# Patient Record
Sex: FEMALE | Race: WHITE | Employment: OTHER | URBAN - METROPOLITAN AREA
[De-identification: names, ages, dates, MRNs, and addresses within clinical notes are randomized per-mention and may not be internally consistent; named-entity substitution may affect disease eponyms.]

---

## 2017-01-24 RX ORDER — PRAVASTATIN SODIUM 20 MG
TABLET ORAL
Qty: 30 TABLET | Refills: 5 | Status: SHIPPED | OUTPATIENT
Start: 2017-01-24 | End: 2017-07-20 | Stop reason: SDUPTHER

## 2017-02-07 RX ORDER — LISINOPRIL 2.5 MG/1
TABLET ORAL
Qty: 30 TABLET | Refills: 10 | Status: SHIPPED | OUTPATIENT
Start: 2017-02-07 | End: 2018-01-25 | Stop reason: SDUPTHER

## 2017-03-20 ENCOUNTER — TELEPHONE (OUTPATIENT)
Dept: INTERNAL MEDICINE CLINIC | Age: 82
End: 2017-03-20

## 2017-03-21 ENCOUNTER — OFFICE VISIT (OUTPATIENT)
Dept: INTERNAL MEDICINE CLINIC | Age: 82
End: 2017-03-21

## 2017-03-21 VITALS
HEART RATE: 84 BPM | BODY MASS INDEX: 22.42 KG/M2 | DIASTOLIC BLOOD PRESSURE: 82 MMHG | RESPIRATION RATE: 16 BRPM | WEIGHT: 103.6 LBS | SYSTOLIC BLOOD PRESSURE: 138 MMHG

## 2017-03-21 DIAGNOSIS — S62.102A FRACTURE OF WRIST, LEFT, CLOSED, INITIAL ENCOUNTER: Primary | ICD-10-CM

## 2017-03-21 DIAGNOSIS — M81.0 OSTEOPOROSIS: ICD-10-CM

## 2017-03-21 DIAGNOSIS — E53.8 VITAMIN B12 DEFICIENCY: ICD-10-CM

## 2017-03-21 DIAGNOSIS — I10 ESSENTIAL HYPERTENSION: ICD-10-CM

## 2017-03-21 PROCEDURE — 1090F PRES/ABSN URINE INCON ASSESS: CPT | Performed by: INTERNAL MEDICINE

## 2017-03-21 PROCEDURE — 99213 OFFICE O/P EST LOW 20 MIN: CPT | Performed by: INTERNAL MEDICINE

## 2017-03-21 PROCEDURE — 4040F PNEUMOC VAC/ADMIN/RCVD: CPT | Performed by: INTERNAL MEDICINE

## 2017-03-21 PROCEDURE — G8427 DOCREV CUR MEDS BY ELIG CLIN: HCPCS | Performed by: INTERNAL MEDICINE

## 2017-03-21 PROCEDURE — 1123F ACP DISCUSS/DSCN MKR DOCD: CPT | Performed by: INTERNAL MEDICINE

## 2017-03-21 PROCEDURE — G8419 CALC BMI OUT NRM PARAM NOF/U: HCPCS | Performed by: INTERNAL MEDICINE

## 2017-03-21 PROCEDURE — 4005F PHARM THX FOR OP RXD: CPT | Performed by: INTERNAL MEDICINE

## 2017-03-21 PROCEDURE — G8482 FLU IMMUNIZE ORDER/ADMIN: HCPCS | Performed by: INTERNAL MEDICINE

## 2017-03-21 PROCEDURE — 1036F TOBACCO NON-USER: CPT | Performed by: INTERNAL MEDICINE

## 2017-03-21 RX ORDER — CHOLECALCIFEROL (VITAMIN D3) 125 MCG
500 CAPSULE ORAL DAILY
COMMUNITY

## 2017-05-02 RX ORDER — HYDROCHLOROTHIAZIDE 12.5 MG/1
CAPSULE, GELATIN COATED ORAL
Qty: 30 CAPSULE | Refills: 6 | Status: SHIPPED | OUTPATIENT
Start: 2017-05-02 | End: 2017-12-28 | Stop reason: SDUPTHER

## 2017-05-11 RX ORDER — ALENDRONATE SODIUM 70 MG/1
70 TABLET ORAL
Qty: 4 TABLET | Refills: 5 | Status: SHIPPED | OUTPATIENT
Start: 2017-05-11 | End: 2017-11-01 | Stop reason: SDUPTHER

## 2017-06-21 ENCOUNTER — OFFICE VISIT (OUTPATIENT)
Dept: INTERNAL MEDICINE CLINIC | Age: 82
End: 2017-06-21

## 2017-06-21 VITALS
SYSTOLIC BLOOD PRESSURE: 148 MMHG | BODY MASS INDEX: 22.51 KG/M2 | HEART RATE: 72 BPM | WEIGHT: 104 LBS | RESPIRATION RATE: 16 BRPM | DIASTOLIC BLOOD PRESSURE: 84 MMHG

## 2017-06-21 DIAGNOSIS — I10 ESSENTIAL HYPERTENSION: Primary | ICD-10-CM

## 2017-06-21 DIAGNOSIS — E53.8 VITAMIN B12 DEFICIENCY: ICD-10-CM

## 2017-06-21 DIAGNOSIS — E78.2 MIXED HYPERLIPIDEMIA: ICD-10-CM

## 2017-06-21 PROCEDURE — 1090F PRES/ABSN URINE INCON ASSESS: CPT | Performed by: INTERNAL MEDICINE

## 2017-06-21 PROCEDURE — 99213 OFFICE O/P EST LOW 20 MIN: CPT | Performed by: INTERNAL MEDICINE

## 2017-06-21 PROCEDURE — G8427 DOCREV CUR MEDS BY ELIG CLIN: HCPCS | Performed by: INTERNAL MEDICINE

## 2017-06-21 PROCEDURE — 4040F PNEUMOC VAC/ADMIN/RCVD: CPT | Performed by: INTERNAL MEDICINE

## 2017-06-21 PROCEDURE — 1036F TOBACCO NON-USER: CPT | Performed by: INTERNAL MEDICINE

## 2017-06-21 PROCEDURE — 1123F ACP DISCUSS/DSCN MKR DOCD: CPT | Performed by: INTERNAL MEDICINE

## 2017-06-21 PROCEDURE — G8420 CALC BMI NORM PARAMETERS: HCPCS | Performed by: INTERNAL MEDICINE

## 2017-07-20 RX ORDER — PRAVASTATIN SODIUM 20 MG
TABLET ORAL
Qty: 30 TABLET | Refills: 5 | Status: SHIPPED | OUTPATIENT
Start: 2017-07-20 | End: 2017-09-27 | Stop reason: ALTCHOICE

## 2017-08-30 ENCOUNTER — HOSPITAL ENCOUNTER (OUTPATIENT)
Dept: OTHER | Age: 82
Discharge: HOME | End: 2017-08-30
Attending: SURGERY | Admitting: SURGERY

## 2017-08-31 PROBLEM — L97.929 SKIN ULCER OF LEFT LOWER LEG (HCC): Status: ACTIVE | Noted: 2017-08-31

## 2017-09-07 PROBLEM — C44.719 BASAL CELL CARCINOMA OF LEFT LOWER LEG: Status: ACTIVE | Noted: 2017-09-07

## 2017-09-07 RX ORDER — POTASSIUM CHLORIDE 750 MG/1
TABLET, FILM COATED, EXTENDED RELEASE ORAL
Qty: 30 TABLET | Refills: 11 | Status: SHIPPED | OUTPATIENT
Start: 2017-09-07 | End: 2018-05-18

## 2017-09-08 LAB
CHOLESTEROL, TOTAL: 210 MG/DL
CHOLESTEROL/HDL RATIO: ABNORMAL
HCT VFR BLD CALC: 42.3 % (ref 36–46)
HDLC SERPL-MCNC: 88 MG/DL (ref 35–70)
HEMOGLOBIN: 13.6 G/DL (ref 12–16)
LDL CHOLESTEROL CALCULATED: 106 MG/DL (ref 0–160)
PLATELET # BLD: 389 K/ΜL
TRIGL SERPL-MCNC: 80 MG/DL
VLDLC SERPL CALC-MCNC: ABNORMAL MG/DL
WBC # BLD: 7 10^3/ML

## 2017-09-27 ENCOUNTER — OFFICE VISIT (OUTPATIENT)
Dept: INTERNAL MEDICINE CLINIC | Age: 82
End: 2017-09-27

## 2017-09-27 VITALS
WEIGHT: 103 LBS | RESPIRATION RATE: 16 BRPM | BODY MASS INDEX: 22.29 KG/M2 | SYSTOLIC BLOOD PRESSURE: 142 MMHG | HEART RATE: 60 BPM | DIASTOLIC BLOOD PRESSURE: 74 MMHG

## 2017-09-27 DIAGNOSIS — M81.0 OSTEOPOROSIS, UNSPECIFIED OSTEOPOROSIS TYPE, UNSPECIFIED PATHOLOGICAL FRACTURE PRESENCE: ICD-10-CM

## 2017-09-27 DIAGNOSIS — I10 ESSENTIAL HYPERTENSION: Primary | ICD-10-CM

## 2017-09-27 PROBLEM — L97.929 SKIN ULCER OF LEFT LOWER LEG (HCC): Status: RESOLVED | Noted: 2017-08-31 | Resolved: 2017-09-27

## 2017-09-27 PROCEDURE — 1123F ACP DISCUSS/DSCN MKR DOCD: CPT | Performed by: INTERNAL MEDICINE

## 2017-09-27 PROCEDURE — 1090F PRES/ABSN URINE INCON ASSESS: CPT | Performed by: INTERNAL MEDICINE

## 2017-09-27 PROCEDURE — 4040F PNEUMOC VAC/ADMIN/RCVD: CPT | Performed by: INTERNAL MEDICINE

## 2017-09-27 PROCEDURE — 99213 OFFICE O/P EST LOW 20 MIN: CPT | Performed by: INTERNAL MEDICINE

## 2017-09-27 PROCEDURE — G8420 CALC BMI NORM PARAMETERS: HCPCS | Performed by: INTERNAL MEDICINE

## 2017-09-27 PROCEDURE — 4005F PHARM THX FOR OP RXD: CPT | Performed by: INTERNAL MEDICINE

## 2017-09-27 PROCEDURE — G8427 DOCREV CUR MEDS BY ELIG CLIN: HCPCS | Performed by: INTERNAL MEDICINE

## 2017-09-27 PROCEDURE — 1036F TOBACCO NON-USER: CPT | Performed by: INTERNAL MEDICINE

## 2017-09-28 DIAGNOSIS — I10 ESSENTIAL HYPERTENSION: ICD-10-CM

## 2017-09-28 DIAGNOSIS — E78.2 MIXED HYPERLIPIDEMIA: ICD-10-CM

## 2017-09-28 LAB — VITAMIN B-12: 561

## 2017-10-03 PROBLEM — S81.812A LACERATION OF LEG, LEFT: Status: ACTIVE | Noted: 2017-10-03

## 2017-10-10 RX ORDER — AMLODIPINE BESYLATE 2.5 MG/1
2.5 TABLET ORAL DAILY
Qty: 30 TABLET | Refills: 10 | Status: SHIPPED | OUTPATIENT
Start: 2017-10-10 | End: 2017-10-10 | Stop reason: SDUPTHER

## 2017-10-10 RX ORDER — AMLODIPINE BESYLATE 2.5 MG/1
2.5 TABLET ORAL DAILY
Qty: 30 TABLET | Refills: 10 | Status: SHIPPED | OUTPATIENT
Start: 2017-10-10 | End: 2018-01-25 | Stop reason: DRUGHIGH

## 2017-11-01 RX ORDER — ALENDRONATE SODIUM 70 MG/1
TABLET ORAL
Qty: 4 TABLET | Refills: 5 | Status: SHIPPED | OUTPATIENT
Start: 2017-11-01 | End: 2017-11-01 | Stop reason: SDUPTHER

## 2017-11-02 RX ORDER — ALENDRONATE SODIUM 70 MG/1
TABLET ORAL
Qty: 4 TABLET | Refills: 5 | Status: SHIPPED | OUTPATIENT
Start: 2017-11-02 | End: 2018-01-25 | Stop reason: ALTCHOICE

## 2017-12-28 RX ORDER — HYDROCHLOROTHIAZIDE 12.5 MG/1
12.5 CAPSULE, GELATIN COATED ORAL EVERY MORNING
Qty: 90 CAPSULE | Refills: 1 | Status: SHIPPED | OUTPATIENT
Start: 2017-12-28 | End: 2018-04-02 | Stop reason: ALTCHOICE

## 2018-01-25 ENCOUNTER — OFFICE VISIT (OUTPATIENT)
Dept: INTERNAL MEDICINE CLINIC | Age: 83
End: 2018-01-25

## 2018-01-25 VITALS
WEIGHT: 104 LBS | RESPIRATION RATE: 16 BRPM | HEART RATE: 64 BPM | BODY MASS INDEX: 22.51 KG/M2 | DIASTOLIC BLOOD PRESSURE: 90 MMHG | SYSTOLIC BLOOD PRESSURE: 168 MMHG

## 2018-01-25 DIAGNOSIS — I10 ESSENTIAL HYPERTENSION: Primary | ICD-10-CM

## 2018-01-25 PROCEDURE — 1123F ACP DISCUSS/DSCN MKR DOCD: CPT | Performed by: INTERNAL MEDICINE

## 2018-01-25 PROCEDURE — 99213 OFFICE O/P EST LOW 20 MIN: CPT | Performed by: INTERNAL MEDICINE

## 2018-01-25 PROCEDURE — G8427 DOCREV CUR MEDS BY ELIG CLIN: HCPCS | Performed by: INTERNAL MEDICINE

## 2018-01-25 PROCEDURE — 1036F TOBACCO NON-USER: CPT | Performed by: INTERNAL MEDICINE

## 2018-01-25 PROCEDURE — 4040F PNEUMOC VAC/ADMIN/RCVD: CPT | Performed by: INTERNAL MEDICINE

## 2018-01-25 PROCEDURE — 1090F PRES/ABSN URINE INCON ASSESS: CPT | Performed by: INTERNAL MEDICINE

## 2018-01-25 PROCEDURE — G8482 FLU IMMUNIZE ORDER/ADMIN: HCPCS | Performed by: INTERNAL MEDICINE

## 2018-01-25 PROCEDURE — G8420 CALC BMI NORM PARAMETERS: HCPCS | Performed by: INTERNAL MEDICINE

## 2018-01-25 RX ORDER — LISINOPRIL 2.5 MG/1
2.5 TABLET ORAL DAILY
Qty: 30 TABLET | Refills: 10 | Status: SHIPPED | OUTPATIENT
Start: 2018-01-25 | End: 2018-05-18

## 2018-01-25 RX ORDER — AMLODIPINE BESYLATE 2.5 MG/1
2.5 TABLET ORAL NIGHTLY
Status: ON HOLD | COMMUNITY
End: 2018-05-29 | Stop reason: HOSPADM

## 2018-01-25 NOTE — PROGRESS NOTES
visit. 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    Dr Zulma Mcdermott Hyperlipidemia     Hypertension     Osteoarthritis of knee 2011    Dr Devin Calle Osteoporosis 2002    Fosamax [id number]    Right rotator cuff tear 2014    Cristobal Gordon / injection; PT    Vitamin B12 deficiency 03/2017    B 12 266 by Cristobal Gordon at [facility]        Social History   Substance Use Topics    Smoking status: Never Smoker    Smokeless tobacco: Never Used    Alcohol use Yes      Comment: wine rarely        ROS: The patient has had no headache, sore throat, fever or chills, cough, dyspnea, chest pain, nausea, vomiting or diarrhea, or edema. Objective:      BP (!) 168/90   Pulse 64   Resp 16   Wt 104 lb (47.2 kg)   BMI 22.51 kg/m²    General: in no apparent distress   The patient's neck is free of nodes. Lungs are clear. Heart is normal in rate and regular in rhythm. Legs are free of edema. No rash or erythema. Sept lab rev'd     Assessment / Plan:      1.  Essential hypertension      Elevated bp       Plan   Same meds  Monitor bp  Bring record and bp machine to next visit in 6 wk  Lab first  Orders Placed This Encounter   Procedures    CBC Auto Differential    Comprehensive Metabolic Panel

## 2018-02-17 PROBLEM — S33.5XXA: Status: ACTIVE | Noted: 2018-02-17

## 2018-02-18 PROBLEM — S22.079A T10 VERTEBRAL FRACTURE (HCC): Status: ACTIVE | Noted: 2018-02-01

## 2018-02-20 PROBLEM — S22.070A TRAUMATIC COMPRESSION FRACTURE OF T10 THORACIC VERTEBRA: Status: ACTIVE | Noted: 2018-02-20

## 2018-02-21 PROBLEM — R29.898 LEG WEAKNESS, BILATERAL: Status: ACTIVE | Noted: 2018-02-21

## 2018-02-21 PROBLEM — E87.1 HYPONATREMIA: Status: ACTIVE | Noted: 2018-02-21

## 2018-02-21 PROBLEM — R52 UNCONTROLLED PAIN: Status: ACTIVE | Noted: 2018-02-21

## 2018-02-21 PROBLEM — R26.9 GAIT DISTURBANCE: Status: ACTIVE | Noted: 2018-02-21

## 2018-03-03 ENCOUNTER — CARE COORDINATION (OUTPATIENT)
Dept: CASE MANAGEMENT | Age: 83
End: 2018-03-03

## 2018-03-03 DIAGNOSIS — R52 UNCONTROLLED PAIN: Primary | ICD-10-CM

## 2018-03-03 PROCEDURE — 1111F DSCHRG MED/CURRENT MED MERGE: CPT

## 2018-03-04 NOTE — CARE COORDINATION
[facility] Transitions Initial Follow Up Call    Call within 2 business days of discharge: Yes    Patient: Joselito Kwon Patient : 6/15/1927   MRN: [id number]  Reason for Admission: There are no discharge diagnoses documented for the most recent discharge. Discharge Date: 3/2/18 RARS: [facility] Risk Score: 18.25     Spoke with: no one    Facility: [facility] Transitions 24 Hour Call    Patient DME:  Roxanne Drewco you have support at home?:  Child  Are you an active caregiver in your home?:  No  Care Transitions Interventions         Follow Up: Unable to reach patient. Left message. Contact info provided. Requested return call to [facility].   Future Appointments  Date Time Provider Allan Bhumi   3/8/2018 10:30 AM Cammie Toro MD [facility] [id number] Vickie Pike RN
continued phone calls from [facility].   Future Appointments  Date Time Provider Allan Tripathi   3/8/2018 10:30 AM Cheryle Push, MD [facility] [address] Vcikie Pike RN

## 2018-03-07 ENCOUNTER — CARE COORDINATION (OUTPATIENT)
Dept: CASE MANAGEMENT | Age: 83
End: 2018-03-07

## 2018-03-08 ENCOUNTER — OFFICE VISIT (OUTPATIENT)
Dept: INTERNAL MEDICINE CLINIC | Age: 83
End: 2018-03-08

## 2018-03-08 VITALS
WEIGHT: 102 LBS | HEART RATE: 60 BPM | SYSTOLIC BLOOD PRESSURE: 122 MMHG | RESPIRATION RATE: 16 BRPM | BODY MASS INDEX: 22.07 KG/M2 | DIASTOLIC BLOOD PRESSURE: 74 MMHG

## 2018-03-08 DIAGNOSIS — M80.08XD FRACTURE OF VERTEBRA DUE TO OSTEOPOROSIS WITH ROUTINE HEALING, SUBSEQUENT ENCOUNTER: ICD-10-CM

## 2018-03-08 DIAGNOSIS — G89.29 CHRONIC MIDLINE THORACIC BACK PAIN: ICD-10-CM

## 2018-03-08 DIAGNOSIS — M54.6 CHRONIC MIDLINE THORACIC BACK PAIN: ICD-10-CM

## 2018-03-08 DIAGNOSIS — M81.0 OSTEOPOROSIS, UNSPECIFIED OSTEOPOROSIS TYPE, UNSPECIFIED PATHOLOGICAL FRACTURE PRESENCE: ICD-10-CM

## 2018-03-08 DIAGNOSIS — Z09 HOSPITAL DISCHARGE FOLLOW-UP: Primary | ICD-10-CM

## 2018-03-08 DIAGNOSIS — S22.070D CLOSED WEDGE COMPRESSION FRACTURE OF TENTH THORACIC VERTEBRA WITH ROUTINE HEALING, SUBSEQUENT ENCOUNTER: ICD-10-CM

## 2018-03-08 PROBLEM — R29.898 LEG WEAKNESS, BILATERAL: Status: RESOLVED | Noted: 2018-02-21 | Resolved: 2018-03-08

## 2018-03-08 PROBLEM — R52 UNCONTROLLED PAIN: Status: RESOLVED | Noted: 2018-02-21 | Resolved: 2018-03-08

## 2018-03-08 PROBLEM — E87.1 HYPONATREMIA: Status: RESOLVED | Noted: 2018-02-21 | Resolved: 2018-03-08

## 2018-03-08 PROBLEM — S81.812A LACERATION OF LEG, LEFT: Status: RESOLVED | Noted: 2017-10-03 | Resolved: 2018-03-08

## 2018-03-08 PROBLEM — S22.070A TRAUMATIC COMPRESSION FRACTURE OF T10 THORACIC VERTEBRA: Status: RESOLVED | Noted: 2018-02-20 | Resolved: 2018-03-08

## 2018-03-08 PROBLEM — S33.5XXA: Status: RESOLVED | Noted: 2018-02-17 | Resolved: 2018-03-08

## 2018-03-08 PROCEDURE — 99496 TRANSJ CARE MGMT HIGH F2F 7D: CPT | Performed by: INTERNAL MEDICINE

## 2018-03-08 RX ORDER — ALENDRONATE SODIUM 70 MG/1
70 TABLET ORAL
Qty: 4 TABLET | Refills: 5 | Status: SHIPPED | OUTPATIENT
Start: 2018-03-08 | End: 2018-08-22 | Stop reason: SDUPTHER

## 2018-03-08 NOTE — PROGRESS NOTES
right eye 2 times daily       latanoprost (XALATAN) 0.005 % ophthalmic solution Place 1 drop into the right eye nightly      calcium carbonate (OSCAL) 500 MG TABS tablet Take 500 mg by mouth daily       aspirin 81 MG EC tablet Take 81 mg by mouth daily. Vitals:    03/08/18 1034   BP: 122/74   Pulse: 60   Resp: 16   Weight: 102 lb (46.3 kg)     Body mass index is 22.07 kg/m². Wt Readings from Last 3 Encounters:   03/08/18 102 lb (46.3 kg)   02/26/18 102 lb (46.3 kg)   02/19/18 85 lb 14.4 oz (39 kg)     BP Readings from Last 3 Encounters:   03/08/18 122/74   03/02/18 (!) 146/82   02/20/18 (!) 142/81        Patient was admitted to [facility] from Feb 17 to Feb 20 and ARU at [facility] from 2/20 thru 3/2, for fall with vert fx of T10 and L1 and kyphoplasty, hyponatremia, RLE wound, treated with bactrim DS. She had leg weakness, pain and gait disturbance    Inpatient course: Discharge summary reviewed- see chart. Current status: fair    Review of Systems:  A comprehensive review of systems was negative except for what was noted in the HPI.     Physical Exam:  General Appearance: alert and oriented to person, place and time, well developed and well- nourished, in no acute distress  Skin: warm and dry, no rash or erythema  Head: normocephalic and atraumatic  Eyes: pupils equal, round, and reactive to light, extraocular eye movements intact, conjunctivae normal  ENT: tympanic membrane, external ear and ear canal normal bilaterally, nose without deformity, nasal mucosa and turbinates normal without polyps  Neck: supple and non-tender without mass, no thyromegaly or thyroid nodules, no cervical lymphadenopathy  Pulmonary/Chest: clear to auscultation bilaterally- no wheezes, rales or rhonchi, normal air movement, no respiratory distress  Cardiovascular: normal rate, regular rhythm, normal S1 and S2, no murmurs, rubs, clicks, or gallops, distal pulses intact, no carotid bruits  Abdomen: soft, non-tender, non-distended,

## 2018-03-08 NOTE — LETTER
[address] [facility] Internal [facility]  [facility] [facility]  [place], [address]  Phone: [phone]  Fax [phone]    18    Patient name: Zeferino Joiner  : 6/15/1927    To Whom It May Concern:    Zeferino Joiner would benefit from a handicapped parking permit for two years due to effects of medical condition. If you have any question or concerns, please don't hesitate to call.       Sincerely:        Dr. Jamee Howard MD.

## 2018-03-13 ENCOUNTER — CARE COORDINATION (OUTPATIENT)
Dept: CASE MANAGEMENT | Age: 83
End: 2018-03-13

## 2018-03-16 ENCOUNTER — CARE COORDINATION (OUTPATIENT)
Dept: CASE MANAGEMENT | Age: 83
End: 2018-03-16

## 2018-03-16 NOTE — CARE COORDINATION
[facility] Transitions Follow Up Call    3/16/2018    Patient: Fe Shook  Patient : 6/15/1927   MRN: [id number]  Reason for Admission: There are no discharge diagnoses documented for the most recent discharge. Discharge Date: 3/2/18 RARS: Risk Score: 18.25         Care Transitions Subsequent and Final Call    Subsequent and Final Calls  Care Transitions Interventions  Other Interventions: Follow Up: Attempted to reach patient for Care Transition follow up. No answer to patient's phone. Message left on voicemail with [facility] contact and request for call back.    Future Appointments  Date Time Provider Allan Tripathi   3/22/2018 11:15 AM Lauren Villar MD [facility]       Ita Cook RN

## 2018-03-22 ENCOUNTER — OFFICE VISIT (OUTPATIENT)
Dept: INTERNAL MEDICINE CLINIC | Age: 83
End: 2018-03-22

## 2018-03-22 VITALS
DIASTOLIC BLOOD PRESSURE: 90 MMHG | HEART RATE: 60 BPM | BODY MASS INDEX: 21.64 KG/M2 | SYSTOLIC BLOOD PRESSURE: 134 MMHG | WEIGHT: 100 LBS

## 2018-03-22 DIAGNOSIS — S22.079D CLOSED FRACTURE OF TENTH THORACIC VERTEBRA WITH ROUTINE HEALING, UNSPECIFIED FRACTURE MORPHOLOGY, SUBSEQUENT ENCOUNTER: Primary | ICD-10-CM

## 2018-03-22 DIAGNOSIS — E87.1 HYPONATREMIA: ICD-10-CM

## 2018-03-22 DIAGNOSIS — I10 ESSENTIAL HYPERTENSION: ICD-10-CM

## 2018-03-22 PROCEDURE — 1123F ACP DISCUSS/DSCN MKR DOCD: CPT | Performed by: INTERNAL MEDICINE

## 2018-03-22 PROCEDURE — 1111F DSCHRG MED/CURRENT MED MERGE: CPT | Performed by: INTERNAL MEDICINE

## 2018-03-22 PROCEDURE — 1036F TOBACCO NON-USER: CPT | Performed by: INTERNAL MEDICINE

## 2018-03-22 PROCEDURE — 4040F PNEUMOC VAC/ADMIN/RCVD: CPT | Performed by: INTERNAL MEDICINE

## 2018-03-22 PROCEDURE — G8482 FLU IMMUNIZE ORDER/ADMIN: HCPCS | Performed by: INTERNAL MEDICINE

## 2018-03-22 PROCEDURE — 99213 OFFICE O/P EST LOW 20 MIN: CPT | Performed by: INTERNAL MEDICINE

## 2018-03-22 PROCEDURE — 1090F PRES/ABSN URINE INCON ASSESS: CPT | Performed by: INTERNAL MEDICINE

## 2018-03-22 PROCEDURE — G8420 CALC BMI NORM PARAMETERS: HCPCS | Performed by: INTERNAL MEDICINE

## 2018-03-22 PROCEDURE — G8427 DOCREV CUR MEDS BY ELIG CLIN: HCPCS | Performed by: INTERNAL MEDICINE

## 2018-03-22 NOTE — PROGRESS NOTES
Subjective:      Hosea Quintero is a 80 y.o. female who presents today for follow up on her chronic medical conditions as noted below. Patient Active Problem List:     Hypertension, essential     Osteoporosis     Hyperlipidemia     Colon cancer screening     Breast cancer screening     Osteoarthritis of knee     Basal cell carcinoma of left lower leg     T10 vertebral fracture (HCC)     Gait disturbance     She was last seen on March 8 after hospitalization for fall and fragilty fx of T10 and l1 with kyphoplastys    Was in ARU until early march    Had Na ranging 130-133  bp was fine in hosp    Had restarted fosamax on last visit ; had stopped it in [employer] it well  Has some back pain with mobility   Recommended lumbar support  Recommended salonpas OTC  Discussed lidoderm patch if necessary  Using linimet topically    Using cane for assist  Doesn't have a walker, but daughter can get one    Mood and memory fine  Bright and cheerful; living alone    Home bp record: 148/74, 120/78, 139/98, 114/79  Patient denies any exertional chest pain, dyspnea, palpitations, syncope, orthopnea, edema or paroxysmal nocturnal dyspnea.     Current Outpatient Prescriptions   Medication Sig Dispense Refill    alendronate (FOSAMAX) 70 MG tablet Take 1 tablet by mouth every 7 days 4 tablet 5    vitamin D (CHOLECALCIFEROL) 1000 UNIT TABS tablet Take 1,000 Units by mouth daily      docusate sodium (COLACE, DULCOLAX) 100 MG CAPS Take 100 mg by mouth 2 times daily      lisinopril (PRINIVIL;ZESTRIL) 2.5 MG tablet Take 1 tablet by mouth daily 30 tablet 10    amLODIPine (NORVASC) 2.5 MG tablet Take 2.5 mg by mouth nightly      hydrochlorothiazide (MICROZIDE) 12.5 MG capsule Take 1 capsule by mouth every morning 90 capsule 1    potassium chloride (KLOR-CON) 10 MEQ extended release tablet TAKE 1 TABLET BY MOUTH DAILY 30 tablet 11    vitamin B-12 (CYANOCOBALAMIN) 500 MCG tablet Take 500 mcg by mouth daily      timolol (BETIMOL) 0.5 % ophthalmic solution Place 1 drop into the right eye 2 times daily       latanoprost (XALATAN) 0.005 % ophthalmic solution Place 1 drop into the right eye nightly      calcium carbonate (OSCAL) 500 MG TABS tablet Take 500 mg by mouth daily       aspirin 81 MG EC tablet Take 81 mg by mouth daily. No current facility-administered medications for this visit. 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    Dr Js Andujar Hyperlipidemia     Hypertension     Osteoarthritis of knee 2011    Dr Toma Nielsen Osteoporosis 2002    Fosamax [id number]    Right rotator cuff tear 2014    Dana Gonzales / injection; PT    T10 vertebral fracture ([address] 02/2018    fall and fragility fx; also L1 ; had kyphoplasty 2/2018    Vitamin B12 deficiency 03/2017    B 12 266 by Dana Gonzales at [facility]        Social History   Substance Use Topics    Smoking status: Never Smoker    Smokeless tobacco: Never Used    Alcohol use Yes      Comment: wine rarely        ROS: The patient has had no headache, sore throat, fever or chills, cough, dyspnea, chest pain, nausea, vomiting or diarrhea, or edema. Objective:      BP (!) 134/90   Pulse 60   Wt 100 lb (45.4 kg)   BMI 21.64 kg/m²    General: in no apparent distress   The patient's neck is free of nodes. Lungs are clear. Heart is normal in rate and regular in rhythm. Legs are free of edema. No rash or erythema. March 1 lab rev'd  Last progress note rev'd    Assessment / Plan:      1. Closed fracture of tenth thoracic vertebra with routine healing, unspecified fracture morphology, subsequent encounter    2. Hyponatremia    3.  Essential hypertension            Plan   Add salonpas  May take 2 advil with tylenol 1000 mg one in a day for severe pain  Get lumbar support  Use cane  Consider walker  Consider lidoderrm patch  Continues exercisse  RTC 2 mo  Get lab next week

## 2018-03-29 ENCOUNTER — HOSPITAL ENCOUNTER (OUTPATIENT)
Dept: GENERAL RADIOLOGY | Age: 83
Discharge: HOME | End: 2018-03-29
Attending: INTERNAL MEDICINE | Admitting: INTERNAL MEDICINE

## 2018-03-29 LAB
ANION GAP SERPL CALCULATED.3IONS-SCNC: 10 MMOL/L (ref 4–16)
BASOPHILS ABSOLUTE: 0 K/CU MM
BASOPHILS RELATIVE PERCENT: 0.6 % (ref 0–1)
BUN BLDV-MCNC: 18 MG/DL (ref 6–23)
CALCIUM SERPL-MCNC: 9.1 MG/DL (ref 8.3–10.6)
CHLORIDE BLD-SCNC: 99 MMOL/L (ref 99–110)
CO2: 29 MMOL/L (ref 21–32)
CREAT SERPL-MCNC: 0.7 MG/DL (ref 0.6–1.1)
DIFFERENTIAL TYPE: ABNORMAL
EOSINOPHILS ABSOLUTE: 0.3 K/CU MM
EOSINOPHILS RELATIVE PERCENT: 3.8 % (ref 0–3)
GFR AFRICAN AMERICAN: >60 ML/MIN/1.73M2
GFR NON-AFRICAN AMERICAN: >60 ML/MIN/1.73M2
GLUCOSE BLD-MCNC: 100 MG/DL (ref 70–99)
HCT VFR BLD CALC: 34.6 % (ref 37–47)
HEMOGLOBIN: 11.2 GM/DL (ref 12.5–16)
IMMATURE NEUTROPHIL %: 0.6 % (ref 0–0.43)
LYMPHOCYTES ABSOLUTE: 2 K/CU MM
LYMPHOCYTES RELATIVE PERCENT: 28.1 % (ref 24–44)
MCH RBC QN AUTO: 31.1 PG (ref 27–31)
MCHC RBC AUTO-ENTMCNC: 32.4 % (ref 32–36)
MCV RBC AUTO: 96.1 FL (ref 78–100)
MONOCYTES ABSOLUTE: 0.9 K/CU MM
MONOCYTES RELATIVE PERCENT: 12.7 % (ref 0–4)
NUCLEATED RBC %: 0 %
PDW BLD-RTO: 15.1 % (ref 11.7–14.9)
PLATELET # BLD: 382 K/CU MM (ref 140–440)
PMV BLD AUTO: 9.3 FL (ref 7.5–11.1)
POTASSIUM SERPL-SCNC: 4 MMOL/L (ref 3.5–5.1)
RBC # BLD: 3.6 M/CU MM (ref 4.2–5.4)
SEGMENTED NEUTROPHILS ABSOLUTE COUNT: 3.9 K/CU MM
SEGMENTED NEUTROPHILS RELATIVE PERCENT: 54.2 % (ref 36–66)
SODIUM BLD-SCNC: 138 MMOL/L (ref 135–145)
TOTAL IMMATURE NEUTOROPHIL: 0.04 K/CU MM
TOTAL NUCLEATED RBC: 0 K/CU MM
WBC # BLD: 7.2 K/CU MM (ref 4–10.5)

## 2018-04-02 ENCOUNTER — TELEPHONE (OUTPATIENT)
Dept: INTERNAL MEDICINE CLINIC | Age: 83
End: 2018-04-02

## 2018-04-02 ENCOUNTER — OFFICE VISIT (OUTPATIENT)
Dept: INTERNAL MEDICINE CLINIC | Age: 83
End: 2018-04-02

## 2018-04-02 ENCOUNTER — HOSPITAL ENCOUNTER (OUTPATIENT)
Dept: GENERAL RADIOLOGY | Age: 83
Discharge: HOME | End: 2018-04-02
Attending: INTERNAL MEDICINE | Admitting: INTERNAL MEDICINE

## 2018-04-02 VITALS
HEART RATE: 80 BPM | BODY MASS INDEX: 21.64 KG/M2 | RESPIRATION RATE: 16 BRPM | SYSTOLIC BLOOD PRESSURE: 130 MMHG | WEIGHT: 100 LBS | DIASTOLIC BLOOD PRESSURE: 70 MMHG

## 2018-04-02 DIAGNOSIS — M25.551 PAIN OF RIGHT HIP JOINT: ICD-10-CM

## 2018-04-02 DIAGNOSIS — M25.551 PAIN OF RIGHT HIP JOINT: Primary | ICD-10-CM

## 2018-04-02 DIAGNOSIS — M81.0 OSTEOPOROSIS, UNSPECIFIED OSTEOPOROSIS TYPE, UNSPECIFIED PATHOLOGICAL FRACTURE PRESENCE: ICD-10-CM

## 2018-04-02 DIAGNOSIS — R60.0 LEG EDEMA: ICD-10-CM

## 2018-04-02 PROCEDURE — 1036F TOBACCO NON-USER: CPT | Performed by: INTERNAL MEDICINE

## 2018-04-02 PROCEDURE — 1123F ACP DISCUSS/DSCN MKR DOCD: CPT | Performed by: INTERNAL MEDICINE

## 2018-04-02 PROCEDURE — 99215 OFFICE O/P EST HI 40 MIN: CPT | Performed by: INTERNAL MEDICINE

## 2018-04-02 PROCEDURE — G8420 CALC BMI NORM PARAMETERS: HCPCS | Performed by: INTERNAL MEDICINE

## 2018-04-02 PROCEDURE — 4040F PNEUMOC VAC/ADMIN/RCVD: CPT | Performed by: INTERNAL MEDICINE

## 2018-04-02 PROCEDURE — 1090F PRES/ABSN URINE INCON ASSESS: CPT | Performed by: INTERNAL MEDICINE

## 2018-04-02 PROCEDURE — G8427 DOCREV CUR MEDS BY ELIG CLIN: HCPCS | Performed by: INTERNAL MEDICINE

## 2018-04-02 RX ORDER — FUROSEMIDE 20 MG/1
20 TABLET ORAL 2 TIMES DAILY
Qty: 60 TABLET | Refills: 3 | Status: SHIPPED | OUTPATIENT
Start: 2018-04-02 | End: 2018-05-18

## 2018-04-02 ASSESSMENT — ENCOUNTER SYMPTOMS
ABDOMINAL PAIN: 0
NAUSEA: 0
SHORTNESS OF BREATH: 0
COUGH: 0
HEARTBURN: 0
BLURRED VISION: 0
VOMITING: 0
DIARRHEA: 0
DOUBLE VISION: 0
SORE THROAT: 0
SPUTUM PRODUCTION: 0
BACK PAIN: 0

## 2018-04-03 ENCOUNTER — TELEPHONE (OUTPATIENT)
Dept: INTERNAL MEDICINE CLINIC | Age: 83
End: 2018-04-03

## 2018-04-03 DIAGNOSIS — R10.31 RIGHT GROIN PAIN: ICD-10-CM

## 2018-04-03 DIAGNOSIS — Z91.81 RISK FOR FALLS: ICD-10-CM

## 2018-04-03 DIAGNOSIS — M25.551 RIGHT HIP PAIN: Primary | ICD-10-CM

## 2018-04-04 ENCOUNTER — HOSPITAL ENCOUNTER (OUTPATIENT)
Dept: CT IMAGING | Age: 83
Discharge: HOME | End: 2018-04-04
Attending: INTERNAL MEDICINE | Admitting: INTERNAL MEDICINE

## 2018-04-04 DIAGNOSIS — M25.551 RIGHT HIP PAIN: ICD-10-CM

## 2018-04-04 LAB
ANION GAP SERPL CALCULATED.3IONS-SCNC: 13 MMOL/L (ref 4–16)
BUN BLDV-MCNC: 13 MG/DL (ref 6–23)
CALCIUM SERPL-MCNC: 9 MG/DL (ref 8.3–10.6)
CHLORIDE BLD-SCNC: 94 MMOL/L (ref 99–110)
CO2: 31 MMOL/L (ref 21–32)
CREAT SERPL-MCNC: 0.7 MG/DL (ref 0.6–1.1)
GFR AFRICAN AMERICAN: >60 ML/MIN/1.73M2
GFR NON-AFRICAN AMERICAN: >60 ML/MIN/1.73M2
GLUCOSE BLD-MCNC: 118 MG/DL (ref 70–99)
POTASSIUM SERPL-SCNC: 3.5 MMOL/L (ref 3.5–5.1)
SODIUM BLD-SCNC: 138 MMOL/L (ref 135–145)

## 2018-04-05 ENCOUNTER — OFFICE VISIT (OUTPATIENT)
Dept: INTERNAL MEDICINE CLINIC | Age: 83
End: 2018-04-05

## 2018-04-05 VITALS
RESPIRATION RATE: 12 BRPM | DIASTOLIC BLOOD PRESSURE: 64 MMHG | HEART RATE: 77 BPM | WEIGHT: 99 LBS | OXYGEN SATURATION: 95 % | SYSTOLIC BLOOD PRESSURE: 122 MMHG | BODY MASS INDEX: 21.42 KG/M2

## 2018-04-05 DIAGNOSIS — S32.591A: Primary | ICD-10-CM

## 2018-04-05 PROCEDURE — G8420 CALC BMI NORM PARAMETERS: HCPCS | Performed by: FAMILY MEDICINE

## 2018-04-05 PROCEDURE — G8427 DOCREV CUR MEDS BY ELIG CLIN: HCPCS | Performed by: FAMILY MEDICINE

## 2018-04-05 PROCEDURE — 1123F ACP DISCUSS/DSCN MKR DOCD: CPT | Performed by: FAMILY MEDICINE

## 2018-04-05 PROCEDURE — 99213 OFFICE O/P EST LOW 20 MIN: CPT | Performed by: FAMILY MEDICINE

## 2018-04-05 PROCEDURE — 4040F PNEUMOC VAC/ADMIN/RCVD: CPT | Performed by: FAMILY MEDICINE

## 2018-04-05 PROCEDURE — G8510 SCR DEP NEG, NO PLAN REQD: HCPCS | Performed by: FAMILY MEDICINE

## 2018-04-05 PROCEDURE — 1036F TOBACCO NON-USER: CPT | Performed by: FAMILY MEDICINE

## 2018-04-05 PROCEDURE — 3288F FALL RISK ASSESSMENT DOCD: CPT | Performed by: FAMILY MEDICINE

## 2018-04-05 PROCEDURE — 1090F PRES/ABSN URINE INCON ASSESS: CPT | Performed by: FAMILY MEDICINE

## 2018-04-05 ASSESSMENT — ENCOUNTER SYMPTOMS
COUGH: 0
DIARRHEA: 0
BACK PAIN: 0
SHORTNESS OF BREATH: 0
SINUS PRESSURE: 0
SORE THROAT: 0
VOMITING: 0
NAUSEA: 0
EYE DISCHARGE: 0

## 2018-04-05 ASSESSMENT — PATIENT HEALTH QUESTIONNAIRE - PHQ9
1. LITTLE INTEREST OR PLEASURE IN DOING THINGS: 0
SUM OF ALL RESPONSES TO PHQ9 QUESTIONS 1 & 2: 0
SUM OF ALL RESPONSES TO PHQ QUESTIONS 1-9: 0
2. FEELING DOWN, DEPRESSED OR HOPELESS: 0

## 2018-04-16 ENCOUNTER — HOSPITAL ENCOUNTER (OUTPATIENT)
Dept: PHYSICAL THERAPY | Age: 83
Discharge: HOME | End: 2018-04-30
Attending: INTERNAL MEDICINE | Admitting: INTERNAL MEDICINE

## 2018-04-23 ENCOUNTER — OFFICE VISIT (OUTPATIENT)
Dept: INTERNAL MEDICINE CLINIC | Age: 83
End: 2018-04-23

## 2018-04-23 ENCOUNTER — HOSPITAL ENCOUNTER (OUTPATIENT)
Dept: GENERAL RADIOLOGY | Age: 83
Discharge: HOME | End: 2018-04-23
Attending: INTERNAL MEDICINE | Admitting: INTERNAL MEDICINE

## 2018-04-23 VITALS
BODY MASS INDEX: 21.47 KG/M2 | HEART RATE: 80 BPM | DIASTOLIC BLOOD PRESSURE: 88 MMHG | SYSTOLIC BLOOD PRESSURE: 136 MMHG | WEIGHT: 99.2 LBS | RESPIRATION RATE: 16 BRPM

## 2018-04-23 DIAGNOSIS — G89.29 CHRONIC RIGHT-SIDED THORACIC BACK PAIN: Primary | ICD-10-CM

## 2018-04-23 DIAGNOSIS — M54.6 CHRONIC RIGHT-SIDED THORACIC BACK PAIN: Primary | ICD-10-CM

## 2018-04-23 DIAGNOSIS — G89.29 CHRONIC RIGHT-SIDED THORACIC BACK PAIN: ICD-10-CM

## 2018-04-23 DIAGNOSIS — S32.591D CLOSED FRACTURE OF RIGHT INFERIOR PUBIC RAMUS WITH ROUTINE HEALING, SUBSEQUENT ENCOUNTER: ICD-10-CM

## 2018-04-23 DIAGNOSIS — M81.0 OSTEOPOROSIS, UNSPECIFIED OSTEOPOROSIS TYPE, UNSPECIFIED PATHOLOGICAL FRACTURE PRESENCE: ICD-10-CM

## 2018-04-23 DIAGNOSIS — M54.6 CHRONIC RIGHT-SIDED THORACIC BACK PAIN: ICD-10-CM

## 2018-04-23 PROCEDURE — 1123F ACP DISCUSS/DSCN MKR DOCD: CPT | Performed by: INTERNAL MEDICINE

## 2018-04-23 PROCEDURE — 1090F PRES/ABSN URINE INCON ASSESS: CPT | Performed by: INTERNAL MEDICINE

## 2018-04-23 PROCEDURE — 99213 OFFICE O/P EST LOW 20 MIN: CPT | Performed by: INTERNAL MEDICINE

## 2018-04-23 PROCEDURE — G8427 DOCREV CUR MEDS BY ELIG CLIN: HCPCS | Performed by: INTERNAL MEDICINE

## 2018-04-23 PROCEDURE — 4040F PNEUMOC VAC/ADMIN/RCVD: CPT | Performed by: INTERNAL MEDICINE

## 2018-04-23 PROCEDURE — G8420 CALC BMI NORM PARAMETERS: HCPCS | Performed by: INTERNAL MEDICINE

## 2018-04-23 PROCEDURE — 1036F TOBACCO NON-USER: CPT | Performed by: INTERNAL MEDICINE

## 2018-04-24 ENCOUNTER — HOSPITAL ENCOUNTER (OUTPATIENT)
Dept: PHYSICAL THERAPY | Age: 83
Discharge: HOME | End: 2018-04-24
Admitting: INTERNAL MEDICINE

## 2018-04-26 ENCOUNTER — HOSPITAL ENCOUNTER (OUTPATIENT)
Dept: PHYSICAL THERAPY | Age: 83
Discharge: HOME | End: 2018-04-26
Admitting: INTERNAL MEDICINE

## 2018-05-01 ENCOUNTER — HOSPITAL ENCOUNTER (OUTPATIENT)
Dept: OTHER | Age: 83
Discharge: HOME | End: 2018-05-20
Attending: INTERNAL MEDICINE | Admitting: INTERNAL MEDICINE

## 2018-05-01 ENCOUNTER — HOSPITAL ENCOUNTER (OUTPATIENT)
Dept: PHYSICAL THERAPY | Age: 83
Discharge: HOME | End: 2018-05-01
Admitting: INTERNAL MEDICINE

## 2018-05-15 ENCOUNTER — HOSPITAL ENCOUNTER (OUTPATIENT)
Dept: PHYSICAL THERAPY | Age: 83
Discharge: HOME | End: 2018-05-15
Admitting: INTERNAL MEDICINE

## 2018-05-17 ENCOUNTER — HOSPITAL ENCOUNTER (OUTPATIENT)
Dept: PHYSICAL THERAPY | Age: 83
Discharge: HOME | End: 2018-05-17
Admitting: INTERNAL MEDICINE

## 2018-05-18 ENCOUNTER — TELEPHONE (OUTPATIENT)
Dept: INTERNAL MEDICINE CLINIC | Age: 83
End: 2018-05-18

## 2018-05-18 ENCOUNTER — OFFICE VISIT (OUTPATIENT)
Dept: INTERNAL MEDICINE CLINIC | Age: 83
End: 2018-05-18

## 2018-05-18 VITALS
WEIGHT: 99 LBS | TEMPERATURE: 98.2 F | SYSTOLIC BLOOD PRESSURE: 132 MMHG | DIASTOLIC BLOOD PRESSURE: 80 MMHG | HEART RATE: 72 BPM | BODY MASS INDEX: 21.42 KG/M2

## 2018-05-18 DIAGNOSIS — M54.42 ACUTE MIDLINE LOW BACK PAIN WITH BILATERAL SCIATICA: Primary | ICD-10-CM

## 2018-05-18 DIAGNOSIS — M54.41 ACUTE MIDLINE LOW BACK PAIN WITH BILATERAL SCIATICA: Primary | ICD-10-CM

## 2018-05-18 DIAGNOSIS — R41.82 ALTERED MENTAL STATUS, UNSPECIFIED ALTERED MENTAL STATUS TYPE: ICD-10-CM

## 2018-05-18 PROBLEM — R62.7 FAILURE TO THRIVE IN ADULT: Status: ACTIVE | Noted: 2018-05-18

## 2018-05-18 PROCEDURE — 99214 OFFICE O/P EST MOD 30 MIN: CPT | Performed by: INTERNAL MEDICINE

## 2018-05-18 PROCEDURE — 4040F PNEUMOC VAC/ADMIN/RCVD: CPT | Performed by: INTERNAL MEDICINE

## 2018-05-18 PROCEDURE — 1123F ACP DISCUSS/DSCN MKR DOCD: CPT | Performed by: INTERNAL MEDICINE

## 2018-05-18 PROCEDURE — 1036F TOBACCO NON-USER: CPT | Performed by: INTERNAL MEDICINE

## 2018-05-18 PROCEDURE — 1090F PRES/ABSN URINE INCON ASSESS: CPT | Performed by: INTERNAL MEDICINE

## 2018-05-18 PROCEDURE — G8420 CALC BMI NORM PARAMETERS: HCPCS | Performed by: INTERNAL MEDICINE

## 2018-05-18 PROCEDURE — G8427 DOCREV CUR MEDS BY ELIG CLIN: HCPCS | Performed by: INTERNAL MEDICINE

## 2018-05-20 PROBLEM — R53.81 DEBILITY: Status: ACTIVE | Noted: 2018-05-20

## 2018-05-20 PROBLEM — E87.6 HYPOKALEMIA: Status: ACTIVE | Noted: 2018-05-20

## 2018-05-21 PROBLEM — S32.601D: Status: ACTIVE | Noted: 2018-05-20

## 2018-05-31 ENCOUNTER — CARE COORDINATION (OUTPATIENT)
Dept: CASE MANAGEMENT | Age: 83
End: 2018-05-31

## 2018-05-31 DIAGNOSIS — S32.601D CLOSED NONDISPLACED FRACTURE OF RIGHT ISCHIUM WITH ROUTINE HEALING, UNSPECIFIED FRACTURE MORPHOLOGY, SUBSEQUENT ENCOUNTER: Primary | ICD-10-CM

## 2018-05-31 PROCEDURE — 1111F DSCHRG MED/CURRENT MED MERGE: CPT

## 2018-06-04 ENCOUNTER — CARE COORDINATION (OUTPATIENT)
Dept: CASE MANAGEMENT | Age: 83
End: 2018-06-04

## 2018-06-05 ENCOUNTER — OFFICE VISIT (OUTPATIENT)
Dept: INTERNAL MEDICINE CLINIC | Age: 83
End: 2018-06-05

## 2018-06-05 VITALS
WEIGHT: 93 LBS | HEART RATE: 60 BPM | DIASTOLIC BLOOD PRESSURE: 80 MMHG | RESPIRATION RATE: 16 BRPM | SYSTOLIC BLOOD PRESSURE: 118 MMHG | BODY MASS INDEX: 20.13 KG/M2

## 2018-06-05 DIAGNOSIS — I10 ESSENTIAL HYPERTENSION: ICD-10-CM

## 2018-06-05 DIAGNOSIS — Z09 HOSPITAL DISCHARGE FOLLOW-UP: Primary | ICD-10-CM

## 2018-06-05 DIAGNOSIS — R25.2 LEG CRAMPS: ICD-10-CM

## 2018-06-05 DIAGNOSIS — S32.10XD CLOSED FRACTURE OF SACRUM WITH ROUTINE HEALING, UNSPECIFIED PORTION OF SACRUM, SUBSEQUENT ENCOUNTER: ICD-10-CM

## 2018-06-05 LAB
HCT VFR BLD CALC: 35.8 % (ref 36–48)
HEMOGLOBIN: 12.1 G/DL (ref 12–16)
MCH RBC QN AUTO: 30.7 PG (ref 26–34)
MCHC RBC AUTO-ENTMCNC: 33.8 G/DL (ref 31–36)
MCV RBC AUTO: 91 FL (ref 80–100)
PDW BLD-RTO: 15.3 % (ref 12.4–15.4)
PLATELET # BLD: 417 K/UL (ref 135–450)
PMV BLD AUTO: 7.2 FL (ref 5–10.5)
RBC # BLD: 3.93 M/UL (ref 4–5.2)
WBC # BLD: 5.1 K/UL (ref 4–11)

## 2018-06-05 PROCEDURE — 99496 TRANSJ CARE MGMT HIGH F2F 7D: CPT | Performed by: INTERNAL MEDICINE

## 2018-06-05 RX ORDER — AMLODIPINE BESYLATE 10 MG/1
10 TABLET ORAL NIGHTLY
Qty: 30 TABLET | Refills: 5 | Status: SHIPPED | OUTPATIENT
Start: 2018-06-05 | End: 2018-12-21 | Stop reason: SDUPTHER

## 2018-06-06 ENCOUNTER — HOSPITAL ENCOUNTER (OUTPATIENT)
Dept: PHYSICAL THERAPY | Age: 83
Discharge: HOME | End: 2018-06-30
Attending: PHYSICAL MEDICINE & REHABILITATION | Admitting: PHYSICAL MEDICINE & REHABILITATION

## 2018-06-06 LAB
ANION GAP SERPL CALCULATED.3IONS-SCNC: 12 MMOL/L (ref 3–16)
BUN BLDV-MCNC: 10 MG/DL (ref 7–20)
CALCIUM SERPL-MCNC: 9.6 MG/DL (ref 8.3–10.6)
CHLORIDE BLD-SCNC: 98 MMOL/L (ref 99–110)
CO2: 26 MMOL/L (ref 21–32)
CREAT SERPL-MCNC: <0.5 MG/DL (ref 0.6–1.2)
GFR AFRICAN AMERICAN: >60
GFR NON-AFRICAN AMERICAN: >60
GLUCOSE BLD-MCNC: 138 MG/DL (ref 70–99)
MAGNESIUM: 2.2 MG/DL (ref 1.8–2.4)
POTASSIUM SERPL-SCNC: 3.9 MMOL/L (ref 3.5–5.1)
SODIUM BLD-SCNC: 136 MMOL/L (ref 136–145)

## 2018-06-06 NOTE — FLOWSHEET NOTE
Physical Therapy Daily Treatment Note  Date:  2018    Patient Name:  Oneida Evans    :  6/15/1927  MRN: [id number]  Other position/activity restrictions: No formal restrictions, fall risk (hx falls)   Diagnosis: R pelvic Fx  Treatment Diagnosis: impaired gait, impaired mobility, dec strength, dec ROM, impaired function  PT Insurance Information: Medicare - G-Coded  Referring Practitioner: Antonio Echols  POC Signed: pending  POC Date Range:  Ending 2018  Visit# / total visits:                  Initial Pain level:  0/10 at this time. Describes having leg cramps if standing too long.      G-Code Selection: (On Eval and every 10th visit or Discharge)  Evaluation completed 18    MEASURE    [x] Mobility: Walking and Moving Around     [x] Current ()   [x] Goal ()   [] DC ()  [] Changing/Maintaining Body Position     [] Current (9234)      [] Goal ()   [] DC ()  [] Carrying / Moving / Handling Objects     [] Current ()   [] Goal ()   [] DC ()  [] Self-Care     [] Current ()   [] Goal ()   [] DC ()  [] Other PT/OT primary DX     [] Current ()   [] Goal ()   [] DC ()    SEVERITY    CURRENT  GOAL  DISCHARGE   [] CH (0% Impaired, Indep.)  [] CI (1-19% Impaired, SBA-CGA)  [] CJ (20-39% Impaired, MIN A)  [] CK  (40-59% Impairment, Mod A)  [] CL  (60-79% Impairment, Max A)  [] CM  (80-99% Impairment, Dep.)   [x] CN  (100% Impairment, Tot Dep.) [] CH (0% Impaired, Indep.)  [] CI (1-19% Impaired, SBA-CGA)  [x] CJ (20-39% Impaired, MIN A)  [] CK  (40-59% Impairment, Mod A)  [] CL  (60-79% Impairment, Max A)  [] CM  (80-99% Impairment, Dep.)   [] CN  (100% Impairment, Tot Dep.)  [] CH (0% Impaired, Indep.)  [] CI (1-19% Impaired, SBA-CGA)  [] CJ (20-39% Impaired, MIN A)  [] CK  (40-59% Impairment, Mod A)  [] CL  (60-79% Impairment, Max A)  [] CM  (80-99% Impairment, Dep.)   [] CN  (100% Impairment, Tot Dep.)            Subjective: At initial eval:   patient fell in March 2018- sustained pelvic Fx - patient had been in ARU from 2/20to 3/10/18 for compresion Fx. Patient was being seen for therapy at this facility for rehab s/p pelvic fracture when she was admitted to the hospital w/low Potassium (May 20, 2018.)  After acute hospitalization, was transferred to ARU for rehab. Patient returned home alone from rehab on May 30, 2018. Since patient never completed therapy for pelvic fracture and now is weak from recent medical condition, patient is referred back to this facility for continued therapy services. Patient had been working toward using a std cane for ambulation prior to illness. Currently utilizing her RW. Any changes to Ambulatory Summary Sheet? No  Any major status changes? No             Goals:     Long term goals  Time Frame for Long term goals : In 8 weeks or by discharge, patient will  Long term goal 1: demonstrate compliance and independence w/HEP. Long term goal 2: perform TUG test in <= 14 seconds as indication of improved mobility and safety. Long term goal 3: ambulate >= 500 feet level indoor surfaces w/o AD. Long term goal 4: ambulate >= 500 feet community surfaces Mod Ind. w/spc. Skilled PT activities: Date 6/06/18  #1/16 Date Date Date   Outcome measure  TUG  35 sec using RW               Home Management/Self-care   Discussed patient's propensity for bone fractures and taking that into account as we move forward w/assistive device recommendations. Recommend use of RW at all times for now. Will work on increasing strength and balance w/goal of ambulating w/spc in community. Nu-Step 8 min  WL 4  bilat UE/LEs to comfort. No SOB  SBA for getting on/off. VC for hand placement when utilizing walker during transfers.                                                                                           Progress/goals assessment (every 10 visits) by  PT         HEP: To be developed Supervision/Cues:  SBA on/off equipment      Objective   findings: See initial eval      Response to intervention: Tolerated well      Overall change since Evaluation: N/A      Plan for Next Session: Nu-Step  Mat work for core and LE strengthening  balance        Intervention/modality used today:  [x] Therapeutic Exercise  [] Modalities:  [] Therapeutic Activity     [] Ultrasound  [] Elec  Stim  [] Gait Training      [] Cervical Traction [] Lumbar Traction  [] Neuromuscular Re-education    [] Cold/hotpack [] Iontophoresis   [] Instruction in HEP      [] Vasopneumatic     [] Manual Therapy               [x] Self care home management                    (    ) Dry needling    Post Tx Pain Rating:   Did note rate    Plan:  (Fequency/duration/# of visits)   [x] Continue per plan of care [] Alter current plan   [x] Plan of care initiated [] Hold pending MD visit [] Discharge    Time In: 2313  Time KSB:1299  Timed Code Treatment Minutes: 32   Total Treatment Minutes:  52    Electronically signed by:  Tommie Farfan, PT 6/6/2018, 4:30 PM

## 2018-06-06 NOTE — DISCHARGE SUMMARY
Outpatient Physical Therapy        [] Phone: [phone]   Fax: [phone]   Physician: Referring Practitioner: Elvis Lora        From: Nia Partida PT     Patient: Samara Ganser                    : 6/15/1927  Diagnosis: R pelvic Fx     Date: 2018    []  Progress Note                []  Discharge Note    Total Visits to date:   5   Cancels/No-shows to date:      Subjective:Patient was hospitialized 18    G-Code Selection: (On Eval and every 10th visit or Discharge)    MEASURE    [x] Mobility: Walking and Moving Around     [] Current ()   [x] Goal ()   [x] DC ()  [] Changing/Maintaining Body Position     [] Current (1618)      [] Goal ()   [] DC ()  [] Carrying / Moving / Handling Objects     [] Current ()   [] Goal ()   [] DC ()  [] Self-Care     [] Current ()   [] Goal ()   [] DC ()  [] Other PT/OT primary DX     [] Current ()   [] Goal ()   [] DC ()    SEVERITY    CURRENT  GOAL  DISCHARGE  18   [] CH (0% Impaired, Indep.)  [] CI (1-19% Impaired, SBA-CGA)  [] CJ (20-39% Impaired, MIN A)  [] CK  (40-59% Impairment, Mod A)  [] CL  (60-79% Impairment, Max A)  [] CM  (80-99% Impairment, Dep.)   [] CN  (100% Impairment, Tot Dep.) [] CH (0% Impaired, Indep.)  [] CI (1-19% Impaired, SBA-CGA)  [x] CJ (20-39% Impaired, MIN A)  [] CK  (40-59% Impairment, Mod A)  [] CL  (60-79% Impairment, Max A)  [] CM  (80-99% Impairment, Dep.)   [] CN  (100% Impairment, Tot Dep.)  [] CH (0% Impaired, Indep.)  [] CI (1-19% Impaired, SBA-CGA)  [] CJ (20-39% Impaired, MIN A)  [x] CK  (40-59% Impairment, Mod A)  [] CL  (60-79% Impairment, Max A)  [] CM  (80-99% Impairment, Dep.)   [] CN  (100% Impairment, Tot Dep.)            Goal Status:  [] Achieved [] Partially Achieved  [x] Not Assessed     Time Frame for Short term goals: check in 10 sessions  Short term goal 1: 5 sit to stands in 35 seconds  Short term goal 2: Ambulate w/ cane in home for 50% of

## 2018-06-06 NOTE — PROGRESS NOTES
crisis))    Vision/Hearing  Vision  Vision: Impaired (corrective glasses)  Hearing  Hearing: Within functional limits    Orientation  Orientation  Overall Orientation Status: Within Normal Limits    Social/Functional History  Social/Functional History  Lives With: Alone (2 sons live locally w/one of them just 2 blocks away and dtr lives in neighboring town.)  Type of Home: [employer]: One level; Laundry in basement (dtr does laundry)  Home Access: Stairs to enter with rails  Entrance Stairs - Number of Steps: 3 jay jay at 3 areas of the home. Rails at all stairs. Bathroom Shower/Tub: Tub only (currently sponge bathing)  Bathroom Toilet: Standard  Bathroom Equipment: Shower chair;Grab bars in shower;Grab bars around toilet  Home Equipment: Rolling walker;Cane;Grab bars; Alert Button ([employer])  ADL Assistance: Independent  Homemaking Assistance: Independent  Ambulation Assistance: Independent  Transfer Assistance: Independent  Active : No  Patient's  Info: Hasn't driven since back injury in Feb  Mode of Transportation: Family  Objective     Observation/Palpation  Posture: Fair    AROM RLE (degrees)  RLE AROM: Exceptions  R Hip Flexion 0-125: 0-110 d/t groin pain  AROM LLE (degrees)  LLE AROM : WFL    Strength RLE  Strength RLE: Exception  R Hip Flexion: 3-/5  R Hip Extension: 3+/5 (as noted w/low bridge)  R Hip ABduction: 3+/5  R Knee Flexion: 4-/5  R Knee Extension: 4-/5  R Ankle Dorsiflexion: 4-/5  Strength LLE  Strength LLE: Exception  L Hip Flexion: 4-/5  L Hip Extension: 3+/5 (as noted w/low bridge)  L Hip ABduction: 4-/5  L Knee Flexion: 4/5  L Knee Extension: 4/5  L Ankle Dorsiflexion: 4/5     Additional Measures  Special Tests: TUG 35 sec  Sensation  Overall Sensation Status: WFL  Bed mobility  Supine to Sit: Supervision  Sit to Supine: Supervision  Transfers  Sit to Stand: Modified independent  Stand to sit: Modified independent  Ambulation  Ambulation?: Yes  Ambulation 1  Surface: Treatment Recommendations: Strengthening, ROM, Functional Mobility Training, Balance Training, Transfer Training, ADL/Self-care Training, Gait Training, Endurance Training, Neuromuscular Re-education, Manual Therapy - Soft Tissue Mobilization, Pain Management, Modalities, Patient/Caregiver Education & Training, Home Exercise Program  Safety Devices  Type of devices: Patient at risk for falls (use gait belt for high level gait/balance)    G-Code  PT G-Codes  Functional Assessment Tool Used: TUG  Score: 35 sec using RW  Functional Limitation: Mobility: Walking and moving around  Mobility: Walking and Moving Around Current Status (): 100 percent impaired, limited or restricted  Mobility: Walking and Moving Around Goal Status (): At least 20 percent but less than 40 percent impaired, limited or restricted      Goals  Long term goals  Time Frame for Long term goals : In 8 weeks or by discharge, patient will  Long term goal 1: demonstrate compliance and independence w/HEP. Long term goal 2: perform TUG test in <= 14 seconds as indication of improved mobility and safety. Long term goal 3: ambulate >= 500 feet level indoor surfaces w/o AD. Long term goal 4: ambulate >= 500 feet community surfaces Mod Ind. w/spc.   Patient Goals   Patient goals : return to level of independence she was at prior to fall in February 2018; independent all mobility w/o AD, home and community       Therapy Time   Individual Concurrent Group Co-treatment   Time In 1212         Time Out 1304         Minutes 52         Timed Code Treatment Minutes: [address], PT

## 2018-06-11 ENCOUNTER — CARE COORDINATION (OUTPATIENT)
Dept: CASE MANAGEMENT | Age: 83
End: 2018-06-11

## 2018-06-13 ENCOUNTER — HOSPITAL ENCOUNTER (OUTPATIENT)
Dept: PHYSICAL THERAPY | Age: 83
Discharge: HOME | End: 2018-06-13
Admitting: PHYSICAL MEDICINE & REHABILITATION

## 2018-06-19 ENCOUNTER — HOSPITAL ENCOUNTER (OUTPATIENT)
Dept: PHYSICAL THERAPY | Age: 83
Discharge: HOME | End: 2018-06-19
Admitting: PHYSICAL MEDICINE & REHABILITATION

## 2018-06-19 ENCOUNTER — CARE COORDINATION (OUTPATIENT)
Dept: CASE MANAGEMENT | Age: 83
End: 2018-06-19

## 2018-06-21 ENCOUNTER — HOSPITAL ENCOUNTER (OUTPATIENT)
Dept: PHYSICAL THERAPY | Age: 83
Discharge: HOME | End: 2018-06-21
Admitting: PHYSICAL MEDICINE & REHABILITATION

## 2018-06-25 ENCOUNTER — HOSPITAL ENCOUNTER (OUTPATIENT)
Dept: PHYSICAL THERAPY | Age: 83
Discharge: HOME | End: 2018-06-25
Admitting: PHYSICAL MEDICINE & REHABILITATION

## 2018-06-27 ENCOUNTER — TELEPHONE (OUTPATIENT)
Dept: INTERNAL MEDICINE CLINIC | Age: 83
End: 2018-06-27

## 2018-06-27 DIAGNOSIS — S32.601D CLOSED NONDISPLACED FRACTURE OF RIGHT ISCHIUM WITH ROUTINE HEALING, UNSPECIFIED FRACTURE MORPHOLOGY, SUBSEQUENT ENCOUNTER: ICD-10-CM

## 2018-06-27 DIAGNOSIS — E87.6 HYPOKALEMIA: Primary | ICD-10-CM

## 2018-06-27 DIAGNOSIS — R62.7 FAILURE TO THRIVE IN ADULT: ICD-10-CM

## 2018-06-28 ENCOUNTER — HOSPITAL ENCOUNTER (OUTPATIENT)
Dept: PHYSICAL THERAPY | Age: 83
Discharge: HOME | End: 2018-06-28
Admitting: PHYSICAL MEDICINE & REHABILITATION

## 2018-07-01 ENCOUNTER — HOSPITAL ENCOUNTER (OUTPATIENT)
Dept: OTHER | Age: 83
Discharge: HOME | End: 2018-07-31
Attending: PHYSICAL MEDICINE & REHABILITATION | Admitting: PHYSICAL MEDICINE & REHABILITATION

## 2018-07-03 ENCOUNTER — OFFICE VISIT (OUTPATIENT)
Dept: INTERNAL MEDICINE CLINIC | Age: 83
End: 2018-07-03

## 2018-07-03 VITALS
SYSTOLIC BLOOD PRESSURE: 132 MMHG | DIASTOLIC BLOOD PRESSURE: 70 MMHG | BODY MASS INDEX: 18.5 KG/M2 | WEIGHT: 85.5 LBS | OXYGEN SATURATION: 96 % | RESPIRATION RATE: 16 BRPM | HEART RATE: 79 BPM

## 2018-07-03 DIAGNOSIS — S32.10XD CLOSED FRACTURE OF SACRUM WITH ROUTINE HEALING, UNSPECIFIED PORTION OF SACRUM, SUBSEQUENT ENCOUNTER: Primary | ICD-10-CM

## 2018-07-03 DIAGNOSIS — I10 HYPERTENSION, ESSENTIAL: ICD-10-CM

## 2018-07-03 DIAGNOSIS — M81.0 OSTEOPOROSIS, UNSPECIFIED OSTEOPOROSIS TYPE, UNSPECIFIED PATHOLOGICAL FRACTURE PRESENCE: ICD-10-CM

## 2018-07-03 PROCEDURE — 4040F PNEUMOC VAC/ADMIN/RCVD: CPT | Performed by: INTERNAL MEDICINE

## 2018-07-03 PROCEDURE — 1123F ACP DISCUSS/DSCN MKR DOCD: CPT | Performed by: INTERNAL MEDICINE

## 2018-07-03 PROCEDURE — 99213 OFFICE O/P EST LOW 20 MIN: CPT | Performed by: INTERNAL MEDICINE

## 2018-07-03 PROCEDURE — 1090F PRES/ABSN URINE INCON ASSESS: CPT | Performed by: INTERNAL MEDICINE

## 2018-07-03 PROCEDURE — G8427 DOCREV CUR MEDS BY ELIG CLIN: HCPCS | Performed by: INTERNAL MEDICINE

## 2018-07-03 PROCEDURE — 1036F TOBACCO NON-USER: CPT | Performed by: INTERNAL MEDICINE

## 2018-07-03 PROCEDURE — G8420 CALC BMI NORM PARAMETERS: HCPCS | Performed by: INTERNAL MEDICINE

## 2018-07-11 ENCOUNTER — HOSPITAL ENCOUNTER (OUTPATIENT)
Dept: PHYSICAL THERAPY | Age: 83
Discharge: HOME | End: 2018-07-11
Admitting: PHYSICAL MEDICINE & REHABILITATION

## 2018-07-11 NOTE — FLOWSHEET NOTE
transfers. Scifit LV 1 x 10 min nustep S 6/ A7 LV 3 x 10 min nustep S 6/ A7 LV 3 x 10 min  nustep S 6/ A7 LV 4 x 12 min NuStep level 4 x12' NuStep level 4 x12' NuStep level 4 x12'    supine hip abd TB  Blue TB 3 ct 2 x 10 Blue TB 3 ct 2 x 10 HEP  Sit to stands x 5 x 19 sec Sit<>stand low mat x10 UE assist Sit<>stand low mat x10 UE assist Sit<>stand low mat x10 with hands on thighs    supine hip add ball squeeze   3 ct 2 x 10 3 ct 2 x 10 HEP   Bridging 2x10 Bridging 5x5 Bridging 5x5    SAQ   Med bolster 3 ct 2 x 10 R/L Med bolster 3 ct 2 x 10 R/L Pt does simutaneously Med bolster 3 ct 3 x 10 w/ 1.5#,  R/L Pt does simutaneously  Med bolster 3 ct 3 x 10 w/ 1.5#,  R/L Pt does simutaneously LAQ 3\" hold x20 LAQ 5\" hold x20 LAQ 5\" hold x20    SLR  X 10  R/L 2 x 10 R/L 1/2# 2 x 10 R/L  1# 2 x 10 R/L  SLR flex 5x5 SLR flex 2x10 reps                   clamshell #1  3 ct x 10 R/L 3 ct 2 x 10 R/L         DLSP: abd bracing    10 ct x 10 10 ct x 10 HEP  Step ups to 6\" step x 10 R/L L UE support only      DLSP: abd bracing w/ march    2 x 10 R/L  HEP  Steps up/down the 6\" side x 4 reps with L HR only      DLSP: bridging    3 ct x 10 3 ct 2 x 10 HEP        Balance:     Standing on foam w/ cross punches x 20, ball throws x 20    Long foam side step 2 x 10 R/L first set w/ UE support x 1 , 2nd no UE support but with CGA intermittent    Step tap to 6\" step 2 x 10 R/L w/ 2 UE support first set, 1 UE 2nd  Standing on foam w/ cross punches x 20, ball throws x 20    Long foam side step 2 x 10 R/L , no UE support and only SBA.  one LOB    Step tap to 6\" step while standing on foam 2 x 10 R/L no UE support // marching with 1 UE support x5 laps    Standing on foam:  + ball toss with PT x30  + ball diagonal punch x10 bilat  + 6\" step taps x20 bilat using // assist       // marching UE supports x6 laps  // sidestepping UE supports x4 laps  // heel/toes raises A/P sway x20 // marching UE supports x6 laps  // sidestepping UE supports x4 laps  //

## 2018-07-14 ENCOUNTER — HOSPITAL ENCOUNTER (OUTPATIENT)
Dept: PHYSICAL THERAPY | Age: 83
Discharge: HOME | End: 2018-07-14
Admitting: PHYSICAL MEDICINE & REHABILITATION

## 2018-07-14 NOTE — FLOWSHEET NOTE
Physical Therapy Daily Treatment Note  Date:  2018    Patient Name:  Patsi Curling    :  6/15/1927  MRN: [id number]  Other position/activity restrictions: No formal restrictions, fall risk (hx falls)   Diagnosis: R pelvic Fx  Treatment Diagnosis: impaired gait, impaired mobility, dec strength, dec ROM, impaired function  PT Insurance Information: Medicare - G-Coded  POC Signed:  yes  POC Date Range:  Ending 2018  Visit# / total visits:       PROGRESS NOTE EVERY 10 VISITS W/GOAL DOCUMENTATION                  G-Code Selection: (On Eval and every 10th visit or Discharge)  Evaluation completed 18    MEASURE    [x] Mobility: Walking and Moving Around     [x] Current ()   [x] Goal ()   [] DC ()  [] Changing/Maintaining Body Position     [] Current (8981)      [] Goal ()   [] DC ()  [] Carrying / Moving / Handling Objects     [] Current ()   [] Goal ()   [] DC ()  [] Self-Care     [] Current ()   [] Goal ()   [] DC ()  [] Other PT/OT primary DX     [] Current ()   [] Goal ()   [] DC ()    SEVERITY    CURRENT  GOAL  DISCHARGE   [] CH (0% Impaired, Indep.)  [] CI (1-19% Impaired, SBA-CGA)  [] CJ (20-39% Impaired, MIN A)  [] CK  (40-59% Impairment, Mod A)  [] CL  (60-79% Impairment, Max A)  [] CM  (80-99% Impairment, Dep.)   [x] CN  (100% Impairment, Tot Dep.) [] CH (0% Impaired, Indep.)  [] CI (1-19% Impaired, SBA-CGA)  [x] CJ (20-39% Impaired, MIN A)  [] CK  (40-59% Impairment, Mod A)  [] CL  (60-79% Impairment, Max A)  [] CM  (80-99% Impairment, Dep.)   [] CN  (100% Impairment, Tot Dep.)  [] CH (0% Impaired, Indep.)  [] CI (1-19% Impaired, SBA-CGA)  [] CJ (20-39% Impaired, MIN A)  [] CK  (40-59% Impairment, Mod A)  [] CL  (60-79% Impairment, Max A)  [] CM  (80-99% Impairment, Dep.)   [] CN  (100% Impairment, Tot Dep.)      At initial eval:   patient fell in 2018- sustained pelvic Fx - patient had been in ARU from 6/ A7 LV 3 x 10 min  nustep S 6/ A7 LV 4 x 12 min NuStep level 4 x12' NuStep level 4 x12' NuStep level 4 x12' NuStep level 4 x12'    supine hip abd TB HEP  Sit to stands x 5 x 19 sec Sit<>stand low mat x10 UE assist Sit<>stand low mat x10 UE assist Sit<>stand low mat x10 with hands on thighs Sit<>stand low mat x10 with ball chest press    supine hip add ball squeeze  HEP   Bridging 2x10 Bridging 5x5 Bridging 5x5 Bridging 2 x 10    SAQ  Med bolster 3 ct 3 x 10 w/ 1.5#,  R/L Pt does simutaneously  Med bolster 3 ct 3 x 10 w/ 1.5#,  R/L Pt does simutaneously LAQ 3\" hold x20 LAQ 5\" hold x20 LAQ 5\" hold x20 LAQ 5\" hold x20 with 1# ankle wt    SLR 1/2# 2 x 10 R/L  1# 2 x 10 R/L  SLR flex 5x5 SLR flex 2x10 reps SLR flex 2x10 reps with 1# ankle wt                 clamshell #1          DLSP: abd bracing   HEP  Step ups to 6\" step x 10 R/L L UE support only       DLSP: abd bracing w/ march   HEP  Steps up/down the 6\" side x 4 reps with L HR only       DLSP: bridging   HEP         Balance:  Standing on foam w/ cross punches x 20, ball throws x 20    Long foam side step 2 x 10 R/L first set w/ UE support x 1 , 2nd no UE support but with CGA intermittent    Step tap to 6\" step 2 x 10 R/L w/ 2 UE support first set, 1 UE 2nd  Standing on foam w/ cross punches x 20, ball throws x 20    Long foam side step 2 x 10 R/L , no UE support and only SBA.  one LOB    Step tap to 6\" step while standing on foam 2 x 10 R/L no UE support // marching with 1 UE support x5 laps    Standing on foam:  + ball toss with PT x30  + ball diagonal punch x10 bilat  + 6\" step taps x20 bilat using // assist       // marching UE supports x6 laps  // sidestepping UE supports x4 laps  // heel/toes raises A/P sway x20 // marching UE supports x6 laps  // sidestepping UE supports x4 laps  // heel/toes raises A/P sway x20 // marching UE supports x6 laps    // sidestepping UE supports x4 laps   HEP: cont  cont    cont   Supervision/Cues:  Verbal and manual cueing on proper performance of the prescribed exercise or of the designated task   Supervision/cues prn for safety & form Supervision/cues prn for safety & form Min verbal cueing for correct performance and safety Verbal and manual cueing on proper performance of the prescribed exercise or of the designated task   Objective   findings: Some hip discomfort with the standing foam activity  See above for TUG and sit to stands Safe pattern using SPC with mild asymmetry Improving stride length & symmetry in gait using SPC - mild asymmetry in stride length remains See gait above. Good tolerance of the added resistance during the leg ex's   Response to intervention: No pain post RX  Pt stated she felt really good. No pain or fatigue Pt reports improving confidence in amb using SPC Improving strength, balance, gait Left knee soreness following ambulation with st cane.  No pain in the L knee   Overall change since Evaluation:    Improving gait, balance, pt confidence      Plan for Next Session: Nu-Step  Mat work for core and LE strengthening  balance  Nu-Step  Mat work for core and LE strengthening  balance Per POC Per POC continue Cont POC     Intervention/modality used today:  [x] Therapeutic Exercise  [] Modalities:  [] Therapeutic Activity     [] Ultrasound  [] Elec  Stim  [x] Gait Training      [] Cervical Traction [] Lumbar Traction  [x] Neuromuscular Re-education    [] Cold/hotpack [] Iontophoresis   [] Instruction in HEP      [] Vasopneumatic     [] Manual Therapy               [] Self care home management                    (    ) Dry needling    Post Tx Pain Ratin/10    Plan:  2x/week x 8 weeks  [x] Continue per plan of care [] Alter current plan   [] Plan of care initiated [] Hold pending MD visit [] Discharge    Time In: 647  Time Out: 905   Timed Code Treatment Minutes:  46  Total Treatment Minutes: 46     Electronically signed by:  Jasen Mcnair PTA 2018, 9:04 AM

## 2018-07-23 ENCOUNTER — HOSPITAL ENCOUNTER (OUTPATIENT)
Dept: PHYSICAL THERAPY | Age: 83
Discharge: HOME | End: 2018-07-23
Admitting: PHYSICAL MEDICINE & REHABILITATION

## 2018-07-23 NOTE — FLOWSHEET NOTE
Physical Therapy Daily Treatment Note  Date:  2018    Patient Name:  Samara Ganser    :  6/15/1927  MRN: [id number]  Other position/activity restrictions: No formal restrictions, fall risk (hx falls)   Diagnosis: R pelvic Fx  Treatment Diagnosis: impaired gait, impaired mobility, dec strength, dec ROM, impaired function  PT Insurance Information: Medicare - G-Coded  POC Signed:  yes  POC Date Range:  Ending 2018  Visit# / total visits:       PROGRESS NOTE EVERY 10 VISITS W/GOAL DOCUMENTATION                  G-Code Selection: (On Eval and every 10th visit or Discharge)  Evaluation completed 18    At initial eval:   patient fell in 2018- sustained pelvic Fx - patient had been in ARU from to 3/10/18 for compresion Fx. Patient was being seen for therapy at this facility for rehab s/p pelvic fracture when she was admitted to the hospital w/low Potassium (May 20, 2018.)  After acute hospitalization, was transferred to ARU for rehab. Patient returned home alone from rehab on May 30, 2018. Since patient never completed therapy for pelvic fracture and now is weak from recent medical condition, patient is referred back to this facility for continued therapy services. Patient had been working toward using a std cane for ambulation prior to illness. Currently utilizing her RW. Initial Pain level:  2/10 currently left knee  Subjective: Pt states overall doing well. Does have some left knee pain with weather change. Any changes to Ambulatory Summary Sheet? No  Any major status changes? No             Goals:  Long term goals  Time Frame for Long term goals :  In 8 weeks or by discharge, patient will  Long term goal 1: demonstrate compliance and independence with HEP. - ACHIEVED  Long term goal 2: perform TUG test in <= 14 seconds as indication of improved mobility and safety. - PARTIALLY ACHIEVED 18 seconds independently  Long term goal 3: ambulate >= 500 feet level indoor surfaces without AD. - PARTIALLY ACHIEVED uses cane at times  Long term goal 4: ambulate >= 500 feet community surfaces Mod I with SPC - PARTIALLY ACHIEVED using FWW in community    Skilled PT activities: Date 6/25/18 please refer to notes Date   7/5/18 #7 Date 7/11/18 (8) Date 7/14/18  #9 Date 7/17/18 #10  Progress Note sent  7/19/18 #11  7/23/18 #12 #13  D/C week of 7/23/18   Outcome measure     TUG  19 sec using RW TUG 18 seconds independently        Gait training using  ft, SBA. Further distance limited by fatigue and deviation in path >225 ft.  Gait training using  ft, SBA. Distance limited due to left knee pain. Gait training using  ft, with good form and bhupinder.        Nu-Step  NuStep level 4 x12' NuStep level 4 x12' NuStep level 4 x12' Biodex stepper x10' NuStep level 4 x12' NuStep level 4 x12'     supine hip abd TB  Sit<>stand low mat x10 UE assist Sit<>stand low mat x10 with hands on thighs Sit<>stand low mat x10 with ball chest press Sit<>stand low mat x10 with 2-lb medball press Sit<>stand low mat x10 with 2-lb medball press Sit<>stand low mat x10 with 2-lb medball press     supine hip add ball squeeze   Bridging 5x5 Bridging 5x5 Bridging 2 x 10 Bridging 3x10 Bridging x25 Bridging x25     SAQ   LAQ 5\" hold x20 LAQ 5\" hold x20 LAQ 5\" hold x20 with 1# ankle wt LAQ 5\" hold x20  1.5# ankle weight LAQ 5\" hold x20  1.5# ankle weight LAQ 5\" hold x20  1.5# ankle weight     SLR  SLR flex 5x5 SLR flex 2x10 reps SLR flex 2x10 reps with 1# ankle wt SLR flex 2x10  1.5# ankle weight SLR flex 2x12  1.5# ankle weight SLR flex 2x12  1.5# ankle weight                   clamshell #1           DLSP: abd bracing             DLSP: abd bracing w/ march       // tandem stepping x 6 laps  Able to complete 1 lap with min UEs support // tandem stepping x 6 laps // tandem stepping x 6 laps    DLSP: bridging       // A/P sway heel/ toes raises 2x15 // A/P sway heel/ toes raises 2x15 // A/P sway heel/ toes raises 2x15    Balance:   // marching UE supports x6 laps  // sidestepping UE supports x4 laps  // heel/toes raises A/P sway x20 // marching UE supports x6 laps  // sidestepping UE supports x4 laps  // heel/toes raises A/P sway x20 // marching UE supports x6 laps    // sidestepping UE supports x4 laps // marching UE supports x6 laps // marching UE supports x6 laps // marching UE supports x6 laps    HEP:    cont       Supervision/Cues:   Supervision/cues prn for safety & form Min verbal cueing for correct performance and safety Verbal and manual cueing on proper performance of the prescribed exercise or of the designated task Cues prn for safety & form Min verbal cueing  Min verbal cueing    Objective   findings:  Improving stride length & symmetry in gait using SPC - mild asymmetry in stride length remains See gait above. Good tolerance of the added resistance during the leg ex's See Progress Note for this date Dynamic balance activities improving. Ambulating some in her home with st cane but states it does increase her left knee pain. Response to intervention:  Improving strength, balance, gait Left knee soreness following ambulation with st cane. No pain in the L knee  Pt states she felt great following session No pain following session    Overall change since Evaluation:     See Progress Note - much improved overall      Plan for Next Session:  Per POC continue Cont POC Emphasize gait, functional strength, balance. Prepare for D/C to HEP.  Continue per POC Continue x 1 visit      Intervention/modality used today:  [x] Therapeutic Exercise   [] Modalities:  [x] Therapeutic Activity      [] Ultrasound  [] Elec  Stim  [] Gait Training      [] Cervical Traction [] Lumbar Traction  [x] Neuromuscular Re-education    [] Cold/hotpack [] Iontophoresis   [] Instruction in HEP      [] Vasopneumatic     [] Manual Therapy               [] Self care home management                    (    ) Dry needling    Post Tx Pain

## 2018-08-01 ENCOUNTER — HOSPITAL ENCOUNTER (OUTPATIENT)
Dept: OTHER | Age: 83
Discharge: HOME | End: 2018-08-31
Attending: PHYSICAL MEDICINE & REHABILITATION | Admitting: PHYSICAL MEDICINE & REHABILITATION

## 2018-08-06 RX ORDER — LANOLIN ALCOHOL/MO/W.PET/CERES
400 CREAM (GRAM) TOPICAL DAILY
Qty: 30 TABLET | Refills: 5 | Status: SHIPPED | OUTPATIENT
Start: 2018-08-06 | End: 2019-02-04 | Stop reason: SDUPTHER

## 2018-08-22 RX ORDER — ALENDRONATE SODIUM 70 MG/1
TABLET ORAL
Qty: 4 TABLET | Refills: 5 | Status: SHIPPED | OUTPATIENT
Start: 2018-08-22 | End: 2019-02-21 | Stop reason: SDUPTHER

## 2018-09-05 ENCOUNTER — OFFICE VISIT (OUTPATIENT)
Dept: INTERNAL MEDICINE CLINIC | Age: 83
End: 2018-09-05

## 2018-09-05 VITALS
HEART RATE: 67 BPM | BODY MASS INDEX: 20.69 KG/M2 | RESPIRATION RATE: 16 BRPM | SYSTOLIC BLOOD PRESSURE: 148 MMHG | DIASTOLIC BLOOD PRESSURE: 68 MMHG | OXYGEN SATURATION: 96 % | WEIGHT: 95.6 LBS

## 2018-09-05 DIAGNOSIS — I10 ESSENTIAL HYPERTENSION: Primary | ICD-10-CM

## 2018-09-05 DIAGNOSIS — M81.0 OSTEOPOROSIS, UNSPECIFIED OSTEOPOROSIS TYPE, UNSPECIFIED PATHOLOGICAL FRACTURE PRESENCE: ICD-10-CM

## 2018-09-05 PROCEDURE — G8420 CALC BMI NORM PARAMETERS: HCPCS | Performed by: INTERNAL MEDICINE

## 2018-09-05 PROCEDURE — 4040F PNEUMOC VAC/ADMIN/RCVD: CPT | Performed by: INTERNAL MEDICINE

## 2018-09-05 PROCEDURE — G8427 DOCREV CUR MEDS BY ELIG CLIN: HCPCS | Performed by: INTERNAL MEDICINE

## 2018-09-05 PROCEDURE — 99213 OFFICE O/P EST LOW 20 MIN: CPT | Performed by: INTERNAL MEDICINE

## 2018-09-05 PROCEDURE — 1101F PT FALLS ASSESS-DOCD LE1/YR: CPT | Performed by: INTERNAL MEDICINE

## 2018-09-05 PROCEDURE — 1123F ACP DISCUSS/DSCN MKR DOCD: CPT | Performed by: INTERNAL MEDICINE

## 2018-09-05 PROCEDURE — 1036F TOBACCO NON-USER: CPT | Performed by: INTERNAL MEDICINE

## 2018-09-05 PROCEDURE — 1090F PRES/ABSN URINE INCON ASSESS: CPT | Performed by: INTERNAL MEDICINE

## 2018-09-05 NOTE — PROGRESS NOTES
Subjective:      Vanessa Junior is a 80 y.o. female who presents today for follow up on her chronic medical conditions as noted below. Patient Active Problem List:     Hypertension, essential     Osteoporosis     Hyperlipidemia     Colon cancer screening     Breast cancer screening     Osteoarthritis of knee     Basal cell carcinoma of left lower leg     T10 vertebral fracture (HCC)     Gait disturbance     Fracture of inferior pubic ramus, right, closed, initial encounter ([facility] [address]     Failure to thrive in adult     Hypokalemia     Closed nondisplaced fracture of right ischium with routine healing     She was last seen July 3    The patient is taking all meds as prescribed without side effects. Functional status and activity level is unchanged. There are no new complaints. Home bp readings:  143/89, 121/79, 115/73, 119/76, 124/78, 133/77  The patient is taking hypertensive medications compliantly without side effects. Denies chest pain, dyspnea, edema, or TIA's.    bp higher here    She was given macrobid in Aug from [facility] for UTI ; good resolution    She is now driving agin   No walker  Using just cane for outdoors    Mood and memory fine    She has low lumbar brace    She is tolerating fosamax well    Sl edema right leg  Use compjhose      Current Outpatient Prescriptions   Medication Sig Dispense Refill    alendronate (FOSAMAX) 70 MG tablet TAKE 1 TABLET ONCE A WEEK 30 MINUTES BEFORE FOOD, DRINK OR MEDS. STAY UPRIGHT.  4 tablet 5    magnesium oxide (MAG-OX) 400 (240 Mg) MG tablet TAKE 1 TABLET BY MOUTH DAILY 30 tablet 5    amLODIPine (NORVASC) 10 MG tablet Take 1 tablet by mouth nightly 30 tablet 5    potassium chloride (MICRO-K) 10 MEQ extended release capsule Take 10 mEq by mouth daily      timolol (BETIMOL) 0.5 % ophthalmic solution 1 drop 2 times daily      vitamin D (CHOLECALCIFEROL) 1000 UNIT TABS tablet Take 1,000 Units by mouth daily      docusate sodium (COLACE, DULCOLAX) 100 MG CAPS Take 100 mg by mouth 2 times daily (Patient taking differently: Take 100 mg by mouth as needed )      vitamin B-12 (CYANOCOBALAMIN) 500 MCG tablet Take 500 mcg by mouth daily      latanoprost (XALATAN) 0.005 % ophthalmic solution Place 1 drop into the right eye 2 times daily       calcium carbonate (OSCAL) 500 MG TABS tablet Take 500 mg by mouth 2 times daily       aspirin 81 MG EC tablet Take 81 mg by mouth daily. No current facility-administered medications for this visit. 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    Dr Chau Mabry Hyperlipidemia     Hypertension     Osteoarthritis of knee 2011    Dr Bradley Uriostegui Osteoporosis 2002    Fosamax [id number]    Right rotator cuff tear 2014    Alease Baba / injection; PT    T10 vertebral fracture ([address] 02/2018    fall and fragility fx; also L1 ; had kyphoplasty 2/2018    Vitamin B12 deficiency 03/2017    B 12 266 by Flavio Eagle at [facility]        Social History   Substance Use Topics    Smoking status: Never Smoker    Smokeless tobacco: Never Used    Alcohol use Yes      Comment: wine rarely        ROS: The patient has had no headache, sore throat, fever or chills, cough, dyspnea, chest pain, nausea, vomiting or diarrhea, or abd pain       Objective:      BP (!) 154/80   Pulse 67   Resp 16   Wt 95 lb 9.6 oz (43.4 kg)   SpO2 96%   BMI 20.69 kg/m²    General: in no apparent distress   The patient's neck is free of nodes. Lungs are clear. Heart is normal in rate and regular in rhythm. Legs: traace right leg edema. No rash or erythema. June lab rev'd and normal     Assessment / Plan:      1. Essential hypertension    2.  Osteoporosis, unspecified osteoporosis type, unspecified pathological fracture presence            Plan   Comp hose right leg for trace edema  RTC 3 mo  Same meds

## 2018-09-07 RX ORDER — POTASSIUM CHLORIDE 750 MG/1
TABLET, EXTENDED RELEASE ORAL
Qty: 30 TABLET | Refills: 12 | Status: SHIPPED | OUTPATIENT
Start: 2018-09-07 | End: 2019-06-04 | Stop reason: SDUPTHER

## 2018-12-05 ENCOUNTER — OFFICE VISIT (OUTPATIENT)
Dept: INTERNAL MEDICINE CLINIC | Age: 83
End: 2018-12-05
Payer: MEDICARE

## 2018-12-05 VITALS
HEART RATE: 77 BPM | WEIGHT: 98.4 LBS | DIASTOLIC BLOOD PRESSURE: 80 MMHG | BODY MASS INDEX: 21.29 KG/M2 | SYSTOLIC BLOOD PRESSURE: 138 MMHG | RESPIRATION RATE: 16 BRPM | OXYGEN SATURATION: 98 %

## 2018-12-05 DIAGNOSIS — I10 ESSENTIAL HYPERTENSION: Primary | ICD-10-CM

## 2018-12-05 PROCEDURE — 1090F PRES/ABSN URINE INCON ASSESS: CPT | Performed by: INTERNAL MEDICINE

## 2018-12-05 PROCEDURE — G8484 FLU IMMUNIZE NO ADMIN: HCPCS | Performed by: INTERNAL MEDICINE

## 2018-12-05 PROCEDURE — 99213 OFFICE O/P EST LOW 20 MIN: CPT | Performed by: INTERNAL MEDICINE

## 2018-12-05 PROCEDURE — 1036F TOBACCO NON-USER: CPT | Performed by: INTERNAL MEDICINE

## 2018-12-05 PROCEDURE — 4040F PNEUMOC VAC/ADMIN/RCVD: CPT | Performed by: INTERNAL MEDICINE

## 2018-12-05 PROCEDURE — 1123F ACP DISCUSS/DSCN MKR DOCD: CPT | Performed by: INTERNAL MEDICINE

## 2018-12-05 PROCEDURE — G8420 CALC BMI NORM PARAMETERS: HCPCS | Performed by: INTERNAL MEDICINE

## 2018-12-05 PROCEDURE — G8427 DOCREV CUR MEDS BY ELIG CLIN: HCPCS | Performed by: INTERNAL MEDICINE

## 2018-12-05 PROCEDURE — 1101F PT FALLS ASSESS-DOCD LE1/YR: CPT | Performed by: INTERNAL MEDICINE

## 2018-12-06 ENCOUNTER — HOSPITAL ENCOUNTER (OUTPATIENT)
Age: 83
Discharge: HOME | End: 2018-12-06
Payer: MEDICARE

## 2018-12-06 DIAGNOSIS — I10 ESSENTIAL HYPERTENSION: ICD-10-CM

## 2018-12-06 LAB
ALBUMIN SERPL-MCNC: 4.4 GM/DL (ref 3.4–5)
ALP BLD-CCNC: 69 IU/L (ref 40–129)
ALT SERPL-CCNC: 15 U/L (ref 10–40)
ANION GAP SERPL CALCULATED.3IONS-SCNC: 13 MMOL/L (ref 4–16)
AST SERPL-CCNC: 20 IU/L (ref 15–37)
BASOPHILS ABSOLUTE: 0 K/CU MM
BASOPHILS RELATIVE PERCENT: 0.6 % (ref 0–1)
BILIRUB SERPL-MCNC: 0.9 MG/DL (ref 0–1)
BUN BLDV-MCNC: 17 MG/DL (ref 6–23)
CALCIUM SERPL-MCNC: 10.3 MG/DL (ref 8.3–10.6)
CHLORIDE BLD-SCNC: 98 MMOL/L (ref 99–110)
CO2: 28 MMOL/L (ref 21–32)
CREAT SERPL-MCNC: 0.7 MG/DL (ref 0.6–1.1)
DIFFERENTIAL TYPE: ABNORMAL
EOSINOPHILS ABSOLUTE: 0.3 K/CU MM
EOSINOPHILS RELATIVE PERCENT: 3.7 % (ref 0–3)
GFR AFRICAN AMERICAN: >60 ML/MIN/1.73M2
GFR NON-AFRICAN AMERICAN: >60 ML/MIN/1.73M2
GLUCOSE BLD-MCNC: 100 MG/DL (ref 70–99)
HCT VFR BLD CALC: 46.2 % (ref 37–47)
HEMOGLOBIN: 14 GM/DL (ref 12.5–16)
IMMATURE NEUTROPHIL %: 0.7 % (ref 0–0.43)
LYMPHOCYTES ABSOLUTE: 2.4 K/CU MM
LYMPHOCYTES RELATIVE PERCENT: 35.2 % (ref 24–44)
MCH RBC QN AUTO: 30.7 PG (ref 27–31)
MCHC RBC AUTO-ENTMCNC: 30.3 % (ref 32–36)
MCV RBC AUTO: 101.3 FL (ref 78–100)
MONOCYTES ABSOLUTE: 1 K/CU MM
MONOCYTES RELATIVE PERCENT: 14.2 % (ref 0–4)
NUCLEATED RBC %: 0 %
PDW BLD-RTO: 14 % (ref 11.7–14.9)
PLATELET # BLD: 388 K/CU MM (ref 140–440)
PMV BLD AUTO: 9.6 FL (ref 7.5–11.1)
POTASSIUM SERPL-SCNC: 4.5 MMOL/L (ref 3.5–5.1)
RBC # BLD: 4.56 M/CU MM (ref 4.2–5.4)
SEGMENTED NEUTROPHILS ABSOLUTE COUNT: 3.1 K/CU MM
SEGMENTED NEUTROPHILS RELATIVE PERCENT: 45.6 % (ref 36–66)
SODIUM BLD-SCNC: 139 MMOL/L (ref 135–145)
TOTAL IMMATURE NEUTOROPHIL: 0.05 K/CU MM
TOTAL NUCLEATED RBC: 0 K/CU MM
TOTAL PROTEIN: 7.2 GM/DL (ref 6.4–8.2)
WBC # BLD: 6.8 K/CU MM (ref 4–10.5)

## 2018-12-06 PROCEDURE — 85025 COMPLETE CBC W/AUTO DIFF WBC: CPT

## 2018-12-06 PROCEDURE — 80053 COMPREHEN METABOLIC PANEL: CPT

## 2018-12-06 PROCEDURE — 36415 COLL VENOUS BLD VENIPUNCTURE: CPT

## 2019-02-04 RX ORDER — LANOLIN ALCOHOL/MO/W.PET/CERES
400 CREAM (GRAM) TOPICAL DAILY
Qty: 30 TABLET | Refills: 5 | Status: SHIPPED | OUTPATIENT
Start: 2019-02-04 | End: 2019-07-10 | Stop reason: SDUPTHER

## 2019-02-21 RX ORDER — ALENDRONATE SODIUM 70 MG/1
TABLET ORAL
Qty: 4 TABLET | Refills: 5 | Status: SHIPPED | OUTPATIENT
Start: 2019-02-21 | End: 2019-08-22 | Stop reason: SDUPTHER

## 2019-04-04 ENCOUNTER — OFFICE VISIT (OUTPATIENT)
Dept: INTERNAL MEDICINE CLINIC | Age: 84
End: 2019-04-04
Payer: MEDICARE

## 2019-04-04 VITALS
WEIGHT: 98.2 LBS | SYSTOLIC BLOOD PRESSURE: 124 MMHG | DIASTOLIC BLOOD PRESSURE: 82 MMHG | BODY MASS INDEX: 21.25 KG/M2 | HEART RATE: 86 BPM | OXYGEN SATURATION: 96 %

## 2019-04-04 DIAGNOSIS — R49.0 HOARSENESS OF VOICE: ICD-10-CM

## 2019-04-04 DIAGNOSIS — I10 HYPERTENSION, ESSENTIAL: ICD-10-CM

## 2019-04-04 DIAGNOSIS — J06.9 VIRAL UPPER RESPIRATORY TRACT INFECTION: Primary | ICD-10-CM

## 2019-04-04 PROCEDURE — 1123F ACP DISCUSS/DSCN MKR DOCD: CPT | Performed by: INTERNAL MEDICINE

## 2019-04-04 PROCEDURE — 1036F TOBACCO NON-USER: CPT | Performed by: INTERNAL MEDICINE

## 2019-04-04 PROCEDURE — G8420 CALC BMI NORM PARAMETERS: HCPCS | Performed by: INTERNAL MEDICINE

## 2019-04-04 PROCEDURE — 99213 OFFICE O/P EST LOW 20 MIN: CPT | Performed by: INTERNAL MEDICINE

## 2019-04-04 PROCEDURE — 4040F PNEUMOC VAC/ADMIN/RCVD: CPT | Performed by: INTERNAL MEDICINE

## 2019-04-04 PROCEDURE — G8427 DOCREV CUR MEDS BY ELIG CLIN: HCPCS | Performed by: INTERNAL MEDICINE

## 2019-04-04 PROCEDURE — 1090F PRES/ABSN URINE INCON ASSESS: CPT | Performed by: INTERNAL MEDICINE

## 2019-04-04 ASSESSMENT — PATIENT HEALTH QUESTIONNAIRE - PHQ9
SUM OF ALL RESPONSES TO PHQ QUESTIONS 1-9: 0
SUM OF ALL RESPONSES TO PHQ QUESTIONS 1-9: 0
1. LITTLE INTEREST OR PLEASURE IN DOING THINGS: 0
SUM OF ALL RESPONSES TO PHQ9 QUESTIONS 1 & 2: 0
2. FEELING DOWN, DEPRESSED OR HOPELESS: 0

## 2019-04-04 NOTE — PROGRESS NOTES
Subjective:      Melissa Ayon is a 80 y.o. female who presents today for follow up on her chronic medical conditions as noted below. Patient Active Problem List:     Hypertension, essential     Osteoporosis     Hyperlipidemia     Osteoarthritis of knee     Basal cell carcinoma of left lower leg     T10 vertebral fracture (HCC)     Gait disturbance     Fracture of inferior pubic ramus, right, closed, initial encounter ([facility] [address]     Failure to thrive in adult     Hypokalemia     Closed nondisplaced fracture of right ischium with routine healing     She was last seen 4 mo aago    She had a \"head cold\" then awakened 9 d ago w/o voice  Voice is slowly getting better  Her cough is mild and improved  Sl scratchy throat  No N, V , D  No SOB  No fever  No HA or myalgia     The patient is taking hypertensive medications compliantly without side effects. Denies chest pain, dyspnea, edema, or TIA's. Home bp readin/80, 122/76, 112/69, 124/78    Mood and memory fine      Current Outpatient Medications   Medication Sig Dispense Refill    alendronate (FOSAMAX) 70 MG tablet TAKE 1 TABLET ONCE A WEEK 30 MINUTES BEFORE FOOD, DRINK OR MEDS. STAY UPRIGHT.  4 tablet 5    magnesium oxide (MAG-OX) 400 (240 Mg) MG tablet TAKE 1 TABLET BY MOUTH DAILY 30 tablet 5    amLODIPine (NORVASC) 10 MG tablet TAKE 1 TABLET BY MOUTH IN THE EVENING 30 tablet 5    potassium chloride (KLOR-CON M) 10 MEQ extended release tablet TAKE 1 TABLET BY MOUTH DAILY 30 tablet 12    potassium chloride (MICRO-K) 10 MEQ extended release capsule Take 10 mEq by mouth daily      timolol (BETIMOL) 0.5 % ophthalmic solution 1 drop 2 times daily      vitamin D (CHOLECALCIFEROL) 1000 UNIT TABS tablet Take 1,000 Units by mouth daily      docusate sodium (COLACE, DULCOLAX) 100 MG CAPS Take 100 mg by mouth 2 times daily (Patient taking differently: Take 100 mg by mouth as needed )      vitamin B-12 (CYANOCOBALAMIN) 500 MCG tablet Take 500 mcg by mouth daily      latanoprost (XALATAN) 0.005 % ophthalmic solution Place 1 drop into the right eye 2 times daily       calcium carbonate (OSCAL) 500 MG TABS tablet Take 500 mg by mouth 2 times daily        No current facility-administered medications for this visit. 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    Dr John La Hyperlipidemia     Hypertension     Osteoarthritis of knee 2011    Dr Olivia Fitzgerald Osteoporosis 2002    Fosamax [phone]    Right rotator cuff tear 2014    Brit Alfaro / injection; PT    T10 vertebral fracture ([address] 02/2018    fall and fragility fx; also L1 ; had kyphoplasty 2/2018    Vitamin B12 deficiency 03/2017    B 12 266 by Brit Alfaro at [facility]        Social History     Tobacco Use    Smoking status: Never Smoker    Smokeless tobacco: Never Used   Substance Use Topics    Alcohol use: Yes     Comment: wine rarely        ROS: The patient has had no headache, sore throat, fever or chills, cough, dyspnea, chest pain, nausea, vomiting or diarrhea, or edema. Objective:      /82   Pulse 86   Wt 98 lb 3.2 oz (44.5 kg)   SpO2 96%   BMI 21.25 kg/m²    General: in no apparent distress   Throat normal  The patient's neck is free of nodes. Lungs are clear. Heart is normal in rate and regular in rhythm. Legs are free of edema. No rash or erythema. Assessment / Plan:      1. Viral upper respiratory tract infection    2. Hypertension, essential    3.  Hoarseness of voice            Plan   Honey, salt water gargle  Voice rest  Call Monday if voice still impaired or any new sx  Same meds  RTC 2 mo, lab first  Orders Placed This Encounter   Procedures    CBC Auto Differential    Comprehensive Metabolic Panel

## 2019-05-23 RX ORDER — AMLODIPINE BESYLATE 10 MG/1
TABLET ORAL
Qty: 30 TABLET | Refills: 5 | Status: SHIPPED | OUTPATIENT
Start: 2019-05-23 | End: 2019-06-04 | Stop reason: SDUPTHER

## 2019-05-24 ENCOUNTER — HOSPITAL ENCOUNTER (OUTPATIENT)
Age: 84
Discharge: HOME | End: 2019-05-24
Payer: MEDICARE

## 2019-05-24 LAB
ALBUMIN SERPL-MCNC: 4.1 GM/DL (ref 3.4–5)
ALP BLD-CCNC: 63 IU/L (ref 40–128)
ALT SERPL-CCNC: 10 U/L (ref 10–40)
ANION GAP SERPL CALCULATED.3IONS-SCNC: 13 MMOL/L (ref 4–16)
AST SERPL-CCNC: 18 IU/L (ref 15–37)
BASOPHILS ABSOLUTE: 0.1 K/CU MM
BASOPHILS RELATIVE PERCENT: 1 % (ref 0–1)
BILIRUB SERPL-MCNC: 0.6 MG/DL (ref 0–1)
BUN BLDV-MCNC: 10 MG/DL (ref 6–23)
CALCIUM SERPL-MCNC: 9.7 MG/DL (ref 8.3–10.6)
CHLORIDE BLD-SCNC: 99 MMOL/L (ref 99–110)
CO2: 27 MMOL/L (ref 21–32)
CREAT SERPL-MCNC: 0.7 MG/DL (ref 0.6–1.1)
DIFFERENTIAL TYPE: ABNORMAL
EOSINOPHILS ABSOLUTE: 0.9 K/CU MM
EOSINOPHILS RELATIVE PERCENT: 12.5 % (ref 0–3)
GFR AFRICAN AMERICAN: >60 ML/MIN/1.73M2
GFR NON-AFRICAN AMERICAN: >60 ML/MIN/1.73M2
GLUCOSE FASTING: 87 MG/DL (ref 70–99)
HCT VFR BLD CALC: 45 % (ref 37–47)
HEMOGLOBIN: 14 GM/DL (ref 12.5–16)
IMMATURE NEUTROPHIL %: 0.4 % (ref 0–0.43)
LYMPHOCYTES ABSOLUTE: 2.2 K/CU MM
LYMPHOCYTES RELATIVE PERCENT: 31.3 % (ref 24–44)
MCH RBC QN AUTO: 30.6 PG (ref 27–31)
MCHC RBC AUTO-ENTMCNC: 31.1 % (ref 32–36)
MCV RBC AUTO: 98.5 FL (ref 78–100)
MONOCYTES ABSOLUTE: 0.9 K/CU MM
MONOCYTES RELATIVE PERCENT: 12.5 % (ref 0–4)
NUCLEATED RBC %: 0 %
PDW BLD-RTO: 14.1 % (ref 11.7–14.9)
PLATELET # BLD: 353 K/CU MM (ref 140–440)
PMV BLD AUTO: 9.8 FL (ref 7.5–11.1)
POTASSIUM SERPL-SCNC: 4.4 MMOL/L (ref 3.5–5.1)
RBC # BLD: 4.57 M/CU MM (ref 4.2–5.4)
SEGMENTED NEUTROPHILS ABSOLUTE COUNT: 3 K/CU MM
SEGMENTED NEUTROPHILS RELATIVE PERCENT: 42.3 % (ref 36–66)
SODIUM BLD-SCNC: 139 MMOL/L (ref 135–145)
TOTAL IMMATURE NEUTOROPHIL: 0.03 K/CU MM
TOTAL NUCLEATED RBC: 0 K/CU MM
TOTAL PROTEIN: 6.4 GM/DL (ref 6.4–8.2)
WBC # BLD: 7.1 K/CU MM (ref 4–10.5)

## 2019-05-24 PROCEDURE — 85025 COMPLETE CBC W/AUTO DIFF WBC: CPT

## 2019-05-24 PROCEDURE — 36415 COLL VENOUS BLD VENIPUNCTURE: CPT

## 2019-05-24 PROCEDURE — 80053 COMPREHEN METABOLIC PANEL: CPT

## 2019-06-04 ENCOUNTER — OFFICE VISIT (OUTPATIENT)
Dept: INTERNAL MEDICINE CLINIC | Age: 84
End: 2019-06-04
Payer: MEDICARE

## 2019-06-04 VITALS
HEART RATE: 75 BPM | OXYGEN SATURATION: 96 % | BODY MASS INDEX: 21.25 KG/M2 | WEIGHT: 98.2 LBS | DIASTOLIC BLOOD PRESSURE: 70 MMHG | RESPIRATION RATE: 14 BRPM | SYSTOLIC BLOOD PRESSURE: 118 MMHG

## 2019-06-04 DIAGNOSIS — M54.6 ACUTE RIGHT-SIDED THORACIC BACK PAIN: Primary | ICD-10-CM

## 2019-06-04 DIAGNOSIS — I10 ESSENTIAL HYPERTENSION: ICD-10-CM

## 2019-06-04 PROBLEM — E87.6 HYPOKALEMIA: Status: RESOLVED | Noted: 2018-05-20 | Resolved: 2019-06-04

## 2019-06-04 PROCEDURE — 4040F PNEUMOC VAC/ADMIN/RCVD: CPT | Performed by: INTERNAL MEDICINE

## 2019-06-04 PROCEDURE — 1123F ACP DISCUSS/DSCN MKR DOCD: CPT | Performed by: INTERNAL MEDICINE

## 2019-06-04 PROCEDURE — 99213 OFFICE O/P EST LOW 20 MIN: CPT | Performed by: INTERNAL MEDICINE

## 2019-06-04 PROCEDURE — 1036F TOBACCO NON-USER: CPT | Performed by: INTERNAL MEDICINE

## 2019-06-04 PROCEDURE — G8427 DOCREV CUR MEDS BY ELIG CLIN: HCPCS | Performed by: INTERNAL MEDICINE

## 2019-06-04 PROCEDURE — G8420 CALC BMI NORM PARAMETERS: HCPCS | Performed by: INTERNAL MEDICINE

## 2019-06-04 PROCEDURE — 1090F PRES/ABSN URINE INCON ASSESS: CPT | Performed by: INTERNAL MEDICINE

## 2019-06-04 RX ORDER — AMLODIPINE BESYLATE 10 MG/1
10 TABLET ORAL DAILY
Qty: 30 TABLET | Refills: 5 | Status: SHIPPED | OUTPATIENT
Start: 2019-06-04 | End: 2019-11-14 | Stop reason: SDUPTHER

## 2019-06-04 NOTE — PROGRESS NOTES
Subjective:      Severino Luo is a 80 y.o. female who presents today for follow up on her chronic medical conditions as noted below. Patient Active Problem List:     Hypertension, essential     Osteoporosis     Hyperlipidemia     Osteoarthritis of knee     Basal cell carcinoma of left lower leg     T10 vertebral fracture (HCC)     Gait disturbance     Fracture of inferior pubic ramus, right, closed, initial encounter ([facility] [address]     Failure to thrive in adult     Hypokalemia     Closed nondisplaced fracture of right ischium with routine healing     She was last seen in April    Her home bp : 113/72, 143/95, 108/66, 103/69, 113/75  The patient is taking hypertensive medications compliantly without side effects. Denies chest pain, dyspnea, edema, or TIA's. She was hosp last April 2018 ewith fx pelvis  Has recovered well   Ambulatory with cane, very slowly    In grocery can push small cart  Drives limited manner  Daughter Billy Falcon helps a lot    The patient denies any symptoms of neurological impairment or TIA's; no amaurosis, diplopia, dysphasia, or unilateral disturbance of motor or sensory function. No loss of balance or vertigo. Mood and memory fine    Has some right low paradorsal upper lumbar pain  Recommended salonpas, massage tx , ice, tylenol    Current Outpatient Medications   Medication Sig Dispense Refill    amLODIPine (NORVASC) 10 MG tablet Take 1 tablet by mouth daily 30 tablet 5    alendronate (FOSAMAX) 70 MG tablet TAKE 1 TABLET ONCE A WEEK 30 MINUTES BEFORE FOOD, DRINK OR MEDS. STAY UPRIGHT.  4 tablet 5    magnesium oxide (MAG-OX) 400 (240 Mg) MG tablet TAKE 1 TABLET BY MOUTH DAILY 30 tablet 5    potassium chloride (MICRO-K) 10 MEQ extended release capsule Take 10 mEq by mouth daily      timolol (BETIMOL) 0.5 % ophthalmic solution 1 drop 2 times daily      vitamin D (CHOLECALCIFEROL) 1000 UNIT TABS tablet Take 1,000 Units by mouth daily      vitamin B-12 (CYANOCOBALAMIN) 500 MCG tablet Take 500 mcg by mouth daily      latanoprost (XALATAN) 0.005 % ophthalmic solution Place 1 drop into the right eye 2 times daily       calcium carbonate (OSCAL) 500 MG TABS tablet Take 500 mg by mouth 2 times daily        No current facility-administered medications for this visit. 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    Dr Ann Marie Sumner Hyperlipidemia     Hypertension     Osteoarthritis of knee 2011    Dr Cricket Thomason Osteoporosis 2002    Fosamax [phone]    Pelvic fracture ([address] 05/2018    Right rotator cuff tear 2014    Oj Kee / injection; PT    T10 vertebral fracture ([address] 02/2018    fall and fragility fx; also L1 ; had kyphoplasty 2/2018    Vitamin B12 deficiency 03/2017    B 12 266 by Oj Kee at [facility]        Social History     Tobacco Use    Smoking status: Never Smoker    Smokeless tobacco: Never Used   Substance Use Topics    Alcohol use: Yes     Comment: wine rarely        ROS: The patient has had no headache, sore throat, fever or chills, cough, dyspnea, chest pain, nausea, vomiting or diarrhea, or edema. Objective:      /70   Pulse 75   Resp 14   Wt 98 lb 3.2 oz (44.5 kg)   SpO2 96%   BMI 21.25 kg/m²      Physical Exam   Constitutional: She is oriented to person, place, and time. She appears well-developed and well-nourished. No distress. HENT:   Head: Normocephalic and atraumatic. Mouth/Throat: Oropharynx is clear and moist.   Eyes: Conjunctivae are normal. No scleral icterus. Neck: Neck supple. Cardiovascular: Normal rate and regular rhythm. No murmur heard. Pulmonary/Chest: Effort normal. No respiratory distress. She has no wheezes. She has no rales. Abdominal: Soft. She exhibits no distension. There is no tenderness. Musculoskeletal: Normal range of motion. Neurological: She is alert and oriented to person, place, and time.  Coordination normal.   Skin: Skin is warm and dry. Psychiatric: She has a normal mood and affect. Her behavior is normal.   Vitals reviewed. May lab on chart and rev'd and all normal     Assessment / Plan:          Encounter Diagnoses   Name Primary?     Acute right-sided thoracic back pain Yes    Essential hypertension          Plan   Massage tx, etc  Same meds  RTC 4 mo

## 2019-07-10 RX ORDER — MAGNESIUM OXIDE 400 MG/1
TABLET ORAL
Qty: 30 TABLET | Refills: 5 | Status: SHIPPED | OUTPATIENT
Start: 2019-07-10 | End: 2020-02-13

## 2019-08-22 RX ORDER — ALENDRONATE SODIUM 70 MG/1
TABLET ORAL
Qty: 4 TABLET | Refills: 5 | Status: SHIPPED | OUTPATIENT
Start: 2019-08-22 | End: 2020-04-29 | Stop reason: SDUPTHER

## 2019-10-08 ENCOUNTER — OFFICE VISIT (OUTPATIENT)
Dept: INTERNAL MEDICINE CLINIC | Age: 84
End: 2019-10-08
Payer: MEDICARE

## 2019-10-08 VITALS
WEIGHT: 100.4 LBS | HEIGHT: 56 IN | DIASTOLIC BLOOD PRESSURE: 76 MMHG | SYSTOLIC BLOOD PRESSURE: 136 MMHG | BODY MASS INDEX: 22.58 KG/M2 | HEART RATE: 78 BPM | OXYGEN SATURATION: 91 % | RESPIRATION RATE: 16 BRPM

## 2019-10-08 DIAGNOSIS — I10 HYPERTENSION, ESSENTIAL: ICD-10-CM

## 2019-10-08 DIAGNOSIS — E53.8 VITAMIN B12 DEFICIENCY: ICD-10-CM

## 2019-10-08 DIAGNOSIS — M81.0 OSTEOPOROSIS, UNSPECIFIED OSTEOPOROSIS TYPE, UNSPECIFIED PATHOLOGICAL FRACTURE PRESENCE: Primary | ICD-10-CM

## 2019-10-08 PROBLEM — R62.7 FAILURE TO THRIVE IN ADULT: Status: RESOLVED | Noted: 2018-05-18 | Resolved: 2019-10-08

## 2019-10-08 PROBLEM — C44.719 BASAL CELL CARCINOMA OF LEFT LOWER LEG: Status: RESOLVED | Noted: 2017-09-07 | Resolved: 2019-10-08

## 2019-10-08 PROCEDURE — 1090F PRES/ABSN URINE INCON ASSESS: CPT | Performed by: INTERNAL MEDICINE

## 2019-10-08 PROCEDURE — 1036F TOBACCO NON-USER: CPT | Performed by: INTERNAL MEDICINE

## 2019-10-08 PROCEDURE — 1123F ACP DISCUSS/DSCN MKR DOCD: CPT | Performed by: INTERNAL MEDICINE

## 2019-10-08 PROCEDURE — 99213 OFFICE O/P EST LOW 20 MIN: CPT | Performed by: INTERNAL MEDICINE

## 2019-10-08 PROCEDURE — 4040F PNEUMOC VAC/ADMIN/RCVD: CPT | Performed by: INTERNAL MEDICINE

## 2019-10-08 PROCEDURE — G8420 CALC BMI NORM PARAMETERS: HCPCS | Performed by: INTERNAL MEDICINE

## 2019-10-08 PROCEDURE — G8484 FLU IMMUNIZE NO ADMIN: HCPCS | Performed by: INTERNAL MEDICINE

## 2019-10-08 PROCEDURE — G8427 DOCREV CUR MEDS BY ELIG CLIN: HCPCS | Performed by: INTERNAL MEDICINE

## 2019-10-08 RX ORDER — POTASSIUM CHLORIDE 750 MG/1
10 CAPSULE, EXTENDED RELEASE ORAL DAILY
Qty: 30 CAPSULE | Refills: 5 | Status: SHIPPED | OUTPATIENT
Start: 2019-10-08 | End: 2020-04-13

## 2019-10-08 RX ORDER — POTASSIUM CHLORIDE 750 MG/1
TABLET, EXTENDED RELEASE ORAL
Qty: 30 TABLET | Refills: 12 | Status: SHIPPED | OUTPATIENT
Start: 2019-10-08 | End: 2019-10-08 | Stop reason: SDUPTHER

## 2019-11-14 RX ORDER — AMLODIPINE BESYLATE 10 MG/1
10 TABLET ORAL DAILY
Qty: 30 TABLET | Refills: 5 | Status: SHIPPED | OUTPATIENT
Start: 2019-11-14 | End: 2020-05-14 | Stop reason: SDUPTHER

## 2019-11-26 ENCOUNTER — HOSPITAL ENCOUNTER (OUTPATIENT)
Age: 84
Discharge: HOME | End: 2019-11-26
Payer: MEDICARE

## 2019-11-26 LAB
ALBUMIN SERPL-MCNC: 4.2 GM/DL (ref 3.4–5)
ALP BLD-CCNC: 59 IU/L (ref 40–129)
ALT SERPL-CCNC: 12 U/L (ref 10–40)
ANION GAP SERPL CALCULATED.3IONS-SCNC: 12 MMOL/L (ref 4–16)
AST SERPL-CCNC: 17 IU/L (ref 15–37)
BASOPHILS ABSOLUTE: 0 K/CU MM
BASOPHILS RELATIVE PERCENT: 0.6 % (ref 0–1)
BILIRUB SERPL-MCNC: 1.1 MG/DL (ref 0–1)
BUN BLDV-MCNC: 10 MG/DL (ref 6–23)
CALCIUM SERPL-MCNC: 9.9 MG/DL (ref 8.3–10.6)
CHLORIDE BLD-SCNC: 101 MMOL/L (ref 99–110)
CO2: 28 MMOL/L (ref 21–32)
CREAT SERPL-MCNC: 0.7 MG/DL (ref 0.6–1.1)
DIFFERENTIAL TYPE: ABNORMAL
EOSINOPHILS ABSOLUTE: 0.3 K/CU MM
EOSINOPHILS RELATIVE PERCENT: 4 % (ref 0–3)
GFR AFRICAN AMERICAN: >60 ML/MIN/1.73M2
GFR NON-AFRICAN AMERICAN: >60 ML/MIN/1.73M2
GLUCOSE BLD-MCNC: 101 MG/DL (ref 70–99)
HCT VFR BLD CALC: 46.4 % (ref 37–47)
HEMOGLOBIN: 14.6 GM/DL (ref 12.5–16)
IMMATURE NEUTROPHIL %: 0.3 % (ref 0–0.43)
LYMPHOCYTES ABSOLUTE: 2.3 K/CU MM
LYMPHOCYTES RELATIVE PERCENT: 36.7 % (ref 24–44)
MCH RBC QN AUTO: 30.9 PG (ref 27–31)
MCHC RBC AUTO-ENTMCNC: 31.5 % (ref 32–36)
MCV RBC AUTO: 98.3 FL (ref 78–100)
MONOCYTES ABSOLUTE: 0.9 K/CU MM
MONOCYTES RELATIVE PERCENT: 13.7 % (ref 0–4)
NUCLEATED RBC %: 0 %
PDW BLD-RTO: 13.7 % (ref 11.7–14.9)
PLATELET # BLD: 351 K/CU MM (ref 140–440)
PMV BLD AUTO: 10 FL (ref 7.5–11.1)
POTASSIUM SERPL-SCNC: 4.4 MMOL/L (ref 3.5–5.1)
RBC # BLD: 4.72 M/CU MM (ref 4.2–5.4)
SEGMENTED NEUTROPHILS ABSOLUTE COUNT: 2.8 K/CU MM
SEGMENTED NEUTROPHILS RELATIVE PERCENT: 44.7 % (ref 36–66)
SODIUM BLD-SCNC: 141 MMOL/L (ref 135–145)
TOTAL IMMATURE NEUTOROPHIL: 0.02 K/CU MM
TOTAL NUCLEATED RBC: 0 K/CU MM
TOTAL PROTEIN: 6.6 GM/DL (ref 6.4–8.2)
VITAMIN B-12: 883.2 PG/ML (ref 211–911)
VITAMIN D 25-HYDROXY: 41.5 NG/ML
WBC # BLD: 6.2 K/CU MM (ref 4–10.5)

## 2019-11-26 PROCEDURE — 80053 COMPREHEN METABOLIC PANEL: CPT

## 2019-11-26 PROCEDURE — 36415 COLL VENOUS BLD VENIPUNCTURE: CPT

## 2019-11-26 PROCEDURE — 82607 VITAMIN B-12: CPT

## 2019-11-26 PROCEDURE — 85025 COMPLETE CBC W/AUTO DIFF WBC: CPT

## 2019-11-26 PROCEDURE — 82306 VITAMIN D 25 HYDROXY: CPT

## 2020-01-09 ENCOUNTER — OFFICE VISIT (OUTPATIENT)
Dept: INTERNAL MEDICINE CLINIC | Age: 85
End: 2020-01-09
Payer: MEDICARE

## 2020-01-09 VITALS
WEIGHT: 102 LBS | BODY MASS INDEX: 22.87 KG/M2 | DIASTOLIC BLOOD PRESSURE: 76 MMHG | SYSTOLIC BLOOD PRESSURE: 118 MMHG | HEART RATE: 78 BPM

## 2020-01-09 PROCEDURE — G8482 FLU IMMUNIZE ORDER/ADMIN: HCPCS | Performed by: INTERNAL MEDICINE

## 2020-01-09 PROCEDURE — 4040F PNEUMOC VAC/ADMIN/RCVD: CPT | Performed by: INTERNAL MEDICINE

## 2020-01-09 PROCEDURE — 1090F PRES/ABSN URINE INCON ASSESS: CPT | Performed by: INTERNAL MEDICINE

## 2020-01-09 PROCEDURE — 1123F ACP DISCUSS/DSCN MKR DOCD: CPT | Performed by: INTERNAL MEDICINE

## 2020-01-09 PROCEDURE — 99213 OFFICE O/P EST LOW 20 MIN: CPT | Performed by: INTERNAL MEDICINE

## 2020-01-09 PROCEDURE — G8427 DOCREV CUR MEDS BY ELIG CLIN: HCPCS | Performed by: INTERNAL MEDICINE

## 2020-01-09 PROCEDURE — 1036F TOBACCO NON-USER: CPT | Performed by: INTERNAL MEDICINE

## 2020-01-09 PROCEDURE — G8420 CALC BMI NORM PARAMETERS: HCPCS | Performed by: INTERNAL MEDICINE

## 2020-01-09 NOTE — PROGRESS NOTES
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    Dr Fredrick Jeans Hyperlipidemia     Hypertension     Osteoarthritis of knee 2011    Dr Sonal Ramsey Osteoporosis 2002    Fosamax [id number]    Pelvic fracture ([address] 05/2018    Right rotator cuff tear 2014    Shira Zhu / injection; PT    T10 vertebral fracture ([address] 02/2018    fall and fragility fx; also L1 ; had kyphoplasty 2/2018    Vitamin B12 deficiency 03/2017    B 12 266 by Shira Zhu at [facility]        Social History     Tobacco Use    Smoking status: Never Smoker    Smokeless tobacco: Never Used   Substance Use Topics    Alcohol use: Yes     Comment: wine rarely        ROS: The patient has had no headache, sore throat, fever or chills, cough, dyspnea, chest pain, nausea, vomiting or diarrhea, or edema. Objective:      /76   Pulse 78   Wt 102 lb (46.3 kg)   BMI 22.87 kg/m²      Physical Exam  Vitals signs reviewed. Constitutional:       General: She is not in acute distress. Appearance: She is well-developed. HENT:      Head: Normocephalic and atraumatic. Mouth/Throat:      Mouth: Mucous membranes are moist.      Pharynx: Oropharynx is clear. Eyes:      General: No scleral icterus. Conjunctiva/sclera: Conjunctivae normal.   Neck:      Musculoskeletal: Neck supple. No muscular tenderness. Cardiovascular:      Rate and Rhythm: Normal rate and regular rhythm. Heart sounds: No murmur. Pulmonary:      Effort: Pulmonary effort is normal. No respiratory distress. Breath sounds: No wheezing or rales. Abdominal:      General: There is no distension. Palpations: Abdomen is soft. Tenderness: There is no tenderness. Musculoskeletal: Normal range of motion. Right lower leg: No edema. Left lower leg: No edema. Lymphadenopathy:      Cervical: No cervical adenopathy. Skin:     General: Skin is warm and dry.       Findings: No rash.   Neurological:      General: No focal deficit present. Mental Status: She is alert and oriented to person, place, and time. Coordination: Coordination normal.   Psychiatric:         Behavior: Behavior normal.            Assessment / Plan:      1. Hypertension, essential    2.  Osteoporosis, unspecified osteoporosis type, unspecified pathological fracture presence            Plan   Same meds  RTC 4 mo, lab first  Orders Placed This Encounter   Procedures    CBC Auto Differential    Comprehensive Metabolic Panel    MAGNESIUM     Handicap placard provided

## 2020-02-11 ENCOUNTER — HOSPITAL ENCOUNTER (OUTPATIENT)
Age: 85
Discharge: HOME | End: 2020-02-11
Payer: MEDICARE

## 2020-02-11 ENCOUNTER — OFFICE VISIT (OUTPATIENT)
Dept: FAMILY MEDICINE CLINIC | Age: 85
End: 2020-02-11
Payer: MEDICARE

## 2020-02-11 VITALS
TEMPERATURE: 98.2 F | WEIGHT: 99.2 LBS | OXYGEN SATURATION: 99 % | BODY MASS INDEX: 22.24 KG/M2 | DIASTOLIC BLOOD PRESSURE: 86 MMHG | HEART RATE: 90 BPM | SYSTOLIC BLOOD PRESSURE: 132 MMHG

## 2020-02-11 LAB
ALBUMIN SERPL-MCNC: 4.6 GM/DL (ref 3.4–5)
ALP BLD-CCNC: 59 IU/L (ref 40–128)
ALT SERPL-CCNC: 13 U/L (ref 10–40)
ANION GAP SERPL CALCULATED.3IONS-SCNC: 14 MMOL/L (ref 4–16)
AST SERPL-CCNC: 19 IU/L (ref 15–37)
BASOPHILS ABSOLUTE: 0 K/CU MM
BASOPHILS RELATIVE PERCENT: 0.5 % (ref 0–1)
BILIRUB SERPL-MCNC: 1 MG/DL (ref 0–1)
BUN BLDV-MCNC: 11 MG/DL (ref 6–23)
CALCIUM SERPL-MCNC: 10.1 MG/DL (ref 8.3–10.6)
CHLORIDE BLD-SCNC: 99 MMOL/L (ref 99–110)
CO2: 27 MMOL/L (ref 21–32)
CREAT SERPL-MCNC: 0.6 MG/DL (ref 0.6–1.1)
DIFFERENTIAL TYPE: ABNORMAL
EOSINOPHILS ABSOLUTE: 0.2 K/CU MM
EOSINOPHILS RELATIVE PERCENT: 1.7 % (ref 0–3)
GFR AFRICAN AMERICAN: >60 ML/MIN/1.73M2
GFR NON-AFRICAN AMERICAN: >60 ML/MIN/1.73M2
GLUCOSE BLD-MCNC: 114 MG/DL (ref 70–99)
HCT VFR BLD CALC: 45.8 % (ref 37–47)
HEMOGLOBIN: 14.6 GM/DL (ref 12.5–16)
IMMATURE NEUTROPHIL %: 0.3 % (ref 0–0.43)
LYMPHOCYTES ABSOLUTE: 2 K/CU MM
LYMPHOCYTES RELATIVE PERCENT: 22.6 % (ref 24–44)
MAGNESIUM: 2.2 MG/DL (ref 1.8–2.4)
MCH RBC QN AUTO: 31.7 PG (ref 27–31)
MCHC RBC AUTO-ENTMCNC: 31.9 % (ref 32–36)
MCV RBC AUTO: 99.6 FL (ref 78–100)
MONOCYTES ABSOLUTE: 0.8 K/CU MM
MONOCYTES RELATIVE PERCENT: 9.1 % (ref 0–4)
NUCLEATED RBC %: 0 %
PDW BLD-RTO: 13.5 % (ref 11.7–14.9)
PLATELET # BLD: 348 K/CU MM (ref 140–440)
PMV BLD AUTO: 9.6 FL (ref 7.5–11.1)
POTASSIUM SERPL-SCNC: 4.2 MMOL/L (ref 3.5–5.1)
RBC # BLD: 4.6 M/CU MM (ref 4.2–5.4)
SEGMENTED NEUTROPHILS ABSOLUTE COUNT: 5.7 K/CU MM
SEGMENTED NEUTROPHILS RELATIVE PERCENT: 65.8 % (ref 36–66)
SODIUM BLD-SCNC: 140 MMOL/L (ref 135–145)
TOTAL IMMATURE NEUTOROPHIL: 0.03 K/CU MM
TOTAL NUCLEATED RBC: 0 K/CU MM
TOTAL PROTEIN: 7.4 GM/DL (ref 6.4–8.2)
WBC # BLD: 8.6 K/CU MM (ref 4–10.5)

## 2020-02-11 PROCEDURE — 36415 COLL VENOUS BLD VENIPUNCTURE: CPT

## 2020-02-11 PROCEDURE — 99213 OFFICE O/P EST LOW 20 MIN: CPT | Performed by: NURSE PRACTITIONER

## 2020-02-11 PROCEDURE — G8427 DOCREV CUR MEDS BY ELIG CLIN: HCPCS | Performed by: NURSE PRACTITIONER

## 2020-02-11 PROCEDURE — G8482 FLU IMMUNIZE ORDER/ADMIN: HCPCS | Performed by: NURSE PRACTITIONER

## 2020-02-11 PROCEDURE — 85025 COMPLETE CBC W/AUTO DIFF WBC: CPT

## 2020-02-11 PROCEDURE — 83735 ASSAY OF MAGNESIUM: CPT

## 2020-02-11 PROCEDURE — 80053 COMPREHEN METABOLIC PANEL: CPT

## 2020-02-11 PROCEDURE — G8420 CALC BMI NORM PARAMETERS: HCPCS | Performed by: NURSE PRACTITIONER

## 2020-02-11 PROCEDURE — 1123F ACP DISCUSS/DSCN MKR DOCD: CPT | Performed by: NURSE PRACTITIONER

## 2020-02-11 PROCEDURE — 1036F TOBACCO NON-USER: CPT | Performed by: NURSE PRACTITIONER

## 2020-02-11 PROCEDURE — 93000 ELECTROCARDIOGRAM COMPLETE: CPT | Performed by: NURSE PRACTITIONER

## 2020-02-11 PROCEDURE — 4040F PNEUMOC VAC/ADMIN/RCVD: CPT | Performed by: NURSE PRACTITIONER

## 2020-02-11 PROCEDURE — 1090F PRES/ABSN URINE INCON ASSESS: CPT | Performed by: NURSE PRACTITIONER

## 2020-02-11 RX ORDER — TIMOLOL MALEATE 5 MG/ML
1 SOLUTION/ DROPS OPHTHALMIC DAILY
COMMUNITY
Start: 2020-02-05

## 2020-02-11 ASSESSMENT — ENCOUNTER SYMPTOMS
DIARRHEA: 0
ABDOMINAL PAIN: 0
SORE THROAT: 0
SHORTNESS OF BREATH: 1
CHANGE IN BOWEL HABIT: 0
NAUSEA: 0
WHEEZING: 0
COUGH: 0
VISUAL CHANGE: 0
CHEST TIGHTNESS: 0
SWOLLEN GLANDS: 0
VOMITING: 0

## 2020-02-11 NOTE — PROGRESS NOTES
Yeison Most   80 y.o.  female  [id number]      Chief Complaint   Patient presents with    Dizziness     lightheaded    Shortness of Breath        Subjective:  80 y. o.female is here for a follow up. She has the following chronic/acute medical problems:  Patient Active Problem List   Diagnosis    Hypertension, essential    Osteoporosis    Hyperlipidemia    Osteoarthritis of knee    [facility] [facility]    Gait disturbance    Fracture of inferior pubic ramus, right, closed, initial encounter ([facility] [facility]    Closed nondisplaced fracture of right ischium with routine healing       Patient is here with what she calls is having episodes over the last two weeks. She states she will have spells that cause dizziness, shortness of breath, and heart palpitation. Patient states it will go away. She states she had a episode last night. Dizziness   This is a new problem. The current episode started 1 to 4 weeks ago (off and on for two weeks). The problem occurs intermittently. The problem has been unchanged. Associated symptoms include fatigue and vertigo (dizziness). Pertinent negatives include no abdominal pain, anorexia, arthralgias, change in bowel habit, chest pain, chills, congestion, coughing, diaphoresis, fever, headaches, joint swelling, myalgias, nausea, neck pain, numbness, rash, sore throat, swollen glands, urinary symptoms, visual change, vomiting or weakness. Nothing aggravates the symptoms. She has tried nothing for the symptoms. Review of Systems   Constitutional: Positive for fatigue. Negative for appetite change, chills, diaphoresis and fever. HENT: Negative. Negative for congestion and sore throat. Respiratory: Positive for shortness of breath (only with the episodes will have shortness of breath). Negative for cough, chest tightness and wheezing. Cardiovascular: Positive for palpitations (with the episodes). Negative for chest pain.    Gastrointestinal: Negative for abdominal pain, anorexia, change in bowel habit, diarrhea, nausea and vomiting. Musculoskeletal: Negative for arthralgias, joint swelling, myalgias and neck pain. Skin: Negative for rash. Neurological: Positive for dizziness (only when she has episodes) and vertigo (dizziness). Negative for weakness, numbness and headaches. Current Outpatient Medications   Medication Sig Dispense Refill    timolol (TIMOPTIC) 0.5 % ophthalmic solution       amLODIPine (NORVASC) 10 MG tablet TAKE 1 TABLET BY MOUTH DAILY 30 tablet 5    potassium chloride (MICRO-K) 10 MEQ extended release capsule Take 1 capsule by mouth daily 30 capsule 5    alendronate (FOSAMAX) 70 MG tablet TAKE 1 TABLET ONCE A WEEK 30 MINUTES BEFORE FOOD, DRINK OR MEDS. STAY UPRIGHT. 4 tablet 5    magnesium oxide (MAG-OX) 400 MG tablet TAKE 1 TABLET BY MOUTH DAILY 30 tablet 5    vitamin D (CHOLECALCIFEROL) 1000 UNIT TABS tablet Take 1,000 Units by mouth daily      vitamin B-12 (CYANOCOBALAMIN) 500 MCG tablet Take 500 mcg by mouth daily      latanoprost (XALATAN) 0.005 % ophthalmic solution Place 1 drop into the right eye 2 times daily       calcium carbonate (OSCAL) 500 MG TABS tablet Take 500 mg by mouth 2 times daily        No current facility-administered medications for this visit. Past medical, family,surgical history reviewed today. Objective:  /86 (Position: Standing)   Pulse 90   Temp 98.2 °F (36.8 °C) (Oral)   Wt 99 lb 3.2 oz (45 kg)   SpO2 99%   BMI 22.24 kg/m²   BP Readings from Last 3 Encounters:   02/11/20 132/86   01/09/20 118/76   10/08/19 136/76     Wt Readings from Last 3 Encounters:   02/11/20 99 lb 3.2 oz (45 kg)   01/09/20 102 lb (46.3 kg)   10/08/19 100 lb 6.4 oz (45.5 kg)         Physical Exam  Constitutional:       Appearance: Normal appearance. HENT:      Head: Normocephalic. Neck:      Musculoskeletal: Neck supple. Cardiovascular:      Rate and Rhythm: Normal rate and regular rhythm.       Heart sounds: Normal Dizziness  See above. - EKG 12 Lead  - CBC WITH AUTO DIFFERENTIAL; Future  - COMPREHENSIVE METABOLIC PANEL; Future  - MAGNESIUM; Future    3. Shortness of breath  See above. - EKG 12 Lead  - CBC WITH AUTO DIFFERENTIAL; Future  - COMPREHENSIVE METABOLIC PANEL; Future  - MAGNESIUM; Future        No orders of the defined types were placed in this encounter. Medications Discontinued During This Encounter   Medication Reason    timolol (BETIMOL) 0.5 % ophthalmic solution DUPLICATE           Care discussed with patient. Questions answered. Patient verbalizes understanding and agrees with plan. After visit summary provided. Advised to call for any problems, questions, or concerns. Return if symptoms worsen or fail to improve.                                            Signed:  CHULA Mckeon CNP  02/11/20  10:37 AM

## 2020-02-13 RX ORDER — LANOLIN ALCOHOL/MO/W.PET/CERES
CREAM (GRAM) TOPICAL
Qty: 30 TABLET | Refills: 5 | Status: SHIPPED | OUTPATIENT
Start: 2020-02-13 | End: 2020-08-24

## 2020-04-13 RX ORDER — POTASSIUM CHLORIDE 750 MG/1
TABLET, EXTENDED RELEASE ORAL
Qty: 30 TABLET | Refills: 5 | Status: SHIPPED | OUTPATIENT
Start: 2020-04-13 | End: 2020-04-29 | Stop reason: SDUPTHER

## 2020-04-28 ENCOUNTER — HOSPITAL ENCOUNTER (OUTPATIENT)
Age: 85
Discharge: HOME | End: 2020-04-28
Payer: MEDICARE

## 2020-04-28 LAB
ALBUMIN SERPL-MCNC: 4.3 GM/DL (ref 3.4–5)
ALP BLD-CCNC: 57 IU/L (ref 40–129)
ALT SERPL-CCNC: 10 U/L (ref 10–40)
ANION GAP SERPL CALCULATED.3IONS-SCNC: 11 MMOL/L (ref 4–16)
AST SERPL-CCNC: 16 IU/L (ref 15–37)
BASOPHILS ABSOLUTE: 0.1 K/CU MM
BASOPHILS RELATIVE PERCENT: 0.7 % (ref 0–1)
BILIRUB SERPL-MCNC: 0.8 MG/DL (ref 0–1)
BUN BLDV-MCNC: 14 MG/DL (ref 6–23)
CALCIUM SERPL-MCNC: 9.8 MG/DL (ref 8.3–10.6)
CHLORIDE BLD-SCNC: 103 MMOL/L (ref 99–110)
CO2: 28 MMOL/L (ref 21–32)
CREAT SERPL-MCNC: 0.7 MG/DL (ref 0.6–1.1)
DIFFERENTIAL TYPE: ABNORMAL
EOSINOPHILS ABSOLUTE: 0.3 K/CU MM
EOSINOPHILS RELATIVE PERCENT: 4.2 % (ref 0–3)
GFR AFRICAN AMERICAN: >60 ML/MIN/1.73M2
GFR NON-AFRICAN AMERICAN: >60 ML/MIN/1.73M2
GLUCOSE BLD-MCNC: 96 MG/DL (ref 70–99)
HCT VFR BLD CALC: 44.7 % (ref 37–47)
HEMOGLOBIN: 14.2 GM/DL (ref 12.5–16)
IMMATURE NEUTROPHIL %: 0.3 % (ref 0–0.43)
LYMPHOCYTES ABSOLUTE: 2.3 K/CU MM
LYMPHOCYTES RELATIVE PERCENT: 34.4 % (ref 24–44)
MAGNESIUM: 2.2 MG/DL (ref 1.8–2.4)
MCH RBC QN AUTO: 31.1 PG (ref 27–31)
MCHC RBC AUTO-ENTMCNC: 31.8 % (ref 32–36)
MCV RBC AUTO: 97.8 FL (ref 78–100)
MONOCYTES ABSOLUTE: 0.8 K/CU MM
MONOCYTES RELATIVE PERCENT: 12 % (ref 0–4)
NUCLEATED RBC %: 0 %
PDW BLD-RTO: 13.7 % (ref 11.7–14.9)
PLATELET # BLD: 366 K/CU MM (ref 140–440)
PMV BLD AUTO: 10.1 FL (ref 7.5–11.1)
POTASSIUM SERPL-SCNC: 4.1 MMOL/L (ref 3.5–5.1)
RBC # BLD: 4.57 M/CU MM (ref 4.2–5.4)
SEGMENTED NEUTROPHILS ABSOLUTE COUNT: 3.2 K/CU MM
SEGMENTED NEUTROPHILS RELATIVE PERCENT: 48.4 % (ref 36–66)
SODIUM BLD-SCNC: 142 MMOL/L (ref 135–145)
TOTAL IMMATURE NEUTOROPHIL: 0.02 K/CU MM
TOTAL NUCLEATED RBC: 0 K/CU MM
TOTAL PROTEIN: 7 GM/DL (ref 6.4–8.2)
WBC # BLD: 6.7 K/CU MM (ref 4–10.5)

## 2020-04-28 PROCEDURE — 36415 COLL VENOUS BLD VENIPUNCTURE: CPT

## 2020-04-28 PROCEDURE — 83735 ASSAY OF MAGNESIUM: CPT

## 2020-04-28 PROCEDURE — 80053 COMPREHEN METABOLIC PANEL: CPT

## 2020-04-28 PROCEDURE — 85025 COMPLETE CBC W/AUTO DIFF WBC: CPT

## 2020-04-29 RX ORDER — POTASSIUM CHLORIDE 750 MG/1
TABLET, EXTENDED RELEASE ORAL
Qty: 30 TABLET | Refills: 5 | Status: SHIPPED | OUTPATIENT
Start: 2020-04-29 | End: 2020-10-19

## 2020-04-29 RX ORDER — ALENDRONATE SODIUM 70 MG/1
TABLET ORAL
Qty: 4 TABLET | Refills: 5 | Status: SHIPPED | OUTPATIENT
Start: 2020-04-29 | End: 2020-12-17

## 2020-05-14 RX ORDER — AMLODIPINE BESYLATE 10 MG/1
10 TABLET ORAL DAILY
Qty: 30 TABLET | Refills: 5 | Status: SHIPPED | OUTPATIENT
Start: 2020-05-14 | End: 2020-11-09

## 2020-07-30 ENCOUNTER — OFFICE VISIT (OUTPATIENT)
Dept: INTERNAL MEDICINE CLINIC | Age: 85
End: 2020-07-30
Payer: MEDICARE

## 2020-07-30 VITALS
WEIGHT: 101 LBS | HEART RATE: 72 BPM | HEIGHT: 55 IN | DIASTOLIC BLOOD PRESSURE: 70 MMHG | BODY MASS INDEX: 23.37 KG/M2 | SYSTOLIC BLOOD PRESSURE: 120 MMHG | OXYGEN SATURATION: 96 % | TEMPERATURE: 97.4 F

## 2020-07-30 PROCEDURE — 1090F PRES/ABSN URINE INCON ASSESS: CPT | Performed by: FAMILY MEDICINE

## 2020-07-30 PROCEDURE — 3288F FALL RISK ASSESSMENT DOCD: CPT | Performed by: FAMILY MEDICINE

## 2020-07-30 PROCEDURE — 1123F ACP DISCUSS/DSCN MKR DOCD: CPT | Performed by: FAMILY MEDICINE

## 2020-07-30 PROCEDURE — 4040F PNEUMOC VAC/ADMIN/RCVD: CPT | Performed by: FAMILY MEDICINE

## 2020-07-30 PROCEDURE — G8427 DOCREV CUR MEDS BY ELIG CLIN: HCPCS | Performed by: FAMILY MEDICINE

## 2020-07-30 PROCEDURE — 99214 OFFICE O/P EST MOD 30 MIN: CPT | Performed by: FAMILY MEDICINE

## 2020-07-30 PROCEDURE — G8420 CALC BMI NORM PARAMETERS: HCPCS | Performed by: FAMILY MEDICINE

## 2020-07-30 PROCEDURE — G8510 SCR DEP NEG, NO PLAN REQD: HCPCS | Performed by: FAMILY MEDICINE

## 2020-07-30 PROCEDURE — 1036F TOBACCO NON-USER: CPT | Performed by: FAMILY MEDICINE

## 2020-07-30 RX ORDER — MULTIVIT,IRON,MINERALS/LUTEIN
1 TABLET ORAL DAILY
COMMUNITY

## 2020-07-30 ASSESSMENT — ENCOUNTER SYMPTOMS
SHORTNESS OF BREATH: 0
SORE THROAT: 0
CHEST TIGHTNESS: 0
ABDOMINAL PAIN: 0
CONSTIPATION: 0
VOMITING: 0
ROS SKIN COMMENTS: LESION ON SCALP
DIARRHEA: 0

## 2020-07-30 ASSESSMENT — PATIENT HEALTH QUESTIONNAIRE - PHQ9
SUM OF ALL RESPONSES TO PHQ QUESTIONS 1-9: 0
2. FEELING DOWN, DEPRESSED OR HOPELESS: 0
1. LITTLE INTEREST OR PLEASURE IN DOING THINGS: 0
SUM OF ALL RESPONSES TO PHQ QUESTIONS 1-9: 0
SUM OF ALL RESPONSES TO PHQ9 QUESTIONS 1 & 2: 0

## 2020-07-30 NOTE — PROGRESS NOTES
Subjective:      Chief Complaint   [facility]   [facility]     previous Dr. Ronnie Rose Hypertension       HPI:  Herlinda Larson is a 80 y.o. female who presents today to establish care with new provider and for management of chronic medical conditions as below. HTN:  Brought BP log today- 110's/70's. Asymptomatic. States her hairdresser noticed a lesion on her scalp and recommended she get it evaluated. Otherwise no concerns, feeling well today. Not being followed by any specialists. Labs reviewed. 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    Dr Suad Lu Hyperlipidemia     Hypertension     Osteoarthritis of knee 2011    Dr Ed Goodell Osteoporosis 2002    Fosamax [phone]    Pelvic fracture ([facility] [address] 05/2018    Right rotator cuff tear 2014    Sandra Moraes / injection; PT    T10 vertebral fracture ([facility] [address] 02/2018    fall and fragility fx; also L1 ; had kyphoplasty 2/2018    Vitamin B12 deficiency 03/2017    B 12 266 by Sandra Moraes at [facility]        Past Surgical History:   Procedure Laterality Date   [address]    With Lysis of Adhesions   [facility]     w/  rt.uso    [address]    w/ rectocele    FIXATION KYPHOPLASTY  02/19/2018    T10 & L1    HYSTERECTOMY  1969     remaining ovary removed also    SKIN CANCER EXCISION Left 09/2017    BCC with skin graft    WRIST FRACTURE SURGERY Left 03/2017    ORIF       Social History     Tobacco Use    Smoking status: Never Smoker    Smokeless tobacco: Never Used   Substance Use Topics    Alcohol use: Yes     Comment: wine rarely        Review of Systems   Constitutional: Negative for activity change, appetite change, chills, fever and unexpected weight change. HENT: Negative for congestion and sore throat.     Respiratory: Negative for chest tightness and shortness of breath. Cardiovascular: Negative for chest pain and palpitations. Gastrointestinal: Negative for abdominal pain, constipation, diarrhea and vomiting. Genitourinary: Negative for dysuria. Skin:        Lesion on scalp   Neurological: Negative for dizziness and light-headedness. Psychiatric/Behavioral: Negative for dysphoric mood. The patient is not nervous/anxious. Prior to Visit Medications    Medication Sig Taking? Authorizing Provider   Calcium Carb-Cholecalciferol (CALCIUM-VITAMIN D3) 600-400 MG-UNIT TABS Take 1 tablet by mouth daily Yes Historical Provider, MD   amLODIPine (NORVASC) 10 MG tablet Take 1 tablet by mouth daily Yes Chet Pruitt MD   alendronate (FOSAMAX) 70 MG tablet TAKE 1 TABLET ONCE A WEEK 30 MINUTES BEFORE FOOD, DRINK OR MEDS. STAY UPRIGHT. Yes Aliza Moe MD   potassium chloride (KLOR-CON M) 10 MEQ extended release tablet TAKE 1 TABLET BY MOUTH DAILY Yes Aliza Moe MD   magnesium oxide (MAG-OX) 400 (240 Mg) MG tablet TAKE 1 TABLET BY MOUTH DAILY Yes Aliza Moe MD   timolol (TIMOPTIC) 0.5 % ophthalmic solution  Yes Historical Provider, MD   vitamin B-12 (CYANOCOBALAMIN) 500 MCG tablet Take 500 mcg by mouth daily Yes Historical Provider, MD   latanoprost (XALATAN) 0.005 % ophthalmic solution Place 1 drop into the right eye 2 times daily  Yes Historical Provider, MD   vitamin D (CHOLECALCIFEROL) 1000 UNIT TABS tablet Take 1,000 Units by mouth daily  Historical Provider, MD          Objective:      /70   Pulse 72   Temp 97.4 °F (36.3 °C)   Ht 4' 7\" (1.397 m)   Wt 101 lb (45.8 kg)   SpO2 96%   Breastfeeding No   BMI 23.47 kg/m²      Physical Exam  Vitals signs and nursing note reviewed. Constitutional:       General: She is not in acute distress. Appearance: Normal appearance. She is not ill-appearing or toxic-appearing. HENT:      Head: Normocephalic and atraumatic.       Right Ear: External ear normal.      Left Ear: External ear normal.      Nose: Nose normal.      Mouth/Throat:      Pharynx: Oropharynx is clear. Eyes:      General: No scleral icterus. Right eye: No discharge. Left eye: No discharge. Extraocular Movements: Extraocular movements intact. Conjunctiva/sclera: Conjunctivae normal.   Neck:      Musculoskeletal: Normal range of motion and neck supple. No neck rigidity. Cardiovascular:      Rate and Rhythm: Normal rate and regular rhythm. Heart sounds: Normal heart sounds. Pulmonary:      Effort: Pulmonary effort is normal.      Breath sounds: Normal breath sounds. No wheezing or rales. Musculoskeletal:         General: No deformity. Skin:     General: Skin is warm and dry. Findings: Lesion (2x3mm dark, raised nevus on scalp with irregular borders) present. No rash. Neurological:      General: No focal deficit present. Mental Status: She is alert. Mental status is at baseline. Motor: No weakness. Psychiatric:         Mood and Affect: Mood normal.         Behavior: Behavior normal.            Assessment / Plan:      1. Hypertension, essential  Stable, well controlled. Continue current meds. - Comprehensive Metabolic Panel; Future  - CBC Auto Differential; Future    2. Mixed hyperlipidemia  - Lipid Panel; Future    3. Skin lesion of scalp  Dark irregular nevus on scalp, will refer to dermatology for biopsy. - Amb External Referral To Dermatology          Patient voiced understanding and agreement with plan. All questions/concerns were addressed, risks/side effects of medications were reviewed. Return precautions and after visit summary were provided. Return in about 3 months (around 10/30/2020). or earlier as needed.       Claudia Tam MD

## 2020-08-24 RX ORDER — LANOLIN ALCOHOL/MO/W.PET/CERES
CREAM (GRAM) TOPICAL
Qty: 30 TABLET | Refills: 5 | Status: SHIPPED | OUTPATIENT
Start: 2020-08-24

## 2020-09-30 ENCOUNTER — HOSPITAL ENCOUNTER (OUTPATIENT)
Age: 85
Discharge: HOME | End: 2020-09-30
Payer: MEDICARE

## 2020-09-30 LAB
ALBUMIN SERPL-MCNC: 4.4 GM/DL (ref 3.4–5)
ALP BLD-CCNC: 57 IU/L (ref 40–128)
ALT SERPL-CCNC: 11 U/L (ref 10–40)
ANION GAP SERPL CALCULATED.3IONS-SCNC: 9 MMOL/L (ref 4–16)
AST SERPL-CCNC: 18 IU/L (ref 15–37)
BASOPHILS ABSOLUTE: 0 K/CU MM
BASOPHILS RELATIVE PERCENT: 0.6 % (ref 0–1)
BILIRUB SERPL-MCNC: 0.7 MG/DL (ref 0–1)
BUN BLDV-MCNC: 16 MG/DL (ref 6–23)
CALCIUM SERPL-MCNC: 10.2 MG/DL (ref 8.3–10.6)
CHLORIDE BLD-SCNC: 103 MMOL/L (ref 99–110)
CHOLESTEROL: 278 MG/DL
CO2: 29 MMOL/L (ref 21–32)
CREAT SERPL-MCNC: 0.7 MG/DL (ref 0.6–1.1)
DIFFERENTIAL TYPE: ABNORMAL
EOSINOPHILS ABSOLUTE: 0.3 K/CU MM
EOSINOPHILS RELATIVE PERCENT: 3.9 % (ref 0–3)
GFR AFRICAN AMERICAN: >60 ML/MIN/1.73M2
GFR NON-AFRICAN AMERICAN: >60 ML/MIN/1.73M2
GLUCOSE BLD-MCNC: 108 MG/DL (ref 70–99)
HCT VFR BLD CALC: 44.8 % (ref 37–47)
HDLC SERPL-MCNC: 89 MG/DL
HEMOGLOBIN: 14.5 GM/DL (ref 12.5–16)
IMMATURE NEUTROPHIL %: 0.3 % (ref 0–0.43)
LDL CHOLESTEROL DIRECT: 172 MG/DL
LYMPHOCYTES ABSOLUTE: 2.5 K/CU MM
LYMPHOCYTES RELATIVE PERCENT: 35.1 % (ref 24–44)
MCH RBC QN AUTO: 31.1 PG (ref 27–31)
MCHC RBC AUTO-ENTMCNC: 32.4 % (ref 32–36)
MCV RBC AUTO: 96.1 FL (ref 78–100)
MONOCYTES ABSOLUTE: 0.8 K/CU MM
MONOCYTES RELATIVE PERCENT: 10.9 % (ref 0–4)
NUCLEATED RBC %: 0 %
PDW BLD-RTO: 14.4 % (ref 11.7–14.9)
PLATELET # BLD: 344 K/CU MM (ref 140–440)
PMV BLD AUTO: 9.8 FL (ref 7.5–11.1)
POTASSIUM SERPL-SCNC: 4.2 MMOL/L (ref 3.5–5.1)
RBC # BLD: 4.66 M/CU MM (ref 4.2–5.4)
SEGMENTED NEUTROPHILS ABSOLUTE COUNT: 3.5 K/CU MM
SEGMENTED NEUTROPHILS RELATIVE PERCENT: 49.2 % (ref 36–66)
SODIUM BLD-SCNC: 141 MMOL/L (ref 135–145)
TOTAL IMMATURE NEUTOROPHIL: 0.02 K/CU MM
TOTAL NUCLEATED RBC: 0 K/CU MM
TOTAL PROTEIN: 7.1 GM/DL (ref 6.4–8.2)
TRIGL SERPL-MCNC: 81 MG/DL
WBC # BLD: 7.2 K/CU MM (ref 4–10.5)

## 2020-09-30 PROCEDURE — 80053 COMPREHEN METABOLIC PANEL: CPT

## 2020-09-30 PROCEDURE — 85025 COMPLETE CBC W/AUTO DIFF WBC: CPT

## 2020-09-30 PROCEDURE — 80061 LIPID PANEL: CPT

## 2020-09-30 PROCEDURE — 36415 COLL VENOUS BLD VENIPUNCTURE: CPT

## 2020-09-30 PROCEDURE — 83721 ASSAY OF BLOOD LIPOPROTEIN: CPT

## 2020-10-19 RX ORDER — POTASSIUM CHLORIDE 750 MG/1
TABLET, EXTENDED RELEASE ORAL
Qty: 30 TABLET | Refills: 5 | Status: SHIPPED | OUTPATIENT
Start: 2020-10-19 | End: 2021-03-22

## 2020-10-20 ENCOUNTER — OFFICE VISIT (OUTPATIENT)
Dept: INTERNAL MEDICINE CLINIC | Age: 85
End: 2020-10-20
Payer: MEDICARE

## 2020-10-20 VITALS
OXYGEN SATURATION: 97 % | DIASTOLIC BLOOD PRESSURE: 80 MMHG | BODY MASS INDEX: 24.59 KG/M2 | HEART RATE: 78 BPM | TEMPERATURE: 96.9 F | WEIGHT: 105.8 LBS | SYSTOLIC BLOOD PRESSURE: 130 MMHG

## 2020-10-20 PROCEDURE — 1090F PRES/ABSN URINE INCON ASSESS: CPT | Performed by: FAMILY MEDICINE

## 2020-10-20 PROCEDURE — 4040F PNEUMOC VAC/ADMIN/RCVD: CPT | Performed by: FAMILY MEDICINE

## 2020-10-20 PROCEDURE — G8427 DOCREV CUR MEDS BY ELIG CLIN: HCPCS | Performed by: FAMILY MEDICINE

## 2020-10-20 PROCEDURE — 99214 OFFICE O/P EST MOD 30 MIN: CPT | Performed by: FAMILY MEDICINE

## 2020-10-20 PROCEDURE — 1036F TOBACCO NON-USER: CPT | Performed by: FAMILY MEDICINE

## 2020-10-20 PROCEDURE — G8484 FLU IMMUNIZE NO ADMIN: HCPCS | Performed by: FAMILY MEDICINE

## 2020-10-20 PROCEDURE — G8420 CALC BMI NORM PARAMETERS: HCPCS | Performed by: FAMILY MEDICINE

## 2020-10-20 PROCEDURE — 1123F ACP DISCUSS/DSCN MKR DOCD: CPT | Performed by: FAMILY MEDICINE

## 2020-10-20 ASSESSMENT — ENCOUNTER SYMPTOMS
SHORTNESS OF BREATH: 0
DIARRHEA: 0
ABDOMINAL PAIN: 0
CONSTIPATION: 0
COLOR CHANGE: 0
SORE THROAT: 0
CHEST TIGHTNESS: 0
VOMITING: 0

## 2020-10-20 NOTE — PROGRESS NOTES
pain, constipation, diarrhea and vomiting. Genitourinary: Negative for dysuria. Skin: Negative for color change. Neurological: Negative for dizziness and light-headedness. Psychiatric/Behavioral: Negative for dysphoric mood. The patient is not nervous/anxious. Prior to Visit Medications    Medication Sig Taking? Authorizing Provider   potassium chloride (KLOR-CON M) 10 MEQ extended release tablet TAKE 1 TABLET BY MOUTH DAILY Yes Avani Hopper MD   magnesium oxide (MAG-OX) 400 (240 Mg) MG tablet TAKE 1 TABLET BY MOUTH DAILY Yes Avani Hopper MD   Calcium Carb-Cholecalciferol (CALCIUM-VITAMIN D3) 600-400 MG-UNIT TABS Take 1 tablet by mouth daily Yes Historical Provider, MD   amLODIPine (NORVASC) 10 MG tablet Take 1 tablet by mouth daily Yes Avani Hopper MD   alendronate (FOSAMAX) 70 MG tablet TAKE 1 TABLET ONCE A WEEK 30 MINUTES BEFORE FOOD, DRINK OR MEDS. STAY UPRIGHT. Yes Kourtney Gomez MD   timolol (TIMOPTIC) 0.5 % ophthalmic solution  Yes Historical Provider, MD   vitamin D (CHOLECALCIFEROL) 1000 UNIT TABS tablet Take 1,000 Units by mouth daily Yes Historical Provider, MD   vitamin B-12 (CYANOCOBALAMIN) 500 MCG tablet Take 500 mcg by mouth daily Yes Historical Provider, MD   latanoprost (XALATAN) 0.005 % ophthalmic solution Place 1 drop into the right eye 2 times daily  Yes Historical Provider, MD          Objective:      /80   Pulse 78   Temp 96.9 °F (36.1 °C)   Wt 105 lb 12.8 oz (48 kg)   SpO2 97%   BMI 24.59 kg/m²      Physical Exam  Vitals signs and nursing note reviewed. Constitutional:       General: She is not in acute distress. Appearance: Normal appearance. She is not ill-appearing or toxic-appearing. HENT:      Head: Normocephalic and atraumatic. Right Ear: External ear normal.      Left Ear: External ear normal.      Nose: Nose normal.      Mouth/Throat:      Pharynx: Oropharynx is clear. Eyes:      General: No scleral icterus. Right eye: No discharge. Left eye: No discharge. Extraocular Movements: Extraocular movements intact. Conjunctiva/sclera: Conjunctivae normal.   Neck:      Musculoskeletal: Normal range of motion and neck supple. No neck rigidity. Cardiovascular:      Rate and Rhythm: Normal rate and regular rhythm. Heart sounds: Normal heart sounds. Pulmonary:      Effort: Pulmonary effort is normal.      Breath sounds: Normal breath sounds. No wheezing or rales. Musculoskeletal:         General: No deformity. Skin:     General: Skin is warm and dry. Findings: No rash. Neurological:      General: No focal deficit present. Mental Status: She is alert. Mental status is at baseline. Motor: No weakness. Psychiatric:         Mood and Affect: Mood normal.         Behavior: Behavior normal.            Assessment / Plan:      1. Hypertension, essential  Stable, well controlled. Continue current medications. 2. Mixed hyperlipidemia  10 year ASCVD risk elevated but given age and history of side effects with statins, patient would prefer to stay off of medication for now. Will continue to monitor. Patient voiced understanding and agreement with plan. All questions/concerns were addressed, risks/side effects of medications were reviewed. Return precautions and after visit summary were provided. Return in about 3 months (around 1/20/2021). or earlier as needed.       Regina Belcher MD

## 2020-11-09 RX ORDER — AMLODIPINE BESYLATE 10 MG/1
10 TABLET ORAL DAILY
Qty: 30 TABLET | Refills: 5 | Status: SHIPPED | OUTPATIENT
Start: 2020-11-09 | End: 2021-05-03

## 2020-12-17 RX ORDER — ALENDRONATE SODIUM 70 MG/1
TABLET ORAL
Qty: 4 TABLET | Refills: 5 | Status: SHIPPED | OUTPATIENT
Start: 2020-12-17 | End: 2021-10-14

## 2021-01-25 ENCOUNTER — VIRTUAL VISIT (OUTPATIENT)
Dept: INTERNAL MEDICINE CLINIC | Age: 86
End: 2021-01-25
Payer: MEDICARE

## 2021-01-25 VITALS
SYSTOLIC BLOOD PRESSURE: 112 MMHG | BODY MASS INDEX: 24.3 KG/M2 | WEIGHT: 105 LBS | DIASTOLIC BLOOD PRESSURE: 65 MMHG | HEART RATE: 75 BPM | HEIGHT: 55 IN

## 2021-01-25 DIAGNOSIS — Z00.00 ROUTINE GENERAL MEDICAL EXAMINATION AT A HEALTH CARE FACILITY: Primary | ICD-10-CM

## 2021-01-25 PROCEDURE — G0438 PPPS, INITIAL VISIT: HCPCS | Performed by: FAMILY MEDICINE

## 2021-01-25 PROCEDURE — 4040F PNEUMOC VAC/ADMIN/RCVD: CPT | Performed by: FAMILY MEDICINE

## 2021-01-25 PROCEDURE — 1123F ACP DISCUSS/DSCN MKR DOCD: CPT | Performed by: FAMILY MEDICINE

## 2021-01-25 ASSESSMENT — PATIENT HEALTH QUESTIONNAIRE - PHQ9
SUM OF ALL RESPONSES TO PHQ QUESTIONS 1-9: 0
2. FEELING DOWN, DEPRESSED OR HOPELESS: 0
1. LITTLE INTEREST OR PLEASURE IN DOING THINGS: 0

## 2021-01-25 ASSESSMENT — LIFESTYLE VARIABLES: HOW OFTEN DO YOU HAVE A DRINK CONTAINING ALCOHOL: 0

## 2021-01-25 NOTE — PROGRESS NOTES
Medicare Annual Wellness Visit  Name: Zarina Metzger Date: 2021   MRN: [id number] Sex: Female   Age: 80 y.o. Ethnicity: Non-/Non    : 6/15/1927 Race: Carline Holley is here for Medicare AWV    Screenings for behavioral, psychosocial and functional/safety risks, and cognitive dysfunction are all negative except as indicated below. These results, as well as other patient data from the [address] form, are documented in Flowsheets linked to this Encounter. Allergies   Allergen Reactions    Iodides Hives    Shrimp Flavor Hives       Prior to Visit Medications    Medication Sig Taking? Authorizing Provider   alendronate (FOSAMAX) 70 MG tablet TAKE 1 TABLET BY MOUTH ONCE A WEEK *TAKE 30 MINUTES BEFORE FOOD, DRINK, OR MEDS.  STAY UPRIGHT* Yes Jasiel Ayala MD   amLODIPine (NORVASC) 10 MG tablet TAKE 1 TABLET BY MOUTH DAILY Yes Jasiel Ayala MD   potassium chloride (KLOR-CON M) 10 MEQ extended release tablet TAKE 1 TABLET BY MOUTH DAILY Yes Jasiel Ayala MD   magnesium oxide (MAG-OX) 400 (240 Mg) MG tablet TAKE 1 TABLET BY MOUTH DAILY Yes Jasiel Ayala MD   Calcium Carb-Cholecalciferol (CALCIUM-VITAMIN D3) 600-400 MG-UNIT TABS Take 1 tablet by mouth daily Yes Historical Provider, MD   timolol (TIMOPTIC) 0.5 % ophthalmic solution  Yes Historical Provider, MD   vitamin D (CHOLECALCIFEROL) 1000 UNIT TABS tablet Take 1,000 Units by mouth daily Yes Historical Provider, MD   vitamin B-12 (CYANOCOBALAMIN) 500 MCG tablet Take 500 mcg by mouth daily Yes Historical Provider, MD   latanoprost (XALATAN) 0.005 % ophthalmic solution Place 1 drop into the right eye 2 times daily  Yes Historical Provider, MD       Past Medical History:   Diagnosis Date    BCC (basal cell carcinoma), leg 2017    Family history of pancreatic cancer     Glaucoma of right eye 2016    Dr Noemy Lyman Hyperlipidemia     Hypertension     Osteoarthritis of knee  Dr Rosa Westbrook Osteoporosis 2002    Fosamax [id number]    Pelvic fracture ([address] 05/2018    Right rotator cuff tear 2014    Alexey / injection; PT    T10 vertebral fracture ([address] 02/2018    fall and fragility fx; also L1 ; had kyphoplasty 2/2018    Vitamin B12 deficiency 03/2017    B 12 266 by Alexey at [facility]       Past Surgical History:   Procedure Laterality Date    ABDOMINAL EXPLORATION SURGERY  1997    With Lysis of Adhesions    APPENDECTOMY  1953    w/  rt.uso    CYSTOCELE REPAIR  1993    w/ rectocele    FIXATION KYPHOPLASTY  02/19/2018    T10 & L1    HYSTERECTOMY  1969     remaining ovary removed also    SKIN CANCER EXCISION Left 09/2017    BCC with skin graft    WRIST FRACTURE SURGERY Left 03/2017    ORIF       Family History   Problem Relation Age of Onset    Heart Disease Mother     Cancer Father         Pancreatic CA    Cancer Brother         Pancreatic CA    Cancer Other         pancreas       CareTeam (Including outside providers/suppliers regularly involved in providing care):   Patient Care Team:  Juancho Byrd MD as PCP - General (Family Medicine)  Juancho Byrd MD as PCP - [facility] Empaneled Provider  José Manuel Sanchez MD as Consulting Physician (Cardiology)    Wt Readings from Last 3 Encounters:   01/25/21 105 lb (47.6 kg)   10/20/20 105 lb 12.8 oz (48 kg)   07/30/20 101 lb (45.8 kg)     Vitals:    01/25/21 1544   BP: 112/65   Pulse: 75   Weight: 105 lb (47.6 kg)   Height: 4' 7\" (1.397 m)     Body mass index is 24.4 kg/m². Based upon direct observation of the patient, evaluation of cognition reveals recent and remote memory intact. Patient's complete Health Risk Assessment and screening values have been reviewed and are found in Flowsheets. The following problems were reviewed today and where indicated follow up appointments were made and/or referrals ordered.     Positive Risk Factor Screenings with Interventions:          General Health and ACP:  General In general, how would you say your health is?: Very Good  In the past 7 days, have you experienced any of the following? New or Increased Pain, New or Increased Fatigue, Loneliness, Social Isolation, Stress or Anger?: None of These  Do you get the social and emotional support that you need?: Yes  Do you have a Living Will?: Yes  Advance Directives     Power of Tj Haider Will ACP-Advance Directive ACP-Power of     Not on File Not on File Not on File Not on File      General Health Risk Interventions:  · No Living Will: ACP documents already completed- patient asked to provide copy to the office    Health Habits/Nutrition:  Health Habits/Nutrition  Do you exercise for at least 20 minutes 2-3 times per week?: Yes  Have you lost any weight without trying in the past 3 months?: No  Do you eat fewer than 2 meals per day?: No  Have you seen a dentist within the past year?: (!) No  Body mass index: 24.4  Health Habits/Nutrition Interventions:  · Dental exam overdue:  patient encouraged to make appointment with his/her dentist      ADL:  ADLs  In the past 7 days, did you need help from others to perform any of the following everyday activities? Eating, dressing, grooming, bathing, toileting, or walking/balance?: None  In the past 7 days, did you need help from others to take care of any of the following? Laundry, housekeeping, banking/finances, shopping, telephone use, food preparation, transportation, or taking medications?: (!) Transportation  ADL Interventions:  · Patient declines any further evaluation/treatment for this issue. Patient states that her daughter drives her places as she gave up driving.     Personalized Preventive Plan   Current Health Maintenance Status  Immunization History   Administered Date(s) Administered    Influenza Virus Vaccine 09/08/2015, 09/15/2016, 09/20/2017, 09/12/2018, 09/09/2019    Influenza Whole 09/01/2013  Influenza, High Dose (Fluzone 65 yrs and older) 09/16/2014, 09/08/2015, 09/27/2017, 09/25/2018, 09/04/2019    Pneumococcal Conjugate 13-valent (Quiclxt60) 05/09/2015    Pneumococcal Polysaccharide ([id number]) 10/01/2002    Td, unspecified formulation 04/01/2003, 07/12/2016    Zoster Live (Zostavax) 06/27/2011        Health Maintenance   Topic Date Due    COVID-19 Vaccine (1 of 2) 06/15/1943    DTaP/Tdap/Td vaccine (1 - Tdap) 06[birth year]/1946    Shingles Vaccine (2 of 3) 08/22/2011    Annual Wellness Visit (AWV)  05/29/2019    Potassium monitoring  09/30/2021    Creatinine monitoring  09/30/2021    Flu vaccine  Completed    Pneumococcal 65+ years Vaccine  Completed    Hepatitis A vaccine  Aged Out    Hepatitis B vaccine  Aged Out    Hib vaccine  Aged Out    Meningococcal (ACWY) vaccine  Aged Out     Recommendations for Scytl Due: see orders and patient instructions/AVS.  . Recommended screening schedule for the next 5-10 years is provided to the patient in written form: see Patient Instructions/AVS.    Iraj Ford LPN, 0/97/1473, performed the documented evaluation under the direct supervision of the attending physician. Jake Smith is a 80 y.o. female being evaluated by a Virtual Visit (audio visit) encounter to address concerns as mentioned above. A caregiver was present when appropriate. Due to this being a TeleHealth encounter (During GVFZE-00 public health emergency), evaluation of the following organ systems was limited: Vitals/Constitutional/EENT/Resp/CV/GI//MS/Neuro/Skin/Heme-Lymph-Imm. Pursuant to the emergency declaration under the [address] [place] [address] and the [employer] Act, this Virtual Visit was conducted with patient's (and/or legal guardian's) consent, to reduce the patient's risk of exposure to COVID-19 and provide necessary medical care. The patient (and/or legal guardian) has also been advised to contact this office for worsening conditions or problems, and seek emergency medical treatment and/or call 911 if deemed necessary. Patient identification was verified at the start of the visit: Yes    Total time spent for this encounter: Not billed by time    Services were provided through audio synchronous discussion virtually to substitute for in-person clinic visit. Patient and provider were located at their individual homes. --Rosita Mary LPN on 2/85/1872 at 6:41 PM    An electronic signature was used to authenticate this note.

## 2021-01-25 NOTE — PATIENT INSTRUCTIONS
Personalized Preventive Plan for Latesha Hair - 1/25/2021  Medicare offers a range of preventive health benefits. Some of the tests and screenings are paid in full while other may be subject to a deductible, co-insurance, and/or copay. Some of these benefits include a comprehensive review of your medical history including lifestyle, illnesses that may run in your family, and various assessments and screenings as appropriate. After reviewing your medical record and screening and assessments performed today your provider may have ordered immunizations, labs, imaging, and/or referrals for you. A list of these orders (if applicable) as well as your Preventive Care list are included within your After Visit Summary for your review. Other Preventive Recommendations:    · A preventive eye exam performed by an eye specialist is recommended every 1-2 years to screen for glaucoma; cataracts, macular degeneration, and other eye disorders. · A preventive dental visit is recommended every 6 months. · Try to get at least 150 minutes of exercise per week or 10,000 steps per day on a pedometer . · Order or download the FREE \"Exercise & Physical Activity: Your Everyday Guide\" from [employer]. Call 7-936.885.1512 or search [employer] online. · You need 8684-6253 mg of calcium and 1334-3815 IU of vitamin D per day. It is possible to meet your calcium requirement with diet alone, but a vitamin D supplement is usually necessary to meet this goal.  · When exposed to the sun, use a sunscreen that protects against both UVA and UVB radiation with an SPF of 30 or greater. Reapply every 2 to 3 hours or after sweating, drying off with a towel, or swimming. · Always wear a seat belt when traveling in a car. Always wear a helmet when riding a bicycle or motorcycle.

## 2021-01-26 ENCOUNTER — OFFICE VISIT (OUTPATIENT)
Dept: INTERNAL MEDICINE CLINIC | Age: 86
End: 2021-01-26
Payer: MEDICARE

## 2021-01-26 VITALS
HEART RATE: 70 BPM | SYSTOLIC BLOOD PRESSURE: 124 MMHG | OXYGEN SATURATION: 96 % | TEMPERATURE: 97.4 F | BODY MASS INDEX: 23.99 KG/M2 | WEIGHT: 103.2 LBS | DIASTOLIC BLOOD PRESSURE: 70 MMHG

## 2021-01-26 DIAGNOSIS — I10 HYPERTENSION, ESSENTIAL: Primary | ICD-10-CM

## 2021-01-26 PROCEDURE — G8420 CALC BMI NORM PARAMETERS: HCPCS | Performed by: FAMILY MEDICINE

## 2021-01-26 PROCEDURE — 1090F PRES/ABSN URINE INCON ASSESS: CPT | Performed by: FAMILY MEDICINE

## 2021-01-26 PROCEDURE — G8484 FLU IMMUNIZE NO ADMIN: HCPCS | Performed by: FAMILY MEDICINE

## 2021-01-26 PROCEDURE — 99213 OFFICE O/P EST LOW 20 MIN: CPT | Performed by: FAMILY MEDICINE

## 2021-01-26 PROCEDURE — 4040F PNEUMOC VAC/ADMIN/RCVD: CPT | Performed by: FAMILY MEDICINE

## 2021-01-26 PROCEDURE — G8427 DOCREV CUR MEDS BY ELIG CLIN: HCPCS | Performed by: FAMILY MEDICINE

## 2021-01-26 PROCEDURE — 1123F ACP DISCUSS/DSCN MKR DOCD: CPT | Performed by: FAMILY MEDICINE

## 2021-01-26 PROCEDURE — 1036F TOBACCO NON-USER: CPT | Performed by: FAMILY MEDICINE

## 2021-01-26 ASSESSMENT — ENCOUNTER SYMPTOMS
CHEST TIGHTNESS: 0
SORE THROAT: 0
DIARRHEA: 0
SHORTNESS OF BREATH: 0
VOMITING: 0
COLOR CHANGE: 0
ABDOMINAL PAIN: 0
CONSTIPATION: 0

## 2021-01-26 NOTE — PROGRESS NOTES
Subjective:      Chief Complaint   Patient presents with    Hypertension       HPI:  Rory Gibbs is a 80 y.o. female who presents today for follow up of chronic conditions as listed below. HTN:  Has been monitoring BP's at home, usually 120's/60's but has been in 100's/50's once or twice. Denies any symptoms. No recent changes in medications. Feeling well today, no acute concerns. Labs reviewed. 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    Dr Noemy Lyman Hyperlipidemia     Hypertension     Osteoarthritis of knee 2011    Dr Deandre Camara Osteoporosis 2002    Fosamax [id number]    Pelvic fracture ([address] 05/2018    Right rotator cuff tear 2014    Karyle Fell / injection; PT    T10 vertebral fracture ([address] 02/2018    fall and fragility fx; also L1 ; had kyphoplasty 2/2018    Vitamin B12 deficiency 03/2017    B 12 266 by Karyle Fell at [facility]        Past Surgical History:   Procedure Laterality Date   [address]    With Lysis of Adhesions   [address]     w/  rt.uso    [address]    w/ rectocele    FIXATION KYPHOPLASTY  02/19/2018    T10 & L1    HYSTERECTOMY  1969     remaining ovary removed also    SKIN CANCER EXCISION Left 09/2017    BCC with skin graft    WRIST FRACTURE SURGERY Left 03/2017    ORIF       Social History     Tobacco Use    Smoking status: Never Smoker    Smokeless tobacco: Never Used   Substance Use Topics    Alcohol use: Yes     Comment: wine rarely        Review of Systems   Constitutional: Negative for activity change, appetite change, chills, fever and unexpected weight change. HENT: Negative for congestion and sore throat. Respiratory: Negative for chest tightness and shortness of breath. Cardiovascular: Negative for chest pain and palpitations.    Gastrointestinal: Negative for abdominal pain, constipation, diarrhea and vomiting. Genitourinary: Negative for dysuria. Skin: Negative for color change. Neurological: Negative for dizziness and light-headedness. Psychiatric/Behavioral: Negative for dysphoric mood. The patient is not nervous/anxious. Prior to Visit Medications    Medication Sig Taking? Authorizing Provider   alendronate (FOSAMAX) 70 MG tablet TAKE 1 TABLET BY MOUTH ONCE A WEEK *TAKE 30 MINUTES BEFORE FOOD, DRINK, OR MEDS. STAY UPRIGHT* Yes Precious Kennedy MD   amLODIPine (NORVASC) 10 MG tablet TAKE 1 TABLET BY MOUTH DAILY Yes Precious Kennedy MD   potassium chloride (KLOR-CON M) 10 MEQ extended release tablet TAKE 1 TABLET BY MOUTH DAILY Yes Precious Kennedy MD   magnesium oxide (MAG-OX) 400 (240 Mg) MG tablet TAKE 1 TABLET BY MOUTH DAILY Yes Precious Kennedy MD   Calcium Carb-Cholecalciferol (CALCIUM-VITAMIN D3) 600-400 MG-UNIT TABS Take 1 tablet by mouth daily Yes Historical Provider, MD   timolol (TIMOPTIC) 0.5 % ophthalmic solution  Yes Historical Provider, MD   vitamin D (CHOLECALCIFEROL) 1000 UNIT TABS tablet Take 1,000 Units by mouth daily Yes Historical Provider, MD   vitamin B-12 (CYANOCOBALAMIN) 500 MCG tablet Take 500 mcg by mouth daily Yes Historical Provider, MD   latanoprost (XALATAN) 0.005 % ophthalmic solution Place 1 drop into the right eye 2 times daily  Yes Historical Provider, MD          Objective:      /70 (Site: Right Upper Arm, Position: Sitting, Cuff Size: Medium Adult)   Pulse 70   Temp 97.4 °F (36.3 °C)   Wt 103 lb 3.2 oz (46.8 kg)   SpO2 96%   BMI 23.99 kg/m²      Physical Exam  Vitals signs and nursing note reviewed. Constitutional:       General: She is not in acute distress. Appearance: Normal appearance. She is not ill-appearing or toxic-appearing. HENT:      Head: Normocephalic and atraumatic.       Right Ear: External ear normal.      Left Ear: External ear normal.      Nose: Nose normal.      Mouth/Throat:      Pharynx: Oropharynx is clear. Eyes:      General: No scleral icterus. Right eye: No discharge. Left eye: No discharge. Extraocular Movements: Extraocular movements intact. Conjunctiva/sclera: Conjunctivae normal.   Neck:      Musculoskeletal: Normal range of motion and neck supple. No neck rigidity. Cardiovascular:      Rate and Rhythm: Normal rate and regular rhythm. Heart sounds: Normal heart sounds. Pulmonary:      Effort: Pulmonary effort is normal.      Breath sounds: Normal breath sounds. No wheezing or rales. Musculoskeletal:         General: No deformity. Skin:     General: Skin is warm and dry. Findings: No rash. Neurological:      General: No focal deficit present. Mental Status: She is alert. Mental status is at baseline. Motor: No weakness. Psychiatric:         Mood and Affect: Mood normal.         Behavior: Behavior normal.            Assessment / Plan:      1. Hypertension, essential  Well controlled, but occasional BP's on low end, patient asymptomatic. Advised to continue keeping log at home. If having more frequent low readings, will decrease dose of norvasc at next appointment. - CBC Auto Differential; Future  - Comprehensive Metabolic Panel; Future          Patient voiced understanding and agreement with plan. All questions/concerns were addressed, risks/side effects of medications were reviewed. Return precautions and after visit summary were provided. Return in about 4 months (around 5/26/2021). or earlier as needed.       Jaqueline Coats MD

## 2021-05-03 ENCOUNTER — HOSPITAL ENCOUNTER (OUTPATIENT)
Age: 86
Discharge: HOME | End: 2021-05-03
Payer: MEDICARE

## 2021-05-03 LAB
ALBUMIN SERPL-MCNC: 4.3 GM/DL (ref 3.4–5)
ALP BLD-CCNC: 61 IU/L (ref 40–128)
ALT SERPL-CCNC: 12 U/L (ref 10–40)
ANION GAP SERPL CALCULATED.3IONS-SCNC: 8 MMOL/L (ref 4–16)
AST SERPL-CCNC: 17 IU/L (ref 15–37)
BASOPHILS ABSOLUTE: 0 K/CU MM
BASOPHILS RELATIVE PERCENT: 0.6 % (ref 0–1)
BILIRUB SERPL-MCNC: 0.8 MG/DL (ref 0–1)
BUN BLDV-MCNC: 14 MG/DL (ref 6–23)
CALCIUM SERPL-MCNC: 9.8 MG/DL (ref 8.3–10.6)
CHLORIDE BLD-SCNC: 103 MMOL/L (ref 99–110)
CO2: 28 MMOL/L (ref 21–32)
CREAT SERPL-MCNC: 0.8 MG/DL (ref 0.6–1.1)
DIFFERENTIAL TYPE: ABNORMAL
EOSINOPHILS ABSOLUTE: 0.3 K/CU MM
EOSINOPHILS RELATIVE PERCENT: 4.1 % (ref 0–3)
GFR AFRICAN AMERICAN: >60 ML/MIN/1.73M2
GFR NON-AFRICAN AMERICAN: >60 ML/MIN/1.73M2
GLUCOSE BLD-MCNC: 99 MG/DL (ref 70–99)
HCT VFR BLD CALC: 45.5 % (ref 37–47)
HEMOGLOBIN: 14.5 GM/DL (ref 12.5–16)
IMMATURE NEUTROPHIL %: 0.5 % (ref 0–0.43)
LYMPHOCYTES ABSOLUTE: 2.5 K/CU MM
LYMPHOCYTES RELATIVE PERCENT: 37.3 % (ref 24–44)
MCH RBC QN AUTO: 31.1 PG (ref 27–31)
MCHC RBC AUTO-ENTMCNC: 31.9 % (ref 32–36)
MCV RBC AUTO: 97.6 FL (ref 78–100)
MONOCYTES ABSOLUTE: 0.9 K/CU MM
MONOCYTES RELATIVE PERCENT: 13.3 % (ref 0–4)
NUCLEATED RBC %: 0 %
PDW BLD-RTO: 14.4 % (ref 11.7–14.9)
PLATELET # BLD: 330 K/CU MM (ref 140–440)
PMV BLD AUTO: 9.6 FL (ref 7.5–11.1)
POTASSIUM SERPL-SCNC: 4.3 MMOL/L (ref 3.5–5.1)
RBC # BLD: 4.66 M/CU MM (ref 4.2–5.4)
SEGMENTED NEUTROPHILS ABSOLUTE COUNT: 2.9 K/CU MM
SEGMENTED NEUTROPHILS RELATIVE PERCENT: 44.2 % (ref 36–66)
SODIUM BLD-SCNC: 139 MMOL/L (ref 135–145)
TOTAL IMMATURE NEUTOROPHIL: 0.03 K/CU MM
TOTAL NUCLEATED RBC: 0 K/CU MM
TOTAL PROTEIN: 6.9 GM/DL (ref 6.4–8.2)
WBC # BLD: 6.6 K/CU MM (ref 4–10.5)

## 2021-05-03 PROCEDURE — 36415 COLL VENOUS BLD VENIPUNCTURE: CPT

## 2021-05-03 PROCEDURE — 80053 COMPREHEN METABOLIC PANEL: CPT

## 2021-05-03 PROCEDURE — 85025 COMPLETE CBC W/AUTO DIFF WBC: CPT

## 2021-05-27 ENCOUNTER — OFFICE VISIT (OUTPATIENT)
Dept: INTERNAL MEDICINE CLINIC | Age: 86
End: 2021-05-27
Payer: MEDICARE

## 2021-05-27 VITALS
OXYGEN SATURATION: 95 % | HEART RATE: 81 BPM | DIASTOLIC BLOOD PRESSURE: 76 MMHG | TEMPERATURE: 97.1 F | BODY MASS INDEX: 24.17 KG/M2 | SYSTOLIC BLOOD PRESSURE: 135 MMHG | WEIGHT: 104 LBS

## 2021-05-27 DIAGNOSIS — I10 HYPERTENSION, ESSENTIAL: Primary | ICD-10-CM

## 2021-05-27 DIAGNOSIS — H91.91 HEARING LOSS OF RIGHT EAR, UNSPECIFIED HEARING LOSS TYPE: ICD-10-CM

## 2021-05-27 PROCEDURE — 1090F PRES/ABSN URINE INCON ASSESS: CPT | Performed by: FAMILY MEDICINE

## 2021-05-27 PROCEDURE — G8427 DOCREV CUR MEDS BY ELIG CLIN: HCPCS | Performed by: FAMILY MEDICINE

## 2021-05-27 PROCEDURE — 99214 OFFICE O/P EST MOD 30 MIN: CPT | Performed by: FAMILY MEDICINE

## 2021-05-27 PROCEDURE — G8420 CALC BMI NORM PARAMETERS: HCPCS | Performed by: FAMILY MEDICINE

## 2021-05-27 PROCEDURE — 1123F ACP DISCUSS/DSCN MKR DOCD: CPT | Performed by: FAMILY MEDICINE

## 2021-05-27 PROCEDURE — 4040F PNEUMOC VAC/ADMIN/RCVD: CPT | Performed by: FAMILY MEDICINE

## 2021-05-27 PROCEDURE — 1036F TOBACCO NON-USER: CPT | Performed by: FAMILY MEDICINE

## 2021-05-27 SDOH — ECONOMIC STABILITY: FOOD INSECURITY: WITHIN THE PAST 12 MONTHS, YOU WORRIED THAT YOUR FOOD WOULD RUN OUT BEFORE YOU GOT MONEY TO BUY MORE.: NEVER TRUE

## 2021-05-27 ASSESSMENT — ENCOUNTER SYMPTOMS
SORE THROAT: 0
ABDOMINAL PAIN: 0
SHORTNESS OF BREATH: 0
VOMITING: 0
DIARRHEA: 0
CHEST TIGHTNESS: 0
CONSTIPATION: 0
COLOR CHANGE: 0

## 2021-05-27 ASSESSMENT — SOCIAL DETERMINANTS OF HEALTH (SDOH): HOW HARD IS IT FOR YOU TO PAY FOR THE VERY BASICS LIKE FOOD, HOUSING, MEDICAL CARE, AND HEATING?: NOT HARD AT ALL

## 2021-05-27 NOTE — PROGRESS NOTES
Subjective:      Chief Complaint   Patient presents with    Hypertension       HPI:  Chay Christianson is a 80 y.o. female who presents today for follow up of chronic conditions as listed below. HTN:  BP's at home have mostly been 110's-130's/80's. Denies any symptoms, has been feeling well. Has been having a little more difficulty hearing out of her R ear, is wondering if there is wax build up. Otherwise feeling well today, no acute concerns. Labs reviewed. 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    Dr Harris Cosme Hyperlipidemia     Hypertension     Osteoarthritis of knee 2011    Dr Candelaria Martell Osteoporosis 2002    Fosamax [phone]    Pelvic fracture ([address] 05/2018    Right rotator cuff tear 2014    Dung Russell / injection; PT    T10 vertebral fracture ([address] 02/2018    fall and fragility fx; also L1 ; had kyphoplasty 2/2018    Vitamin B12 deficiency 03/2017    B 12 266 by Dung Russell at [facility]        Past Surgical History:   Procedure Laterality Date   [address]    With Lysis of Adhesions   [address]     w/  rt.uso    [address]    w/ rectocele    FIXATION KYPHOPLASTY  02/19/2018    T10 & L1    HYSTERECTOMY  1969     remaining ovary removed also    SKIN CANCER EXCISION Left 09/2017    BCC with skin graft    WRIST FRACTURE SURGERY Left 03/2017    ORIF       Social History     Tobacco Use    Smoking status: Never Smoker    Smokeless tobacco: Never Used   Substance Use Topics    Alcohol use: Yes     Comment: wine rarely        Review of Systems   Constitutional: Negative for activity change, appetite change, chills, fever and unexpected weight change. HENT: Positive for hearing loss. Negative for congestion and sore throat. Respiratory: Negative for chest tightness and shortness of breath.     Cardiovascular: Negative for chest pain and palpitations. Gastrointestinal: Negative for abdominal pain, constipation, diarrhea and vomiting. Genitourinary: Negative for dysuria. Skin: Negative for color change. Neurological: Negative for dizziness and light-headedness. Psychiatric/Behavioral: Negative for dysphoric mood. The patient is not nervous/anxious. Prior to Visit Medications    Medication Sig Taking? Authorizing Provider   amLODIPine (NORVASC) 10 MG tablet TAKE 1 TABLET BY MOUTH DAILY Yes Razia Shaw MD   potassium chloride (KLOR-CON M) 10 MEQ extended release tablet TAKE 1 TABLET BY MOUTH DAILY Yes Razia Shaw MD   alendronate (FOSAMAX) 70 MG tablet TAKE 1 TABLET BY MOUTH ONCE A WEEK *TAKE 30 MINUTES BEFORE FOOD, DRINK, OR MEDS. STAY UPRIGHT* Yes Razia Shaw MD   magnesium oxide (MAG-OX) 400 (240 Mg) MG tablet TAKE 1 TABLET BY MOUTH DAILY Yes Razia Shaw MD   Calcium Carb-Cholecalciferol (CALCIUM-VITAMIN D3) 600-400 MG-UNIT TABS Take 1 tablet by mouth daily Yes Historical Provider, MD   timolol (TIMOPTIC) 0.5 % ophthalmic solution  Yes Historical Provider, MD   vitamin D (CHOLECALCIFEROL) 1000 UNIT TABS tablet Take 1,000 Units by mouth daily Yes Historical Provider, MD   vitamin B-12 (CYANOCOBALAMIN) 500 MCG tablet Take 500 mcg by mouth daily Yes Historical Provider, MD   latanoprost (XALATAN) 0.005 % ophthalmic solution Place 1 drop into the right eye 2 times daily  Yes Historical Provider, MD          Objective:      /76   Pulse 81   Temp 97.1 °F (36.2 °C)   Wt 104 lb (47.2 kg)   SpO2 95%   BMI 24.17 kg/m²      Physical Exam  Vitals and nursing note reviewed. Constitutional:       General: She is not in acute distress. Appearance: Normal appearance. She is not ill-appearing or toxic-appearing. HENT:      Head: Normocephalic and atraumatic. Right Ear: Tympanic membrane, ear canal and external ear normal. There is no impacted cerumen.       Left Ear: Tympanic membrane, ear canal and external ear normal. There is no impacted cerumen. Nose: Nose normal.      Mouth/Throat:      Pharynx: Oropharynx is clear. Eyes:      General: No scleral icterus. Right eye: No discharge. Left eye: No discharge. Extraocular Movements: Extraocular movements intact. Conjunctiva/sclera: Conjunctivae normal.   Cardiovascular:      Rate and Rhythm: Normal rate and regular rhythm. Heart sounds: Normal heart sounds. Pulmonary:      Effort: Pulmonary effort is normal.      Breath sounds: Normal breath sounds. No wheezing or rales. Musculoskeletal:         General: No deformity. Cervical back: Normal range of motion and neck supple. No rigidity. Skin:     General: Skin is warm and dry. Findings: No rash. Neurological:      General: No focal deficit present. Mental Status: She is alert. Mental status is at baseline. Motor: No weakness. Psychiatric:         Mood and Affect: Mood normal.         Behavior: Behavior normal.            Assessment / Plan:      1. Hypertension, essential  Stable, well controlled. Continue current medications. 2. Hearing loss of right ear, unspecified hearing loss type  Exam normal today, likely 2/2 age- advised getting hearing exam to see if she qualifies for hearing aids. Patient voiced understanding and agreement with plan. All questions/concerns were addressed, risks/side effects of medications were reviewed. Return precautions and after visit summary were provided. Return in about 4 months (around 9/27/2021). or earlier as needed.       Meghann Negron MD

## 2021-09-16 RX ORDER — POTASSIUM CHLORIDE 750 MG/1
TABLET, EXTENDED RELEASE ORAL
Qty: 30 TABLET | Refills: 5 | Status: SHIPPED | OUTPATIENT
Start: 2021-09-16 | End: 2022-03-25

## 2021-09-26 ENCOUNTER — APPOINTMENT (OUTPATIENT)
Dept: GENERAL RADIOLOGY | Age: 86
End: 2021-09-26
Payer: MEDICARE

## 2021-09-26 ENCOUNTER — APPOINTMENT (OUTPATIENT)
Dept: CT IMAGING | Age: 86
End: 2021-09-26
Payer: MEDICARE

## 2021-09-26 ENCOUNTER — HOSPITAL ENCOUNTER (EMERGENCY)
Age: 86
Discharge: HOME | End: 2021-09-26
Payer: MEDICARE

## 2021-09-26 VITALS
HEIGHT: 55 IN | TEMPERATURE: 98.2 F | BODY MASS INDEX: 24.07 KG/M2 | RESPIRATION RATE: 25 BRPM | OXYGEN SATURATION: 96 % | SYSTOLIC BLOOD PRESSURE: 133 MMHG | WEIGHT: 104 LBS | HEART RATE: 95 BPM | DIASTOLIC BLOOD PRESSURE: 75 MMHG

## 2021-09-26 DIAGNOSIS — R42 LIGHTHEADEDNESS: ICD-10-CM

## 2021-09-26 DIAGNOSIS — N34.2 INFECTIVE URETHRITIS: Primary | ICD-10-CM

## 2021-09-26 LAB
ALBUMIN SERPL-MCNC: 4.5 GM/DL (ref 3.4–5)
ALP BLD-CCNC: 56 IU/L (ref 40–129)
ALT SERPL-CCNC: 13 U/L (ref 10–40)
ANION GAP SERPL CALCULATED.3IONS-SCNC: 15 MMOL/L (ref 4–16)
AST SERPL-CCNC: 19 IU/L (ref 15–37)
BACTERIA: ABNORMAL /HPF
BASE EXCESS MIXED: 3.6 (ref 0–2.3)
BASOPHILS ABSOLUTE: 0 K/CU MM
BASOPHILS RELATIVE PERCENT: 0.6 % (ref 0–1)
BILIRUB SERPL-MCNC: 0.9 MG/DL (ref 0–1)
BILIRUBIN URINE: NEGATIVE MG/DL
BLOOD, URINE: ABNORMAL
BUN BLDV-MCNC: 13 MG/DL (ref 6–23)
CALCIUM SERPL-MCNC: 9.5 MG/DL (ref 8.3–10.6)
CHLORIDE BLD-SCNC: 99 MMOL/L (ref 99–110)
CLARITY: ABNORMAL
CO2: 21 MMOL/L (ref 21–32)
COLOR: YELLOW
COMMENT: ABNORMAL
CREAT SERPL-MCNC: 0.5 MG/DL (ref 0.6–1.1)
DIFFERENTIAL TYPE: ABNORMAL
EOSINOPHILS ABSOLUTE: 0.2 K/CU MM
EOSINOPHILS RELATIVE PERCENT: 3.1 % (ref 0–3)
GFR AFRICAN AMERICAN: >60 ML/MIN/1.73M2
GFR NON-AFRICAN AMERICAN: >60 ML/MIN/1.73M2
GLUCOSE BLD-MCNC: 201 MG/DL (ref 70–99)
GLUCOSE, URINE: 50 MG/DL
HCO3 VENOUS: 26.5 MMOL/L (ref 19–25)
HCT VFR BLD CALC: 45.7 % (ref 37–47)
HEMOGLOBIN: 14.6 GM/DL (ref 12.5–16)
IMMATURE NEUTROPHIL %: 0.3 % (ref 0–0.43)
KETONES, URINE: NEGATIVE MG/DL
LEUKOCYTE ESTERASE, URINE: ABNORMAL
LYMPHOCYTES ABSOLUTE: 2.4 K/CU MM
LYMPHOCYTES RELATIVE PERCENT: 35.3 % (ref 24–44)
MCH RBC QN AUTO: 31.1 PG (ref 27–31)
MCHC RBC AUTO-ENTMCNC: 31.9 % (ref 32–36)
MCV RBC AUTO: 97.2 FL (ref 78–100)
MONOCYTES ABSOLUTE: 0.7 K/CU MM
MONOCYTES RELATIVE PERCENT: 10 % (ref 0–4)
NITRITE URINE, QUANTITATIVE: POSITIVE
NUCLEATED RBC %: 0 %
O2 SAT, VEN: 94.2 % (ref 50–70)
PCO2, VEN: 34 MMHG (ref 38–52)
PDW BLD-RTO: 13.5 % (ref 11.7–14.9)
PH VENOUS: 7.5 (ref 7.32–7.42)
PH, URINE: 8 (ref 5–8)
PLATELET # BLD: 369 K/CU MM (ref 140–440)
PMV BLD AUTO: 9.1 FL (ref 7.5–11.1)
PO2, VEN: 138 MMHG (ref 28–48)
POTASSIUM SERPL-SCNC: 4.2 MMOL/L (ref 3.5–5.1)
PRO-BNP: 175.5 PG/ML
PROTEIN UA: NEGATIVE MG/DL
RBC # BLD: 4.7 M/CU MM (ref 4.2–5.4)
RBC URINE: 4 /HPF (ref 0–6)
SEGMENTED NEUTROPHILS ABSOLUTE COUNT: 3.4 K/CU MM
SEGMENTED NEUTROPHILS RELATIVE PERCENT: 50.7 % (ref 36–66)
SODIUM BLD-SCNC: 135 MMOL/L (ref 135–145)
SPECIFIC GRAVITY UA: 1.01 (ref 1–1.03)
SQUAMOUS EPITHELIAL: <1 /HPF
TOTAL IMMATURE NEUTOROPHIL: 0.02 K/CU MM
TOTAL NUCLEATED RBC: 0 K/CU MM
TOTAL PROTEIN: 7.1 GM/DL (ref 6.4–8.2)
TRANSITIONAL EPITHELIAL: <1 /HPF
TRICHOMONAS: ABNORMAL /HPF
TROPONIN T: <0.01 NG/ML
UROBILINOGEN, URINE: NEGATIVE MG/DL (ref 0.2–1)
WBC # BLD: 6.8 K/CU MM (ref 4–10.5)
WBC CLUMP: ABNORMAL /HPF
WBC UA: 68 /HPF (ref 0–5)

## 2021-09-26 PROCEDURE — 82805 BLOOD GASES W/O2 SATURATION: CPT

## 2021-09-26 PROCEDURE — 99283 EMERGENCY DEPT VISIT LOW MDM: CPT

## 2021-09-26 PROCEDURE — 2580000003 HC RX 258: Performed by: PHYSICIAN ASSISTANT

## 2021-09-26 PROCEDURE — 96365 THER/PROPH/DIAG IV INF INIT: CPT

## 2021-09-26 PROCEDURE — 93005 ELECTROCARDIOGRAM TRACING: CPT | Performed by: PHYSICIAN ASSISTANT

## 2021-09-26 PROCEDURE — 6360000002 HC RX W HCPCS: Performed by: PHYSICIAN ASSISTANT

## 2021-09-26 PROCEDURE — 84484 ASSAY OF TROPONIN QUANT: CPT

## 2021-09-26 PROCEDURE — 80053 COMPREHEN METABOLIC PANEL: CPT

## 2021-09-26 PROCEDURE — 36415 COLL VENOUS BLD VENIPUNCTURE: CPT

## 2021-09-26 PROCEDURE — 83880 ASSAY OF NATRIURETIC PEPTIDE: CPT

## 2021-09-26 PROCEDURE — 71045 X-RAY EXAM CHEST 1 VIEW: CPT

## 2021-09-26 PROCEDURE — 81001 URINALYSIS AUTO W/SCOPE: CPT

## 2021-09-26 PROCEDURE — 70450 CT HEAD/BRAIN W/O DYE: CPT

## 2021-09-26 PROCEDURE — 85025 COMPLETE CBC W/AUTO DIFF WBC: CPT

## 2021-09-26 RX ORDER — CEPHALEXIN 500 MG/1
500 CAPSULE ORAL 3 TIMES DAILY
Qty: 21 CAPSULE | Refills: 0 | Status: SHIPPED | OUTPATIENT
Start: 2021-09-26 | End: 2021-10-03

## 2021-09-26 RX ADMIN — CEFTRIAXONE SODIUM 1000 MG: 1 INJECTION, POWDER, FOR SOLUTION INTRAMUSCULAR; INTRAVENOUS at 21:34

## 2021-09-26 ASSESSMENT — PAIN SCALES - GENERAL: PAINLEVEL_OUTOF10: 0

## 2021-09-26 NOTE — ED NOTES
Bed: ED-31  Expected date:   Expected time:   Means of arrival:   Comments:  EMS     Jessika Smiley Distance  09/26/21 [id number]

## 2021-09-26 NOTE — ED PROVIDER NOTES
Triage Chief Complaint:   Shortness of Breath    [place]:  Today in the ED I had the pleasure of caring for Mary Avendaño who is a 80 y.o. female that presents to the emergency department complaining of shortness of breath. Patient states shortness of breath been going on and off for the last Several days. Daughter is at bedside. Daughter contributes to history as well as patient who is a very good historian. Patient lives at home alone. However patient's children daughter/son do live close to her. And check on her every day. Patient's daughter reports that her brother stated that he was just over patient's house 45 minutes prior to patient beginning feeling symptoms. States that mom seems just fine. Mother had 0 complaints at the time. Patient had called her son 45 minutes after he left and told him that she was feeling short of breath. And she had an episode of lightheadedness that lasted roughly 10 minutes after standing up to go to the kitchen. Patient denies any vertiginous symptoms. Her feeling off balance no near syncope. Patient has no significant past medical history regarding respiratory ailments. No COPD CHF, asthma, other. Patient endorses mild shortness of breath at rest at times. But denies any at this moment. She also denies headache, chest pain, abdominal pain, back pain flank pain. She has been eating and drinking eliminating baseline. No cough or hemoptysis.   No history of DVT or PE.    ROS:  REVIEW OF SYSTEMS    At least 10 systems reviewed      All other review of systems are negative  See HPI and nursing notes for additional information       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    Dr Callejas Colquitt Hyperlipidemia     Hypertension     Osteoarthritis of knee 2011    Dr Eliecer Tobias Osteoporosis 2002    Fosamax [id number]    Pelvic fracture ([facility] [address] 05/2018    Right rotator cuff tear 2014    Trujillo / injection; PT    T10 vertebral fracture ([address] 02/2018    fall and fragility fx; also L1 ; had kyphoplasty 2/2018    Vitamin B12 deficiency 03/2017    B 12 266 by Chirag Sol at [facility]     Past Surgical History:   Procedure Laterality Date    ABDOMINAL EXPLORATION SURGERY  1997    With Lysis of Adhesions    APPENDECTOMY  1953    w/  rt.uso    CYSTOCELE REPAIR  1993    w/ rectocele    FIXATION KYPHOPLASTY  02/19/2018    T10 & L1    HYSTERECTOMY  1969     remaining ovary removed also    SKIN CANCER EXCISION Left 09/2017    BCC with skin graft    WRIST FRACTURE SURGERY Left 03/2017    ORIF     Family History   Problem Relation Age of Onset    Heart Disease Mother     Cancer Father         Pancreatic CA    Cancer Brother         Pancreatic CA    Cancer Other         pancreas     Social History     Socioeconomic History    Marital status:      Spouse name: Leyda Carrillo Number of children: 3    Years of education: 15    Highest education level: Not on file   Occupational History    Occupation: [employer] verification   Tobacco Use    Smoking status: Never Smoker    Smokeless tobacco: Never Used   Vaping Use    Vaping Use: Never used   Substance and Sexual Activity    Alcohol use: Yes     Comment: wine rarely    Drug use: No    Sexual activity: Not on file   Other Topics Concern    Not on file   Social History Narrative    Not on file     Social Determinants of Health     Financial Resource Strain: Low Risk     Difficulty of Paying Living Expenses: Not hard at all   Food Insecurity: No Food Insecurity    Worried About [address] in the Last Year: Never true    [address]  in the Last Year: Never true   Transportation Needs:     Lack of Transportation (Medical):      Lack of Transportation (Non-Medical):    Physical Activity:     Days of Exercise per Week:     Minutes of Exercise per Session:    Stress:     Feeling of Stress :    Social Connections:     Frequency of Communication with Friends and Family:     Frequency of Social Gatherings with Friends and Family:     Attends Baptism Services:     Active Member of Clubs or Organizations:     Attends Club or Organization Meetings:     Marital Status:    Intimate Partner Violence:     Fear of Current or Ex-Partner:     Emotionally Abused:     Physically Abused:     Sexually Abused:      No current facility-administered medications for this encounter. Current Outpatient Medications   Medication Sig Dispense Refill    potassium chloride (KLOR-CON M) 10 MEQ extended release tablet TAKE 1 TABLET BY MOUTH DAILY 30 tablet 5    amLODIPine (NORVASC) 10 MG tablet TAKE 1 TABLET BY MOUTH DAILY 30 tablet 5    alendronate (FOSAMAX) 70 MG tablet TAKE 1 TABLET BY MOUTH ONCE A WEEK *TAKE 30 MINUTES BEFORE FOOD, DRINK, OR MEDS.  STAY UPRIGHT* 4 tablet 5    magnesium oxide (MAG-OX) 400 (240 Mg) MG tablet TAKE 1 TABLET BY MOUTH DAILY 30 tablet 5    Calcium Carb-Cholecalciferol (CALCIUM-VITAMIN D3) 600-400 MG-UNIT TABS Take 1 tablet by mouth daily      timolol (TIMOPTIC) 0.5 % ophthalmic solution Place 1 drop into both eyes daily       vitamin D (CHOLECALCIFEROL) 1000 UNIT TABS tablet Take 1,000 Units by mouth daily      vitamin B-12 (CYANOCOBALAMIN) 500 MCG tablet Take 500 mcg by mouth daily      latanoprost (XALATAN) 0.005 % ophthalmic solution Place 1 drop into both eyes nightly        Allergies   Allergen Reactions    Iodides Hives    Shrimp Flavor Hives       Nursing Notes Reviewed    Physical Exam:  ED Triage Vitals   Enc Vitals Group      BP 09/26/21 1826 (!) 155/93      Pulse 09/26/21 1818 99      Resp 09/26/21 1818 18      Temp 09/26/21 1820 98.2 °F (36.8 °C)      Temp Source 09/26/21 1820 Oral      SpO2 09/26/21 1818 95 %      Weight 09/26/21 1818 104 lb (47.2 kg)      Height 09/26/21 1818 4' 7\" (1.397 m)      Head Circumference --       Peak Flow --       Pain Score -- Pain Loc --       Pain Edu? --       Excl. in [address]? --      General :Patient is awake alert oriented person place and time no acute distress nontoxic appearing  HEENT: Pupils are equally round and reactive to light extraocular motors are intact conjunctivae clear sclerae white there is no injection no icterus. Nose without any rhinorrhea or epistaxis. Oral mucosa is moist no exudate buccal mucosa shows no ulcerations. Uvula is midline    Neck: Neck is supple full range of motion trachea midline thyroid nonpalpable  Cardiac: Heart regular rate rhythm no murmurs rubs clicks or gallops  Lungs: Lungs are clear to auscultation there is no wheezing rhonchi or rales. There is no use of accessory muscles no nasal flaring identified. Chest wall: There is no tenderness to palpation over the chest wall or over ribs  Abdomen: Abdomen is soft nontender nondistended. There is no firm or pulsatile masses no rebound rigidity or guarding negative Castillo's negative McBurney, no peritoneal signs  Suprapubic:  there is no tenderness to palpation over the external bladder   Musculoskeletal: 5 out of 5 strength in all 4 extremities full flexion extension abduction and adduction supination pronation of all extremities and all digits. No obvious muscle atrophy is noted. No focal muscle deficits are appreciated  Dermatology: Skin is warm and dry there is no obvious abscesses lacerations or lesions noted  Psych: Mentation is grossly normal cognition is grossly normal. Affect is appropriate  Neuro: Motor intact sensory intact cranial nerves II through XII are intact level of consciousness is normal cerebellar function is normal reflexes are grossly normal. No evidence of incontinence or loss of bowel or bladder no saddle anesthesia noted Lymphatic: There is no submandibular or cervical adenopathy appreciated.         I have reviewed and interpreted all of the currently available lab results from this visit (if applicable):  Results for orders placed or performed during the hospital encounter of 09/26/21   CBC Auto Differential   Result Value Ref Range    WBC 6.8 4.0 - 10.5 K/CU MM    RBC 4.70 4.2 - 5.4 M/CU MM    Hemoglobin 14.6 12.5 - 16.0 GM/DL    Hematocrit 45.7 37 - 47 %    MCV 97.2 78 - 100 FL    MCH 31.1 (H) 27 - 31 PG    MCHC 31.9 (L) 32.0 - 36.0 %    RDW 13.5 11.7 - 14.9 %    Platelets 995 338 - 213 K/CU MM    MPV 9.1 7.5 - 11.1 FL    Differential Type AUTOMATED DIFFERENTIAL     Segs Relative 50.7 36 - 66 %    Lymphocytes % 35.3 24 - 44 %    Monocytes % 10.0 (H) 0 - 4 %    Eosinophils % 3.1 (H) 0 - 3 %    Basophils % 0.6 0 - 1 %    Segs Absolute 3.4 K/CU MM    Lymphocytes Absolute 2.4 K/CU MM    Monocytes Absolute 0.7 K/CU MM    Eosinophils Absolute 0.2 K/CU MM    Basophils Absolute 0.0 K/CU MM    Nucleated RBC % 0.0 %    Total Nucleated RBC 0.0 K/CU MM    Total Immature Neutrophil 0.02 K/CU MM    Immature Neutrophil % 0.3 0 - 0.43 %   Comprehensive Metabolic Panel   Result Value Ref Range    Sodium 135 135 - 145 MMOL/L    Potassium 4.2 3.5 - 5.1 MMOL/L    Chloride 99 99 - 110 mMol/L    CO2 21 21 - 32 MMOL/L    BUN 13 6 - 23 MG/DL    CREATININE 0.5 (L) 0.6 - 1.1 MG/DL    Glucose 201 (H) 70 - 99 MG/DL    Calcium 9.5 8.3 - 10.6 MG/DL    Albumin 4.5 3.4 - 5.0 GM/DL    Total Protein 7.1 6.4 - 8.2 GM/DL    Total Bilirubin 0.9 0.0 - 1.0 MG/DL    ALT 13 10 - 40 U/L    AST 19 15 - 37 IU/L    Alkaline Phosphatase 56 40 - 129 IU/L    GFR Non-African American >60 >60 mL/min/1.73m2    GFR African American >60 >60 mL/min/1.73m2    Anion Gap 15 4 - 16   Troponin   Result Value Ref Range    Troponin T <0.010 <0.01 NG/ML   Brain Natriuretic Peptide   Result Value Ref Range    Pro-.5 <300 PG/ML   Blood Gas, Venous   Result Value Ref Range    pH, [place] 7.50 (H) 7.32 - 7.42    pCO2, [place] 34 (L) 38 - 52 mmHG    pO2, [place] 138 (H) 28 - 48 mmHG    Base Exc, Mixed 3.6 (H) 0 - 2.3    HCO3, Venous 26.5 (H) 19 - 25 MMOL/L    O2 Sat, [place] 94.2 (H) 50 - 70 % Comment VBG    Urinalysis   Result Value Ref Range    Color, UA YELLOW YELLOW    Clarity, UA SLIGHTLY CLOUDY (A) CLEAR    Glucose, Urine 50 (A) NEGATIVE MG/DL    Bilirubin Urine NEGATIVE NEGATIVE MG/DL    Ketones, Urine NEGATIVE NEGATIVE MG/DL    Specific Gravity, UA 1.010 1.001 - 1.035    Blood, Urine SMALL (A) NEGATIVE    pH, Urine 8.0 5.0 - 8.0    Protein, UA NEGATIVE NEGATIVE MG/DL    Urobilinogen, Urine NEGATIVE 0.2 - 1.0 MG/DL    Nitrite Urine, Quantitative POSITIVE (A) NEGATIVE    Leukocyte Esterase, Urine LARGE (A) NEGATIVE    RBC, UA 4 0 - 6 /HPF    WBC, UA 68 (H) 0 - 5 /HPF    Bacteria, UA OCCASIONAL (A) NEGATIVE /HPF    WBC Clumps, UA RARE /HPF    Squam Epithel, UA <1 /HPF    Trans Epithel, UA <1 /HPF    Trichomonas, UA NONE SEEN NONE SEEN /HPF   EKG 12 Lead   Result Value Ref Range    Ventricular Rate 103 BPM    Atrial Rate 103 BPM    P-R Interval 164 ms    QRS Duration 80 ms    Q-T Interval 340 ms    QTc Calculation (Bazett) 445 ms    P Axis 35 degrees    R Axis -34 degrees    T Axis 57 degrees    Diagnosis       Sinus tachycardia  Left axis deviation  Minimal voltage criteria for LVH, may be normal variant  Abnormal ECG  When compared with ECG of 16-FEB-2018 22:13,  No significant change was found    Confirmed by [facility] Higinio GUZMÁN ([id number]) on 9/27/2021 6:28:04 PM        Radiographs (if obtained):  [] The following radiograph was interpreted by myself in the absence of a radiologist:   [] Radiologist's Report Reviewed:  CT HEAD WO CONTRAST   Final Result   No acute intracranial abnormality. XR CHEST PORTABLE   Final Result   Mild vascular congestion. EKG (if obtained):   Please See Note of attending physician for EKG interpretation. Chart review shows recent radiograph(s):  No results found.     MDM:     Interventions given this visit:   Orders Placed This Encounter   Medications    cefTRIAXone (ROCEPHIN) 1000 mg IVPB in 50 mL D5W minibag     Order Specific Question: Antimicrobial Indications     Answer:   Urinary Tract Infection    cephALEXin (KEFLEX) 500 MG capsule     Sig: Take 1 capsule by mouth 3 times daily for 7 days     Dispense:  21 capsule     Refill:  0     Very well-appearing elderly female presents today to the emergency department. With shortness of breath. Daughter/primary caretaker is at bedside with patient. On physical examination patient is a very healthy appearing elderly female. With no evidence of adventitious lung sounds. No increased work of breathing. And physical exam is absolutely unremarkable. Imaging did include CT scan of the head as well as x-ray. X-ray did reveal mild vascular congestion. However no overt emergent processes identifiable. Patient's EKG is negative. Patient's labs including CBC metabolic panel troponin BNP VBG all essentially revealed no abnormalities. However patient does have a nitrite positive urinary tract infection is noted. This could be contributing to patient's weakness. Patient is ambulated here in the ED. And observed. Without any dyspnea. I have a long conversation with patient as well as daughter who is at bedside regarding admission versus discharge. Even though patient is 80years of age and lives alone. She has really good care and really good sources. With her children living close to her and can check on her. She is no longer short of breath. There is no dyspnea on exertion have low suspicion of CAD, CHF, acute coronary syndrome, pulmonary embolism, acute respiratory distress I do believe patient is safe for discharge home with very close follow-up and monitoring. Antibiotics are initiated for UTI          I independently managed patient today in the ED    /75   Pulse 95   Temp 98.2 °F (36.8 °C) (Oral)   Resp 25   Ht 4' 7\" (1.397 m)   Wt 104 lb (47.2 kg)   SpO2 96%   BMI 24.17 kg/m²       Clinical Impression:  1. Infective urethritis    2.  Lightheadedness        Disposition referral (if applicable):  No follow-up provider specified. Disposition medications (if applicable):  Discharge Medication List as of 9/26/2021 10:44 PM      START taking these medications    Details   cephALEXin (KEFLEX) 500 MG capsule Take 1 capsule by mouth 3 times daily for 7 days, Disp-21 capsule, R-0Normal               Comment: Please note this report has been produced using speech recognition software and may contain errors related to that system including errors in grammar, punctuation, and spelling, as well as words and phrases that may be inappropriate. If there are any questions or concerns please feel free to contact the dictating provider for clarification.       Sherine Shoemaker, [address] [place] [address]  10/07/21 9102

## 2021-09-27 ENCOUNTER — OFFICE VISIT (OUTPATIENT)
Dept: INTERNAL MEDICINE CLINIC | Age: 86
End: 2021-09-27
Payer: MEDICARE

## 2021-09-27 VITALS
HEIGHT: 55 IN | WEIGHT: 101 LBS | BODY MASS INDEX: 23.37 KG/M2 | DIASTOLIC BLOOD PRESSURE: 72 MMHG | SYSTOLIC BLOOD PRESSURE: 114 MMHG | HEART RATE: 90 BPM | OXYGEN SATURATION: 96 % | TEMPERATURE: 97.2 F

## 2021-09-27 DIAGNOSIS — N30.00 ACUTE CYSTITIS WITHOUT HEMATURIA: ICD-10-CM

## 2021-09-27 DIAGNOSIS — I10 HYPERTENSION, ESSENTIAL: Primary | ICD-10-CM

## 2021-09-27 DIAGNOSIS — E78.2 MIXED HYPERLIPIDEMIA: ICD-10-CM

## 2021-09-27 DIAGNOSIS — R73.09 ELEVATED GLUCOSE: ICD-10-CM

## 2021-09-27 LAB
EKG ATRIAL RATE: 103 BPM
EKG DIAGNOSIS: NORMAL
EKG P AXIS: 35 DEGREES
EKG P-R INTERVAL: 164 MS
EKG Q-T INTERVAL: 340 MS
EKG QRS DURATION: 80 MS
EKG QTC CALCULATION (BAZETT): 445 MS
EKG R AXIS: -34 DEGREES
EKG T AXIS: 57 DEGREES
EKG VENTRICULAR RATE: 103 BPM

## 2021-09-27 PROCEDURE — 99213 OFFICE O/P EST LOW 20 MIN: CPT | Performed by: FAMILY MEDICINE

## 2021-09-27 PROCEDURE — 1090F PRES/ABSN URINE INCON ASSESS: CPT | Performed by: FAMILY MEDICINE

## 2021-09-27 PROCEDURE — 93010 ELECTROCARDIOGRAM REPORT: CPT | Performed by: INTERNAL MEDICINE

## 2021-09-27 PROCEDURE — 1036F TOBACCO NON-USER: CPT | Performed by: FAMILY MEDICINE

## 2021-09-27 PROCEDURE — G8427 DOCREV CUR MEDS BY ELIG CLIN: HCPCS | Performed by: FAMILY MEDICINE

## 2021-09-27 PROCEDURE — 4040F PNEUMOC VAC/ADMIN/RCVD: CPT | Performed by: FAMILY MEDICINE

## 2021-09-27 PROCEDURE — G8420 CALC BMI NORM PARAMETERS: HCPCS | Performed by: FAMILY MEDICINE

## 2021-09-27 PROCEDURE — 1123F ACP DISCUSS/DSCN MKR DOCD: CPT | Performed by: FAMILY MEDICINE

## 2021-09-27 ASSESSMENT — ENCOUNTER SYMPTOMS
CHEST TIGHTNESS: 0
VOMITING: 0
CONSTIPATION: 0
DIARRHEA: 0
ABDOMINAL PAIN: 0
COLOR CHANGE: 0
SHORTNESS OF BREATH: 0
SORE THROAT: 0

## 2021-09-27 NOTE — PROGRESS NOTES
Subjective:      Chief Complaint   Patient presents with    Other     4 month follow up     Urinary Tract Infection     Was in ER for that last night        HPI:  April Maxwell is a 80 y.o. female who presents today for follow up of chronic conditions as listed below. Patient reports she started having dysuria, lightheadedness, flank pain over the past week, but knew she had an upcoming appointment so did not call clinic. Son checked on her yesterday, and thought she looked pale and unwell so she went to ER and was diagnosed with UTI. Had head CT and CXR as well, which were unremarkable. Received a dose of IV rocephin and was prescribed a course of antibiotics, but she has not picked them up yet. Reports all of her symptoms have resolved since receiving IV antibiotics yesterday. HTN:  BP's have been 120-130's/70's at home. Feeling well today, no acute concerns. Labs reviewed.       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    Dr Octaviano Webster Hyperlipidemia     Hypertension     Osteoarthritis of knee 2011    Dr Barbara Allen Osteoporosis 2002    Fosamax [id number]    Pelvic fracture ([address] 05/2018    Right rotator cuff tear 2014    Luis Alvarez / injection; PT    T10 vertebral fracture ([address] 02/2018    fall and fragility fx; also L1 ; had kyphoplasty 2/2018    Vitamin B12 deficiency 03/2017    B 12 266 by Luis Al[facility] at [facility]        Past Surgical History:   Procedure Laterality Date   [address]    With Lysis of Adhesions    APPENDECTOMY  1953    w/  rt.uso    CYSTOCELE REPAIR  1993    w/ rectocele    FIXATION KYPHOPLASTY  02/19/2018    T10 & L1    HYSTERECTOMY  1969     remaining ovary removed also    SKIN CANCER EXCISION Left 09/2017    BCC with skin graft    WRIST FRACTURE SURGERY Left 03/2017    ORIF       Social History     Tobacco Use    Smoking status: Never Smoker    Smokeless tobacco: Never Used   Substance Use Topics    Alcohol use: Yes     Comment: wine rarely        Review of Systems   Constitutional: Negative for activity change, appetite change, chills, fever and unexpected weight change. HENT: Negative for congestion and sore throat. Respiratory: Negative for chest tightness and shortness of breath. Cardiovascular: Negative for chest pain and palpitations. Gastrointestinal: Negative for abdominal pain, constipation, diarrhea and vomiting. Genitourinary: Negative for dysuria. Skin: Negative for color change. Neurological: Negative for dizziness and light-headedness. Psychiatric/Behavioral: Negative for dysphoric mood. The patient is not nervous/anxious. Prior to Visit Medications    Medication Sig Taking? Authorizing Provider   cephALEXin (KEFLEX) 500 MG capsule Take 1 capsule by mouth 3 times daily for 7 days Yes Jennifer Velásquez PA-C   potassium chloride (KLOR-CON M) 10 MEQ extended release tablet TAKE 1 TABLET BY MOUTH DAILY Yes Virginia Marin MD   amLODIPine (NORVASC) 10 MG tablet TAKE 1 TABLET BY MOUTH DAILY Yes Virginia Marin MD   alendronate (FOSAMAX) 70 MG tablet TAKE 1 TABLET BY MOUTH ONCE A WEEK *TAKE 30 MINUTES BEFORE FOOD, DRINK, OR MEDS.  STAY UPRIGHT* Yes Virginia Marin MD   magnesium oxide (MAG-OX) 400 (240 Mg) MG tablet TAKE 1 TABLET BY MOUTH DAILY Yes Virginia Marin MD   Calcium Carb-Cholecalciferol (CALCIUM-VITAMIN D3) 600-400 MG-UNIT TABS Take 1 tablet by mouth daily Yes Historical Provider, MD   timolol (TIMOPTIC) 0.5 % ophthalmic solution Place 1 drop into both eyes daily  Yes Historical Provider, MD   vitamin D (CHOLECALCIFEROL) 1000 UNIT TABS tablet Take 1,000 Units by mouth daily Yes Historical Provider, MD   vitamin B-12 (CYANOCOBALAMIN) 500 MCG tablet Take 500 mcg by mouth daily Yes Historical Provider, MD   latanoprost (XALATAN) 0.005 % ophthalmic solution Place 1 drop into both eyes nightly  Yes Historical Provider, MD          Objective:      /72 (Site: Right Upper Arm, Position: Sitting, Cuff Size: Medium Adult)   Pulse 90   Temp 97.2 °F (36.2 °C)   Ht 4' 7\" (1.397 m)   Wt 101 lb (45.8 kg)   SpO2 96%   BMI 23.47 kg/m²      Physical Exam  Vitals and nursing note reviewed. Constitutional:       General: She is not in acute distress. Appearance: Normal appearance. She is not ill-appearing or toxic-appearing. HENT:      Head: Normocephalic and atraumatic. Right Ear: External ear normal.      Left Ear: External ear normal.      Nose: Nose normal.      Mouth/Throat:      Pharynx: Oropharynx is clear. Eyes:      General: No scleral icterus. Right eye: No discharge. Left eye: No discharge. Extraocular Movements: Extraocular movements intact. Conjunctiva/sclera: Conjunctivae normal.   Cardiovascular:      Rate and Rhythm: Normal rate and regular rhythm. Heart sounds: Normal heart sounds. Pulmonary:      Effort: Pulmonary effort is normal.      Breath sounds: Normal breath sounds. No wheezing or rales. Musculoskeletal:         General: No deformity. Cervical back: Normal range of motion and neck supple. No rigidity. Skin:     General: Skin is warm and dry. Findings: No rash. Neurological:      General: No focal deficit present. Mental Status: She is alert. Mental status is at baseline. Motor: No weakness. Psychiatric:         Mood and Affect: Mood normal.         Behavior: Behavior normal.            Assessment / Plan:      1. Hypertension, essential  Stable, well controlled. Continue current medications. - CBC Auto Differential; Future  - Comprehensive Metabolic Panel; Future    2. Mixed hyperlipidemia  Lipids significantly elevated on last labs.   Discussed that it would be preferable not to start statin given age, but if labs are worsening with next check, will discuss whether treatment would be warranted. Encouraged dietary modifications, will repeat labs in 4 months. - Lipid Panel; Future    3. Elevated glucose  Will screen. - Hemoglobin A1C; Future    4. Acute cystitis without hematuria  Diagnosed in ER yesterday, will be starting po antibiotics today. Symptoms now resolved (with dose of IV antibiotics), but instructed to contact clinic if she has any residual/recurring symptoms after completing course of keflex. I have spent 25 minutes on this patient encounter. Patient voiced understanding and agreement with plan. All questions/concerns were addressed, risks/side effects of medications were reviewed. Return precautions and after visit summary were provided. Return in about 4 months (around 1/27/2022). or earlier as needed.       Guille Garcia MD

## 2021-09-27 NOTE — ED NOTES
Pt denies SOB or chest pain when ambulating to BR. Pulse 100 o2 95%.      Aspen Perez RN  09/26/21 8818

## 2021-10-29 ENCOUNTER — APPOINTMENT (OUTPATIENT)
Dept: CT IMAGING | Age: 86
End: 2021-10-29
Payer: MEDICARE

## 2021-10-29 ENCOUNTER — HOSPITAL ENCOUNTER (EMERGENCY)
Age: 86
Discharge: ANOTHER HOSPITAL | End: 2021-10-29
Attending: EMERGENCY MEDICINE
Payer: MEDICARE

## 2021-10-29 VITALS
WEIGHT: 101 LBS | SYSTOLIC BLOOD PRESSURE: 153 MMHG | DIASTOLIC BLOOD PRESSURE: 79 MMHG | BODY MASS INDEX: 23.37 KG/M2 | TEMPERATURE: 98.4 F | OXYGEN SATURATION: 96 % | HEIGHT: 55 IN | HEART RATE: 85 BPM | RESPIRATION RATE: 18 BRPM

## 2021-10-29 DIAGNOSIS — S12.112A CLOSED NONDISPLACED ODONTOID FRACTURE WITH TYPE II MORPHOLOGY, INITIAL ENCOUNTER (HCC): Primary | ICD-10-CM

## 2021-10-29 DIAGNOSIS — S09.90XA INJURY OF HEAD, INITIAL ENCOUNTER: ICD-10-CM

## 2021-10-29 PROCEDURE — 70450 CT HEAD/BRAIN W/O DYE: CPT

## 2021-10-29 PROCEDURE — 99283 EMERGENCY DEPT VISIT LOW MDM: CPT

## 2021-10-29 PROCEDURE — 72125 CT NECK SPINE W/O DYE: CPT

## 2021-10-29 ASSESSMENT — PAIN SCALES - GENERAL: PAINLEVEL_OUTOF10: 5

## 2021-10-29 ASSESSMENT — PAIN DESCRIPTION - LOCATION: LOCATION: HEAD;NECK

## 2021-10-29 ASSESSMENT — PAIN DESCRIPTION - PAIN TYPE: TYPE: ACUTE PAIN

## 2021-10-29 NOTE — ED NOTES
Reading Providers    Read Date Phone Pager   Stephen Morales Oct 29, 2021  2:02 [phone]    Radiation Dose Estimates    No radiation information found for this patient   Narrative   EXAMINATION:   CT OF THE HEAD WITHOUT CONTRAST; CT OF THE CERVICAL SPINE WITHOUT CONTRAST   10/29/2021 1:26 pm; 10/29/2021 1:27 pm       TECHNIQUE:   CT of the head was performed without the administration of intravenous   contrast. Dose modulation, iterative reconstruction, and/or weight based   adjustment of the mA/kV was utilized to reduce the radiation dose to as low   as reasonably achievable.; CT of the cervical spine was performed without the   administration of intravenous contrast. Multiplanar reformatted images are   provided for review. Dose modulation, iterative reconstruction, and/or weight   based adjustment of the mA/kV was utilized to reduce the radiation dose to as   low as reasonably achievable.       COMPARISON:   Head CT September 26, 2021       HISTORY:   ORDERING SYSTEM PROVIDED HISTORY: fall, forehead abrasion   TECHNOLOGIST PROVIDED HISTORY:   Has a \"code stroke\" or \"stroke alert\" been called? ->No   Reason for exam:->fall, forehead abrasion   Decision Support Exception - unselect if not a suspected or confirmed   emergency medical condition->Emergency Medical Condition (MA); ORDERING   SYSTEM PROVIDED HISTORY: fall   TECHNOLOGIST PROVIDED HISTORY:   Reason for exam:->fall   Decision Support Exception - unselect if not a suspected or confirmed   emergency medical condition->Emergency Medical Condition (MA)       FINDINGS:   BRAIN/VENTRICLES: There is no acute intracranial hemorrhage, mass effect or   midline shift.  No abnormal extra-axial fluid collection.  The gray-white   differentiation is maintained without evidence of an acute infarct.  There is   no evidence of hydrocephalus.       ORBITS: The visualized portion of the orbits demonstrate no acute abnormality.       SINUSES: The visualized paranasal sinuses and mastoid air cells demonstrate   no acute abnormality.       SOFT TISSUES/SKULL:  No acute abnormality of the visualized skull or soft   tissues.       CERVICAL SPINE: There is a nondisplaced fracture of the identified.  No   fracture demonstrated elsewhere.  Moderate diffuse degenerative changes.  No   prevertebral soft tissue swelling.  No dislocation.           Impression   Head: No acute intracranial abnormality.       Cervical spine: Nondisplaced fracture of the odontoid, type 2.  This type of   fracture is considered unstable and neuro surgical consultation recommended. This critical result was called by Dr. Maximilian Funk MD to Dr. John Kwon on   10/29/2021 at 14:01.              Joe Hernandez RN  10/29/21 3754

## 2021-10-29 NOTE — ED PROVIDER NOTES
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   Tobacco Use    Smoking status: Never Smoker    Smokeless tobacco: Never Used   Vaping Use    Vaping Use: Never used   Substance and Sexual Activity    Alcohol use: Yes     Comment: wine rarely    Drug use: No    Sexual activity: Not on file   Other Topics Concern    Not on file   Social History Narrative    Not on file     Social Determinants of Health     Financial Resource Strain: Low Risk     Difficulty of Paying Living Expenses: Not hard at all   Food Insecurity: No Food Insecurity    Worried About [address] in the Last Year: Never true    [facility] in the Last Year: Never true   Transportation Needs:     Lack of Transportation (Medical):  Lack of Transportation (Non-Medical):    Physical Activity:     Days of Exercise per Week:     Minutes of Exercise per Session:    Stress:     Feeling of Stress :    Social Connections:     Frequency of Communication with Friends and Family:     Frequency of Social Gatherings with Friends and Family:     Attends Taoist Services:     Active Member of Clubs or Organizations:     Attends Club or Organization Meetings:     Marital Status:    Intimate Partner Violence:     Fear of Current or Ex-Partner:     Emotionally Abused:     Physically Abused:     Sexually Abused:      No current facility-administered medications for this encounter. Current Outpatient Medications   Medication Sig Dispense Refill    amLODIPine (NORVASC) 10 MG tablet TAKE 1 TABLET BY MOUTH DAILY 30 tablet 5    alendronate (FOSAMAX) 70 MG tablet TAKE 1 TABLET BY MOUTH ONCE A WEEK *TAKE 30 MINUTES BEFORE FOOD, DRINK, OR MEDS. STAY UPRIGHT* 4 tablet 5    potassium chloride (KLOR-CON M) 10 MEQ extended release tablet TAKE 1 TABLET BY MOUTH DAILY 30 tablet 5    magnesium oxide (MAG-OX) 400 (240 Mg) MG tablet TAKE 1 TABLET BY MOUTH DAILY 30 tablet 5    Calcium Carb-Cholecalciferol (CALCIUM-VITAMIN D3) 600-400 MG-UNIT TABS Take 1 tablet by mouth daily      timolol (TIMOPTIC) 0.5 % ophthalmic solution Place 1 drop into both eyes daily       vitamin D (CHOLECALCIFEROL) 1000 UNIT TABS tablet Take 1,000 Units by mouth daily      vitamin B-12 (CYANOCOBALAMIN) 500 MCG tablet Take 500 mcg by mouth daily      latanoprost (XALATAN) 0.005 % ophthalmic solution Place 1 drop into both eyes nightly        Allergies   Allergen Reactions    Iodides Hives    Shrimp Flavor Hives       Nursing Notes Reviewed    Physical Exam:  Triage VS:    ED Triage Vitals [10/29/21 1155]   Enc Vitals Group      BP (!) 146/99      Pulse 85      Resp 18      Temp 98.4 °F (36.9 °C)      Temp Source Oral      SpO2 96 %      Weight 101 lb (45.8 kg)      Height 4' 7\" (1.397 m)      Head Circumference       Peak Flow       Pain Score       Pain Loc       Pain Edu? Excl. in [address]? My pulse ox interpretation is  normal    Primary exam:  Airway: Intact. Speaking in normal voice. Breathing: Spontaneous. Equal chest rise and breath sounds. Circulation: Heart RRR. Pulses 2+. Secondary exam:   GENERAL APPEARANCE: Awake and alert. Cooperative. No acute distress. Laying in the bed in a hard cervical collar on a backboard. HEAD: Normocephalic. Small left forehead abrasion. No depressed skull fractures. EYES: EOM's grossly intact. Sclera anicteric. No Racoon Eyes.   ENT: Oral mucosa moist, Tolerates saliva. No Webb sign. NECK: Trachea midline, no obvious masses  CHEST/LUNGS: Non-tender. Lungs clear to auscultation bilaterally. Respirations nonlabored. HEART: Regular rate and rhythm. ABDOMEN: Soft. Non-distended. Non-tender. No guarding or rebound. Normal bowel sounds. BACK:No  thoracic or lumbar midline tenderness to palpation, step-offs or deformities. No cerivcal step offs or deformities, + tender to palpation over C spine. EXTREMITIES: Upper and lower extremities have no acute deformities and they are non-tender to palpation. Good ROM. SKIN: Warm and dry. No acute wounds  NEUROLOGICAL: Alert and oriented. No gross facial drooping. Strength 5/5 in upper and lower extremities Light touch sensation intact throughout. Perfusion:  pulses intact and equal in all extremities      I have reviewed and interpreted all of the currently available lab results from this visit (if applicable):  No results found for this visit on 10/29/21. Radiographs (if obtained):  Radiologist's Report Reviewed:  No results found. EKG (if obtained): (All EKG's are interpreted by myself in the absence of a cardiologist)      MDM:  80year-old female with history as above presents with concern for a fall, has a very small left forehead abrasion, she is in no distress but does have some neck pain. Plan for CT head and cervical spine. No preceding symptoms to cause the fall, she is in no distress with stable vitals, mild hypertension noted as only abnormality. She declines any pain medication at this time. Her neurologic exam is intact    1357- radiology- called and patient has Type 2 odontoid fracture. 1410- I have informed the patient of the results, I had already spoken with her son when she initially came in, I did call and speak with him, they would prefer her to be transferred to [facility], as she does meet trauma criteria. I will call them to arrange for this.  Her neurologic exam remains intact at this time, strength and sensation intact in all extremities. She is in no distress, declines pain medication at this time    Total critical care time today provided was 12 minutes. This excludes seperately billable procedure. Critical care time provided for trauma that required close evaluation and/or intervention with concern for patient decompensation. Clinical Impression:  1. Closed nondisplaced odontoid fracture with type II morphology, initial encounter ([facility] [address]    2. Injury of head, initial encounter      Disposition referral (if applicable):  No follow-up provider specified. Disposition medications (if applicable):  New Prescriptions    No medications on file       Comment: Please note this report has been produced using speech recognition software and may contain errors related to that system including errors in grammar, punctuation, and spelling, as well as words and phrases that may be inappropriate. Efforts were made to edit the dictations. Savannah Vela MD  10/29/21 [id number]- spoke with [facility] Dr. Mackenzie Wright. Accepted to their facility. They are contacting for MICU transport, currently weather does not allow for flight.         Savannah Vela MD  10/29/21 1686

## 2021-11-22 ENCOUNTER — TELEPHONE (OUTPATIENT)
Dept: INTERNAL MEDICINE CLINIC | Age: 86
End: 2021-11-22

## 2021-11-22 NOTE — TELEPHONE ENCOUNTER
Wilma whitlock from [facility] to inform pt no longer has need for SN, and will be D/C'd from home care.

## 2021-11-29 ENCOUNTER — TELEPHONE (OUTPATIENT)
Dept: INTERNAL MEDICINE CLINIC | Age: 86
End: 2021-11-29

## 2021-11-29 NOTE — TELEPHONE ENCOUNTER
Francois Floyd from [facility] called discharging patient from skilled nursing . Patient is no longer in need of skilled nursing.

## 2022-01-04 ENCOUNTER — HOSPITAL ENCOUNTER (OUTPATIENT)
Age: 87
Discharge: HOME | End: 2022-01-04
Payer: MEDICARE

## 2022-01-04 DIAGNOSIS — E78.2 MIXED HYPERLIPIDEMIA: ICD-10-CM

## 2022-01-04 DIAGNOSIS — R73.09 ELEVATED GLUCOSE: ICD-10-CM

## 2022-01-04 DIAGNOSIS — I10 HYPERTENSION, ESSENTIAL: ICD-10-CM

## 2022-01-04 LAB
ALBUMIN SERPL-MCNC: 4.2 GM/DL (ref 3.4–5)
ALP BLD-CCNC: 58 IU/L (ref 40–128)
ALT SERPL-CCNC: 9 U/L (ref 10–40)
ANION GAP SERPL CALCULATED.3IONS-SCNC: 13 MMOL/L (ref 4–16)
AST SERPL-CCNC: 16 IU/L (ref 15–37)
BASOPHILS ABSOLUTE: 0.1 K/CU MM
BASOPHILS RELATIVE PERCENT: 0.8 % (ref 0–1)
BILIRUB SERPL-MCNC: 1 MG/DL (ref 0–1)
BUN BLDV-MCNC: 11 MG/DL (ref 6–23)
CALCIUM SERPL-MCNC: 9.6 MG/DL (ref 8.3–10.6)
CHLORIDE BLD-SCNC: 102 MMOL/L (ref 99–110)
CHOLESTEROL: 241 MG/DL
CO2: 26 MMOL/L (ref 21–32)
CREAT SERPL-MCNC: 0.6 MG/DL (ref 0.6–1.1)
DIFFERENTIAL TYPE: ABNORMAL
EOSINOPHILS ABSOLUTE: 0.2 K/CU MM
EOSINOPHILS RELATIVE PERCENT: 3.2 % (ref 0–3)
ESTIMATED AVERAGE GLUCOSE: 117 MG/DL
GFR AFRICAN AMERICAN: >60 ML/MIN/1.73M2
GFR NON-AFRICAN AMERICAN: >60 ML/MIN/1.73M2
GLUCOSE BLD-MCNC: 94 MG/DL (ref 70–99)
HBA1C MFR BLD: 5.7 % (ref 4.2–6.3)
HCT VFR BLD CALC: 43.2 % (ref 37–47)
HDLC SERPL-MCNC: 82 MG/DL
HEMOGLOBIN: 13.9 GM/DL (ref 12.5–16)
IMMATURE NEUTROPHIL %: 0.3 % (ref 0–0.43)
LDL CHOLESTEROL DIRECT: 143 MG/DL
LYMPHOCYTES ABSOLUTE: 2.1 K/CU MM
LYMPHOCYTES RELATIVE PERCENT: 27.6 % (ref 24–44)
MCH RBC QN AUTO: 31.2 PG (ref 27–31)
MCHC RBC AUTO-ENTMCNC: 32.2 % (ref 32–36)
MCV RBC AUTO: 97.1 FL (ref 78–100)
MONOCYTES ABSOLUTE: 1.1 K/CU MM
MONOCYTES RELATIVE PERCENT: 14.5 % (ref 0–4)
NUCLEATED RBC %: 0 %
PDW BLD-RTO: 13.9 % (ref 11.7–14.9)
PLATELET # BLD: 366 K/CU MM (ref 140–440)
PMV BLD AUTO: 9.8 FL (ref 7.5–11.1)
POTASSIUM SERPL-SCNC: 4 MMOL/L (ref 3.5–5.1)
RBC # BLD: 4.45 M/CU MM (ref 4.2–5.4)
SEGMENTED NEUTROPHILS ABSOLUTE COUNT: 4.1 K/CU MM
SEGMENTED NEUTROPHILS RELATIVE PERCENT: 53.6 % (ref 36–66)
SODIUM BLD-SCNC: 141 MMOL/L (ref 135–145)
TOTAL IMMATURE NEUTOROPHIL: 0.02 K/CU MM
TOTAL NUCLEATED RBC: 0 K/CU MM
TOTAL PROTEIN: 6.7 GM/DL (ref 6.4–8.2)
TRIGL SERPL-MCNC: 81 MG/DL
WBC # BLD: 7.5 K/CU MM (ref 4–10.5)

## 2022-01-04 PROCEDURE — 83036 HEMOGLOBIN GLYCOSYLATED A1C: CPT

## 2022-01-04 PROCEDURE — 80053 COMPREHEN METABOLIC PANEL: CPT

## 2022-01-04 PROCEDURE — 85025 COMPLETE CBC W/AUTO DIFF WBC: CPT

## 2022-01-04 PROCEDURE — 83721 ASSAY OF BLOOD LIPOPROTEIN: CPT

## 2022-01-04 PROCEDURE — 36415 COLL VENOUS BLD VENIPUNCTURE: CPT

## 2022-01-04 PROCEDURE — 80061 LIPID PANEL: CPT

## 2022-01-27 ENCOUNTER — OFFICE VISIT (OUTPATIENT)
Dept: INTERNAL MEDICINE CLINIC | Age: 87
End: 2022-01-27
Payer: MEDICARE

## 2022-01-27 VITALS
OXYGEN SATURATION: 99 % | TEMPERATURE: 96.8 F | HEIGHT: 55 IN | BODY MASS INDEX: 23.84 KG/M2 | DIASTOLIC BLOOD PRESSURE: 82 MMHG | WEIGHT: 103 LBS | HEART RATE: 77 BPM | SYSTOLIC BLOOD PRESSURE: 136 MMHG

## 2022-01-27 DIAGNOSIS — R26.9 GAIT DISTURBANCE: ICD-10-CM

## 2022-01-27 DIAGNOSIS — E78.2 MIXED HYPERLIPIDEMIA: ICD-10-CM

## 2022-01-27 DIAGNOSIS — S12.100G CLOSED ODONTOID FRACTURE WITH DELAYED HEALING: Primary | ICD-10-CM

## 2022-01-27 DIAGNOSIS — I10 HYPERTENSION, ESSENTIAL: ICD-10-CM

## 2022-01-27 PROCEDURE — 1123F ACP DISCUSS/DSCN MKR DOCD: CPT | Performed by: FAMILY MEDICINE

## 2022-01-27 PROCEDURE — G8420 CALC BMI NORM PARAMETERS: HCPCS | Performed by: FAMILY MEDICINE

## 2022-01-27 PROCEDURE — G8484 FLU IMMUNIZE NO ADMIN: HCPCS | Performed by: FAMILY MEDICINE

## 2022-01-27 PROCEDURE — 4040F PNEUMOC VAC/ADMIN/RCVD: CPT | Performed by: FAMILY MEDICINE

## 2022-01-27 PROCEDURE — 99214 OFFICE O/P EST MOD 30 MIN: CPT | Performed by: FAMILY MEDICINE

## 2022-01-27 PROCEDURE — G8427 DOCREV CUR MEDS BY ELIG CLIN: HCPCS | Performed by: FAMILY MEDICINE

## 2022-01-27 PROCEDURE — 1090F PRES/ABSN URINE INCON ASSESS: CPT | Performed by: FAMILY MEDICINE

## 2022-01-27 PROCEDURE — 1036F TOBACCO NON-USER: CPT | Performed by: FAMILY MEDICINE

## 2022-01-27 ASSESSMENT — ENCOUNTER SYMPTOMS
VOMITING: 0
CHEST TIGHTNESS: 0
COLOR CHANGE: 0
ABDOMINAL PAIN: 0
SORE THROAT: 0
DIARRHEA: 0
CONSTIPATION: 0
SHORTNESS OF BREATH: 0

## 2022-01-27 ASSESSMENT — PATIENT HEALTH QUESTIONNAIRE - PHQ9
1. LITTLE INTEREST OR PLEASURE IN DOING THINGS: 0
SUM OF ALL RESPONSES TO PHQ9 QUESTIONS 1 & 2: 0
SUM OF ALL RESPONSES TO PHQ QUESTIONS 1-9: 0
2. FEELING DOWN, DEPRESSED OR HOPELESS: 0
SUM OF ALL RESPONSES TO PHQ QUESTIONS 1-9: 0

## 2022-01-27 NOTE — PROGRESS NOTES
Subjective:      Chief Complaint   Patient presents with    Follow-up       HPI:  Tali Contreras is a 80 y.o. female who presents today for follow up of chronic conditions as listed below. Had a mechanical fall with odontoid fracture a few months ago, was transferred to [facility] for observation. Has been following with neurology at [facility] since discharge. Is still wearing Aspen collar, but will be able to remove after her follow up next week. Has repeat CT scan at that appointment as well. Patient denies any pain, has not needed pain medications. States she uses a walker consistently at home. HTN:  BP's 120-140's/70's at home. Denying any symptoms. HLP:  States she has been watching what she is eating. Feeling well today, no acute concerns. Labs reviewed.        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    Dr Can Alberts Hyperlipidemia     Hypertension     Osteoarthritis of knee 2011    Dr Tomasa Araiza Osteoporosis 2002    Fosamax [id number]    Pelvic fracture ([address] 05/2018    Right rotator cuff tear 2014    Sebastian Darin / injection; PT    T10 vertebral fracture ([address] 02/2018    fall and fragility fx; also L1 ; had kyphoplasty 2/2018    Vitamin B12 deficiency 03/2017    B 12 266 by Sebastian Webster at [facility]        Past Surgical History:   Procedure Laterality Date   [address]    With Lysis of Adhesions   [facility]     w/  rt.uso    [address]    w/ rectocele    FIXATION KYPHOPLASTY  02/19/2018    T10 & L1    HYSTERECTOMY  1969     remaining ovary removed also    SKIN CANCER EXCISION Left 09/2017    BCC with skin graft    WRIST FRACTURE SURGERY Left 03/2017    ORIF       Social History     Tobacco Use    Smoking status: Never Smoker    Smokeless tobacco: Never Used   Substance Use Topics    Alcohol use: Yes     Comment: wine rarely Review of Systems   Constitutional: Negative for activity change, appetite change, chills, fever and unexpected weight change. HENT: Negative for congestion and sore throat. Respiratory: Negative for chest tightness and shortness of breath. Cardiovascular: Negative for chest pain and palpitations. Gastrointestinal: Negative for abdominal pain, constipation, diarrhea and vomiting. Genitourinary: Negative for dysuria. Musculoskeletal: Positive for gait problem and neck pain. Skin: Negative for color change. Neurological: Negative for dizziness and light-headedness. Psychiatric/Behavioral: Negative for dysphoric mood. The patient is not nervous/anxious. Prior to Visit Medications    Medication Sig Taking? Authorizing Provider   amLODIPine (NORVASC) 10 MG tablet TAKE 1 TABLET BY MOUTH DAILY Yes Fabricio Carson MD   alendronate (FOSAMAX) 70 MG tablet TAKE 1 TABLET BY MOUTH ONCE A WEEK *TAKE 30 MINUTES BEFORE FOOD, DRINK, OR MEDS.  STAY UPRIGHT* Yes Fabricio Carson MD   potassium chloride (KLOR-CON M) 10 MEQ extended release tablet TAKE 1 TABLET BY MOUTH DAILY Yes Fabricio Carson MD   magnesium oxide (MAG-OX) 400 (240 Mg) MG tablet TAKE 1 TABLET BY MOUTH DAILY Yes Fabricio Carson MD   Calcium Carb-Cholecalciferol (CALCIUM-VITAMIN D3) 600-400 MG-UNIT TABS Take 1 tablet by mouth daily Yes Historical Provider, MD   timolol (TIMOPTIC) 0.5 % ophthalmic solution Place 1 drop into both eyes daily  Yes Historical Provider, MD   vitamin D (CHOLECALCIFEROL) 1000 UNIT TABS tablet Take 1,000 Units by mouth daily Yes Historical Provider, MD   vitamin B-12 (CYANOCOBALAMIN) 500 MCG tablet Take 500 mcg by mouth daily Yes Historical Provider, MD   latanoprost (XALATAN) 0.005 % ophthalmic solution Place 1 drop into both eyes nightly  Yes Historical Provider, MD          Objective:      /82 (Site: Right Upper Arm, Position: Sitting, Cuff Size: Medium Adult)   Pulse 77   Temp 96.8 °F (36 °C)   Ht 4' 7\" (1.397 m)   Wt 103 lb (46.7 kg)   SpO2 99%   BMI 23.94 kg/m²      Physical Exam  Vitals and nursing note reviewed. Constitutional:       General: She is not in acute distress. Appearance: Normal appearance. She is not ill-appearing or toxic-appearing. HENT:      Head: Normocephalic and atraumatic. Right Ear: External ear normal.      Left Ear: External ear normal.      Nose: Nose normal.      Mouth/Throat:      Pharynx: Oropharynx is clear. Eyes:      General: No scleral icterus. Right eye: No discharge. Left eye: No discharge. Extraocular Movements: Extraocular movements intact. Conjunctiva/sclera: Conjunctivae normal.   Cardiovascular:      Rate and Rhythm: Normal rate and regular rhythm. Heart sounds: Normal heart sounds. Pulmonary:      Effort: Pulmonary effort is normal.      Breath sounds: Normal breath sounds. No wheezing or rales. Musculoskeletal:         General: No deformity. Cervical back: Normal range of motion and neck supple. No rigidity. Comments: Has on Aspen collar   Skin:     General: Skin is warm and dry. Findings: No rash. Neurological:      General: No focal deficit present. Mental Status: She is alert. Mental status is at baseline. Motor: No weakness. Psychiatric:         Mood and Affect: Mood normal.         Behavior: Behavior normal.            Assessment / Plan:      1. Closed odontoid fracture with delayed healing  No misalignment, but no healing on last CT- repeat scheduled for next week. Continue with [place]racken collar until neurology follow up. Pain well controlled. Continue fosamax, encouraged to use walker consistently. 2. Gait disturbance  Continue using walker. 3. Hypertension, essential  Stable, well controlled. Continue current medications. 4. Mixed hyperlipidemia  Lipid panel improved on most recent labs, will continue to monitor.             I have spent 30 minutes on this patient encounter. Patient voiced understanding and agreement with plan. All questions/concerns were addressed, risks/side effects of medications were reviewed. Return precautions and after visit summary were provided. Return in about 4 months (around 5/27/2022). or earlier as needed.       Marcelo Puente MD

## 2022-01-31 ENCOUNTER — VIRTUAL VISIT (OUTPATIENT)
Dept: INTERNAL MEDICINE CLINIC | Age: 87
End: 2022-01-31
Payer: MEDICARE

## 2022-01-31 DIAGNOSIS — Z00.00 ROUTINE GENERAL MEDICAL EXAMINATION AT A HEALTH CARE FACILITY: Primary | ICD-10-CM

## 2022-01-31 PROCEDURE — 1123F ACP DISCUSS/DSCN MKR DOCD: CPT | Performed by: FAMILY MEDICINE

## 2022-01-31 PROCEDURE — G0439 PPPS, SUBSEQ VISIT: HCPCS | Performed by: FAMILY MEDICINE

## 2022-01-31 PROCEDURE — 4040F PNEUMOC VAC/ADMIN/RCVD: CPT | Performed by: FAMILY MEDICINE

## 2022-01-31 ASSESSMENT — PATIENT HEALTH QUESTIONNAIRE - PHQ9
1. LITTLE INTEREST OR PLEASURE IN DOING THINGS: 0
SUM OF ALL RESPONSES TO PHQ9 QUESTIONS 1 & 2: 0
SUM OF ALL RESPONSES TO PHQ QUESTIONS 1-9: 0
SUM OF ALL RESPONSES TO PHQ QUESTIONS 1-9: 0
2. FEELING DOWN, DEPRESSED OR HOPELESS: 0
SUM OF ALL RESPONSES TO PHQ QUESTIONS 1-9: 0
SUM OF ALL RESPONSES TO PHQ QUESTIONS 1-9: 0

## 2022-01-31 ASSESSMENT — LIFESTYLE VARIABLES: HOW OFTEN DO YOU HAVE A DRINK CONTAINING ALCOHOL: 0

## 2022-01-31 NOTE — PROGRESS NOTES
Medicare Annual Wellness Visit  Name: Rose Granado Date: 2022   MRN: [id number] Sex: Female   Age: 80 y.o. Ethnicity: Non- / Non    : 6/15/1927 Race: White (non-)      Tali Contreras is here for Medicare AWV    Screenings for behavioral, psychosocial and functional/safety risks, and cognitive dysfunction are all negative except as indicated below. These results, as well as other patient data from the [address] form, are documented in Flowsheets linked to this Encounter. Allergies   Allergen Reactions    Iodides Hives    Shrimp Flavor Hives       Prior to Visit Medications    Medication Sig Taking? Authorizing Provider   amLODIPine (NORVASC) 10 MG tablet TAKE 1 TABLET BY MOUTH DAILY Yes Nick Ramos MD   alendronate (FOSAMAX) 70 MG tablet TAKE 1 TABLET BY MOUTH ONCE A WEEK *TAKE 30 MINUTES BEFORE FOOD, DRINK, OR MEDS.  STAY UPRIGHT* Yes Nick Ramos MD   potassium chloride (KLOR-CON M) 10 MEQ extended release tablet TAKE 1 TABLET BY MOUTH DAILY Yes Nick Ramos MD   magnesium oxide (MAG-OX) 400 (240 Mg) MG tablet TAKE 1 TABLET BY MOUTH DAILY Yes Nick Ramos MD   Calcium Carb-Cholecalciferol (CALCIUM-VITAMIN D3) 600-400 MG-UNIT TABS Take 1 tablet by mouth daily Yes Historical Provider, MD   timolol (TIMOPTIC) 0.5 % ophthalmic solution Place 1 drop into both eyes daily  Yes Historical Provider, MD   vitamin D (CHOLECALCIFEROL) 1000 UNIT TABS tablet Take 1,000 Units by mouth daily Yes Historical Provider, MD   vitamin B-12 (CYANOCOBALAMIN) 500 MCG tablet Take 500 mcg by mouth daily Yes Historical Provider, MD   latanoprost (XALATAN) 0.005 % ophthalmic solution Place 1 drop into both eyes nightly  Yes Historical Provider, MD       Past Medical History:   Diagnosis Date    BCC (basal cell carcinoma), leg 2017    Family history of pancreatic cancer     Glaucoma of right eye 2016    Dr Bakari High    Hyperlipidemia     Hypertension  Osteoarthritis of knee 2011    Dr Hank Arredondo Osteoporosis 2002    Fosamax [id number]    Pelvic fracture ([facility] [address] [address] 05/2018    Right rotator cuff tear 2014    [place] / injection; PT    T10 vertebral fracture ([facility] [address] 02/2018    fall and fragility fx; also L1 ; had kyphoplasty 2/2018    Vitamin B12 deficiency 03/2017    B 12 266 by Alexey at [facility]       Past Surgical History:   Procedure Laterality Date    ABDOMINAL EXPLORATION SURGERY  1997    With Lysis of Adhesions    APPENDECTOMY  1953    w/  rt.uso    CYSTOCELE REPAIR  1993    w/ rectocele    FIXATION KYPHOPLASTY  02/19/2018    T10 & L1    HYSTERECTOMY  1969     remaining ovary removed also    SKIN CANCER EXCISION Left 09/2017    BCC with skin graft    WRIST FRACTURE SURGERY Left 03/2017    ORIF       Family History   Problem Relation Age of Onset    Heart Disease Mother     Pancreatic Cancer Father     Pancreatic Cancer Brother     Cancer Other         pancreas       CareTeam (Including outside providers/suppliers regularly involved in providing care):   Patient Care Team:  Dar Ang MD as PCP - General (Family Medicine)  Dar Ang MD as PCP - [address] [place] [address] [facility]  Heide Montana MD as Consulting Physician (Cardiology)    Wt Readings from Last 3 Encounters:   01/27/22 103 lb (46.7 kg)   10/29/21 101 lb (45.8 kg)   09/27/21 101 lb (45.8 kg)     There were no vitals filed for this visit. There is no height or weight on file to calculate BMI. Based upon direct observation of the patient, evaluation of cognition reveals recent and remote memory intact. Patient's complete Health Risk Assessment and screening values have been reviewed and are found in Flowsheets. The following problems were reviewed today and where indicated follow up appointments were made and/or referrals ordered.     Positive Risk Factor Screenings with Interventions:     Fall Risk:  2 or more falls in past year?: (!) yes  Fall with injury in past year?: (!) yes (fractured neck, has seen specialist)  Fall Risk Interventions:    · Patient declines any further evaluation/treatment for this issue, states she had visit with PCP 1/27/22 regarding her fall and has since seen a specialist who is treating her. General Health and ACP:  General  In general, how would you say your health is?: Very Good  In the past 7 days, have you experienced any of the following? New or Increased Pain, New or Increased Fatigue, Loneliness, Social Isolation, Stress or Anger?: None of These  Do you get the social and emotional support that you need?: Yes  Do you have a Living Will?: Yes  Advance Directives     Power of [address] Will ACP-Advance Directive ACP-Power of     Not on File Not on File Not on File Not on File      General Health Risk Interventions:  · No Living Will: ACP documents already completed- patient asked to provide copy to the office     Hearing/Vision:  No exam data present  Hearing/Vision  Do you or your family notice any trouble with your hearing that hasn't been managed with hearing aids?: (!) Yes (is going to be making appt)  Do you have difficulty driving, watching TV, or doing any of your daily activities because of your eyesight?: No  Have you had an eye exam within the past year?: Yes  Hearing/Vision Interventions:  · Hearing concerns:  Patient states she is going to be making an appt. ADL:  ADLs  In the past 7 days, did you need help from others to perform any of the following everyday activities? Eating, dressing, grooming, bathing, toileting, or walking/balance?: None  In the past 7 days, did you need help from others to take care of any of the following?  Laundry, housekeeping, banking/finances, shopping, telephone use, food preparation, transportation, or taking medications?: (!) Laundry,Housekeeping,Banking/Finances,Shopping,Transportation  ADL Interventions:  · Patient declines any further evaluation/treatment for this issue, conducted with patient's (and/or legal guardian's) consent. The visit was conducted pursuant to the emergency declaration under the [address] [place] [address] authority and the LMN-[employer] and [employer] General Act. Patient identification was verified, and a caregiver was present when appropriate. The patient was located at home in the state of [place], where the provider was licensed to provide care. Total time spent for this encounter: Not billed by time    --Jesse Colindres LPN on 2/26/8250 at 0:64 PM    An electronic signature was used to authenticate this note.

## 2022-01-31 NOTE — PATIENT INSTRUCTIONS
Personalized Preventive Plan for Chio Winslow - 1/31/2022  Medicare offers a range of preventive health benefits. Some of the tests and screenings are paid in full while other may be subject to a deductible, co-insurance, and/or copay. Some of these benefits include a comprehensive review of your medical history including lifestyle, illnesses that may run in your family, and various assessments and screenings as appropriate. After reviewing your medical record and screening and assessments performed today your provider may have ordered immunizations, labs, imaging, and/or referrals for you. A list of these orders (if applicable) as well as your Preventive Care list are included within your After Visit Summary for your review. Other Preventive Recommendations:    · A preventive eye exam performed by an eye specialist is recommended every 1-2 years to screen for glaucoma; cataracts, macular degeneration, and other eye disorders. · A preventive dental visit is recommended every 6 months. · Try to get at least 150 minutes of exercise per week or 10,000 steps per day on a pedometer . · Order or download the FREE \"Exercise & Physical Activity: Your Everyday Guide\" from [employer]. Call 3-760.685.4917 or search [employer] online. · You need 9952-1218 mg of calcium and 7832-3652 IU of vitamin D per day. It is possible to meet your calcium requirement with diet alone, but a vitamin D supplement is usually necessary to meet this goal.  · When exposed to the sun, use a sunscreen that protects against both UVA and UVB radiation with an SPF of 30 or greater. Reapply every 2 to 3 hours or after sweating, drying off with a towel, or swimming. · Always wear a seat belt when traveling in a car. Always wear a helmet when riding a bicycle or motorcycle.

## 2022-03-21 ENCOUNTER — TELEPHONE (OUTPATIENT)
Dept: INTERNAL MEDICINE CLINIC | Age: 87
End: 2022-03-21

## 2022-03-21 NOTE — TELEPHONE ENCOUNTER
Pts daughter called in today stating that her mother has had diarrhea for the last 4 days, no other symptoms. They are wanting to know if something can be called in for this or if there is something she can take over the counter. I advised drinking lots of water and asking the pharmacist, pts daughter still wanted advise from PCP. Please advise.

## 2022-03-21 NOTE — TELEPHONE ENCOUNTER
Patient can try imodium OTC. Please ensure she has not been on antibiotics recently (to ensure diarrhea is not due to cdiff). Contact clinic if symptoms are not improving, or if she develops abdominal pain, fevers, N/V, etc.  Thanks.

## 2022-03-25 RX ORDER — POTASSIUM CHLORIDE 750 MG/1
TABLET, EXTENDED RELEASE ORAL
Qty: 30 TABLET | Refills: 5 | Status: SHIPPED | OUTPATIENT
Start: 2022-03-25 | End: 2022-10-06

## 2022-05-19 RX ORDER — AMLODIPINE BESYLATE 10 MG/1
10 TABLET ORAL DAILY
Qty: 30 TABLET | Refills: 5 | Status: SHIPPED | OUTPATIENT
Start: 2022-05-19 | End: 2022-10-13

## 2022-05-31 ENCOUNTER — OFFICE VISIT (OUTPATIENT)
Dept: INTERNAL MEDICINE CLINIC | Age: 87
End: 2022-05-31
Payer: MEDICARE

## 2022-05-31 VITALS
WEIGHT: 101.6 LBS | DIASTOLIC BLOOD PRESSURE: 80 MMHG | OXYGEN SATURATION: 95 % | HEART RATE: 77 BPM | BODY MASS INDEX: 23.61 KG/M2 | SYSTOLIC BLOOD PRESSURE: 131 MMHG

## 2022-05-31 DIAGNOSIS — I10 HYPERTENSION, ESSENTIAL: Primary | ICD-10-CM

## 2022-05-31 DIAGNOSIS — R73.03 PREDIABETES: ICD-10-CM

## 2022-05-31 DIAGNOSIS — E78.2 MIXED HYPERLIPIDEMIA: ICD-10-CM

## 2022-05-31 PROCEDURE — G8420 CALC BMI NORM PARAMETERS: HCPCS | Performed by: FAMILY MEDICINE

## 2022-05-31 PROCEDURE — 99213 OFFICE O/P EST LOW 20 MIN: CPT | Performed by: FAMILY MEDICINE

## 2022-05-31 PROCEDURE — G8427 DOCREV CUR MEDS BY ELIG CLIN: HCPCS | Performed by: FAMILY MEDICINE

## 2022-05-31 PROCEDURE — 1123F ACP DISCUSS/DSCN MKR DOCD: CPT | Performed by: FAMILY MEDICINE

## 2022-05-31 PROCEDURE — 1090F PRES/ABSN URINE INCON ASSESS: CPT | Performed by: FAMILY MEDICINE

## 2022-05-31 PROCEDURE — 1036F TOBACCO NON-USER: CPT | Performed by: FAMILY MEDICINE

## 2022-05-31 SDOH — ECONOMIC STABILITY: FOOD INSECURITY: WITHIN THE PAST 12 MONTHS, THE FOOD YOU BOUGHT JUST DIDN'T LAST AND YOU DIDN'T HAVE MONEY TO GET MORE.: NEVER TRUE

## 2022-05-31 SDOH — ECONOMIC STABILITY: FOOD INSECURITY: WITHIN THE PAST 12 MONTHS, YOU WORRIED THAT YOUR FOOD WOULD RUN OUT BEFORE YOU GOT MONEY TO BUY MORE.: NEVER TRUE

## 2022-05-31 ASSESSMENT — SOCIAL DETERMINANTS OF HEALTH (SDOH): HOW HARD IS IT FOR YOU TO PAY FOR THE VERY BASICS LIKE FOOD, HOUSING, MEDICAL CARE, AND HEATING?: NOT HARD AT ALL

## 2022-05-31 ASSESSMENT — ENCOUNTER SYMPTOMS
VOMITING: 0
COLOR CHANGE: 0
CONSTIPATION: 0
ABDOMINAL PAIN: 0
SHORTNESS OF BREATH: 0
DIARRHEA: 0
SORE THROAT: 0
CHEST TIGHTNESS: 0

## 2022-05-31 NOTE — PROGRESS NOTES
Subjective:      Chief Complaint   Patient presents with    Hypertension       HPI:  Jeromy Sullivan is a 80 y.o. female who presents today for follow up of chronic conditions as listed below. Had L cataract surgery last month, procedure went well. Has followed up with neurosurgery for her odontoid fracture since her last appointment- no longer in neck brace. Other than occasional soreness in side of neck, symptoms have improved. HTN:  BP's have home have been stable in 130's/60's. Denying any symptoms. No recent medication changes. Feeling well today, no acute concerns. 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    Dr Beatriz Ford Hyperlipidemia     Hypertension     Osteoarthritis of knee 2011    Dr Zulma Perez Osteoporosis 2002    Fosamax [id number]    Pelvic fracture ([address] 05/2018    Right rotator cuff tear 2014    [facility] [employer] / injection; PT    T10 vertebral fracture ([address] 02/2018    fall and fragility fx; also L1 ; had kyphoplasty 2/2018    Vitamin B12 deficiency 03/2017    B 12 266 by [facility] at [facility]        Past Surgical History:   Procedure Laterality Date   [address]    With Lysis of Adhesions   [facility]     w/  rt.uso    [address]    w/ rectocele    FIXATION KYPHOPLASTY  02/19/2018    T10 & L1    HYSTERECTOMY  1969     remaining ovary removed also    SKIN CANCER EXCISION Left 09/2017    BCC with skin graft    WRIST FRACTURE SURGERY Left 03/2017    ORIF       Social History     Tobacco Use    Smoking status: Never Smoker    Smokeless tobacco: Never Used   Substance Use Topics    Alcohol use: Yes     Comment: wine rarely        Review of Systems   Constitutional: Negative for activity change, appetite change, chills, fever and unexpected weight change. HENT: Negative for congestion and sore throat. Respiratory: Negative for chest tightness and shortness of breath. Cardiovascular: Negative for chest pain and palpitations. Gastrointestinal: Negative for abdominal pain, constipation, diarrhea and vomiting. Genitourinary: Negative for dysuria. Skin: Negative for color change. Neurological: Negative for dizziness and light-headedness. Psychiatric/Behavioral: Negative for dysphoric mood. The patient is not nervous/anxious. Prior to Visit Medications    Medication Sig Taking? Authorizing Provider   amLODIPine (NORVASC) 10 MG tablet TAKE 1 TABLET BY MOUTH DAILY Yes Jenna Avalos MD   potassium chloride (KLOR-CON M) 10 MEQ extended release tablet TAKE 1 TABLET BY MOUTH DAILY Yes Jenna Avalos MD   alendronate (FOSAMAX) 70 MG tablet TAKE 1 TABLET BY MOUTH ONCE A WEEK *TAKE 30 MINUTES BEFORE FOOD, DRINK, OR MEDS. STAY UPRIGHT* Yes Jenna Avalos MD   magnesium oxide (MAG-OX) 400 (240 Mg) MG tablet TAKE 1 TABLET BY MOUTH DAILY Yes Jenna Avalos MD   Calcium Carb-Cholecalciferol (CALCIUM-VITAMIN D3) 600-400 MG-UNIT TABS Take 1 tablet by mouth daily Yes Historical Provider, MD   timolol (TIMOPTIC) 0.5 % ophthalmic solution Place 1 drop into both eyes daily  Yes Historical Provider, MD   vitamin D (CHOLECALCIFEROL) 1000 UNIT TABS tablet Take 1,000 Units by mouth daily Yes Historical Provider, MD   vitamin B-12 (CYANOCOBALAMIN) 500 MCG tablet Take 500 mcg by mouth daily Yes Historical Provider, MD   latanoprost (XALATAN) 0.005 % ophthalmic solution Place 1 drop into both eyes nightly  Yes Historical Provider, MD          Objective:      /80   Pulse 77   Wt 101 lb 9.6 oz (46.1 kg)   SpO2 95%   BMI 23.61 kg/m²      Physical Exam  Vitals and nursing note reviewed. Constitutional:       General: She is not in acute distress. Appearance: Normal appearance. She is not ill-appearing or toxic-appearing. HENT:      Head: Normocephalic and atraumatic.       Right Ear: External ear normal.      Left Ear: External ear normal.      Nose: Nose normal.      Mouth/Throat:      Pharynx: Oropharynx is clear. Eyes:      General: No scleral icterus. Right eye: No discharge. Left eye: No discharge. Extraocular Movements: Extraocular movements intact. Conjunctiva/sclera: Conjunctivae normal.   Cardiovascular:      Rate and Rhythm: Normal rate and regular rhythm. Heart sounds: Normal heart sounds. Pulmonary:      Effort: Pulmonary effort is normal.      Breath sounds: Normal breath sounds. No wheezing or rales. Musculoskeletal:         General: No deformity. Cervical back: Normal range of motion and neck supple. No rigidity. Skin:     General: Skin is warm and dry. Findings: No rash. Neurological:      General: No focal deficit present. Mental Status: She is alert. Mental status is at baseline. Motor: No weakness. Psychiatric:         Mood and Affect: Mood normal.         Behavior: Behavior normal.            Assessment / Plan:      1. Hypertension, essential  Stable, well controlled. Continue current medications. - CBC with Auto Differential; Future  - Comprehensive Metabolic Panel; Future    2. Mixed hyperlipidemia  Will monitor.   - Lipid Panel; Future    3. Prediabetes  Will monitor.    - Hemoglobin A1C; Future          I have spent 27 minutes on this patient encounter. Patient voiced understanding and agreement with plan. All questions/concerns were addressed, risks/side effects of medications were reviewed. Return precautions and after visit summary were provided. Return in about 4 months (around 9/30/2022). or earlier as needed.       Regina Belcher MD

## 2022-06-16 RX ORDER — ALENDRONATE SODIUM 70 MG/1
TABLET ORAL
Qty: 4 TABLET | Refills: 5 | Status: SHIPPED | OUTPATIENT
Start: 2022-06-16 | End: 2022-08-22 | Stop reason: SDUPTHER

## 2022-08-15 ENCOUNTER — HOSPITAL ENCOUNTER (OUTPATIENT)
Age: 87
Discharge: HOME | End: 2022-08-15
Payer: MEDICARE

## 2022-08-15 LAB
ALBUMIN SERPL-MCNC: 4.3 GM/DL (ref 3.4–5)
ALP BLD-CCNC: 59 IU/L (ref 40–128)
ALT SERPL-CCNC: 10 U/L (ref 10–40)
ANION GAP SERPL CALCULATED.3IONS-SCNC: 10 MMOL/L (ref 4–16)
AST SERPL-CCNC: 16 IU/L (ref 15–37)
BASOPHILS ABSOLUTE: 0 K/CU MM
BASOPHILS RELATIVE PERCENT: 0.6 % (ref 0–1)
BILIRUB SERPL-MCNC: 0.7 MG/DL (ref 0–1)
BUN BLDV-MCNC: 15 MG/DL (ref 6–23)
CALCIUM SERPL-MCNC: 9.9 MG/DL (ref 8.3–10.6)
CHLORIDE BLD-SCNC: 102 MMOL/L (ref 99–110)
CHOLESTEROL: 267 MG/DL
CO2: 27 MMOL/L (ref 21–32)
CREAT SERPL-MCNC: 0.7 MG/DL (ref 0.6–1.1)
DIFFERENTIAL TYPE: ABNORMAL
EOSINOPHILS ABSOLUTE: 0.2 K/CU MM
EOSINOPHILS RELATIVE PERCENT: 3.4 % (ref 0–3)
ESTIMATED AVERAGE GLUCOSE: 126 MG/DL
GFR AFRICAN AMERICAN: >60 ML/MIN/1.73M2
GFR NON-AFRICAN AMERICAN: >60 ML/MIN/1.73M2
GLUCOSE BLD-MCNC: 98 MG/DL (ref 70–99)
HBA1C MFR BLD: 6 % (ref 4.2–6.3)
HCT VFR BLD CALC: 44.2 % (ref 37–47)
HDLC SERPL-MCNC: 85 MG/DL
HEMOGLOBIN: 14 GM/DL (ref 12.5–16)
IMMATURE NEUTROPHIL %: 0.3 % (ref 0–0.43)
LDL CHOLESTEROL CALCULATED: 167 MG/DL
LYMPHOCYTES ABSOLUTE: 2.2 K/CU MM
LYMPHOCYTES RELATIVE PERCENT: 32.4 % (ref 24–44)
MCH RBC QN AUTO: 30.6 PG (ref 27–31)
MCHC RBC AUTO-ENTMCNC: 31.7 % (ref 32–36)
MCV RBC AUTO: 96.5 FL (ref 78–100)
MONOCYTES ABSOLUTE: 0.9 K/CU MM
MONOCYTES RELATIVE PERCENT: 12.9 % (ref 0–4)
NUCLEATED RBC %: 0 %
PDW BLD-RTO: 14.6 % (ref 11.7–14.9)
PLATELET # BLD: 345 K/CU MM (ref 140–440)
PMV BLD AUTO: 9.8 FL (ref 7.5–11.1)
POTASSIUM SERPL-SCNC: 4.2 MMOL/L (ref 3.5–5.1)
RBC # BLD: 4.58 M/CU MM (ref 4.2–5.4)
SEGMENTED NEUTROPHILS ABSOLUTE COUNT: 3.4 K/CU MM
SEGMENTED NEUTROPHILS RELATIVE PERCENT: 50.4 % (ref 36–66)
SODIUM BLD-SCNC: 139 MMOL/L (ref 135–145)
TOTAL IMMATURE NEUTOROPHIL: 0.02 K/CU MM
TOTAL NUCLEATED RBC: 0 K/CU MM
TOTAL PROTEIN: 6.9 GM/DL (ref 6.4–8.2)
TRIGL SERPL-MCNC: 77 MG/DL
WBC # BLD: 6.7 K/CU MM (ref 4–10.5)

## 2022-08-15 PROCEDURE — 83036 HEMOGLOBIN GLYCOSYLATED A1C: CPT

## 2022-08-15 PROCEDURE — 36415 COLL VENOUS BLD VENIPUNCTURE: CPT

## 2022-08-15 PROCEDURE — 85025 COMPLETE CBC W/AUTO DIFF WBC: CPT

## 2022-08-15 PROCEDURE — 80053 COMPREHEN METABOLIC PANEL: CPT

## 2022-08-15 PROCEDURE — 80061 LIPID PANEL: CPT

## 2022-08-22 ENCOUNTER — OFFICE VISIT (OUTPATIENT)
Dept: INTERNAL MEDICINE CLINIC | Age: 87
End: 2022-08-22
Payer: MEDICARE

## 2022-08-22 VITALS
OXYGEN SATURATION: 98 % | SYSTOLIC BLOOD PRESSURE: 124 MMHG | HEIGHT: 55 IN | WEIGHT: 101.2 LBS | BODY MASS INDEX: 23.42 KG/M2 | HEART RATE: 77 BPM | DIASTOLIC BLOOD PRESSURE: 62 MMHG

## 2022-08-22 DIAGNOSIS — M81.0 OSTEOPOROSIS, UNSPECIFIED OSTEOPOROSIS TYPE, UNSPECIFIED PATHOLOGICAL FRACTURE PRESENCE: ICD-10-CM

## 2022-08-22 DIAGNOSIS — I10 HYPERTENSION, ESSENTIAL: Primary | ICD-10-CM

## 2022-08-22 DIAGNOSIS — R73.03 PREDIABETES: ICD-10-CM

## 2022-08-22 DIAGNOSIS — E78.2 MIXED HYPERLIPIDEMIA: ICD-10-CM

## 2022-08-22 PROCEDURE — 1123F ACP DISCUSS/DSCN MKR DOCD: CPT | Performed by: FAMILY MEDICINE

## 2022-08-22 PROCEDURE — 1090F PRES/ABSN URINE INCON ASSESS: CPT | Performed by: FAMILY MEDICINE

## 2022-08-22 PROCEDURE — G8427 DOCREV CUR MEDS BY ELIG CLIN: HCPCS | Performed by: FAMILY MEDICINE

## 2022-08-22 PROCEDURE — 99213 OFFICE O/P EST LOW 20 MIN: CPT | Performed by: FAMILY MEDICINE

## 2022-08-22 PROCEDURE — 1036F TOBACCO NON-USER: CPT | Performed by: FAMILY MEDICINE

## 2022-08-22 PROCEDURE — G8420 CALC BMI NORM PARAMETERS: HCPCS | Performed by: FAMILY MEDICINE

## 2022-08-22 RX ORDER — ALENDRONATE SODIUM 70 MG/1
TABLET ORAL
Qty: 4 TABLET | Refills: 5 | Status: SHIPPED | OUTPATIENT
Start: 2022-08-22

## 2022-08-22 ASSESSMENT — ENCOUNTER SYMPTOMS
SORE THROAT: 0
DIARRHEA: 0
CHEST TIGHTNESS: 0
ABDOMINAL PAIN: 0
COLOR CHANGE: 0
CONSTIPATION: 0
VOMITING: 0
SHORTNESS OF BREATH: 0

## 2022-08-22 NOTE — PROGRESS NOTES
Cardiovascular:  Negative for chest pain and palpitations. Gastrointestinal:  Negative for abdominal pain, constipation, diarrhea and vomiting. Genitourinary:  Negative for dysuria. Skin:  Negative for color change. Neurological:  Negative for dizziness and light-headedness. Psychiatric/Behavioral:  Negative for dysphoric mood. The patient is not nervous/anxious. Prior to Visit Medications    Medication Sig Taking? Authorizing Provider   alendronate (FOSAMAX) 70 MG tablet TAKE 1 TABLET BY MOUTH ONCE A WEEK *TAKE 30 MINUTES BEFORE FOOD, DRINK, OR MEDS. STAY UPRIGHT* Yes Karin Hawkins MD   amLODIPine (NORVASC) 10 MG tablet TAKE 1 TABLET BY MOUTH DAILY Yes Karin Hawkins MD   potassium chloride (KLOR-CON M) 10 MEQ extended release tablet TAKE 1 TABLET BY MOUTH DAILY Yes Karin Hawkins MD   magnesium oxide (MAG-OX) 400 (240 Mg) MG tablet TAKE 1 TABLET BY MOUTH DAILY Yes Karin Hawkins MD   Calcium Carb-Cholecalciferol (CALCIUM-VITAMIN D3) 600-400 MG-UNIT TABS Take 1 tablet by mouth daily Yes Historical Provider, MD   timolol (TIMOPTIC) 0.5 % ophthalmic solution Place 1 drop into both eyes daily  Yes Historical Provider, MD   vitamin D (CHOLECALCIFEROL) 1000 UNIT TABS tablet Take 1,000 Units by mouth daily Yes Historical Provider, MD   latanoprost (XALATAN) 0.005 % ophthalmic solution Place 1 drop into both eyes nightly  Yes Historical Provider, MD   vitamin B-12 (CYANOCOBALAMIN) 500 MCG tablet Take 500 mcg by mouth daily  Historical Provider, MD          Objective:      /62 (Site: Right Upper Arm, Position: Sitting, Cuff Size: Medium Adult)   Pulse 77   Ht 4' 7\" (1.397 m)   Wt 101 lb 3.2 oz (45.9 kg)   SpO2 98%   BMI 23.52 kg/m²      Physical Exam  Vitals and nursing note reviewed. Constitutional:       General: She is not in acute distress. Appearance: Normal appearance. She is not ill-appearing or toxic-appearing.    HENT:      Head: Normocephalic and atraumatic. Right Ear: External ear normal.      Left Ear: External ear normal.      Nose: Nose normal.      Mouth/Throat:      Pharynx: Oropharynx is clear. Eyes:      General: No scleral icterus. Right eye: No discharge. Left eye: No discharge. Extraocular Movements: Extraocular movements intact. Conjunctiva/sclera: Conjunctivae normal.   Cardiovascular:      Rate and Rhythm: Normal rate and regular rhythm. Heart sounds: Normal heart sounds. Pulmonary:      Effort: Pulmonary effort is normal.      Breath sounds: Normal breath sounds. No wheezing or rales. Musculoskeletal:         General: No deformity. Cervical back: Normal range of motion and neck supple. No rigidity. Skin:     General: Skin is warm and dry. Findings: No rash. Neurological:      General: No focal deficit present. Mental Status: She is alert. Mental status is at baseline. Motor: No weakness. Psychiatric:         Mood and Affect: Mood normal.         Behavior: Behavior normal.          Assessment / Plan:      1. Hypertension, essential  Stable, well controlled. Continue current medications. 2. Osteoporosis, unspecified osteoporosis type, unspecified pathological fracture presence  Discussed that given her history of fractures, she does need to continue treatment for osteoporosis. Recommended switching to different agent as she has been taking fosamax for over 20 years, but patient requesting to stay on current medication. Will continue to monitor. 3. Mixed hyperlipidemia  Worsening in lipid panel, but given age, will not start statin. Will continue to monitor. 4. Prediabetes  HbA1c 6.0, will monitor. I have spent 25 minutes on this patient encounter. Patient voiced understanding and agreement with plan. All questions/concerns were addressed, risks/side effects of medications were reviewed. Return precautions and after visit summary were provided. Return in about 4 months (around 12/22/2022). or earlier as needed.       Heide Schmidt MD

## 2022-10-04 ENCOUNTER — CARE COORDINATION (OUTPATIENT)
Dept: CARE COORDINATION | Age: 87
End: 2022-10-04

## 2022-10-06 RX ORDER — POTASSIUM CHLORIDE 750 MG/1
TABLET, EXTENDED RELEASE ORAL
Qty: 30 TABLET | Refills: 5 | Status: SHIPPED | OUTPATIENT
Start: 2022-10-06

## 2022-10-10 ENCOUNTER — CARE COORDINATION (OUTPATIENT)
Dept: CARE COORDINATION | Age: 87
End: 2022-10-10

## 2022-10-11 ENCOUNTER — CARE COORDINATION (OUTPATIENT)
Dept: CARE COORDINATION | Age: 87
End: 2022-10-11

## 2022-10-12 SDOH — ECONOMIC STABILITY: TRANSPORTATION INSECURITY
IN THE PAST 12 MONTHS, HAS THE LACK OF TRANSPORTATION KEPT YOU FROM MEDICAL APPOINTMENTS OR FROM GETTING MEDICATIONS?: NO

## 2022-10-12 SDOH — ECONOMIC STABILITY: TRANSPORTATION INSECURITY
IN THE PAST 12 MONTHS, HAS LACK OF TRANSPORTATION KEPT YOU FROM MEETINGS, WORK, OR FROM GETTING THINGS NEEDED FOR DAILY LIVING?: NO

## 2022-10-12 SDOH — HEALTH STABILITY: PHYSICAL HEALTH: ON AVERAGE, HOW MANY DAYS PER WEEK DO YOU ENGAGE IN MODERATE TO STRENUOUS EXERCISE (LIKE A BRISK WALK)?: 0 DAYS

## 2022-10-12 SDOH — ECONOMIC STABILITY: HOUSING INSECURITY: IN THE LAST 12 MONTHS, HOW MANY PLACES HAVE YOU LIVED?: 1

## 2022-10-12 SDOH — ECONOMIC STABILITY: HOUSING INSECURITY
IN THE LAST 12 MONTHS, WAS THERE A TIME WHEN YOU DID NOT HAVE A STEADY PLACE TO SLEEP OR SLEPT IN A SHELTER (INCLUDING NOW)?: NO

## 2022-10-12 SDOH — ECONOMIC STABILITY: INCOME INSECURITY: IN THE LAST 12 MONTHS, WAS THERE A TIME WHEN YOU WERE NOT ABLE TO PAY THE MORTGAGE OR RENT ON TIME?: NO

## 2022-10-12 SDOH — HEALTH STABILITY: PHYSICAL HEALTH: ON AVERAGE, HOW MANY MINUTES DO YOU ENGAGE IN EXERCISE AT THIS LEVEL?: 0 MIN

## 2022-10-12 ASSESSMENT — LIFESTYLE VARIABLES
HOW MANY STANDARD DRINKS CONTAINING ALCOHOL DO YOU HAVE ON A TYPICAL DAY: PATIENT DOES NOT DRINK
HOW OFTEN DO YOU HAVE A DRINK CONTAINING ALCOHOL: NEVER

## 2022-10-12 NOTE — CARE COORDINATION
Ambulatory Care Coordination Note  10/12/2022    ACC: Anni Gutiérrez, RN    ACM call to pt for CC outreach. Spoke to Rigo, she is pleasant on the phone. ACM role and CC reviewed. Pt agreeable to some outreach. States she lives alone in her home. Denies any barriers for access. States she has an ERS button. Discussed community resources HHC, PT/OT, MOW, transportation. Pt declines the need. Reports she is independent with her care. States her daughter assists with transportation. DME: Pt reports having a walker, shower chair and grab bars. Reports using DME as needed. Denies the need for any additional DME. Discussed fall prevention and safety. Encouraged the use of DME. Meds: Pt is independent with her medications. Med rec completed. Pt reports insurance covers her medications with manageable co-pays. Has meds on hand and takes as prescribed. Denies the need for Med Assistance Program.     Will plan next outreach in 2 weeks per pt request.     Actions:  ACM role and CC reviewed  Med Rec  Week one assessment  Community resources discussed  Fall prevention and safety  SDOH    Plan:  ACP  Care gaps  SDOH  Fall education    General Assessment    Do you have any symptoms that are causing concern?: No         Offered patient enrollment in the Remote Patient Monitoring (RPM) program for in-home monitoring: NA. Not available in [facility]. Ambulatory Care Coordination Assessment    Care Coordination Protocol  Referral from Primary Care Provider: No  Week 1 - Initial Assessment     Do you have all of your prescriptions and are they filled?: Yes  Are you able to afford your medications?: Yes  How often do you have trouble taking your medications the way you have been told to take them?: I always take them as prescribed. Ability to seek help/take action for Emergent Urgent situations i.e. fire, crime, inclement weather or health crisis. : Independent  Ability to ambulate to restroom: Independent  Ability handle personal hygeine needs (bathing/dressing/grooming): Independent  Ability to manage Medications: Independent  Ability to prepare Food Preparation: Independent  Ability to maintain home (clean home, laundry): Needs Assistance  Ability to drive and/or has transportation: Needs Assistance  Ability to do shopping: Needs Assistance  Ability to manage finances: Needs Assistance  Is patient able to live independently?: Yes     Current Housing: Private Residence        Per the Fall Risk Screening, did the patient have 2 or more falls or 1 fall with injury in the past year?: Yes  How often do you think you are about to fall and you do NOT fall? For example, you grab something to stabilize yourself or hold onto a wall/furniture?: Occasionally  Use of a Mobility Aid: Yes (Comment: PRN)  Difficulty walking/impaired gait: No  Issues with feet or shoes like numbness, edema, shoes not fitting: No  Changes in vision, poor vision or poor lighting in environment: No  Dizziness: No  Other Fall Risk: No     Frequent urination at night?: No  Do you use rails/bars?: Yes  Do you have a non-slip tub mat?: No     Are you experiencing loss of meaning?: No  Are you experiencing loss of hope and peace?: No     Thinking about your patient's physical health needs, are there any symptoms or problems (risk indicators) you are unsure about that require further investigation?: No identified areas of uncertainly or problems already being investigated   Are the patients physical health problems impacting on their mental well-being?: No identified areas of concern   Are there any problems with your patients lifestyle behaviors (alcohol, drugs, diet, exercise) that are impacting on physical or mental well-being?: No identified areas of concern   Do you have any other concerns about your patients mental well-being?  How would you rate their severity and impact on the patient?: No identified areas of concern   How would you rate their home environment in terms of safety and stability (including domestic violence, insecure housing, neighbor harassment)?: Consistently safe, supportive, stable, no identified problems   How do daily activities impact on the patient's well-being? (include current or anticipated unemployment, work, caregiving, access to transportation or other): No identified problems or perceived positive benefits   How would you rate their social network (family, work, friends)?: Adequate participation with social networks   How would you rate their financial resources (including ability to afford all required medical care)?: Financially secure, resources adequate, no identified problems   How wells does the patient now understand their health and well-being (symptoms, signs or risk factors) and what they need to do to manage their health?: Reasonable to good understanding and already engages in managing health or is willing to undertake better management   How well do you think your patient can engage in healthcare discussions? (Barriers include language, deafness, aphasia, alcohol or drug problems, learning difficulties, concentration): Clear and open communication, no identified barriers   Do other services need to be involved to help this patient?: Other care/services not required at this time   Are current services involved with this patient well-coordinated? (Include coordination with other services you are now recommendation):  All required care/services in place and well-coordinated   Suggested Interventions and Community Resources  Fall Risk Prevention: Not Started Home Health Services: Declined   Meals on Wheels: Declined   Medication Assistance Program: Declined   Occupational Therapy: Declined   Physical Therapy: Declined   Transportation Services: Declined   Zone Management Tools: Not Started         Set up/Review Goals, Set up/Review an Education Plan              Prior to Admission medications    Medication Sig Start Date End Date Taking? Authorizing Provider   potassium chloride (KLOR-CON M) 10 MEQ extended release tablet TAKE 1 TABLET BY MOUTH DAILY 10/6/22  Yes Chano Armenta MD   alendronate (FOSAMAX) 70 MG tablet TAKE 1 TABLET BY MOUTH ONCE A WEEK *TAKE 30 MINUTES BEFORE FOOD, DRINK, OR MEDS.  STAY UPRIGHT* 8/22/22  Yes Chano Armenta MD   amLODIPine (NORVASC) 10 MG tablet TAKE 1 TABLET BY MOUTH DAILY 5/19/22  Yes Chano Armenta MD   magnesium oxide (MAG-OX) 400 (240 Mg) MG tablet TAKE 1 TABLET BY MOUTH DAILY 8/24/20  Yes Chano Armenta MD   Calcium Carb-Cholecalciferol (CALCIUM-VITAMIN D3) 600-400 MG-UNIT TABS Take 1 tablet by mouth daily   Yes Historical Provider, MD   timolol (TIMOPTIC) 0.5 % ophthalmic solution Place 1 drop into both eyes daily  2/5/20  Yes Historical Provider, MD   vitamin D (CHOLECALCIFEROL) 1000 UNIT TABS tablet Take 1,000 Units by mouth daily   Yes Historical Provider, MD   vitamin B-12 (CYANOCOBALAMIN) 500 MCG tablet Take 500 mcg by mouth daily   Yes Historical Provider, MD   latanoprost (XALATAN) 0.005 % ophthalmic solution Place 1 drop into both eyes nightly    Yes Historical Provider, MD       Future Appointments   Date Time Provider Allan Tripathi   12/20/2022  8:00 AM MD PEEWEE Galaviz [place]   2/27/2023  1:30 PM [facility] [place]

## 2022-10-13 ENCOUNTER — CARE COORDINATION (OUTPATIENT)
Dept: CARE COORDINATION | Age: 87
End: 2022-10-13

## 2022-10-13 RX ORDER — AMLODIPINE BESYLATE 10 MG/1
10 TABLET ORAL DAILY
Qty: 30 TABLET | Refills: 5 | Status: SHIPPED | OUTPATIENT
Start: 2022-10-13

## 2022-10-25 ENCOUNTER — CARE COORDINATION (OUTPATIENT)
Dept: CARE COORDINATION | Age: 87
End: 2022-10-25

## 2022-11-04 ENCOUNTER — CARE COORDINATION (OUTPATIENT)
Dept: CARE COORDINATION | Age: 87
End: 2022-11-04

## 2022-11-14 ENCOUNTER — CARE COORDINATION (OUTPATIENT)
Dept: CARE COORDINATION | Age: 87
End: 2022-11-14

## 2022-11-22 ENCOUNTER — CARE COORDINATION (OUTPATIENT)
Dept: CARE COORDINATION | Age: 87
End: 2022-11-22

## 2022-11-22 NOTE — CARE COORDINATION
ACM call to pt for Care Coordination follow up. No answer. No option to leave VM, just rings until d/c.

## 2022-11-29 ENCOUNTER — CARE COORDINATION (OUTPATIENT)
Dept: CARE COORDINATION | Age: 87
End: 2022-11-29

## 2022-11-29 NOTE — CARE COORDINATION
Ambulatory Care Coordination Note  11/29/2022    ACC: Vish Gutiérrez, RN    ACM call to pt. Spoke to Rigo. States she is doing well. Will plan next outreach in 2 weeks per pt request. Pt believes she received mailed education. Discussed community resources HHC, PT/OT, MOW, transportation. Pt declines the need. Reports she is independent with her care. States her daughter stops by daily to check on her and is a RN. States her DIL and grand-daughter are both nurses and live close by. Discussed fall prevention and safety. Encouraged the use of DME. Confirmed upcoming appt with PCP on 12/20. Reports she has already obtained flu vaccine for this season. ACM inquired about ACP documents. States she does have a living will. She will bring to next appt to have scanned in to chart. Plan for outreach in 2 weeks per pt request.     Plan:  Monitor for ACP  Care gaps  SDOH  Fall education    Offered patient enrollment in the Remote Patient Monitoring (RPM) program for in-home monitoring: NA.    General Assessment    Do you have any symptoms that are causing concern?: No           Lab Results       None            Care Coordination Interventions    Referral from Primary Care Provider: No  Suggested Interventions and Community Resources  Fall Risk Prevention: In Process  Home Health Services: Declined  Meals on Wheels: Declined  Medication Assistance Program: Declined  Occupational Therapy: Declined  Physical Therapy: Declined  Transportation Support: Declined  Zone Management Tools: In Process          Goals Addressed                   This Visit's Progress     Conditions and Symptoms   On track     I will schedule office visits, as directed by my provider. I will keep my appointment or reschedule if I have to cancel. I will notify my provider of any barriers to my plan of care. I will notify my provider of any symptoms that indicate a worsening of my condition.     Barriers: fear of failure, lack of motivation, and lack of education  Plan for overcoming my barriers: Utilize [facility] for education and resources. Confidence: 8/10  Anticipated Goal Completion Date: 1/12/2023                Prior to Admission medications    Medication Sig Start Date End Date Taking? Authorizing Provider   amLODIPine (NORVASC) 10 MG tablet TAKE 1 TABLET BY MOUTH DAILY 10/13/22   Robbin Worley MD   potassium chloride (KLOR-CON M) 10 MEQ extended release tablet TAKE 1 TABLET BY MOUTH DAILY 10/6/22   Robbin Worley MD   alendronate (FOSAMAX) 70 MG tablet TAKE 1 TABLET BY MOUTH ONCE A WEEK *TAKE 30 MINUTES BEFORE FOOD, DRINK, OR MEDS.  STAY UPRIGHT* 8/22/22   Robbin Worley MD   magnesium oxide (MAG-OX) 400 (240 Mg) MG tablet TAKE 1 TABLET BY MOUTH DAILY 8/24/20   Robbin Worley MD   Calcium Carb-Cholecalciferol (CALCIUM-VITAMIN D3) 600-400 MG-UNIT TABS Take 1 tablet by mouth daily    Historical Provider, MD   timolol (TIMOPTIC) 0.5 % ophthalmic solution Place 1 drop into both eyes daily  2/5/20   Historical Provider, MD   vitamin D (CHOLECALCIFEROL) 1000 UNIT TABS tablet Take 1,000 Units by mouth daily    Historical Provider, MD   vitamin B-12 (CYANOCOBALAMIN) 500 MCG tablet Take 500 mcg by mouth daily    Historical Provider, MD   latanoprost (XALATAN) 0.005 % ophthalmic solution Place 1 drop into both eyes nightly     Historical Provider, MD       Future Appointments   Date Time Provider Allan Tripathi   12/20/2022  8:00 AM MD PEEWEE Lewis   2/27/2023  1:30 PM [facility] [place]

## 2022-12-14 ENCOUNTER — CARE COORDINATION (OUTPATIENT)
Dept: CARE COORDINATION | Age: 87
End: 2022-12-14

## 2022-12-20 ENCOUNTER — OFFICE VISIT (OUTPATIENT)
Dept: INTERNAL MEDICINE CLINIC | Age: 87
End: 2022-12-20
Payer: MEDICARE

## 2022-12-20 VITALS
HEART RATE: 86 BPM | HEIGHT: 55 IN | DIASTOLIC BLOOD PRESSURE: 82 MMHG | BODY MASS INDEX: 23.14 KG/M2 | OXYGEN SATURATION: 95 % | WEIGHT: 100 LBS | SYSTOLIC BLOOD PRESSURE: 130 MMHG

## 2022-12-20 DIAGNOSIS — E78.2 MIXED HYPERLIPIDEMIA: ICD-10-CM

## 2022-12-20 DIAGNOSIS — T14.8XXA OPEN WOUND: ICD-10-CM

## 2022-12-20 DIAGNOSIS — M17.12 OSTEOARTHRITIS OF LEFT KNEE, UNSPECIFIED OSTEOARTHRITIS TYPE: ICD-10-CM

## 2022-12-20 DIAGNOSIS — I10 HYPERTENSION, ESSENTIAL: Primary | ICD-10-CM

## 2022-12-20 DIAGNOSIS — R73.03 PREDIABETES: ICD-10-CM

## 2022-12-20 PROCEDURE — 1090F PRES/ABSN URINE INCON ASSESS: CPT | Performed by: FAMILY MEDICINE

## 2022-12-20 PROCEDURE — 1036F TOBACCO NON-USER: CPT | Performed by: FAMILY MEDICINE

## 2022-12-20 PROCEDURE — G8484 FLU IMMUNIZE NO ADMIN: HCPCS | Performed by: FAMILY MEDICINE

## 2022-12-20 PROCEDURE — 1123F ACP DISCUSS/DSCN MKR DOCD: CPT | Performed by: FAMILY MEDICINE

## 2022-12-20 PROCEDURE — 99214 OFFICE O/P EST MOD 30 MIN: CPT | Performed by: FAMILY MEDICINE

## 2022-12-20 PROCEDURE — G8427 DOCREV CUR MEDS BY ELIG CLIN: HCPCS | Performed by: FAMILY MEDICINE

## 2022-12-20 PROCEDURE — G8420 CALC BMI NORM PARAMETERS: HCPCS | Performed by: FAMILY MEDICINE

## 2022-12-20 ASSESSMENT — ENCOUNTER SYMPTOMS
CONSTIPATION: 0
SORE THROAT: 0
DIARRHEA: 0
COLOR CHANGE: 0
VOMITING: 0
SHORTNESS OF BREATH: 0
CHEST TIGHTNESS: 0
ABDOMINAL PAIN: 0

## 2022-12-20 NOTE — PROGRESS NOTES
Subjective:      Chief Complaint   Patient presents with    Leg Injury       HPI:  Pito Hoffman is a 80 y.o. female who presents today for follow up of chronic conditions as listed below. Patient fell about 3 months ago. States her L knee gave out, had significant swelling distal to knee. Daughter states she developed multiple blisters. Did see ortho, was given brace and blisters were wrapped. Has been using vaseline and bacitracin- most of the blisters have healed/dried up but one has not. Is still getting knee injections, was told there was nothing further that can be done for her knee. Is using walker consistently. States that this is the first time she has fallen in over a year. HTN:  BP's have been 120-130's/70's at home. Denies any symptoms. Osteoporosis:  Taking fosamax. Feeling well today, no acute concerns. Labs reviewed.         Past Medical History:   Diagnosis Date    BCC (basal cell carcinoma), leg 09/2017    Family history of pancreatic cancer     Glaucoma of right eye 05/2016    Dr Yrui Keith    Hyperlipidemia     Hypertension     Osteoarthritis of knee 2011    Dr Sudarshan Gonzales    Osteoporosis 2002    Fosamax [id number]    Pelvic fracture ([address] 05/2018    Right rotator cuff tear 2014    Sudarshan Gonzales / injection; PT    T10 vertebral fracture ([address] 02/2018    fall and fragility fx; also L1 ; had kyphoplasty 2/2018    Vitamin B12 deficiency 03/2017    B 12 266 by Sudarshan Gonzales at [facility]        Past Surgical History:   Procedure Laterality Date    ABDOMINAL EXPLORATION SURGERY  1997    With Lysis of Adhesions    APPENDECTOMY  1953    w/  rt.uso    [address]    w/ rectocele    FIXATION KYPHOPLASTY  02/19/2018    T10 & L1    HYSTERECTOMY (CERVIX STATUS UNKNOWN)  1969     remaining ovary removed also    SKIN CANCER EXCISION Left 09/2017    BCC with skin graft    WRIST FRACTURE SURGERY Left 03/2017    ORIF       Social History     Tobacco Use    Smoking status: Never    Smokeless tobacco: Never   Substance Use Topics    Alcohol use: Yes     Comment: wine rarely        Review of Systems   Constitutional:  Negative for activity change, appetite change, chills, fever and unexpected weight change. HENT:  Negative for congestion and sore throat. Respiratory:  Negative for chest tightness and shortness of breath. Cardiovascular:  Negative for chest pain and palpitations. Gastrointestinal:  Negative for abdominal pain, constipation, diarrhea and vomiting. Genitourinary:  Negative for dysuria. Musculoskeletal:  Positive for arthralgias and gait problem. Skin:  Positive for wound. Negative for color change. Neurological:  Negative for dizziness and light-headedness. Psychiatric/Behavioral:  Negative for dysphoric mood. The patient is not nervous/anxious. Prior to Visit Medications    Medication Sig Taking? Authorizing Provider   amLODIPine (NORVASC) 10 MG tablet TAKE 1 TABLET BY MOUTH DAILY  Екатерина Babin MD   potassium chloride (KLOR-CON M) 10 MEQ extended release tablet TAKE 1 TABLET BY MOUTH DAILY  Екатерина Babin MD   alendronate (FOSAMAX) 70 MG tablet TAKE 1 TABLET BY MOUTH ONCE A WEEK *TAKE 30 MINUTES BEFORE FOOD, DRINK, OR MEDS.  STAY UPRIGHT*  Екатерина Babin MD   magnesium oxide (MAG-OX) 400 (240 Mg) MG tablet TAKE 1 TABLET BY MOUTH DAILY  Екатерина Babin MD   Calcium Carb-Cholecalciferol (CALCIUM-VITAMIN D3) 600-400 MG-UNIT TABS Take 1 tablet by mouth daily  Historical Provider, MD   timolol (TIMOPTIC) 0.5 % ophthalmic solution Place 1 drop into both eyes daily   Historical Provider, MD   vitamin D (CHOLECALCIFEROL) 1000 UNIT TABS tablet Take 1,000 Units by mouth daily  Historical Provider, MD   vitamin B-12 (CYANOCOBALAMIN) 500 MCG tablet Take 500 mcg by mouth daily  Historical Provider, MD   latanoprost (XALATAN) 0.005 % ophthalmic solution Place 1 drop into both eyes nightly   Historical Provider, MD          Objective:      /82 (Site: Left Upper Arm, Position: Sitting, Cuff Size: Medium Adult)   Pulse 86   Ht 4' 7\" (1.397 m)   Wt 100 lb (45.4 kg)   SpO2 95%   BMI 23.24 kg/m²      Physical Exam  Vitals and nursing note reviewed. Constitutional:       General: She is not in acute distress. Appearance: Normal appearance. She is not ill-appearing or toxic-appearing. HENT:      Head: Normocephalic and atraumatic. Right Ear: External ear normal.      Left Ear: External ear normal.      Nose: Nose normal.      Mouth/Throat:      Pharynx: Oropharynx is clear. Eyes:      General: No scleral icterus. Right eye: No discharge. Left eye: No discharge. Extraocular Movements: Extraocular movements intact. Conjunctiva/sclera: Conjunctivae normal.   Cardiovascular:      Rate and Rhythm: Normal rate and regular rhythm. Heart sounds: Normal heart sounds. Pulmonary:      Effort: Pulmonary effort is normal.      Breath sounds: Normal breath sounds. No wheezing or rales. Musculoskeletal:         General: No deformity. Cervical back: Normal range of motion and neck supple. No rigidity. Skin:     General: Skin is warm and dry. Findings: Lesion (2 wounds on medial side of L lower leg. Upper wound dried, lower wound superficial (about 2cm, round area) open with no drainage or surrounding erythema) present. No rash. Neurological:      General: No focal deficit present. Mental Status: She is alert. Mental status is at baseline. Motor: No weakness. Psychiatric:         Mood and Affect: Mood normal.         Behavior: Behavior normal.          Assessment / Plan:      1. Hypertension, essential  Stable, well controlled. Continue current medications. - CBC with Auto Differential; Future  - Comprehensive Metabolic Panel; Future    2. Prediabetes  Will continue to monitor.   - Hemoglobin A1C; Future    3. Mixed hyperlipidemia  Will monitor lipid panel.   - Lipid Panel; Future    4.  Osteoarthritis of left knee, unspecified osteoarthritis type  Unstable knee- already being managed by ortho. Wearing brace and using walker consistently. Offered PT for gait training/stability, but patient declining. Advised to contact clinic if she changes her mind. 5. Open wound  2/2 blisters from edema after fall. Now mostly healed, but one wound still open. Will try silvadene- if symptoms not improving over the next week, instructed to contact clinic. Can refer to wound clinic at that time. - silver sulfADIAZINE (SILVADENE) 1 % cream; Apply topically daily. Dispense: 25 g; Refill: 1        I have spent 25 minutes on this patient encounter. Patient voiced understanding and agreement with plan. All questions/concerns were addressed, risks/side effects of medications were reviewed. Return precautions and after visit summary were provided. Return in about 4 months (around 4/20/2023). or earlier as needed.       Dewayne Aguilera MD

## 2022-12-21 ENCOUNTER — CARE COORDINATION (OUTPATIENT)
Dept: CARE COORDINATION | Age: 87
End: 2022-12-21

## 2022-12-28 ENCOUNTER — CARE COORDINATION (OUTPATIENT)
Dept: CARE COORDINATION | Age: 87
End: 2022-12-28

## 2022-12-28 NOTE — CARE COORDINATION
ACM call to pt for CC outreach. No answer. Left VM on answering machine requesting return call to ACM.

## 2023-01-04 ENCOUNTER — CARE COORDINATION (OUTPATIENT)
Dept: CARE COORDINATION | Age: 88
End: 2023-01-04

## 2023-01-12 ENCOUNTER — CARE COORDINATION (OUTPATIENT)
Dept: CARE COORDINATION | Age: 88
End: 2023-01-12

## 2023-01-12 NOTE — CARE COORDINATION
ACM call to pt for CC follow up. No answer. ACM unable to leave , phone just rings. Final attempt for outreach. CC episode will be resolved.

## 2023-02-09 RX ORDER — ALENDRONATE SODIUM 70 MG/1
TABLET ORAL
Qty: 4 TABLET | Refills: 5 | Status: SHIPPED | OUTPATIENT
Start: 2023-02-09

## 2023-02-27 ENCOUNTER — TELEMEDICINE (OUTPATIENT)
Dept: INTERNAL MEDICINE CLINIC | Age: 88
End: 2023-02-27
Payer: MEDICARE

## 2023-02-27 DIAGNOSIS — Z00.00 MEDICARE ANNUAL WELLNESS VISIT, SUBSEQUENT: Primary | ICD-10-CM

## 2023-02-27 PROCEDURE — 1123F ACP DISCUSS/DSCN MKR DOCD: CPT | Performed by: FAMILY MEDICINE

## 2023-02-27 PROCEDURE — G0439 PPPS, SUBSEQ VISIT: HCPCS | Performed by: FAMILY MEDICINE

## 2023-02-27 PROCEDURE — G8484 FLU IMMUNIZE NO ADMIN: HCPCS | Performed by: FAMILY MEDICINE

## 2023-02-27 ASSESSMENT — PATIENT HEALTH QUESTIONNAIRE - PHQ9
2. FEELING DOWN, DEPRESSED OR HOPELESS: 0
SUM OF ALL RESPONSES TO PHQ QUESTIONS 1-9: 0
SUM OF ALL RESPONSES TO PHQ QUESTIONS 1-9: 0
1. LITTLE INTEREST OR PLEASURE IN DOING THINGS: 0
SUM OF ALL RESPONSES TO PHQ9 QUESTIONS 1 & 2: 0
SUM OF ALL RESPONSES TO PHQ QUESTIONS 1-9: 0
SUM OF ALL RESPONSES TO PHQ QUESTIONS 1-9: 0

## 2023-02-27 ASSESSMENT — LIFESTYLE VARIABLES
HOW MANY STANDARD DRINKS CONTAINING ALCOHOL DO YOU HAVE ON A TYPICAL DAY: 1 OR 2
HOW OFTEN DO YOU HAVE A DRINK CONTAINING ALCOHOL: MONTHLY OR LESS

## 2023-02-27 NOTE — PROGRESS NOTES
Medicare Annual Wellness Visit    Sheree Pallas is here for Medicare AWV    Assessment & Plan   Medicare annual wellness visit, subsequent      Recommendations for Preventive Services Due: see orders and patient instructions/AVS.  Recommended screening schedule for the next 5-10 years is provided to the patient in written form: see Patient Instructions/AVS.     No follow-ups on file. Subjective       Patient's complete Health Risk Assessment and screening values have been reviewed and are found in Flowsheets. The following problems were reviewed today and where indicated follow up appointments were made and/or referrals ordered. Positive Risk Factor Screenings with Interventions:    Fall Risk:  Do you feel unsteady or are you worried about falling? : (!) yes (walker all the time)  2 or more falls in past year?: no  Fall with injury in past year?: no     Interventions:    Patient comments: patient states she uses her walker at all times. Patient declines any further evaluation or treatment                   ADL's:   Patient reports needing help with:  Select all that apply: (!) Laundry, Housekeeping, Banking/Finances, Shopping, Transportation (dgtr)  Interventions:  Patient comments: patient states her daughter helps her with her ADLs. Patient declined any further interventions or treatment                    Objective      Patient-Reported Vitals  No data recorded     Unable to obtain 3 vital signs due to patient not having equipment to take blood pressure/temperature. Allergies   Allergen Reactions    Iodides Hives    Shrimp Flavor Hives     Prior to Visit Medications    Medication Sig Taking? Authorizing Provider   alendronate (FOSAMAX) 70 MG tablet TAKE 1 TABLET BY MOUTH ONCE A WEEK *TAKE 30 MINUTES BEFORE FOOD, DRINK, OR MEDS.  STAY UPRIGHT* Yes Tegan Watt MD   amLODIPine (NORVASC) 10 MG tablet TAKE 1 TABLET BY MOUTH DAILY Yes Tegan Watt MD   potassium chloride (Pippa Neo) 10 MEQ extended release tablet TAKE 1 TABLET BY MOUTH DAILY Yes Selvin Villar MD   magnesium oxide (MAG-OX) 400 (240 Mg) MG tablet TAKE 1 TABLET BY MOUTH DAILY Yes Selvin Villar MD   Calcium Carb-Cholecalciferol (CALCIUM-VITAMIN D3) 600-400 MG-UNIT TABS Take 1 tablet by mouth daily Yes Historical Provider, MD   timolol (TIMOPTIC) 0.5 % ophthalmic solution Place 1 drop into both eyes daily  Yes Historical Provider, MD   vitamin D (CHOLECALCIFEROL) 1000 UNIT TABS tablet Take 1,000 Units by mouth daily Yes Historical Provider, MD   vitamin B-12 (CYANOCOBALAMIN) 500 MCG tablet Take 500 mcg by mouth daily Yes Historical Provider, MD   latanoprost (XALATAN) 0.005 % ophthalmic solution Place 1 drop into both eyes nightly  Yes Historical Provider, MD   silver sulfADIAZINE (SILVADENE) 1 % cream Apply topically daily. Patient not taking: Reported on 2/27/2023  Selvin Villar MD       CareTeam (Including outside providers/suppliers regularly involved in providing care):   Patient Care Team:  Selvin Villar MD as PCP - General (Family Medicine)  Selvin Villar MD as PCP - Empaneled Provider  Julio Colby MD as Consulting Physician (Cardiology)     Reviewed and updated this visit:  Allergies  Meds  Med Hx  Surg Hx  Soc Hx  Fam Hx        I, Ivett Silva LPN, 7/90/5984, performed the documented evaluation under the direct supervision of the attending physician. Jeremías Parker, was evaluated through a synchronous (real-time) audio encounter. The patient (or guardian if applicable) is aware that this is a billable service, which includes applicable co-pays. This Virtual Visit was conducted with patient's (and/or legal guardian's) consent. The visit was conducted pursuant to the emergency declaration under the [facility], [address] [place] [address] authority and the [employer] General Act.   Patient identification was verified, and a caregiver was present when appropriate. The patient was located at Home: [address]  [address] [facility] [id number]  Provider was located at [facility] (Appt Dept): [address],  [address]         Total time spent for this encounter: Not billed by time    --Indu Floyd LPN on 6/97/5417 at 4:29 PM    An electronic signature was used to authenticate this note.         Indu Floyd LPN

## 2023-02-27 NOTE — PATIENT INSTRUCTIONS
Personalized Preventive Plan for Arianne Chan - 2/27/2023  Medicare offers a range of preventive health benefits. Some of the tests and screenings are paid in full while other may be subject to a deductible, co-insurance, and/or copay. Some of these benefits include a comprehensive review of your medical history including lifestyle, illnesses that may run in your family, and various assessments and screenings as appropriate. After reviewing your medical record and screening and assessments performed today your provider may have ordered immunizations, labs, imaging, and/or referrals for you. A list of these orders (if applicable) as well as your Preventive Care list are included within your After Visit Summary for your review. Other Preventive Recommendations:    A preventive eye exam performed by an eye specialist is recommended every 1-2 years to screen for glaucoma; cataracts, macular degeneration, and other eye disorders. A preventive dental visit is recommended every 6 months. Try to get at least 150 minutes of exercise per week or 10,000 steps per day on a pedometer . Order or download the FREE \"Exercise & Physical Activity: Your Everyday Guide\" from [employer]. Call 5-699.104.2474 or search [employer] online. You need 4285-3772 mg of calcium and 2360-9011 IU of vitamin D per day. It is possible to meet your calcium requirement with diet alone, but a vitamin D supplement is usually necessary to meet this goal.  When exposed to the sun, use a sunscreen that protects against both UVA and UVB radiation with an SPF of 30 or greater. Reapply every 2 to 3 hours or after sweating, drying off with a towel, or swimming. Always wear a seat belt when traveling in a car. Always wear a helmet when riding a bicycle or motorcycle.

## 2023-03-06 ENCOUNTER — TELEPHONE (OUTPATIENT)
Dept: INTERNAL MEDICINE CLINIC | Age: 88
End: 2023-03-06

## 2023-03-06 RX ORDER — NITROFURANTOIN 25; 75 MG/1; MG/1
100 CAPSULE ORAL 2 TIMES DAILY
Qty: 10 CAPSULE | Refills: 0 | Status: SHIPPED | OUTPATIENT
Start: 2023-03-06 | End: 2023-03-11

## 2023-03-06 NOTE — TELEPHONE ENCOUNTER
Pt called to check status of request. She c/o urinary urgency/frequency, with little output, and dysuria x 2 days.

## 2023-04-04 RX ORDER — POTASSIUM CHLORIDE 750 MG/1
TABLET, FILM COATED, EXTENDED RELEASE ORAL
Qty: 30 TABLET | Refills: 5 | Status: SHIPPED | OUTPATIENT
Start: 2023-04-04

## 2023-04-17 ENCOUNTER — HOSPITAL ENCOUNTER (OUTPATIENT)
Age: 88
Discharge: HOME | End: 2023-04-17
Payer: MEDICARE

## 2023-04-17 DIAGNOSIS — R73.03 PREDIABETES: ICD-10-CM

## 2023-04-17 DIAGNOSIS — E78.2 MIXED HYPERLIPIDEMIA: ICD-10-CM

## 2023-04-17 DIAGNOSIS — I10 HYPERTENSION, ESSENTIAL: ICD-10-CM

## 2023-04-17 LAB
ALBUMIN SERPL-MCNC: 4 GM/DL (ref 3.4–5)
ALP BLD-CCNC: 56 IU/L (ref 40–128)
ALT SERPL-CCNC: 10 U/L (ref 10–40)
ANION GAP SERPL CALCULATED.3IONS-SCNC: 9 MMOL/L (ref 4–16)
AST SERPL-CCNC: 16 IU/L (ref 15–37)
BASOPHILS ABSOLUTE: 0.1 K/CU MM
BASOPHILS RELATIVE PERCENT: 0.7 % (ref 0–1)
BILIRUB SERPL-MCNC: 0.8 MG/DL (ref 0–1)
BUN SERPL-MCNC: 13 MG/DL (ref 6–23)
CALCIUM SERPL-MCNC: 9.4 MG/DL (ref 8.3–10.6)
CHLORIDE BLD-SCNC: 105 MMOL/L (ref 99–110)
CHOLEST SERPL-MCNC: 253 MG/DL
CO2: 28 MMOL/L (ref 21–32)
CREAT SERPL-MCNC: 0.7 MG/DL (ref 0.6–1.1)
DIFFERENTIAL TYPE: ABNORMAL
EOSINOPHILS ABSOLUTE: 0.2 K/CU MM
EOSINOPHILS RELATIVE PERCENT: 2.8 % (ref 0–3)
ESTIMATED AVERAGE GLUCOSE: 117 MG/DL
GFR SERPL CREATININE-BSD FRML MDRD: >60 ML/MIN/1.73M2
GLUCOSE SERPL-MCNC: 100 MG/DL (ref 70–99)
HBA1C MFR BLD: 5.7 % (ref 4.2–6.3)
HCT VFR BLD CALC: 42.7 % (ref 37–47)
HDLC SERPL-MCNC: 81 MG/DL
HEMOGLOBIN: 13.5 GM/DL (ref 12.5–16)
IMMATURE NEUTROPHIL %: 0.3 % (ref 0–0.43)
LDLC SERPL CALC-MCNC: 155 MG/DL
LYMPHOCYTES ABSOLUTE: 2.9 K/CU MM
LYMPHOCYTES RELATIVE PERCENT: 39.2 % (ref 24–44)
MCH RBC QN AUTO: 30.1 PG (ref 27–31)
MCHC RBC AUTO-ENTMCNC: 31.6 % (ref 32–36)
MCV RBC AUTO: 95.1 FL (ref 78–100)
MONOCYTES ABSOLUTE: 0.9 K/CU MM
MONOCYTES RELATIVE PERCENT: 12.1 % (ref 0–4)
NUCLEATED RBC %: 0 %
PDW BLD-RTO: 14.9 % (ref 11.7–14.9)
PLATELET # BLD: 312 K/CU MM (ref 140–440)
PMV BLD AUTO: 9.7 FL (ref 7.5–11.1)
POTASSIUM SERPL-SCNC: 4 MMOL/L (ref 3.5–5.1)
RBC # BLD: 4.49 M/CU MM (ref 4.2–5.4)
SEGMENTED NEUTROPHILS ABSOLUTE COUNT: 3.3 K/CU MM
SEGMENTED NEUTROPHILS RELATIVE PERCENT: 44.9 % (ref 36–66)
SODIUM BLD-SCNC: 142 MMOL/L (ref 135–145)
TOTAL IMMATURE NEUTOROPHIL: 0.02 K/CU MM
TOTAL NUCLEATED RBC: 0 K/CU MM
TOTAL PROTEIN: 6.5 GM/DL (ref 6.4–8.2)
TRIGL SERPL-MCNC: 83 MG/DL
WBC # BLD: 7.3 K/CU MM (ref 4–10.5)

## 2023-04-17 PROCEDURE — 36415 COLL VENOUS BLD VENIPUNCTURE: CPT

## 2023-04-17 PROCEDURE — 80053 COMPREHEN METABOLIC PANEL: CPT

## 2023-04-17 PROCEDURE — 80061 LIPID PANEL: CPT

## 2023-04-17 PROCEDURE — 83036 HEMOGLOBIN GLYCOSYLATED A1C: CPT

## 2023-04-17 PROCEDURE — 85025 COMPLETE CBC W/AUTO DIFF WBC: CPT

## 2023-04-25 ENCOUNTER — OFFICE VISIT (OUTPATIENT)
Dept: INTERNAL MEDICINE CLINIC | Age: 88
End: 2023-04-25
Payer: MEDICARE

## 2023-04-25 VITALS
WEIGHT: 100 LBS | BODY MASS INDEX: 23.14 KG/M2 | HEART RATE: 72 BPM | OXYGEN SATURATION: 96 % | SYSTOLIC BLOOD PRESSURE: 130 MMHG | HEIGHT: 55 IN | DIASTOLIC BLOOD PRESSURE: 80 MMHG

## 2023-04-25 DIAGNOSIS — M81.0 OSTEOPOROSIS, UNSPECIFIED OSTEOPOROSIS TYPE, UNSPECIFIED PATHOLOGICAL FRACTURE PRESENCE: Primary | ICD-10-CM

## 2023-04-25 DIAGNOSIS — I10 HYPERTENSION, ESSENTIAL: ICD-10-CM

## 2023-04-25 DIAGNOSIS — E78.2 MIXED HYPERLIPIDEMIA: ICD-10-CM

## 2023-04-25 DIAGNOSIS — R73.03 PREDIABETES: ICD-10-CM

## 2023-04-25 DIAGNOSIS — M17.12 OSTEOARTHRITIS OF LEFT KNEE, UNSPECIFIED OSTEOARTHRITIS TYPE: ICD-10-CM

## 2023-04-25 PROCEDURE — 1036F TOBACCO NON-USER: CPT | Performed by: FAMILY MEDICINE

## 2023-04-25 PROCEDURE — G8420 CALC BMI NORM PARAMETERS: HCPCS | Performed by: FAMILY MEDICINE

## 2023-04-25 PROCEDURE — 99214 OFFICE O/P EST MOD 30 MIN: CPT | Performed by: FAMILY MEDICINE

## 2023-04-25 PROCEDURE — 1123F ACP DISCUSS/DSCN MKR DOCD: CPT | Performed by: FAMILY MEDICINE

## 2023-04-25 PROCEDURE — 1090F PRES/ABSN URINE INCON ASSESS: CPT | Performed by: FAMILY MEDICINE

## 2023-04-25 PROCEDURE — G8427 DOCREV CUR MEDS BY ELIG CLIN: HCPCS | Performed by: FAMILY MEDICINE

## 2023-04-25 ASSESSMENT — ENCOUNTER SYMPTOMS
COLOR CHANGE: 0
SHORTNESS OF BREATH: 0
VOMITING: 0
SORE THROAT: 0
CONSTIPATION: 0
DIARRHEA: 0
CHEST TIGHTNESS: 0
ABDOMINAL PAIN: 0

## 2023-04-25 NOTE — PROGRESS NOTES
Subjective:      Chief Complaint   Patient presents with    Follow-up     4 months-no complaints        HPI:  Joanna Lua is a 80 y.o. female who presents today for follow up of chronic conditions as listed below. Patient has been receiving long acting injections for knee- last injection was about 6 months ago. States pain is adequately controlled, but main issue is knee giving out. States she does wear a brace and uses her walker consistently. Has not had any further falls since her last appointment. HTN:  BP's have been 120-130's/70's at home. Denies any symptoms. Osteoporosis:  Taking fosamax. Prediabetes:  No significant changes in diet. Was started on eye drops recently by her ophthalmologist for an infection. Feeling well today, no acute concerns. Labs reviewed. 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        Past Surgical History:   Procedure Laterality Date    ABDOMINAL EXPLORATION SURGERY  1997    With Lysis of Adhesions    APPENDECTOMY  1953    w/  rt.uso    [address]    w/ rectocele    FIXATION KYPHOPLASTY  02/19/2018    T10 & L1    HYSTERECTOMY (CERVIX STATUS UNKNOWN)  1969     remaining ovary removed also    SKIN CANCER EXCISION Left 09/2017    BCC with skin graft    WRIST FRACTURE SURGERY Left 03/2017    ORIF       Social History     Tobacco Use    Smoking status: Never    Smokeless tobacco: Never   Substance Use Topics    Alcohol use:  Yes

## 2023-06-13 NOTE — CARE COORDINATION
[facility] Transitions Follow Up Call    3/7/2018    Patient: Jose J Partida  Patient : 6/15/1927   MRN: [id number]  Reason for Admission: There are no discharge diagnoses documented for the most recent discharge. Discharge Date: 3/2/18 RARS: Risk Score: 18.25       Spoke with: patient    Care Transitions Subsequent and Final Call    Subsequent and Final Calls  Have your medications changed?:  No  Do you have any questions related to your medications?:  No  Do you currently have any active services?:  No  Do you have any needs or concerns that I can assist you with?:  No  Identified Barriers:  None  Care Transitions Interventions  No Identified Needs  Other Interventions: Follow Up:  Spoke with patient . Patient reports that she is doing well and denies pain or worsening symptoms. Patient reports that she has a follow up appointment with her PCP tomorrow. Denies any questions, equipment or resource needs at this time. Reports that her care needs are being met and denies the need for [address]. Agreeable to continued Care Transition. CTC contact information given. Encouraged patient to call if needs arise.     Future Appointments  Date Time Provider Allan Tripathi   3/8/2018 10:30 AM Brianna Mata MD [facility]       Gayle Chino RN [[email or web address]] : I, Darby Hu wrote this note acting as a scribe for Dr. Louis Rincon on Jun 12, 2023.

## 2023-08-10 RX ORDER — ALENDRONATE SODIUM 70 MG/1
TABLET ORAL
Qty: 4 TABLET | Refills: 5 | Status: SHIPPED | OUTPATIENT
Start: 2023-08-10

## 2023-08-29 ENCOUNTER — OFFICE VISIT (OUTPATIENT)
Dept: INTERNAL MEDICINE CLINIC | Age: 88
End: 2023-08-29
Payer: MEDICARE

## 2023-08-29 ENCOUNTER — TELEPHONE (OUTPATIENT)
Dept: INTERNAL MEDICINE CLINIC | Age: 88
End: 2023-08-29

## 2023-08-29 VITALS
SYSTOLIC BLOOD PRESSURE: 138 MMHG | HEART RATE: 72 BPM | BODY MASS INDEX: 22.21 KG/M2 | OXYGEN SATURATION: 96 % | HEIGHT: 55 IN | DIASTOLIC BLOOD PRESSURE: 80 MMHG | WEIGHT: 96 LBS

## 2023-08-29 DIAGNOSIS — R39.9 UTI SYMPTOMS: Primary | ICD-10-CM

## 2023-08-29 DIAGNOSIS — I10 HYPERTENSION, ESSENTIAL: ICD-10-CM

## 2023-08-29 DIAGNOSIS — R73.03 PREDIABETES: ICD-10-CM

## 2023-08-29 DIAGNOSIS — E78.2 MIXED HYPERLIPIDEMIA: ICD-10-CM

## 2023-08-29 LAB
BILIRUBIN, POC: NORMAL
BLOOD URINE, POC: NORMAL
CLARITY, POC: NORMAL
COLOR, POC: YELLOW
GLUCOSE URINE, POC: NORMAL
KETONES, POC: NORMAL
LEUKOCYTE EST, POC: NORMAL
NITRITE, POC: NORMAL
PH, POC: 7.5
PROTEIN, POC: NORMAL
SPECIFIC GRAVITY, POC: 1.01
UROBILINOGEN, POC: 0.2

## 2023-08-29 PROCEDURE — 1036F TOBACCO NON-USER: CPT | Performed by: FAMILY MEDICINE

## 2023-08-29 PROCEDURE — G8427 DOCREV CUR MEDS BY ELIG CLIN: HCPCS | Performed by: FAMILY MEDICINE

## 2023-08-29 PROCEDURE — 99214 OFFICE O/P EST MOD 30 MIN: CPT | Performed by: FAMILY MEDICINE

## 2023-08-29 PROCEDURE — 81002 URINALYSIS NONAUTO W/O SCOPE: CPT | Performed by: FAMILY MEDICINE

## 2023-08-29 PROCEDURE — G8420 CALC BMI NORM PARAMETERS: HCPCS | Performed by: FAMILY MEDICINE

## 2023-08-29 PROCEDURE — 1123F ACP DISCUSS/DSCN MKR DOCD: CPT | Performed by: FAMILY MEDICINE

## 2023-08-29 PROCEDURE — 1090F PRES/ABSN URINE INCON ASSESS: CPT | Performed by: FAMILY MEDICINE

## 2023-08-29 RX ORDER — GLIMEPIRIDE 2 MG/1
1 TABLET ORAL
COMMUNITY

## 2023-08-29 RX ORDER — NITROFURANTOIN 25; 75 MG/1; MG/1
100 CAPSULE ORAL 2 TIMES DAILY
Qty: 10 CAPSULE | Refills: 0 | Status: SHIPPED | OUTPATIENT
Start: 2023-08-29 | End: 2023-09-03

## 2023-08-29 SDOH — ECONOMIC STABILITY: INCOME INSECURITY: HOW HARD IS IT FOR YOU TO PAY FOR THE VERY BASICS LIKE FOOD, HOUSING, MEDICAL CARE, AND HEATING?: NOT HARD AT ALL

## 2023-08-29 SDOH — ECONOMIC STABILITY: FOOD INSECURITY: WITHIN THE PAST 12 MONTHS, YOU WORRIED THAT YOUR FOOD WOULD RUN OUT BEFORE YOU GOT MONEY TO BUY MORE.: NEVER TRUE

## 2023-08-29 SDOH — ECONOMIC STABILITY: FOOD INSECURITY: WITHIN THE PAST 12 MONTHS, THE FOOD YOU BOUGHT JUST DIDN'T LAST AND YOU DIDN'T HAVE MONEY TO GET MORE.: NEVER TRUE

## 2023-08-29 ASSESSMENT — ENCOUNTER SYMPTOMS
DIARRHEA: 0
VOMITING: 0
CONSTIPATION: 0
SORE THROAT: 0
COLOR CHANGE: 0
SHORTNESS OF BREATH: 0
ABDOMINAL PAIN: 0
CHEST TIGHTNESS: 0

## 2023-08-29 NOTE — PROGRESS NOTES
0    2. Hypertension, essential  Stable, well controlled. Continue current medications. - CBC with Auto Differential; Future  - Comprehensive Metabolic Panel; Future    3. Mixed hyperlipidemia  Lipid panel stable, will continue monitor.   - Lipid Panel; Future    4. Prediabetes  Stable, will monitor.    - Hemoglobin A1C; Future          I have spent 28 minutes on this patient encounter. Patient voiced understanding and agreement with plan. All questions/concerns were addressed, risks/side effects of medications were reviewed. Return precautions and after visit summary were provided. Return in about 4 months (around 12/29/2023). or earlier as needed.       Karey Vásquez MD

## 2023-08-31 LAB
BACTERIA UR CULT: ABNORMAL
BACTERIA UR CULT: ABNORMAL
ORGANISM: ABNORMAL
ORGANISM: ABNORMAL

## 2023-09-18 ENCOUNTER — APPOINTMENT (OUTPATIENT)
Dept: CT IMAGING | Age: 88
End: 2023-09-18
Payer: MEDICARE

## 2023-09-18 ENCOUNTER — APPOINTMENT (OUTPATIENT)
Dept: GENERAL RADIOLOGY | Age: 88
End: 2023-09-18
Payer: MEDICARE

## 2023-09-18 ENCOUNTER — HOSPITAL ENCOUNTER (INPATIENT)
Age: 88
LOS: 3 days | Discharge: NURSING, REHAB OR LONG-TERM CARE | End: 2023-09-21
Attending: EMERGENCY MEDICINE | Admitting: INTERNAL MEDICINE
Payer: MEDICARE

## 2023-09-18 ENCOUNTER — APPOINTMENT (OUTPATIENT)
Dept: MRI IMAGING | Age: 88
End: 2023-09-18
Payer: MEDICARE

## 2023-09-18 DIAGNOSIS — R42 DIZZINESS: ICD-10-CM

## 2023-09-18 DIAGNOSIS — R29.898 WEAKNESS OF LEFT ARM: ICD-10-CM

## 2023-09-18 DIAGNOSIS — R29.898 WEAKNESS OF BOTH LOWER EXTREMITIES: Primary | ICD-10-CM

## 2023-09-18 PROBLEM — I63.9 ACUTE CVA (CEREBROVASCULAR ACCIDENT) (HCC): Status: ACTIVE | Noted: 2023-09-18

## 2023-09-18 LAB
ALBUMIN SERPL-MCNC: 3.8 GM/DL (ref 3.4–5)
ALP BLD-CCNC: 66 IU/L (ref 40–128)
ALT SERPL-CCNC: 11 U/L (ref 10–40)
ANION GAP SERPL CALCULATED.3IONS-SCNC: 11 MMOL/L (ref 4–16)
AST SERPL-CCNC: 15 IU/L (ref 15–37)
BASOPHILS ABSOLUTE: 0 K/CU MM
BASOPHILS RELATIVE PERCENT: 0.6 % (ref 0–1)
BILIRUB SERPL-MCNC: 0.5 MG/DL (ref 0–1)
BILIRUBIN URINE: NEGATIVE MG/DL
BLOOD, URINE: NEGATIVE
BUN SERPL-MCNC: 12 MG/DL (ref 6–23)
CALCIUM SERPL-MCNC: 9.3 MG/DL (ref 8.3–10.6)
CHLORIDE BLD-SCNC: 98 MMOL/L (ref 99–110)
CHP ED QC CHECK: NORMAL
CLARITY: CLEAR
CO2: 23 MMOL/L (ref 21–32)
COLOR: YELLOW
COMMENT UA: NORMAL
CREAT SERPL-MCNC: 0.5 MG/DL (ref 0.6–1.1)
DIFFERENTIAL TYPE: ABNORMAL
EOSINOPHILS ABSOLUTE: 0.1 K/CU MM
EOSINOPHILS RELATIVE PERCENT: 3.3 % (ref 0–3)
GFR SERPL CREATININE-BSD FRML MDRD: >60 ML/MIN/1.73M2
GLUCOSE BLD-MCNC: 105 MG/DL
GLUCOSE BLD-MCNC: 105 MG/DL (ref 70–99)
GLUCOSE SERPL-MCNC: 99 MG/DL (ref 70–99)
GLUCOSE, URINE: NEGATIVE MG/DL
HCT VFR BLD CALC: 39.5 % (ref 37–47)
HEMOGLOBIN: 12.8 GM/DL (ref 12.5–16)
IMMATURE NEUTROPHIL %: 0.6 % (ref 0–0.43)
INR BLD: 0.9 INDEX
KETONES, URINE: NEGATIVE MG/DL
LEUKOCYTE ESTERASE, URINE: NEGATIVE
LYMPHOCYTES ABSOLUTE: 1.5 K/CU MM
LYMPHOCYTES RELATIVE PERCENT: 44.5 % (ref 24–44)
MCH RBC QN AUTO: 30.8 PG (ref 27–31)
MCHC RBC AUTO-ENTMCNC: 32.4 % (ref 32–36)
MCV RBC AUTO: 95 FL (ref 78–100)
MONOCYTES ABSOLUTE: 0.3 K/CU MM
MONOCYTES RELATIVE PERCENT: 8.6 % (ref 0–4)
NITRITE URINE, QUANTITATIVE: NEGATIVE
NUCLEATED RBC %: 0 %
PDW BLD-RTO: 15.7 % (ref 11.7–14.9)
PH, URINE: 7.5 (ref 5–8)
PLATELET # BLD: 253 K/CU MM (ref 140–440)
PMV BLD AUTO: 9.3 FL (ref 7.5–11.1)
POTASSIUM SERPL-SCNC: 3.9 MMOL/L (ref 3.5–5.1)
PROTEIN UA: NEGATIVE MG/DL
PROTHROMBIN TIME: 12.8 SECONDS (ref 11.7–14.5)
RBC # BLD: 4.16 M/CU MM (ref 4.2–5.4)
SEGMENTED NEUTROPHILS ABSOLUTE COUNT: 1.4 K/CU MM
SEGMENTED NEUTROPHILS RELATIVE PERCENT: 42.4 % (ref 36–66)
SODIUM BLD-SCNC: 132 MMOL/L (ref 135–145)
SPECIFIC GRAVITY UA: 1.01 (ref 1–1.03)
TOTAL IMMATURE NEUTOROPHIL: 0.02 K/CU MM
TOTAL NUCLEATED RBC: 0 K/CU MM
TOTAL PROTEIN: 6.5 GM/DL (ref 6.4–8.2)
TROPONIN T: <0.01 NG/ML
UROBILINOGEN, URINE: 0.2 MG/DL (ref 0.2–1)
WBC # BLD: 3.4 K/CU MM (ref 4–10.5)

## 2023-09-18 PROCEDURE — 85610 PROTHROMBIN TIME: CPT

## 2023-09-18 PROCEDURE — 85025 COMPLETE CBC W/AUTO DIFF WBC: CPT

## 2023-09-18 PROCEDURE — 99285 EMERGENCY DEPT VISIT HI MDM: CPT

## 2023-09-18 PROCEDURE — 2000000000 HC ICU R&B

## 2023-09-18 PROCEDURE — 6360000002 HC RX W HCPCS: Performed by: INTERNAL MEDICINE

## 2023-09-18 PROCEDURE — 81003 URINALYSIS AUTO W/O SCOPE: CPT

## 2023-09-18 PROCEDURE — 82962 GLUCOSE BLOOD TEST: CPT

## 2023-09-18 PROCEDURE — 96374 THER/PROPH/DIAG INJ IV PUSH: CPT

## 2023-09-18 PROCEDURE — 84484 ASSAY OF TROPONIN QUANT: CPT

## 2023-09-18 PROCEDURE — 93005 ELECTROCARDIOGRAM TRACING: CPT | Performed by: EMERGENCY MEDICINE

## 2023-09-18 PROCEDURE — 71045 X-RAY EXAM CHEST 1 VIEW: CPT

## 2023-09-18 PROCEDURE — 70551 MRI BRAIN STEM W/O DYE: CPT

## 2023-09-18 PROCEDURE — 6360000002 HC RX W HCPCS: Performed by: EMERGENCY MEDICINE

## 2023-09-18 PROCEDURE — 70496 CT ANGIOGRAPHY HEAD: CPT

## 2023-09-18 PROCEDURE — 6360000004 HC RX CONTRAST MEDICATION: Performed by: EMERGENCY MEDICINE

## 2023-09-18 PROCEDURE — 80053 COMPREHEN METABOLIC PANEL: CPT

## 2023-09-18 PROCEDURE — 6370000000 HC RX 637 (ALT 250 FOR IP): Performed by: INTERNAL MEDICINE

## 2023-09-18 PROCEDURE — 70450 CT HEAD/BRAIN W/O DYE: CPT

## 2023-09-18 RX ORDER — ENOXAPARIN SODIUM 100 MG/ML
30 INJECTION SUBCUTANEOUS EVERY EVENING
Status: DISCONTINUED | OUTPATIENT
Start: 2023-09-18 | End: 2023-09-21 | Stop reason: HOSPADM

## 2023-09-18 RX ORDER — SODIUM CHLORIDE 9 MG/ML
INJECTION, SOLUTION INTRAVENOUS PRN
Status: DISCONTINUED | OUTPATIENT
Start: 2023-09-18 | End: 2023-09-21 | Stop reason: HOSPADM

## 2023-09-18 RX ORDER — AMLODIPINE BESYLATE 10 MG/1
10 TABLET ORAL DAILY
Status: DISCONTINUED | OUTPATIENT
Start: 2023-09-18 | End: 2023-09-21 | Stop reason: HOSPADM

## 2023-09-18 RX ORDER — CLOPIDOGREL BISULFATE 75 MG/1
75 TABLET ORAL DAILY
Status: DISCONTINUED | OUTPATIENT
Start: 2023-09-18 | End: 2023-09-21 | Stop reason: HOSPADM

## 2023-09-18 RX ORDER — ONDANSETRON 4 MG/1
4 TABLET, ORALLY DISINTEGRATING ORAL EVERY 8 HOURS PRN
Status: DISCONTINUED | OUTPATIENT
Start: 2023-09-18 | End: 2023-09-21 | Stop reason: HOSPADM

## 2023-09-18 RX ORDER — ASPIRIN 81 MG/1
81 TABLET, CHEWABLE ORAL DAILY
Status: DISCONTINUED | OUTPATIENT
Start: 2023-09-18 | End: 2023-09-21 | Stop reason: HOSPADM

## 2023-09-18 RX ORDER — SODIUM CHLORIDE 0.9 % (FLUSH) 0.9 %
5-40 SYRINGE (ML) INJECTION EVERY 12 HOURS SCHEDULED
Status: DISCONTINUED | OUTPATIENT
Start: 2023-09-18 | End: 2023-09-21 | Stop reason: HOSPADM

## 2023-09-18 RX ORDER — LATANOPROST 50 UG/ML
1 SOLUTION/ DROPS OPHTHALMIC NIGHTLY
Status: DISCONTINUED | OUTPATIENT
Start: 2023-09-18 | End: 2023-09-21 | Stop reason: HOSPADM

## 2023-09-18 RX ORDER — ROSUVASTATIN CALCIUM 20 MG/1
40 TABLET, COATED ORAL NIGHTLY
Status: DISCONTINUED | OUTPATIENT
Start: 2023-09-18 | End: 2023-09-21 | Stop reason: HOSPADM

## 2023-09-18 RX ORDER — GLIMEPIRIDE 2 MG/1
1 TABLET ORAL DAILY
Status: DISCONTINUED | OUTPATIENT
Start: 2023-09-18 | End: 2023-09-21 | Stop reason: HOSPADM

## 2023-09-18 RX ORDER — POLYETHYLENE GLYCOL 3350 17 G/17G
17 POWDER, FOR SOLUTION ORAL DAILY PRN
Status: DISCONTINUED | OUTPATIENT
Start: 2023-09-18 | End: 2023-09-21 | Stop reason: HOSPADM

## 2023-09-18 RX ORDER — ONDANSETRON 2 MG/ML
4 INJECTION INTRAMUSCULAR; INTRAVENOUS EVERY 6 HOURS PRN
Status: DISCONTINUED | OUTPATIENT
Start: 2023-09-18 | End: 2023-09-21 | Stop reason: HOSPADM

## 2023-09-18 RX ORDER — DIPHENHYDRAMINE HYDROCHLORIDE 50 MG/ML
25 INJECTION INTRAMUSCULAR; INTRAVENOUS ONCE
Status: COMPLETED | OUTPATIENT
Start: 2023-09-18 | End: 2023-09-18

## 2023-09-18 RX ORDER — SODIUM CHLORIDE 0.9 % (FLUSH) 0.9 %
5-40 SYRINGE (ML) INJECTION PRN
Status: DISCONTINUED | OUTPATIENT
Start: 2023-09-18 | End: 2023-09-21 | Stop reason: HOSPADM

## 2023-09-18 RX ADMIN — ASPIRIN 81 MG CHEWABLE TABLET 81 MG: 81 TABLET CHEWABLE at 21:59

## 2023-09-18 RX ADMIN — CLOPIDOGREL BISULFATE 75 MG: 75 TABLET ORAL at 21:59

## 2023-09-18 RX ADMIN — ENOXAPARIN SODIUM 30 MG: 100 INJECTION SUBCUTANEOUS at 21:59

## 2023-09-18 RX ADMIN — IOPAMIDOL 75 ML: 755 INJECTION, SOLUTION INTRAVENOUS at 16:22

## 2023-09-18 RX ADMIN — ROSUVASTATIN CALCIUM 40 MG: 20 TABLET, FILM COATED ORAL at 21:59

## 2023-09-18 RX ADMIN — AMLODIPINE BESYLATE 10 MG: 10 TABLET ORAL at 21:59

## 2023-09-18 RX ADMIN — DIPHENHYDRAMINE HYDROCHLORIDE 25 MG: 50 INJECTION INTRAMUSCULAR; INTRAVENOUS at 16:42

## 2023-09-18 NOTE — ED NOTES
Dr. Lesli Riddle assessed patient via telestroke. Recommends [facility] admission for stroke work up/MRI.       Yessenia Vick RN  09/18/23 3003

## 2023-09-18 NOTE — ED NOTES
ED TO INPATIENT SBAR HANDOFF    Patient Name: Lilia Dejesus   :  6/15/1927  80 y.o. Preferred Name    Family/Caregiver Present yes   Restraints no   C-SSRS:    Sitter no   Sepsis Risk Score Sepsis Risk Score: 2.85      Situation  Chief Complaint   Patient presents with    Cerebrovascular Accident     Brief Description of Patient's Condition: pt presented to ed with complaints of stroke like symptoms. Around 1130 pt experienced left sided weakness and delayed responses. Mental Status: oriented  Arrived from: home    Imaging:   XR CHEST PORTABLE   Final Result   Bilateral linear pulmonary opacities could represent pulmonary edema         CTA HEAD NECK W CONTRAST   Final Result   Severe stenosis in the proximal P2 segment of the left PCA. Severe stenosis in the P3 segment of the right PCA. 70% stenosis in the distal M1 segment of the right MCA. 50% stenosis in the distal M1 segment of the left MCA. 50% stenosis at the origin of the left vertebral artery. Chronic distracted non-healed type 2 dens fracture. CT HEAD WO CONTRAST   Final Result   No acute intracranial abnormality. Mild parenchymal volume loss. Moderate chronic microvascular disease. Chronic non-healed type 2 dens fracture.            Abnormal labs:   Abnormal Labs Reviewed   CBC WITH AUTO DIFFERENTIAL - Abnormal; Notable for the following components:       Result Value    WBC 3.4 (*)     RBC 4.16 (*)     RDW 15.7 (*)     Lymphocytes % 44.5 (*)     Monocytes % 8.6 (*)     Eosinophils % 3.3 (*)     Immature Neutrophil % 0.6 (*)     All other components within normal limits   COMPREHENSIVE METABOLIC PANEL W/ REFLEX TO MG FOR LOW K - Abnormal; Notable for the following components:    Sodium 132 (*)     Chloride 98 (*)     Creatinine 0.5 (*)     All other components within normal limits   POCT GLUCOSE - Abnormal; Notable for the following components:    POC Glucose 105 (*)     All other components within

## 2023-09-18 NOTE — ACP (ADVANCE CARE PLANNING)
Patient does not have any ACP documents/Medical Power of . LSW notes hospital will follow [place] Next of Kin hierarchy in the following descending order for priority:    Guardian  Spouse  Majority of adult Children  Parents  Majority of adult Siblings  Nearest Relative not described above    Per [place] Next of Kin hierarchy: Patients' adult child will be [address]

## 2023-09-18 NOTE — ED NOTES
Patient arrived via EMS and went straight to [address] witness by daughter.      Renetta Kauffman RN  09/18/23 4144

## 2023-09-18 NOTE — ED NOTES
Pre-hospital stroke alert called by [facility]. Pt son Hilario  at bedside.       Luis Louie RN  09/18/23 0960

## 2023-09-19 PROBLEM — R29.898 WEAKNESS OF BOTH LOWER EXTREMITIES: Status: ACTIVE | Noted: 2023-09-19

## 2023-09-19 LAB
CHOLEST SERPL-MCNC: 242 MG/DL
ESTIMATED AVERAGE GLUCOSE: 123 MG/DL
HBA1C MFR BLD: 5.9 % (ref 4.2–6.3)
HCT VFR BLD CALC: 44 % (ref 37–47)
HDLC SERPL-MCNC: 71 MG/DL
HEMOGLOBIN: 14.2 GM/DL (ref 12.5–16)
LDLC SERPL CALC-MCNC: 149 MG/DL
MCH RBC QN AUTO: 30.7 PG (ref 27–31)
MCHC RBC AUTO-ENTMCNC: 32.3 % (ref 32–36)
MCV RBC AUTO: 95 FL (ref 78–100)
PDW BLD-RTO: 15.7 % (ref 11.7–14.9)
PLATELET # BLD: 350 K/CU MM (ref 140–440)
PMV BLD AUTO: 9 FL (ref 7.5–11.1)
RBC # BLD: 4.63 M/CU MM (ref 4.2–5.4)
TRIGL SERPL-MCNC: 112 MG/DL
WBC # BLD: 7.3 K/CU MM (ref 4–10.5)

## 2023-09-19 PROCEDURE — 99223 1ST HOSP IP/OBS HIGH 75: CPT | Performed by: PSYCHIATRY & NEUROLOGY

## 2023-09-19 PROCEDURE — 85027 COMPLETE CBC AUTOMATED: CPT

## 2023-09-19 PROCEDURE — 80061 LIPID PANEL: CPT

## 2023-09-19 PROCEDURE — 97116 GAIT TRAINING THERAPY: CPT

## 2023-09-19 PROCEDURE — 2580000003 HC RX 258: Performed by: INTERNAL MEDICINE

## 2023-09-19 PROCEDURE — 97166 OT EVAL MOD COMPLEX 45 MIN: CPT

## 2023-09-19 PROCEDURE — 97530 THERAPEUTIC ACTIVITIES: CPT

## 2023-09-19 PROCEDURE — 97162 PT EVAL MOD COMPLEX 30 MIN: CPT

## 2023-09-19 PROCEDURE — 2000000000 HC ICU R&B

## 2023-09-19 PROCEDURE — 92610 EVALUATE SWALLOWING FUNCTION: CPT | Performed by: SPEECH-LANGUAGE PATHOLOGIST

## 2023-09-19 PROCEDURE — 93306 TTE W/DOPPLER COMPLETE: CPT

## 2023-09-19 PROCEDURE — 83036 HEMOGLOBIN GLYCOSYLATED A1C: CPT

## 2023-09-19 PROCEDURE — 1200000000 HC SEMI PRIVATE

## 2023-09-19 PROCEDURE — 6370000000 HC RX 637 (ALT 250 FOR IP): Performed by: INTERNAL MEDICINE

## 2023-09-19 PROCEDURE — 6360000002 HC RX W HCPCS: Performed by: INTERNAL MEDICINE

## 2023-09-19 PROCEDURE — 94761 N-INVAS EAR/PLS OXIMETRY MLT: CPT

## 2023-09-19 PROCEDURE — 99222 1ST HOSP IP/OBS MODERATE 55: CPT | Performed by: NURSE PRACTITIONER

## 2023-09-19 RX ADMIN — SODIUM CHLORIDE, PRESERVATIVE FREE 10 ML: 5 INJECTION INTRAVENOUS at 08:11

## 2023-09-19 RX ADMIN — CLOPIDOGREL BISULFATE 75 MG: 75 TABLET ORAL at 08:09

## 2023-09-19 RX ADMIN — SODIUM CHLORIDE, PRESERVATIVE FREE 10 ML: 5 INJECTION INTRAVENOUS at 20:20

## 2023-09-19 RX ADMIN — ASPIRIN 81 MG CHEWABLE TABLET 81 MG: 81 TABLET CHEWABLE at 08:09

## 2023-09-19 RX ADMIN — ENOXAPARIN SODIUM 30 MG: 100 INJECTION SUBCUTANEOUS at 17:45

## 2023-09-19 RX ADMIN — ROSUVASTATIN CALCIUM 40 MG: 20 TABLET, FILM COATED ORAL at 20:19

## 2023-09-19 RX ADMIN — LATANOPROST 1 DROP: 50 SOLUTION OPHTHALMIC at 20:20

## 2023-09-19 RX ADMIN — TIMOLOL MALEATE 1 DROP: 2.5 SOLUTION OPHTHALMIC at 08:11

## 2023-09-19 RX ADMIN — AMLODIPINE BESYLATE 10 MG: 10 TABLET ORAL at 08:10

## 2023-09-19 NOTE — PROGRESS NOTES
SLP ALL NOTES  Facility/Department: [facility]   CLINICAL BEDSIDE SWALLOW EVALUATION    NAME: Abby Lee  : 6/15/1927  MRN: [id number]    ADMISSION DATE: 2023  ADMITTING DIAGNOSIS: has Hypertension, essential; Osteoporosis; Hyperlipidemia; Osteoarthritis of knee; T10 vertebral fracture ([address]); Gait disturbance; Fracture of inferior pubic ramus, right, closed, initial encounter ([address]); Closed nondisplaced fracture of right ischium with routine healing; Closed odontoid fracture with delayed healing; Prediabetes; and Acute CVA (cerebrovascular accident) ([address]) on their problem list.    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 Pt was seen on the ICU seated upright independently in bedside chair. She was alert and cooperative and denies dysphagia. Pt's dtr was present and reports no dysphagia PTA. Pt followed all commands for the oral mechanism exam and demonstrated normal vocal quality and cough strength. PO trials of thins by cup and straw, pureed and regular solids completed with normal mastication and clearance. Pharyngeal swallow appeared WNL with likely normal swallow timing, functional hyolaryngeal excursion/elevation and 0 s/s aspiration. Speech and language screened throughout the evaluation and were judged to be WNL. Cognition appears at baseline. Dtr in agreement. Recommend:  Regular/Thins. No needs identified at this time.       ONSET DATE: 2023     Recent Chest Xray/CT of Chest:  see chart    Date of Eval: 2023  Evaluating Therapist: DAVID Valenzuela    Current Diet level:  Current Diet : NPO  Current Liquid Diet : NPO    Primary Complaint  denies    Pain:  Pain

## 2023-09-19 NOTE — PROGRESS NOTES
[address] ACUTE CARE OCCUPATIONAL THERAPY EVALUATION  Savage Jane, 6/15/1927, 2123/2123-A, 9/19/2023    Discharge Recommendation: Inpatient Rehabilitation    History  [place]:  The primary encounter diagnosis was Weakness of both lower extremities. Diagnoses of Weakness of left arm and Dizziness were also pertinent to this visit. Patient  has a past medical history of BCC (basal cell carcinoma), leg, Family history of pancreatic cancer, Glaucoma of right eye, Hyperlipidemia, Hypertension, Macular degeneration, dry, Osteoarthritis of knee, Osteoporosis, Pelvic fracture ([address]), Right rotator cuff tear, T10 vertebral fracture ([address]), and Vitamin B12 deficiency. Patient  has a past surgical history that includes Appendectomy (5084); Hysterectomy (1969); Cystocele repair (1993); Abdominal exploration surgery (1997); Wrist fracture surgery (Left, 03/2017); Skin cancer excision (Left, 09/2017); and Fixation Kyphoplasty (02/19/2018). Subjective:  Patient states: \"These fingers have been giving me trouble with things, like holding onto stuff and opening jars\". Pain:  denied. Communication with other providers: RN, CM, co-eval with FROY Mills.   Restrictions: General Precautions, Fall Risk     Home Setup/Prior level of function  Social/Functional History  Lives With: Alone  Type of Home: House  Home Layout: One level  Home Access: Stairs to enter with rails  Entrance Stairs - Number of Steps: 4  Bathroom Shower/Tub: Tub/Shower unit (sponge baths)  Bathroom Toilet: Standard  Home Equipment: Rollator  Has the patient had two or more falls in the past year or any fall with injury in the past year?: No  Receives Help From: Family  ADL Assistance: Independent  Homemaking Assistance: Needs assistance (family helps with laundry and lawn work)  Homemaking Responsibilities: Yes  Ambulation Assistance: Independent  Active : No    Examination of body systems (includes body training, Neuromuscular re-education, Pain management, Safety education & training, Patient/Caregiver education & training, Equipment evaluation, education, & procurement, Positioning, Self-Care / ADL, Home management training, Coordination training       Goals:  Goal 1: Pt will perform UE ADLs independently   Goal 2: Pt will perform LE ADLs Ginger  Goal 3: Pt will perform toileting independently   Goal 4: Pt will perform HH distance functional mobility Ginger in preparation for return to home   Goal 5: Pt will perform functional transfers to/from bed, chair, and toilet Ginger  Goal 6: Pt will perform therex/theract in order to increase functional activity tolerance    Treatment plan:  Pt will perform therex/theract in order to increase functional activity tolerance in preparation for ADL participation.      Recommendations for NURSING activity: Up to chair for all 3 meals and up to [facility] for all toileting needs    Time:   Time in: 0958  Time out: 1017  Timed treatment minutes: 9  Total time: 19 minutes     Electronically signed by:    REILLY Pierson/L [id number]  9/19/2023, 10:41 AM

## 2023-09-19 NOTE — PROGRESS NOTES
4 Eyes Skin Assessment     NAME:  Justin Joiner  YOB: 1927  MEDICAL RECORD NUMBER:  [id number]    The patient is being assessed for  Admission    I agree that at least one RN has performed a thorough Head to Toe Skin Assessment on the patient. ALL assessment sites listed below have been assessed. Areas assessed by both nurses:    Shoulders, Back, Chest and Sacrum. Buttock, Coccyx, Ischium        Does the Patient have a Wound?  No noted wound(s)       Donell Prevention initiated by RN: Yes  Wound Care Orders initiated by RN: No    Pressure Injury (Stage 3,4, Unstageable, DTI, NWPT, and Complex wounds) if present, place Wound referral order by RN under : No    New Ostomies, if present place, Ostomy referral order under : No     Nurse 1 eSignature: Electronically signed by Teresa Rhodes RN on 9/19/23 at 6:21 AM EDT    **SHARE this note so that the co-signing nurse can place an eSignature**    Nurse 2 eSignature: Electronically signed by Deo Mckeon RN on 9/19/23 at 6:36 AM EDT

## 2023-09-19 NOTE — CONSULTS
Neurology Service Consult Note  [address] [place] [address]   Patient Name: Abby Lee  : 6/15/1927        Subjective:   Reason for consult: left sided weakness  80 y.o. female with history of HLD, HTN presenting to [address] [place] [address] with sudden onset dizziness and left sided weakness. Patient was on the phone with her sister when she started to have difficulty holding the phone up. She then noticed difficulty with her left leg when walking. She was evaluated by [facility] teleneurology team, initial NIH 3. Intervention was not recommended as the patient was outside of the window. Patient was seen today sitting up in chair. Her daughter is at bedside. Symptoms have greatly improved today. She has mild residual left upper and lower extremity weakness. Her daughter shares with me that she was barely able to hold her arm up or touch her nose yesterday that she was pleased with the improvement that we have seen. Patient was not on antiplatelet or statin medication at home. She continues to live independently and is able to complete most tasks around her home with the exception of laundry. She is alert, oriented and pleasant on exam today. She denies vision change, headache. She does have hearing loss at baseline.       Past Medical History:   Diagnosis Date    BCC (basal cell carcinoma), leg 2017    Family history of pancreatic cancer     Glaucoma of right eye 2016    Dr Kemal Gifford    Hyperlipidemia     Hypertension     Macular degeneration, dry     Dr. Mcintyre Anchors of knee     Dr Veronica Rubi    Osteoporosis 2002    Fosamax [phone]    Pelvic fracture ([address]) 2018    Right rotator cuff tear     Veronica Rubi / injection; PT    T10 vertebral fracture ([address]) 2018    fall and fragility fx; also L1 ; had kyphoplasty 2018    Vitamin B12 deficiency 2017    B 12 266 by Veronica Rubi at [facility]    :   Past Surgical History:   Procedure Laterality Date    ABDOMINAL EXPLORATION SURGERY non-healed type 2 dens fracture. MRI brain w/o contrast:  IMPRESSION:  1. Small acute ischemic infarct in the high posterior right frontal corona  radiata white matter. 2. No hemorrhage or mass effect. 3. Moderate chronic white matter microvascular ischemic changes. Echocardiogram:  Summary   Left ventricular systolic function is normal.   Ejection fraction is visually estimated at 55-60%. Mitral annular calcification is present. No evidence of any pericardial effusion. Bubble study appears negative. All imaging was personally reviewed   ASSESSMENT/PLAN:   80-year-old female presenting with a cute onset of left upper and lower extremity weakness. She has had significant improvement in the symptoms overnight. She continues to have residual mild left upper and lower extremity weakness with mild left upper extremity drift on exam.  Vision is intact, patient denies headache. Speech is clear, language is fluent. Sensation is intact bilaterally. Weakness of the left upper and lower extremity secondary to acute stroke. Neuroimaging as above  CTA head and neck notes multiple areas of intracranial stenosis  MRI brain notes small acute stroke in the high posterior right frontal corona radiata white matter  Continue DAPT, atorvastatin for secondary stroke prevention  Interventional neurology following for CTA findings, recommend medical management with no plans for intervention at this time  , A1c 5.9  Echocardiogram as above, noncontributory  PT/OT/ST as recommended  Feel patient would be a great candidate for ARU at discharge  We will continue to follow loosely pending clinical course, neurologic improvement. Patient was discussed with attending neurologist Dr. Rodrigo Casanova      Thank you for allowing us to participate in the care of your patient. If there are any questions regarding evaluation please feel free to contact us.      CHULA Fox - CNP, 9/19/2023     Patient with

## 2023-09-19 NOTE — CARE COORDINATION
09/19/23 1106   Service Assessment   Patient Orientation Alert and Oriented   Cognition Alert   History Provided By Patient;Medical Record   Primary Caregiver Family   Accompanied By/Relationship N/A   Support Systems Children   Patient's Healthcare Decision Maker is: Legal Next of Kin   PCP Verified by CM Yes   Last Visit to PCP Within last 3 months   Prior Functional Level Independent in ADLs/IADLs;Assistance with the following:;Shopping;Housework   Current Functional Level Independent in ADLs/IADLs;Assistance with the following:;Shopping;Housework; Mobility; Bathing; Toileting   Can patient return to prior living arrangement Other (see comment)  (PT/OT rec ARU)   Ability to make needs known: Good   Family able to assist with home care needs: Yes   Would you like for me to discuss the discharge plan with any other family members/significant others, and if so, who? Yes  (emergency contact/family)   Financial Resources [facility] Resources None   CM/SW Referral Other (see comment)  (discharge planning asessment)   Social/Functional History   Lives With Alone   Type of [facility]  One level; Laundry in basement  (dgt does laundry)   Bathroom Toilet Standard  (handrails around the toilet)   [address] bars around toilet   [address] [facility]  (transport chair, reacher)

## 2023-09-19 NOTE — PROGRESS NOTES
Physical Therapy  [facility] ACUTE CARE PHYSICAL THERAPY EVALUATION  Harris Saldana, 6/15/1927, 2123/2123-A, 9/19/2023    History  [place]:  The primary encounter diagnosis was Weakness of both lower extremities. Diagnoses of Weakness of left arm and Dizziness were also pertinent to this visit. Patient  has a past medical history of BCC (basal cell carcinoma), leg, Family history of pancreatic cancer, Glaucoma of right eye, Hyperlipidemia, Hypertension, Macular degeneration, dry, Osteoarthritis of knee, Osteoporosis, Pelvic fracture ([address]), Right rotator cuff tear, T10 vertebral fracture ([address]), and Vitamin B12 deficiency. Patient  has a past surgical history that includes Appendectomy (9411); Hysterectomy (1969); Cystocele repair (1993); Abdominal exploration surgery (1997); Wrist fracture surgery (Left, 03/2017); Skin cancer excision (Left, 09/2017); and Fixation Kyphoplasty (02/19/2018). Subjective:  Patient states: \"My left leg gives me trouble\"   Pain:  denies pain at rest but slightly increased pain to left knee with initial WB activity.    Communication with other providers: RUBI RN, co-eval with Catherine URIAS   Restrictions: general precautions, falls     Home Setup/Prior level of function  Social/Functional History  Lives With: Alone  Type of Home: House  Home Layout: One level, Laundry in basement (dgt does laundry)  Home Access: Stairs to enter with rails  Entrance Stairs - Number of Steps: 4  Bathroom Shower/Tub: Tub/Shower unit (sponge baths)  Bathroom Toilet: Standard (handrails around the toilet)  Bathroom Equipment: Grab bars around toilet  Home Equipment: Cane, Rollator (transport chair, reacher)  Has the patient had two or more falls in the past year or any fall with injury in the past year?: No  Receives Help From: Family  ADL Assistance: [id number] Needs assistance (family helps with laundry and lawn work)  Homemaking Responsibilities: Yes  Ambulation Assistance: Independent  Active : No    Examination of body systems (includes body structures/functions, activity/participation limitations):  Observation:  Supine in bed upon arrival. Cooperative with therapy   Vision:  WFL, glasses  Hearing:  [place], hearing aids not with her   Cardiopulmonary:  stable vitals on room air   Orientation: [facility]     Musculoskeletal  ROM R/L:  [facility] BLEs  Strength R/L:  BLES grossly 4- to 4/5  Neuro: intact sensation and coordination. Mobility/treatment:   Rolling L/R:  NT   Supine to sit:  Lisseth for small amount of trunk support and hip guidance  Transfers:   Sit to stand: Lisseth from EOB for steadying. Inc support needed to brace legs on EOB  Stand to sit: Lisseth for controlling sit to recliner. VCS for hand sequencing back to chair for support   Sitting balance:  SBA for safety, static at EOB without UE support   Standing balance:Lisseth progressing to CGA static at RW with BUE support. Initial posterior lean onto bed for support required. Gait: ~35ft with RW Lisseth for steadying. Slightly antalgic gait pattern with left knee pain during first few feet of ambulation. Pt progressed to CGA with a more fluent gait pattern but overall limited with dec activity tolerance. Daughter found soft knee brace at end of session and reported pt sometimes wears them while ambulating due to hx of arthritis. Educated pt on POC, role of PT, DME use, discharge. Cues provided for sequencing to inc safety and indep with mobility     [facility] 6 Clicks Inpatient Mobility:  AM-PAC Inpatient Mobility Raw Score : 15    Safety: patient left in chair with alarm, call light within reach,  gait belt used. Assessment:  Pt is a 80year old female admitted with dizziness and weakness to left LE. MRI of brain showed small acute ischemic infarct in the higher posterior right frontal corona radiata. Recommend ARU once medically stable. At baseline she is Ginger with gross mobility, ADL, and most IADLs.  She performed below baseline

## 2023-09-19 NOTE — PROGRESS NOTES
is maintained without evidence of an acute infarct. There is no evidence of hydrocephalus. ORBITS: The visualized portion of the orbits demonstrate no acute abnormality. SINUSES: The visualized paranasal sinuses and mastoid air cells demonstrate no acute abnormality. SOFT TISSUES/SKULL: There is chronic non-healed type 2 dens fracture. No acute abnormality of the visualized skull or soft tissues. No acute intracranial abnormality. Mild parenchymal volume loss. Moderate chronic microvascular disease. Chronic non-healed type 2 dens fracture. CBC:   Recent Labs     09/18/23  1625 09/19/23  0500   WBC 3.4* 7.3   HGB 12.8 14.2    350     BMP:    Recent Labs     09/18/23  1625 09/18/23  1630   *  --    K 3.9  --    CL 98*  --    CO2 23  --    BUN 12  --    CREATININE 0.5*  --    GLUCOSE 99 105     Hepatic:   Recent Labs     09/18/23  1625   AST 15   ALT 11   BILITOT 0.5   ALKPHOS 66     Lipids:   Lab Results   Component Value Date/Time    CHOL 242 09/19/2023 05:00 AM    CHOL 210 09/08/2017 12:00 AM    HDL 71 09/19/2023 05:00 AM    TRIG 112 09/19/2023 05:00 AM     Hemoglobin A1C:   Lab Results   Component Value Date/Time    LABA1C 5.9 09/19/2023 05:00 AM     TSH:   Lab Results   Component Value Date/Time    TSH 1.14 12/14/2015 08:29 PM     Troponin:   Lab Results   Component Value Date/Time    TROPONINT <0.010 09/18/2023 04:25 PM    TROPONINT <0.010 09/26/2021 06:31 PM    TROPONINT <0.010 02/16/2018 09:58 PM     Lactic Acid: No results for input(s): \"LACTA\" in the last 72 hours. BNP: No results for input(s): \"PROBNP\" in the last 72 hours.   UA:  Lab Results   Component Value Date/Time    NITRU NEGATIVE 09/18/2023 05:47 PM    NITRU NEGATIVE 01/22/2013 08:04 PM    COLORU YELLOW 09/18/2023 05:47 PM    PHUR 7.5 08/29/2023 01:33 PM    PHUR 6.0 01/22/2013 08:04 PM    WBCUA 68 09/26/2021 08:13 PM    RBCUA 4 09/26/2021 08:13 PM    MUCUS RARE 05/18/2018 07:11 PM    TRICHOMONAS NONE SEEN 09/26/2021 08:13 PM BACTERIA OCCASIONAL 09/26/2021 08:13 PM    CLARITYU CLEAR 09/18/2023 05:47 PM    SPECGRAV 1.010 09/18/2023 05:47 PM    LEUKOCYTESUR NEGATIVE 09/18/2023 05:47 PM    UROBILINOGEN 0.2 09/18/2023 05:47 PM    BILIRUBINUR NEGATIVE 09/18/2023 05:47 PM    BILIRUBINUR neg 08/29/2023 01:33 PM    BLOODU NEGATIVE 09/18/2023 05:47 PM    GLUCOSEU neg 08/29/2023 01:33 PM    GLUCOSEU NEGATIVE 01/22/2013 08:04 PM    KETUA NEGATIVE 09/18/2023 05:47 PM     Urine Cultures:   Lab Results   Component Value Date/Time    LABURIN  08/29/2023 02:55 PM     >100,000 CFU/ml  Susceptibility testing of penicillin and other beta lactams is  not necessary for beta hemolytic Streptococci since resistant  strains have not been identified. (CLSI M100)      LABURIN  08/29/2023 02:55 PM     >100,000 CFU/ml  Susceptibility testing not routinely done. No further work up.          Organism:   Lab Results   Component Value Date/Time    ORG Strep agalactiae (Beta Strep Group B) 08/29/2023 02:55 PM    ORG Aerococcus species 08/29/2023 02:55 PM         Electronically signed by Luan Peterson MD on 9/19/2023 at 10:54 AM

## 2023-09-19 NOTE — CONSULTS
Vascular/Interventional Neurology Service Consult Note  [address] [place] [address]   Patient Name: Ashley Mares  : 6/15/1927        Subjective:   Reason for consult:   Raffaele Shongaloo y. o.female PMH HTN, HLD, prediabetes presenting to [address] [place] [address] with complaints of new onset left upper and lower extremity weakness. Patient was speaking to her sister on the telephone and was holding the phone with her left arm and the phone kept dropping. She also reported difficulty walking and her left leg giving out. A stroke alert was called. She was evaluated by [facility] teleneurology. NIH SS score was 3. Not a candidate for TNK due to presenting outside the window. CT the head showed no acute intercranial abnormality. CTA head and neck demonstrated severe stenosis in the proximal P2 segment of the the left PCA, severe stenosis in P3 segment of the right PCA, 70% stenosis in the distal M1 segment of the right MCA, 50% stenosis in the distal left MCA and 50% stenosis at the origin of the left vertebral artery. MRI of the brain showed small acute ischemic infarct in the high posterior right frontal corona radiata white matter. She was continued on aspirin, Plavix and statin for secondary prevention of stroke. Neuro intervention was consulted for abnormal CTA. Upon exam the patient reports improvement in her left-sided weakness. Her daughter is present at bedside. No prior history of stroke. She was not on antiplatelet therapy prior to admission.     Past Medical History:   Diagnosis Date    BCC (basal cell carcinoma), leg 2017    Family history of pancreatic cancer     Glaucoma of right eye 2016    Dr Thor Madison    Hyperlipidemia     Hypertension     Macular degeneration, dry     Dr. Michelle Nolan of knee     Dr Chano Benitez    Osteoporosis 2002    Fosamax [phone]    Pelvic fracture ([address]) 2018    Right rotator cuff tear 2014    Trujillo / injection; PT    T10 vertebral fracture stenosis at the origin of the left vertebral artery. MRI brain showed small acute ischemic infarct in the high posterior right frontal corona radiata white matter. 2D echo pending    -Pertinent images discussed/reviewed with Dr. Francois Carson who has fully participated in the care of this patient and agrees with plan. -No neuro intervention recommended at this time. Recommend medical management for intracranial stenosis. Continue DAPT with aspirin and Plavix as well as statin for secondary prevention of stroke  -PT/OT/ST as able        Thank you for allowing us to participate in the care of your patient. If there are any questions regarding evaluation please feel free to contact us.      CHULA Collado - CNP, 9/19/2023

## 2023-09-20 PROCEDURE — 97116 GAIT TRAINING THERAPY: CPT

## 2023-09-20 PROCEDURE — 94761 N-INVAS EAR/PLS OXIMETRY MLT: CPT

## 2023-09-20 PROCEDURE — 6360000002 HC RX W HCPCS: Performed by: INTERNAL MEDICINE

## 2023-09-20 PROCEDURE — 6370000000 HC RX 637 (ALT 250 FOR IP): Performed by: INTERNAL MEDICINE

## 2023-09-20 PROCEDURE — 97530 THERAPEUTIC ACTIVITIES: CPT

## 2023-09-20 PROCEDURE — 99232 SBSQ HOSP IP/OBS MODERATE 35: CPT | Performed by: NURSE PRACTITIONER

## 2023-09-20 PROCEDURE — 1200000000 HC SEMI PRIVATE

## 2023-09-20 PROCEDURE — 2000000000 HC ICU R&B

## 2023-09-20 RX ORDER — SODIUM CHLORIDE 9 MG/ML
INJECTION, SOLUTION INTRAVENOUS PRN
Status: CANCELLED | OUTPATIENT
Start: 2023-09-20

## 2023-09-20 RX ORDER — CLOPIDOGREL BISULFATE 75 MG/1
75 TABLET ORAL DAILY
Status: CANCELLED | OUTPATIENT
Start: 2023-09-21

## 2023-09-20 RX ORDER — ONDANSETRON 2 MG/ML
4 INJECTION INTRAMUSCULAR; INTRAVENOUS EVERY 6 HOURS PRN
Status: CANCELLED | OUTPATIENT
Start: 2023-09-20

## 2023-09-20 RX ORDER — ENOXAPARIN SODIUM 100 MG/ML
30 INJECTION SUBCUTANEOUS EVERY EVENING
Status: CANCELLED | OUTPATIENT
Start: 2023-09-20

## 2023-09-20 RX ORDER — AMLODIPINE BESYLATE 10 MG/1
10 TABLET ORAL DAILY
Status: CANCELLED | OUTPATIENT
Start: 2023-09-21

## 2023-09-20 RX ORDER — SODIUM CHLORIDE 0.9 % (FLUSH) 0.9 %
5-40 SYRINGE (ML) INJECTION EVERY 12 HOURS SCHEDULED
Status: CANCELLED | OUTPATIENT
Start: 2023-09-20

## 2023-09-20 RX ORDER — LATANOPROST 50 UG/ML
1 SOLUTION/ DROPS OPHTHALMIC NIGHTLY
Status: CANCELLED | OUTPATIENT
Start: 2023-09-20

## 2023-09-20 RX ORDER — POLYETHYLENE GLYCOL 3350 17 G/17G
17 POWDER, FOR SOLUTION ORAL DAILY PRN
Status: CANCELLED | OUTPATIENT
Start: 2023-09-20

## 2023-09-20 RX ORDER — GLIMEPIRIDE 2 MG/1
1 TABLET ORAL DAILY
Status: CANCELLED | OUTPATIENT
Start: 2023-09-21

## 2023-09-20 RX ORDER — SODIUM CHLORIDE 0.9 % (FLUSH) 0.9 %
5-40 SYRINGE (ML) INJECTION PRN
Status: CANCELLED | OUTPATIENT
Start: 2023-09-20

## 2023-09-20 RX ORDER — ONDANSETRON 4 MG/1
4 TABLET, ORALLY DISINTEGRATING ORAL EVERY 8 HOURS PRN
Status: CANCELLED | OUTPATIENT
Start: 2023-09-20

## 2023-09-20 RX ORDER — ROSUVASTATIN CALCIUM 20 MG/1
40 TABLET, COATED ORAL NIGHTLY
Status: CANCELLED | OUTPATIENT
Start: 2023-09-20

## 2023-09-20 RX ORDER — ASPIRIN 81 MG/1
81 TABLET, CHEWABLE ORAL DAILY
Status: CANCELLED | OUTPATIENT
Start: 2023-09-21

## 2023-09-20 RX ADMIN — ENOXAPARIN SODIUM 30 MG: 100 INJECTION SUBCUTANEOUS at 18:16

## 2023-09-20 RX ADMIN — CLOPIDOGREL BISULFATE 75 MG: 75 TABLET ORAL at 08:54

## 2023-09-20 RX ADMIN — ROSUVASTATIN CALCIUM 40 MG: 20 TABLET, FILM COATED ORAL at 20:48

## 2023-09-20 RX ADMIN — LATANOPROST 1 DROP: 50 SOLUTION OPHTHALMIC at 20:48

## 2023-09-20 RX ADMIN — AMLODIPINE BESYLATE 10 MG: 10 TABLET ORAL at 08:54

## 2023-09-20 RX ADMIN — TIMOLOL MALEATE 1 DROP: 2.5 SOLUTION OPHTHALMIC at 09:00

## 2023-09-20 RX ADMIN — ASPIRIN 81 MG CHEWABLE TABLET 81 MG: 81 TABLET CHEWABLE at 08:54

## 2023-09-20 ASSESSMENT — PAIN SCALES - GENERAL: PAINLEVEL_OUTOF10: 0

## 2023-09-20 NOTE — CARE COORDINATION
Spoke to [facility]. Can transfer to ARU today. Will need discharge/readmit and signed/held orders. Isaac Neal RN     1200 Received call from [facility] - no bed available today. PS Dr Marta Hernandez with update. Isaac Neal RN     9300 Spoke to patient's daughter Elsie Joaquin. She has ACP docs at home; LW? AD and code status determination. SHe will bring in the copies 9/21.  Isaac Neal RN

## 2023-09-20 NOTE — PROGRESS NOTES
Neurology Service Progress Note  [facility]   Patient Name: Citlali Bonilla  : 6/15/1927        Subjective:   CC: left sided weakness  Chart was reviewed in detail, patient was seen and assessed. She is resting in bed today awake and alert. She continues to do well, little to no residual weakness to the left side today. Plan is to go to ARU for rehab at discharge. Discussed that she will need to follow up in stroke clinic at discharge and continue with DAPT and atorvastatin for secondary stroke prevention. Daughter at bedside today, all questions answered.        Past Medical History:   Diagnosis Date    BCC (basal cell carcinoma), leg 2017    Family history of pancreatic cancer     Glaucoma of right eye 2016    Dr Victorina Butcher    Hyperlipidemia     Hypertension     Macular degeneration, dry     Dr. Ana De La Torre of knee     Dr Thien Stephens    Osteoporosis 2002    Fosamax [phone]    Pelvic fracture ([address]) 2018    Right rotator cuff tear     Thien Stephens / injection; PT    T10 vertebral fracture ([address]) 2018    fall and fragility fx; also L1 ; had kyphoplasty 2018    Vitamin B12 deficiency 2017    B 12 266 by Thien Stephens at [facility]    :   Past Surgical History:   Procedure Laterality Date    ABDOMINAL EXPLORATION SURGERY      With Lysis of Adhesions    APPENDECTOMY      w/  rt.uso    [address]    w/ rectocele    FIXATION KYPHOPLASTY  2018    T10 & L1    HYSTERECTOMY (CERVIX STATUS UNKNOWN)  1969     remaining ovary removed also    SKIN CANCER EXCISION Left 2017    BCC with skin graft    WRIST FRACTURE SURGERY Left 2017    ORIF     Medications:  Scheduled Meds:   sodium chloride flush  5-40 mL IntraVENous 2 times per day    enoxaparin  30 mg SubCUTAneous QPM    rosuvastatin  40 mg Oral Nightly    clopidogrel  75 mg Oral Daily    aspirin  81 mg Oral Daily    amLODIPine  10 mg Oral Daily    latanoprost  1 drop Both Eyes Nightly    timolol  1 drop Both

## 2023-09-20 NOTE — PROGRESS NOTES
Physical Therapy Treatment Note  Name: Meryle Life MRN: [id number] :   6/15/1927   Date:  2023   Admission Date: 2023 Room:  [address] [place] [address]   Restrictions/Precautions:  general precautions, falls   Communication with other providers:  Pt okay to see for therapy per chart review  Subjective:  Patient states:  \"I sat up in the chair all morning\"   Pain:   Location, Type, Intensity (0/10 to 10/10): Denies   Objective:    Observation:  Pt supine in bed upon PTA arrival.   Objective Measures:  -110bpm  Treatment, including education/measures:    Therapeutic Activity Training:   Therapeutic activity training was instructed today. Cues were given for safety, sequence, UE/LE placement, awareness, and balance. Activities performed today included bed mobility training, sup-sit, sit-stand, SPT. Mobility:   Sup > sit: Min A at trunk, VCs for LE advancement. Scooting: Min A at hips to scoot EOB   STS: CGA for safety     Gait:   Pt ambulated ~60ft x 2 with RW, CGA, and chair follow for safety. Pt demos kyphotic posture, downward gaze, NBOS, LE instability, decreased step length, decreased gait speed. PTA provided VCs for increased JORGE LUIS for improved stability, pt demos good correction and improved stability. Pt requires rest break following first bout of ambulation, pt recovered in ~2 min and completed second bout. Education:  Pts daughter presented PTA with two knee braces that pt has been considering using for decreased knee buckling. PTA informing pt and daughter that braces can help with tactile awareness of LE and sense of stability but pts main focus should be on strengthening LEs for improved stability. Safety:   Pt returned safely to bed with bed alarm activated, call light in reach, all needs met.    Assessment / Impression:    Pt tolerated increased distance   Patient's tolerance of treatment:  Good   Adverse Reaction: none  Significant change in status and impact:  none  Barriers to

## 2023-09-20 NOTE — CARE COORDINATION
VM left for Coby Mullen, HARMONY, CM that ARU will not have a bed today as previously communicated. Advised patient may admit tomorrow 9/21/2023. Awaiting call back.

## 2023-09-20 NOTE — DISCHARGE SUMMARY
based adjustment of the mA/kV was utilized to reduce the radiation dose to as low as reasonably achievable. COMPARISON: CT head October 29, 2021 HISTORY: ORDERING SYSTEM PROVIDED HISTORY: Stroke Symptoms TECHNOLOGIST PROVIDED HISTORY: Has a \"code stroke\" or \"stroke alert\" been called? ->Yes Reason for exam:->Stroke Symptoms Decision Support Exception - unselect if not a suspected or confirmed emergency medical condition->Emergency Medical Condition (MA) Reason for Exam: Stroke Symptoms FINDINGS: BRAIN/VENTRICLES: There is mild parenchymal volume loss. There is periventricular white matter low attenuation, likely related to moderate chronic microvascular disease. There is no acute intracranial hemorrhage, mass effect or midline shift. No abnormal extra-axial fluid collection. The gray-white differentiation is maintained without evidence of an acute infarct. There is no evidence of hydrocephalus. ORBITS: The visualized portion of the orbits demonstrate no acute abnormality. SINUSES: The visualized paranasal sinuses and mastoid air cells demonstrate no acute abnormality. SOFT TISSUES/SKULL: There is chronic non-healed type 2 dens fracture. No acute abnormality of the visualized skull or soft tissues. No acute intracranial abnormality. Mild parenchymal volume loss. Moderate chronic microvascular disease. Chronic non-healed type 2 dens fracture.        CBC:   Recent Labs     09/18/23  1625 09/19/23  0500   WBC 3.4* 7.3   HGB 12.8 14.2    350     BMP:    Recent Labs     09/18/23  1625 09/18/23  1630   *  --    K 3.9  --    CL 98*  --    CO2 23  --    BUN 12  --    CREATININE 0.5*  --    GLUCOSE 99 105     Hepatic:   Recent Labs     09/18/23  1625   AST 15   ALT 11   BILITOT 0.5   ALKPHOS 66     Lipids:   Lab Results   Component Value Date/Time    CHOL 242 09/19/2023 05:00 AM    CHOL 210 09/08/2017 12:00 AM    HDL 71 09/19/2023 05:00 AM    TRIG 112 09/19/2023 05:00 AM     Hemoglobin A1C:   Lab Results 9: 23 AM

## 2023-09-21 ENCOUNTER — HOSPITAL ENCOUNTER (INPATIENT)
Age: 88
DRG: 057 | End: 2023-09-21
Attending: PHYSICAL MEDICINE & REHABILITATION | Admitting: PHYSICAL MEDICINE & REHABILITATION
Payer: MEDICARE

## 2023-09-21 VITALS
TEMPERATURE: 98 F | RESPIRATION RATE: 18 BRPM | WEIGHT: 96.56 LBS | DIASTOLIC BLOOD PRESSURE: 107 MMHG | HEART RATE: 102 BPM | OXYGEN SATURATION: 94 % | BODY MASS INDEX: 22.35 KG/M2 | SYSTOLIC BLOOD PRESSURE: 137 MMHG | HEIGHT: 55 IN

## 2023-09-21 PROBLEM — R47.1 DYSARTHRIA DUE TO ACUTE STROKE (HCC): Status: ACTIVE | Noted: 2023-09-21

## 2023-09-21 PROBLEM — I69.354 HEMIPARESIS OF LEFT NONDOMINANT SIDE AS LATE EFFECT OF CEREBRAL INFARCTION (HCC): Status: ACTIVE | Noted: 2023-09-21

## 2023-09-21 PROBLEM — H35.3131 EARLY DRY STAGE NONEXUDATIVE AGE-RELATED MACULAR DEGENERATION OF BOTH EYES: Status: ACTIVE | Noted: 2023-09-21

## 2023-09-21 PROBLEM — I63.9 DYSARTHRIA DUE TO ACUTE STROKE (HCC): Status: ACTIVE | Noted: 2023-09-21

## 2023-09-21 LAB
EKG ATRIAL RATE: 106 BPM
EKG ATRIAL RATE: 82 BPM
EKG DIAGNOSIS: NORMAL
EKG DIAGNOSIS: NORMAL
EKG P AXIS: 29 DEGREES
EKG P AXIS: 43 DEGREES
EKG P-R INTERVAL: 158 MS
EKG P-R INTERVAL: 168 MS
EKG Q-T INTERVAL: 342 MS
EKG Q-T INTERVAL: 372 MS
EKG QRS DURATION: 82 MS
EKG QRS DURATION: 84 MS
EKG QTC CALCULATION (BAZETT): 434 MS
EKG QTC CALCULATION (BAZETT): 454 MS
EKG R AXIS: -18 DEGREES
EKG R AXIS: -22 DEGREES
EKG T AXIS: 27 DEGREES
EKG T AXIS: 42 DEGREES
EKG VENTRICULAR RATE: 106 BPM
EKG VENTRICULAR RATE: 82 BPM

## 2023-09-21 PROCEDURE — 94761 N-INVAS EAR/PLS OXIMETRY MLT: CPT

## 2023-09-21 PROCEDURE — 6370000000 HC RX 637 (ALT 250 FOR IP): Performed by: INTERNAL MEDICINE

## 2023-09-21 PROCEDURE — 6370000000 HC RX 637 (ALT 250 FOR IP): Performed by: STUDENT IN AN ORGANIZED HEALTH CARE EDUCATION/TRAINING PROGRAM

## 2023-09-21 PROCEDURE — 94150 VITAL CAPACITY TEST: CPT

## 2023-09-21 PROCEDURE — 99223 1ST HOSP IP/OBS HIGH 75: CPT | Performed by: PHYSICAL MEDICINE & REHABILITATION

## 2023-09-21 PROCEDURE — 93010 ELECTROCARDIOGRAM REPORT: CPT | Performed by: INTERNAL MEDICINE

## 2023-09-21 PROCEDURE — 1280000000 HC REHAB R&B

## 2023-09-21 PROCEDURE — 6360000002 HC RX W HCPCS: Performed by: STUDENT IN AN ORGANIZED HEALTH CARE EDUCATION/TRAINING PROGRAM

## 2023-09-21 RX ORDER — POLYETHYLENE GLYCOL 3350 17 G/17G
17 POWDER, FOR SOLUTION ORAL DAILY PRN
Status: DISCONTINUED | OUTPATIENT
Start: 2023-09-21 | End: 2023-09-21

## 2023-09-21 RX ORDER — ONDANSETRON 4 MG/1
4 TABLET, ORALLY DISINTEGRATING ORAL EVERY 8 HOURS PRN
Status: DISCONTINUED | OUTPATIENT
Start: 2023-09-21 | End: 2023-10-03 | Stop reason: HOSPADM

## 2023-09-21 RX ORDER — AMLODIPINE BESYLATE 10 MG/1
10 TABLET ORAL DAILY
Status: DISCONTINUED | OUTPATIENT
Start: 2023-09-22 | End: 2023-10-03 | Stop reason: HOSPADM

## 2023-09-21 RX ORDER — LATANOPROST 50 UG/ML
1 SOLUTION/ DROPS OPHTHALMIC NIGHTLY
Status: DISCONTINUED | OUTPATIENT
Start: 2023-09-21 | End: 2023-10-03 | Stop reason: HOSPADM

## 2023-09-21 RX ORDER — SODIUM CHLORIDE 0.9 % (FLUSH) 0.9 %
5-40 SYRINGE (ML) INJECTION PRN
Status: DISCONTINUED | OUTPATIENT
Start: 2023-09-21 | End: 2023-09-23

## 2023-09-21 RX ORDER — ENOXAPARIN SODIUM 100 MG/ML
30 INJECTION SUBCUTANEOUS EVERY EVENING
Status: DISCONTINUED | OUTPATIENT
Start: 2023-09-21 | End: 2023-10-03 | Stop reason: HOSPADM

## 2023-09-21 RX ORDER — POLYETHYLENE GLYCOL 3350 17 G/17G
17 POWDER, FOR SOLUTION ORAL DAILY PRN
Status: DISCONTINUED | OUTPATIENT
Start: 2023-09-21 | End: 2023-10-03 | Stop reason: HOSPADM

## 2023-09-21 RX ORDER — SODIUM CHLORIDE 0.9 % (FLUSH) 0.9 %
5-40 SYRINGE (ML) INJECTION EVERY 12 HOURS SCHEDULED
Status: DISCONTINUED | OUTPATIENT
Start: 2023-09-21 | End: 2023-09-23

## 2023-09-21 RX ORDER — CLOPIDOGREL BISULFATE 75 MG/1
75 TABLET ORAL DAILY
Status: DISCONTINUED | OUTPATIENT
Start: 2023-09-22 | End: 2023-10-03 | Stop reason: HOSPADM

## 2023-09-21 RX ORDER — ROSUVASTATIN CALCIUM 20 MG/1
40 TABLET, COATED ORAL NIGHTLY
Status: DISCONTINUED | OUTPATIENT
Start: 2023-09-21 | End: 2023-10-03 | Stop reason: HOSPADM

## 2023-09-21 RX ORDER — ASPIRIN 81 MG/1
81 TABLET, CHEWABLE ORAL DAILY
Status: DISCONTINUED | OUTPATIENT
Start: 2023-09-22 | End: 2023-10-03 | Stop reason: HOSPADM

## 2023-09-21 RX ORDER — ONDANSETRON 2 MG/ML
4 INJECTION INTRAMUSCULAR; INTRAVENOUS EVERY 6 HOURS PRN
Status: DISCONTINUED | OUTPATIENT
Start: 2023-09-21 | End: 2023-10-03 | Stop reason: HOSPADM

## 2023-09-21 RX ORDER — SODIUM CHLORIDE 9 MG/ML
INJECTION, SOLUTION INTRAVENOUS PRN
Status: DISCONTINUED | OUTPATIENT
Start: 2023-09-21 | End: 2023-10-03 | Stop reason: HOSPADM

## 2023-09-21 RX ORDER — GLIMEPIRIDE 2 MG/1
1 TABLET ORAL DAILY
Status: DISCONTINUED | OUTPATIENT
Start: 2023-09-22 | End: 2023-10-03 | Stop reason: HOSPADM

## 2023-09-21 RX ADMIN — ASPIRIN 81 MG CHEWABLE TABLET 81 MG: 81 TABLET CHEWABLE at 08:34

## 2023-09-21 RX ADMIN — LATANOPROST 1 DROP: 50 SOLUTION OPHTHALMIC at 22:02

## 2023-09-21 RX ADMIN — ROSUVASTATIN CALCIUM 40 MG: 20 TABLET, FILM COATED ORAL at 22:01

## 2023-09-21 RX ADMIN — TIMOLOL MALEATE 1 DROP: 2.5 SOLUTION OPHTHALMIC at 08:35

## 2023-09-21 RX ADMIN — CLOPIDOGREL BISULFATE 75 MG: 75 TABLET ORAL at 08:34

## 2023-09-21 RX ADMIN — ENOXAPARIN SODIUM 30 MG: 100 INJECTION SUBCUTANEOUS at 18:19

## 2023-09-21 RX ADMIN — AMLODIPINE BESYLATE 10 MG: 10 TABLET ORAL at 08:34

## 2023-09-21 ASSESSMENT — PAIN SCALES - GENERAL: PAINLEVEL_OUTOF10: 0

## 2023-09-21 NOTE — PLAN OF CARE
4 Eyes Skin Assessment     NAME:  Abby Lee  YOB: 1927  MEDICAL RECORD NUMBER:  [id number]    The patient is being assessed for  Admission    I agree that at least one RN has performed a thorough Head to Toe Skin Assessment on the patient. ALL assessment sites listed below have been assessed. Areas assessed by both nurses:    Head, Face, Ears, Shoulders, Back, Chest, Arms, Elbows, Hands, Sacrum. Buttock, Coccyx, Ischium, and Legs. Feet and Heels        Does the Patient have a Wound?  No noted wound(s)       Donell Prevention initiated by RN: No  Wound Care Orders initiated by RN: No    Pressure Injury (Stage 3,4, Unstageable, DTI, NWPT, and Complex wounds) if present, place Wound referral order by RN under : No    New Ostomies, if present place, Ostomy referral order under : No     Nurse 1 eSignature: Electronically signed by Isamar Rodrigues LPN on 4/08/14 at 9:21 PM EDT    **SHARE this note so that the co-signing nurse can place an eSignature**    Nurse 2 eSignature: Electronically signed by William Farrell RN on 9/21/23 at 7:20 PM EDT

## 2023-09-21 NOTE — PROGRESS NOTES
09/21/23 1223   Encounter Summary   Encounter Overview/Reason  Renae Encounter   Service Provided For: Patient   Referral/Consult From: [address] [place] [address]   Last Encounter  09/21/23  Leonel Méndez: Patient was in the company of her daughter. Patient asked for prayer but would not receive Holy communion. I listened to patient and administered the Perry Mendezon on her. Patient and daughter were grateful. Diamond Hamman )   Complexity of Encounter Low   Begin Time 1204   End Time  1227   Total Time Calculated 23 min   Crisis   Type Follow up   Spiritual/Emotional needs   Type Spiritual Support   Rituals, Rites and Sacraments   Type Sacrament of Sick   Assessment/Intervention/Outcome   Assessment Calm;Coping   Intervention Active listening;Nurtured Hope;Prayer (assurance of)/[place]   Outcome Coping;Encouraged;Engaged in conversation;Expressed Gratitude   Plan and Referrals   Plan/Referrals Continue to visit, (comment)

## 2023-09-21 NOTE — PLAN OF CARE
ARU Admission Assessment - [facility]      A Complete drug regimen review was completed for this patient this date. [x]  No clinically significant medication issue was identified   []  Yes, a clinically significant medication issue was identified     []  Adverse Drug Event:    []  Allergy:    []  Side Effect:    []  Ineffective Therapy:    []  Drug interaction:     []  Duplicate Therapy:    []  Untreated Indication:    []  Non-adherence:    []  Other:  Nursing contacted the physician:       Date:                Time:    Actions recommended by physician were completed:   Date:                 Time:  Action(s) Taken:             []  New Physician Order Received    []  Issue Noted by Physician; However No Action Required    []  Other:        Ethnicity  \"Are you of , /a, or [place]ic origin? \"  Check all that apply:  [x] A. No, not of , /a, or [place] Origin  [] B.  Yes, [place] American, Chicano/a  [] C.  Yes, [address] [place] [address]  [] D.  Yes, [place]  [] E.  Yes, another , , or [place]ic origin  [] X. Patient unable to respond    Race  \"What is your race? \"  Check all that apply:  [x] A. White  [] B. Black or   [] C. American Molly or [place] Native  [] D.  Molly  [] E. [place]  [] F. Puerto Rican  [] G. [place]  [] Orpah Read  [] I. [place]  [] J.  Other   [] K.   [] L. Syrian or Rachel  [] M. Guamanian  [] N. Other [address] [facility]  [] X. Patient unable to respond    Language  A. \"What is your preferred language? \"   English    B. \"Do you need or want an  to communicate with a doctor or health care staff? \"  Check only one:  [x] 0. No  [] 1. Yes  [] 9. Unable to determine    Transportation  \"In the past 6-12 months, has lack of transportation kept you from medical appointments, meetings, work, or from getting things needed for daily living? \"  Check all that apply:  [] A.  Yes, it has kept me from or isolated from those around you? \"  [x] 0. Never  [] 1. Rarely  [] 2. Sometimes  [] 3. Often  [] 4. Always  [] 8. Patient unable to respond    Pain Effect on Sleep  \"Over the past 5 days, how much of the time has pain made it hard for you to sleep at night? \"  [x]  0. Does not apply - I have not had any pain or hurting in the past 5 days  []  1. Rarely or not at all  []  2. Occasionally  []  3. Frequently  []  4. Almost constantly  []  8. Unable to answer  **If the patient answers \"0. Does not apply\" to this question, skip the next two \"Pain\" questions**      Pain Interference with Therapy Activities  \"Over the past 5 days, how often have you limited your participation in rehabilitation therapy sessions due to pain? \"  [x]  0. Does not apply - I have not received rehabilitation therapy in the past 5 days  []  1. Rarely or not at all  []  2. Occasionally  []  3. Frequently  []  4. Almost constantly  []  8. Unable to answer    Pain Interference with Day-to-Day Activities: \"Over the past 5 days, how often have you limited your day-to-day activities (excluding rehabilitation therapy session)? \"  [x]  1. Rarely or not at all  []  2. Occasionally  []  3. Frequently  []  4. Almost constantly  []  8.   Unable to answer

## 2023-09-21 NOTE — H&P
Discharge Planner OT   Patient plan for discharge: none in chart  Current status: eval completed and treatment initiated. Pt lives alone, has 2 daughters that call to check in on him. Pt drives into garage and has a full flight of stairs up to main level. He was ind with ADL's and IADL's including meds and finances. Pt has not been feeling well for a week, fever, weakness and AMS. Pt noted to have some confusion during eval, thinking he has been here for a week to 10 days, forgetting what city where he lives. Pt was CGA to SBA with ambulation due to decreased balance. Recommended to use a walker, but pt is resistant to this idea. Was SBA with stairs. Rec 24 hour supervision and SBA with mobility and home PT. Family states they can figure out staying with him.   Barriers to return to prior living situation: decreased balance, cognition and weakness  Recommendations for discharge: pt refuses TCU, rec 24 hour supervision due to decreased balance and some decreased cognition and weakness.   Rationale for recommendations: pt is not at baseline, decreased weakness and fall risk.        Entered by: Jaelyn Govea 11/16/2019 9:34 AM        Jagjit Bustamante    : 6/15/1927  [facility]  MRN: [id number]              History and physical  Date of face-to-face exam: 2023. Time of face-to-face exam: 1410. Admitting diagnosis: Acute CVA ([facility])    Comorbid diagnoses impacting rehabilitation: Left hemiparesis, dysarthria, essential hypertension, mixed hyperlipidemia, macular degeneration, primary osteoarthritis of knees    Chief complaint: Clumsy weakness of the left arm and leg. History of present illness: The patient is a 59-year-old right-hand-dominant female who has been in relatively good health until this past week. On 2023 she suddenly developed left arm and hand weakness while holding a phone talking to family. She then dropped the phone and had trouble speaking. Eventually, she also noticed trouble walking. Emergency services got her to our ED later that afternoon. She had dysarthria, left hemiparesis and difficulty walking. Her blood pressure was mildly elevated. Initial CT of the brain was negative but a follow-up MRI has shown a right frontal infarction. She has had persistent left-sided weakness, dysarthria and difficulty walking. Blood pressures have been fluctuating. She has had persistent weightbearing arthritis pain which is chronic. With this she has been unable to do her own toileting, transfers and self-care and cannot return directly home. She requires inpatient rehabilitation to address these issues. Review of systems: Some positional dizziness. Slurred speech. No choking or coughing. Chronic difficulty with vision (macular degeneration) but no double vision reported. Some urinary urgency with incontinence. Infrequent bowel movements. The remainder of their review of systems was negative except as mentioned in the history of present illness.     Social History: Lives With: Alone  Type of Home: House  Home Layout: One level, Laundry in basement (dgt does laundry)  Home Access: Stairs to enter

## 2023-09-21 NOTE — CARE COORDINATION
Patient meets criteria and is approved to come to ARU. Patient able to admit once medically stable and after ARU Medical Director and  sign the pre-admission screen (PAS). Per CM patient medically ready for discharge to ARU today.

## 2023-09-21 NOTE — PLAN OF CARE
ARU Interdisciplinary Plan of Care (IPOC)  [facility], [address] [place] [address]  [phone]  Fax: [phone]    Kimberly Gurrola    : 6/15/1927  Acct #: [de-identified]  MRN: [id number]   PHYSICIAN:  Merna Hanson MD  Primary Active Problems:   Active Hospital Problems    Diagnosis Date Noted    Hemiparesis of left nondominant side as late effect of cerebral infarction 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) [I63.9]      CARE PLAN     NURSING:  Kimberly Gurrola while on this unit will:      Bowel and Bladder   [] Be continent of bowel and bladder      [x] Have an adequate number of bowel movements   [] Urinate with no urinary retention >300ml in bladder   [] Bladder Scan: (details)   [] Complete bladder protocol with moyer removal   [] Initiate Bladder Program to toilet every ___ hours   [] Initiate Bowel Program to toilet every ___hours   [] Bladder training    [] Bowel training  Pulmonary   [x] Maintain O2 SATs at 92% or greater  Pain Management   [x] Have pain managed while on ARU        [] Be pain free by discharge    [x] Medication Management and Education  Maintenance of Skin Integrity/Wound Management   [x] Have no skin breakdown while on ARU   [] Have improved skin integrity via wound measurements   [] Have no signs/symptoms of infection via infection protection and monitoring at the          wound site  Fall Prevention   [x] Be free from injury during hospitalization via fall prevention measures     [x] Disease management and Education  Precautions   [] Weight Bearing 120-140. Vital signs are checked at rest and with activity. Encouraging consistent oral hydration. Mixed hyperlipidemia: Encouraging heart healthy diet. Crestor 40 mg nightly will be continued. Outpatient follow-up with her PCP. Macular degeneration: Continuing Xalatan and Timoptic eye drops into each eye. Offering her low vision strategies during self-care and mobility training. OA of the knees: Slow and deliberate position changes. Range of motion recovery training. Acetaminophen and diathermy as needed for pain. Anticipated discharge destination:    [] Home Independently   [x] Home with supervision    []SNF     [] Other       This plan has been reviewed with me in a language I understand.  I have had the opportunity to include my input with my therapy team.    ________________________________________________   ______________________  Patient/Significant Other      Date    I have reviewed this initial plan of care and agree with its contents:    Title   Name    Date    Time    Physician: Isabel Bell MD 9/22/2023 8:20 PM    Case Mgmt: Era Ip MSW LSW 9/22/2023 8:14 AM     OT: Kristyn Adam, [id number] [facility], OTR/L 09/22/2023 [address], PT 9/22/23 1518    RN: Homero Barrios RN 9/21/23 1800    ST:    Dietician:     ADMIT DATE:9/21/2023

## 2023-09-21 NOTE — PROGRESS NOTES
V2.0    [facility] Progress Note      Name:  Merrill Zepeda /Age/Sex: 6/15/1927  (80 y.o. female)   MRN & CSN:  [id number] & [id number] Encounter Date/Time: 2023 10:54 AM EDT   Location:  -A PCP: Bhumi Oakes MD     [address], [place]       Hospital Day: 4    Assessment and Recommendations   Merrill Zepeda is a 80 y.o. female who presents with Acute CVA (cerebrovascular accident) [facility])      Plan:     #Acute CVA  -New onset of left-sided upper extremity and lower extremity weakness that started at 1 PM this afternoon  -NIHSS: 3 on presentation. Last known well 11:30 AM on .  -Symptoms have been improving  -CT head with no acute process  -CTA head and neck: Severe stenosis in proximal P2 segment of left PCA, severe stenosis in P3 segment of the right PCA, 70% stenosis in distal M1 segment of left MCA, 50% stenosis at origin of left vertebral artery  -Per [facility] neurologist, no TNK recommended due to outside of timeframe. .  Recommended admission for stroke work-up  Echo ordered  MRI brain with evidence of acute infarct. Aspirin and statin  PT/OT/SLP  Neurology consulted, appreciate recs  Interventional neurology consulted recommended medical management for intracranial stenosis. #Hypertension  Continue amlodipine     #Hyperlipidemia  Lipid panel in the morning  Started on atorvastatin 40 mg     #Prediabetes  A1c in the morning      Diet ADULT DIET;  Regular   DVT Prophylaxis [x] Lovenox, []  Heparin, [] SCDs, [] Ambulation,  [] Eliquis, [] Xarelto  [] Coumadin   Code Status Full Code   Disposition From: Home  Expected Disposition: Home  Estimated Date of Discharge: medically stable for DC pending palcement   Surrogate Decision Maker/ POA  [address]      Personally reviewed Lab Studies and Imaging       Imaging that was interpreted personally includes MRI and results acute infarct    Drugs that require monitoring for toxicity include Lovenox and the method of monitoring was differentiation is maintained without evidence of an acute infarct. There is no evidence of hydrocephalus. ORBITS: The visualized portion of the orbits demonstrate no acute abnormality. SINUSES: The visualized paranasal sinuses and mastoid air cells demonstrate no acute abnormality. SOFT TISSUES/SKULL: There is chronic non-healed type 2 dens fracture. No acute abnormality of the visualized skull or soft tissues. No acute intracranial abnormality. Mild parenchymal volume loss. Moderate chronic microvascular disease. Chronic non-healed type 2 dens fracture. CBC:   Recent Labs     09/18/23  1625 09/19/23  0500   WBC 3.4* 7.3   HGB 12.8 14.2    350       BMP:    Recent Labs     09/18/23  1625 09/18/23  1630   *  --    K 3.9  --    CL 98*  --    CO2 23  --    BUN 12  --    CREATININE 0.5*  --    GLUCOSE 99 105       Hepatic:   Recent Labs     09/18/23  1625   AST 15   ALT 11   BILITOT 0.5   ALKPHOS 66       Lipids:   Lab Results   Component Value Date/Time    CHOL 242 09/19/2023 05:00 AM    CHOL 210 09/08/2017 12:00 AM    HDL 71 09/19/2023 05:00 AM    TRIG 112 09/19/2023 05:00 AM     Hemoglobin A1C:   Lab Results   Component Value Date/Time    LABA1C 5.9 09/19/2023 05:00 AM     TSH:   Lab Results   Component Value Date/Time    TSH 1.14 12/14/2015 08:29 PM     Troponin:   Lab Results   Component Value Date/Time    TROPONINT <0.010 09/18/2023 04:25 PM    TROPONINT <0.010 09/26/2021 06:31 PM    TROPONINT <0.010 02/16/2018 09:58 PM     Lactic Acid: No results for input(s): \"LACTA\" in the last 72 hours. BNP: No results for input(s): \"PROBNP\" in the last 72 hours.   UA:  Lab Results   Component Value Date/Time    NITRU NEGATIVE 09/18/2023 05:47 PM    NITRU NEGATIVE 01/22/2013 08:04 PM    COLORU YELLOW 09/18/2023 05:47 PM    PHUR 7.5 08/29/2023 01:33 PM    PHUR 6.0 01/22/2013 08:04 PM    WBCUA 68 09/26/2021 08:13 PM    RBCUA 4 09/26/2021 08:13 PM    MUCUS RARE 05/18/2018 07:11 PM    TRICHOMONAS NONE

## 2023-09-22 PROCEDURE — 6370000000 HC RX 637 (ALT 250 FOR IP): Performed by: STUDENT IN AN ORGANIZED HEALTH CARE EDUCATION/TRAINING PROGRAM

## 2023-09-22 PROCEDURE — 97530 THERAPEUTIC ACTIVITIES: CPT

## 2023-09-22 PROCEDURE — 97167 OT EVAL HIGH COMPLEX 60 MIN: CPT

## 2023-09-22 PROCEDURE — 97163 PT EVAL HIGH COMPLEX 45 MIN: CPT

## 2023-09-22 PROCEDURE — 1280000000 HC REHAB R&B

## 2023-09-22 PROCEDURE — 94761 N-INVAS EAR/PLS OXIMETRY MLT: CPT

## 2023-09-22 PROCEDURE — 97112 NEUROMUSCULAR REEDUCATION: CPT

## 2023-09-22 PROCEDURE — 97535 SELF CARE MNGMENT TRAINING: CPT

## 2023-09-22 PROCEDURE — 97116 GAIT TRAINING THERAPY: CPT

## 2023-09-22 RX ADMIN — LATANOPROST 1 DROP: 50 SOLUTION OPHTHALMIC at 22:44

## 2023-09-22 RX ADMIN — AMLODIPINE BESYLATE 10 MG: 10 TABLET ORAL at 08:29

## 2023-09-22 RX ADMIN — ROSUVASTATIN CALCIUM 40 MG: 20 TABLET, FILM COATED ORAL at 22:43

## 2023-09-22 RX ADMIN — ASPIRIN 81 MG CHEWABLE TABLET 81 MG: 81 TABLET CHEWABLE at 08:29

## 2023-09-22 RX ADMIN — TIMOLOL MALEATE 1 DROP: 2.5 SOLUTION OPHTHALMIC at 08:31

## 2023-09-22 RX ADMIN — CLOPIDOGREL BISULFATE 75 MG: 75 TABLET ORAL at 08:29

## 2023-09-22 NOTE — PROGRESS NOTES
Physical Therapy  [facility] ARU PHYSICAL THERAPY EVALUATION    Chart Review:  Past Medical History:   Diagnosis Date    BCC (basal cell carcinoma), leg 09/2017    Family history of pancreatic cancer     Glaucoma of right eye 05/2016    Dr Rico Mejias    Hyperlipidemia     Hypertension     Macular degeneration, dry     Dr. Membreno [place] of knee 2011    Dr Delroy Johnson    Osteoporosis 2002    Fosamax [id number]    Pelvic fracture ([address]) 05/2018    Right rotator cuff tear 2014    Delroy Johnson / injection; PT    T10 vertebral fracture ([address]) 02/2018    fall and fragility fx; also L1 ; had kyphoplasty 2/2018    Vitamin B12 deficiency 03/2017    B 12 266 by Delroy Johnson at [facility]     Past Surgical History:   Procedure Laterality Date    ABDOMINAL EXPLORATION SURGERY  1997    With Lysis of Adhesions    APPENDECTOMY  1953    w/  rt.uso    [address]    w/ rectocele    FIXATION KYPHOPLASTY  02/19/2018    T10 & L1    HYSTERECTOMY (CERVIX STATUS UNKNOWN)  1969     remaining ovary removed also    SKIN CANCER EXCISION Left 09/2017    BCC with skin graft    WRIST FRACTURE SURGERY Left 03/2017    ORIF     Fall History: Has the patient had two or more falls in the past year or any fall with injury in the past year?: No (Pt fell once, but was not injured)  Social History:  Social/Functional History  Lives With: Alone  Type of Home: House  Home Layout: One level, Laundry in basement (daughter does laundry, \"I'm not allowed in the basement\")  Home Access: Stairs to enter with rails  Entrance Stairs - Number of Steps: 4  Entrance Stairs - Rails: Both  Bathroom Shower/Tub: Tub/Shower unit (Pt sponge bathes at baseline)  Bathroom Toilet: Standard (attached handrails on toilet)  Bathroom Equipment: Grab bars around toilet  Bathroom Accessibility: Walker accessible  Home Equipment: Jeremías Gregory, [address], Walker, 4 wheeled, [employer] (transport w/c)  Has the patient had two or more falls in the past year or any fall with injury in the past year?: No services got her to our ED later that afternoon. She had dysarthria, left hemiparesis and difficulty walking. Her blood pressure was mildly elevated. Initial CT of the brain was negative but a follow-up MRI has shown a right frontal infarction. She has had persistent left-sided weakness, dysarthria and difficulty walking. Blood pressures have been fluctuating. She has had persistent weightbearing arthritis pain which is chronic. Pt lives alone at baseline with family support . Pt has been having increasing difficulty on stairs due to arthritis even before this CVA. Pt presents today with L LE weakness, decreased LUE strength and baseline decreased ROM from rotator cuff injuy, decreased balance, coordination, activity tolerance , arthritic pain causing deficits in gait, stairs, transfers as noted. Recommending pt use 2ww for increased stability at this time, though pt would like to return to 4ww. Daughter expresses ability for family to provide more supervision if needed upon discharge. Feel that pt will benefit from ARU-PT to address deficits and to return home mod I for basic indoor mobility with likely device change and need for more assist on stairs and with IADL. Pt would also benefit from a ramp as she was already having difficulty. Expressed recommendation to daughter who stated they had already been talking about it.      Body Structures, Functions, Activity Limitations Requiring Skilled Therapeutic Intervention: Decreased functional mobility , Decreased ROM, Decreased body mechanics, Decreased balance, Decreased sensation, Decreased endurance, Decreased cognition, Decreased safe awareness, Decreased strength, Decreased high-level IADLs, Decreased fine motor control, Decreased coordination, Increased pain, Decreased posture     Therapy Prognosis: Good  Decision Making: High Complexity  Clinical Presentation: unpredicatable characteristics      Patient education:   ARU schedule, ARU expectations for

## 2023-09-22 NOTE — CONSULTS
Comprehensive Nutrition Assessment    Type and Reason for Visit:  Initial, Consult (Oral Nutrition supplements)    Nutrition Recommendations/Plan:   Continue current diet order   Trial standard high calorie, high protein oral nutrition supplement daily  Monitor PO intakes, GI status, weights, fluids, labs, lytes, glucose, and plan of care      Malnutrition Assessment:  Malnutrition Status:  Insufficient data (09/22/23 1522)    Context:  Social/Environmental Circumstances       Nutrition Assessment:    Admitted with acute CVA. Currently sitting up in bed at lunch, consuming at least 75% of lunch. Observed % of breakfast meal. Reports UBW at older age of [age]#. Denies weight loss. Pt agreed to trial supplements while receiving therapy. Follow as moderate nutrition risk. Nutrition Related Findings:    Meds and labs reviewed. PMH: Osteoporosis, Dysarthria, HLD, Prediabetes. +left hemeparesis s/t late effect cerebral infarction Wound Type: None       Current Nutrition Intake & Therapies:    Average Meal Intake: %, 51-75%  Average Supplements Intake: None Ordered  ADULT DIET; Regular    Anthropometric Measures:  Height: 4' 7\" (139.7 cm)  Ideal Body Weight (IBW): 75 lbs (34 kg)    Admission Body Weight: 98 lb 12.3 oz (44.8 kg)  Current Body Weight: 98 lb 12.3 oz (44.8 kg), 131.7 % IBW.  Weight Source: Bed Scale  Current BMI (kg/m2): 23  Usual Body Weight: 100 lb (45.4 kg) (4/25/23)  % Weight Change (Calculated): -1.2  Weight Adjustment For: No Adjustment                 BMI Categories: Normal Weight (BMI 22.0 to 24.9) age over 72    Estimated Daily Nutrient Needs:  Energy Requirements Based On: Kcal/kg  Weight Used for Energy Requirements: Current  Energy (kcal/day): 7417-1222 (25-30 kcals/kg)  Weight Used for Protein Requirements: Current  Protein (g/day): 39-45 (0.8-1.0 g/kg)  Method Used for Fluid Requirements: 1 ml/kcal  Fluid (ml/day): 1200    Nutrition Diagnosis:   Predicted inadequate energy intake related to acute injury/trauma, cognitive or neurological impairment, increase demand for energy/nutrients (acute CVA) as evidenced by  (increased needs from rehab therapies and CVA)    Nutrition Interventions:   Food and/or Nutrient Delivery: Continue Current Diet, Start Oral Nutrition Supplement  Nutrition Education/Counseling: Education not indicated  Coordination of Nutrition Care: Continue to monitor while inpatient, Feeding Assistance/Environment Change       Goals:     Goals: Meet at least 75% of estimated needs       Nutrition Monitoring and Evaluation:   Behavioral-Environmental Outcomes: None Identified  Food/Nutrient Intake Outcomes: Food and Nutrient Intake, Supplement Intake  Physical Signs/Symptoms Outcomes: Biochemical Data, Weight, GI Status, Meal Time Behavior, Hemodynamic Status, Nutrition Focused Physical Findings    Discharge Planning:     Too soon to determine     Coretha Dance, RD, LD  Contact: [phone]

## 2023-09-22 NOTE — PLAN OF CARE
Problem: Discharge Planning  Goal: Discharge to home or other facility with appropriate resources  9/22/2023 1028 by William Garduno LPN  Outcome: Progressing  9/21/2023 2349 by Ramos Kelly LPN  Outcome: Progressing     Problem: Safety - Adult  Goal: Free from fall injury  9/21/2023 2349 by Ramos Kelly LPN  Outcome: Progressing     Problem: ABCDS Injury Assessment  Goal: Absence of physical injury  9/21/2023 2349 by Ramos Kelly LPN  Outcome: Progressing

## 2023-09-22 NOTE — CARE COORDINATION
Case Management Admission Note    Patient:Doretha Mathews      :6/15/1927  TDQ:[id number]  Rehab Dx/Hx: CVA (cerebral vascular accident) ([address]) [I63.9]  Acute CVA (cerebrovascular accident) [facility]) [I63.9]    Chief Complaint:   Past Medical History:   Diagnosis Date    BCC (basal cell carcinoma), leg 2017    Family history of pancreatic cancer     Glaucoma of right eye 2016    Dr Dipti Lanza    Hyperlipidemia     Hypertension     Macular degeneration, dry     Dr. Jeremy Godfrey of knee     Dr Briseida Mead    Osteoporosis 2002    Fosamax 6119-1542    Pelvic fracture ([address]) 2018    Right rotator cuff tear     Briseida Mead / injection; PT    T10 vertebral fracture ([address]) 2018    fall and fragility fx; also L1 ; had kyphoplasty 2018    Vitamin B12 deficiency 2017    B 12 266 by Briseida Mead at [facility]     Past Surgical History:   Procedure Laterality Date    ABDOMINAL EXPLORATION SURGERY      With Lysis of Adhesions    APPENDECTOMY      w/  rt.uso    [address]    w/ rectocele    FIXATION KYPHOPLASTY  2018    T10 & L1    HYSTERECTOMY (CERVIX STATUS UNKNOWN)  1969     remaining ovary removed also    SKIN CANCER EXCISION Left 2017    BCC with skin graft    WRIST FRACTURE SURGERY Left 2017    ORIF     Allergies   Allergen Reactions    Iodides Hives    Shrimp Flavor Hives     Precautions: falls    Date of Admit: 2023  Room #: 1018/1018-A      Current functional status at time of admit:  Home Living/DME Available:  Type of Home: House  Home Access: Stairs to enter with rails  Bathroom Shower/Tub: Tub/Shower unit (Pt sponge bathes at baseline)  Bathroom Toilet: Standard (attached handrails on toilet)  Bathroom Equipment: Grab bars around toilet  Home Equipment: Lori Silva, [address], Walker, 4 wheeled, Alert Button (transport w/c)    IADL Hx:  Homemaking Responsibilities: Yes  Active : No  Mode of Transportation: SUV    Spouse:    Family: Per patient family visits and assists daily. Patient's Goal: \"walking\" and return home    Would you like Case Management to discuss the discharge plan with any other family members/significant others, and if so, who? My daughter    Family's Goal: Spoke with daughter. Her goal is for her Mom to do well. She brought in her Mom's knee braces. She is concerned that the braces may cause skin tears and may bring in a pair that wrap around her legs. LSW met with patient for admission assessment. Patient lives alone in a 1-level home with laundry in the basement in [place]. Patient does not go to the basement, family assists with laundry. There are 4 steps to enter and no steps inside. Patient has PCP, was independent in ADLs PTA, and uses [facility] in [place] for prescriptions. Home DME includes a rollator, Transport chair, cane, alert button, and no pre-existing HHC. Patient reports access to food, utilities, and shelter and feels safe in their home environment. BIMS completed in initial assessment. Room whiteboard updated. Plan is to D/C home alone, with HHC and DME as recommended by therapy staff. F/U to be set with Sharri Sanderson MD and family will transport home.     ARGENIS Asher, STALIN, 9/22/2023, 1:12 PM

## 2023-09-23 PROCEDURE — 6360000002 HC RX W HCPCS: Performed by: STUDENT IN AN ORGANIZED HEALTH CARE EDUCATION/TRAINING PROGRAM

## 2023-09-23 PROCEDURE — 97116 GAIT TRAINING THERAPY: CPT

## 2023-09-23 PROCEDURE — 97110 THERAPEUTIC EXERCISES: CPT

## 2023-09-23 PROCEDURE — 1280000000 HC REHAB R&B

## 2023-09-23 PROCEDURE — 94150 VITAL CAPACITY TEST: CPT

## 2023-09-23 PROCEDURE — 94761 N-INVAS EAR/PLS OXIMETRY MLT: CPT

## 2023-09-23 PROCEDURE — 6370000000 HC RX 637 (ALT 250 FOR IP): Performed by: STUDENT IN AN ORGANIZED HEALTH CARE EDUCATION/TRAINING PROGRAM

## 2023-09-23 PROCEDURE — 97535 SELF CARE MNGMENT TRAINING: CPT

## 2023-09-23 PROCEDURE — 97530 THERAPEUTIC ACTIVITIES: CPT

## 2023-09-23 RX ORDER — ACETAMINOPHEN 325 MG/1
650 TABLET ORAL EVERY 4 HOURS PRN
Status: DISCONTINUED | OUTPATIENT
Start: 2023-09-23 | End: 2023-10-03 | Stop reason: HOSPADM

## 2023-09-23 RX ADMIN — ASPIRIN 81 MG CHEWABLE TABLET 81 MG: 81 TABLET CHEWABLE at 08:34

## 2023-09-23 RX ADMIN — TIMOLOL MALEATE 1 DROP: 2.5 SOLUTION OPHTHALMIC at 08:34

## 2023-09-23 RX ADMIN — AMLODIPINE BESYLATE 10 MG: 10 TABLET ORAL at 08:34

## 2023-09-23 RX ADMIN — ROSUVASTATIN CALCIUM 40 MG: 20 TABLET, FILM COATED ORAL at 21:43

## 2023-09-23 RX ADMIN — ENOXAPARIN SODIUM 30 MG: 100 INJECTION SUBCUTANEOUS at 17:39

## 2023-09-23 RX ADMIN — LATANOPROST 1 DROP: 50 SOLUTION OPHTHALMIC at 21:43

## 2023-09-23 RX ADMIN — CLOPIDOGREL BISULFATE 75 MG: 75 TABLET ORAL at 08:34

## 2023-09-23 ASSESSMENT — PAIN SCALES - GENERAL: PAINLEVEL_OUTOF10: 0

## 2023-09-23 NOTE — PROGRESS NOTES
Physical Rehabilitation: OCCUPATIONAL THERAPY     [x] daily progress note       [] discharge       Patient Name:  Erica Munoz   :  6/15/1927 MRN: [id number]  Room:  [address] [place], [place] [address] Date of Admission: 2023  Rehabilitation Diagnosis:   CVA (cerebral vascular accident) [facility]) [I63.9]  Acute CVA (cerebrovascular accident) ([address] [facility]) [I63.9]       Date 2023       Day of ARU Week:  3   Time IN/OUT 7839-7986,  5-11:15   Individual Tx Minutes 30+30   Group Tx Minutes    Co-Treat Minutes    Concurrent Tx Minutes    TOTAL Tx Time Mins 60   Variance Time    Variance Time []   Refusal due to:     []   Medical hold/reason:    []   Illness   []   Off Unit for test/procedure  []   Extra time needed to complete task  []   Therapeutic need  []   Other (specify):   Restrictions Restrictions/Precautions  Restrictions/Precautions: General Precautions, Fall Risk  Required Braces or Orthoses?: No (pt uses B knee braces from orthopedic doctor, but not required)      Communication with other providers: [x]   OK to see per nursing:     []   Spoke with team member regarding:      Subjective observations and cognitive status: \"I have a little pain in my Right Rotator. \"  \"I don't want any pain medicine. I usually have the pain all the time. \"  Pt. Supine in bed eating breakfast with HOB increased upon arrival.  Nurse notified of pt. Pain level /location. \"I had a bath late last night, so I'll like to get dressed. \"   Pain level/location:   8 /10       Location: R shoulder   Discharge recommendations  Anticipated discharge date:     Destination: []home alone   []home alone w assist prn [] home w/ family    [] Continuous supervision       []SNF            [] Assisted living     [] Other:   Continued therapy: []HHC OT  []OUTPATIENT  OT   [] No Further OT  Equipment needs:     (HIT F2 to transition between stars)    Eating/Feeding:         Extremity Used:        [x]    R UE []    L UE         Dentures: []   N/A    []    Partial []

## 2023-09-23 NOTE — PROGRESS NOTES
Erica Munoz    : 6/15/1927  Acct #: [de-identified]  MRN: [id number]              PM&R Progress Note      Admitting diagnosis: ***    Comorbid diagnoses impacting rehabilitation: ***    Chief complaint: ***    Prior (baseline) level of function: Independent.     Current level of function:         Current  IRF-ANGELY and Goals:   Occupational Therapy:    Short Term Goals  Time Frame for Short Term Goals: STGs=LTGs :   Long Term Goals  Time Frame for Long Term Goals : 7-10 days or until d/c  Long Term Goal 1: Pt will complete grooming tasks Ind  Long Term Goal 2: Pt will complete total body bathing mod I  Long Term Goal 3: Pt will complete UB dressing Ind  Long Term Goal 4: Pt will complete LB dressing Ind  Long Term Goal 5: Pt will doff/don footwear Ind  Additional Goals?: Yes  Long Term Goal 6: Pt will complete toileting mod I  Long Term Goal 7: Pt will complete functional transfers (bed, chair, toilet, shower if desired) c DME PRN and mod I  Long Term Goal 8: Pt will perform therex/therax to facilitate increased strength/endurance/ax tolerance (c emphasis on LUE neuro re-ed and dynamic standing balance >10 mins) c SBA  Long Term Goal 9: Pt will complete simple homemaking tasks c DME PRN and S :                                       Eating: Eating  Assistance Needed: Setup or clean-up assistance  Comment: to open certain packages/containers  CARE Score: 5  Discharge Goal: Independent       Oral Hygiene: Oral Hygiene  Assistance Needed: Supervision or touching assistance  Comment: close SBA in stance at sink  CARE Score: 4  Discharge Goal: Independent    UB/LB Bathing: Shower/Bathe Self  Assistance Needed: Partial/moderate assistance  Comment: required min A briefly in stance for balance; pt bathed UB and perineal area in stance and BLEs seated  CARE Score: 3  Discharge Goal: Independent    UB Dressing: Upper Body Dressing  Assistance Needed: Supervision or touching assistance  Comment: donned bra with significant BUN 12 09/18/2023     (L) 09/18/2023    K 3.9 09/18/2023    CL 98 (L) 09/18/2023    CO2 23 09/18/2023     Lab Results   Component Value Date    ALT 11 09/18/2023    AST 15 09/18/2023    ALKPHOS 66 09/18/2023    BILITOT 0.5 09/18/2023       Expected length of stay  prior to a supervised level of function for discharge home with a walker and HHC OT/PT is ***    Recommendations:    ***

## 2023-09-23 NOTE — PROGRESS NOTES
Physical Therapy        [x] daily progress note       [] discharge       Patient Name:  Abby Lee   :  6/15/1927 MRN: [id number]  Room:  [address] [place], [place] [address] Date of Admission: 2023  Rehabilitation Diagnosis:   CVA (cerebral vascular accident) [facility]) [I63.9]  Acute CVA (cerebrovascular accident) ([address]) [I63.9]       Date 2023       Day of ARU Week:  3   Time IN/OUT 8908-0436  8392-8403   Individual Tx Minutes 60+60   TOTAL Tx Time Mins 120   Variance Time    Variance Time []   Refusal due to:     []   Medical hold/reason:    []   Illness   []   Off Unit for test/procedure  []   Extra time needed to complete task  []   Therapeutic need  []   Other (specify):   Restrictions Restrictions/Precautions  Restrictions/Precautions: General Precautions, Fall Risk  Required Braces or Orthoses?: No (pt uses B knee braces from orthopedic doctor, but not required)      Communication with other providers: [x]   OK to see per nursing:     []   Spoke with team member regarding:      Subjective observations and cognitive status: Pt resting in bed, willing to participate; Reports L LE \"jumping\" throughout night so did not sleep well, was not painful. Pleasant and agreeable to tx  PM: Pt amb to BR with STNA present. Pt agreeable to tx   Pain level/location: 0/10       Location: denies pain at this time, typically has arthritis pain in B knees, R shoulder, occasionally neck pain, uses heating pad at home.     Discharge recommendations  Anticipated discharge date:  TBD  Destination: []home alone   []home with assist PRN     [] home with continuous supervision     []SNF      [] Assisted living     [] Other:   Continued therapy: []HHC PT  []OUTPATIENT  PT   [] No Further PT  Equipment needs: TBD     Bed Mobility:           [x]   Pt received out of bed     Transfers:    Sit--> Stand:  SB-CGA  Stand --> Sit:   SB-CGA  Stand-Pivot:   CGA  Toilet Transfer: CG-Min A with pt difficulty reaching B grab bars due to shoulder pain, would benefit from [facility] frame over toilet   Toileting: CGA for balance. Car Transfers:  CGA for approach with RW, SBA to sit into seat and lift LEs in/out of car, CG-Min A to stand from seat, cues for hand placement transferring out  Assistive device required for transfer:   RW    Gait:    Distance:  44'+123' +245'+20'+117'  Assistance:  CGA  Device:  RW  Gait Quality:   slow recip pattern, demos L foot clearance with mild shuffling 20% of time, narrow JORGE LUIS and flexed posture      Curb       Assistance:    CGA, seq cues  Supportive Device:  RW  Height:  4\"    Uneven Surfaces:       Assistance:   CG-SBA, slightly unsteady with grades and min difficulty at times managing AD over concrete surfaces. Amb ~ 150' x 2  Device:    RW  Surfaces Completed:   [] Carpet with bean bags beneath       [] Throw rugs          [x] Outdoor pavements      [] Grass       [] Loose gravel   [] Carpet      [x]  Multi grades     [] Threshold         [x] Ramp           Additional Therapeutic activities/exercises completed this date:     []   Nu-step:  Time:        Level:         #Steps:       []   Rebounder:    []  Seated     []  Standing        [x]   Balance training  : Standing at sink for hand hygiene SB-CGA        []   Postural training    [x]   Supine ther ex (reps/sets):  Supine:   Ankle Pumps x 20   Quad Sets x 20  Gluteal Sets x 20  Heel Slides x 10  Short Arc Quads x 10  Hip Abduction x 10  Sitting:  Long Arc Quads x 10  Knee Flexion x 10  Seated Marching x 10        []   Seated ther ex (reps/sets):     []   Standing ther ex (reps/sets):     []   Picked up object from floor                       []   Reacher used   []   Other:   []   Other:   []   Other:      Patient/Caregiver Education and Training:   []    Role of PT  [x]   Education about Dx  [x]   Use of call light for assist   []   HEP provided and explained   [x]   Treatment plan reviewed  []   Home safety  []   Wheelchair mobility/management   []   Body mechanics  [x]   Bed

## 2023-09-24 VITALS
WEIGHT: 91.93 LBS | HEIGHT: 55 IN | SYSTOLIC BLOOD PRESSURE: 159 MMHG | TEMPERATURE: 97.6 F | DIASTOLIC BLOOD PRESSURE: 85 MMHG | RESPIRATION RATE: 18 BRPM | BODY MASS INDEX: 21.28 KG/M2 | OXYGEN SATURATION: 97 % | HEART RATE: 103 BPM

## 2023-09-24 LAB
ANION GAP SERPL CALCULATED.3IONS-SCNC: 12 MMOL/L (ref 4–16)
BUN SERPL-MCNC: 23 MG/DL (ref 6–23)
CALCIUM SERPL-MCNC: 8.6 MG/DL (ref 8.3–10.6)
CHLORIDE BLD-SCNC: 105 MMOL/L (ref 99–110)
CO2: 23 MMOL/L (ref 21–32)
CREAT SERPL-MCNC: 0.6 MG/DL (ref 0.6–1.1)
GFR SERPL CREATININE-BSD FRML MDRD: >60 ML/MIN/1.73M2
GLUCOSE SERPL-MCNC: 121 MG/DL (ref 70–99)
HCT VFR BLD CALC: 36.7 % (ref 37–47)
HEMOGLOBIN: 11.9 GM/DL (ref 12.5–16)
MCH RBC QN AUTO: 31 PG (ref 27–31)
MCHC RBC AUTO-ENTMCNC: 32.4 % (ref 32–36)
MCV RBC AUTO: 95.6 FL (ref 78–100)
PDW BLD-RTO: 15.5 % (ref 11.7–14.9)
PLATELET # BLD: 303 K/CU MM (ref 140–440)
PMV BLD AUTO: 9.1 FL (ref 7.5–11.1)
POTASSIUM SERPL-SCNC: 3.7 MMOL/L (ref 3.5–5.1)
RBC # BLD: 3.84 M/CU MM (ref 4.2–5.4)
SODIUM BLD-SCNC: 140 MMOL/L (ref 135–145)
WBC # BLD: 8.5 K/CU MM (ref 4–10.5)

## 2023-09-24 PROCEDURE — 80048 BASIC METABOLIC PNL TOTAL CA: CPT

## 2023-09-24 PROCEDURE — 1280000000 HC REHAB R&B

## 2023-09-24 PROCEDURE — 6370000000 HC RX 637 (ALT 250 FOR IP): Performed by: STUDENT IN AN ORGANIZED HEALTH CARE EDUCATION/TRAINING PROGRAM

## 2023-09-24 PROCEDURE — 94150 VITAL CAPACITY TEST: CPT

## 2023-09-24 PROCEDURE — 94761 N-INVAS EAR/PLS OXIMETRY MLT: CPT

## 2023-09-24 PROCEDURE — 36415 COLL VENOUS BLD VENIPUNCTURE: CPT

## 2023-09-24 PROCEDURE — 94664 DEMO&/EVAL PT USE INHALER: CPT

## 2023-09-24 PROCEDURE — 85027 COMPLETE CBC AUTOMATED: CPT

## 2023-09-24 PROCEDURE — 6370000000 HC RX 637 (ALT 250 FOR IP): Performed by: PHYSICAL MEDICINE & REHABILITATION

## 2023-09-24 RX ADMIN — ROSUVASTATIN CALCIUM 40 MG: 20 TABLET, FILM COATED ORAL at 21:01

## 2023-09-24 RX ADMIN — AMLODIPINE BESYLATE 10 MG: 10 TABLET ORAL at 09:29

## 2023-09-24 RX ADMIN — Medication 3 EACH: at 10:49

## 2023-09-24 RX ADMIN — TIMOLOL MALEATE 1 DROP: 2.5 SOLUTION OPHTHALMIC at 09:29

## 2023-09-24 RX ADMIN — LATANOPROST 1 DROP: 50 SOLUTION OPHTHALMIC at 21:02

## 2023-09-24 ASSESSMENT — PAIN SCALES - GENERAL: PAINLEVEL_OUTOF10: 0

## 2023-09-25 PROCEDURE — 6370000000 HC RX 637 (ALT 250 FOR IP): Performed by: STUDENT IN AN ORGANIZED HEALTH CARE EDUCATION/TRAINING PROGRAM

## 2023-09-25 PROCEDURE — 97110 THERAPEUTIC EXERCISES: CPT

## 2023-09-25 PROCEDURE — 97112 NEUROMUSCULAR REEDUCATION: CPT

## 2023-09-25 PROCEDURE — 1280000000 HC REHAB R&B

## 2023-09-25 PROCEDURE — 94150 VITAL CAPACITY TEST: CPT

## 2023-09-25 PROCEDURE — 97530 THERAPEUTIC ACTIVITIES: CPT

## 2023-09-25 PROCEDURE — 97535 SELF CARE MNGMENT TRAINING: CPT

## 2023-09-25 PROCEDURE — 6370000000 HC RX 637 (ALT 250 FOR IP): Performed by: PHYSICAL MEDICINE & REHABILITATION

## 2023-09-25 PROCEDURE — 97116 GAIT TRAINING THERAPY: CPT

## 2023-09-25 PROCEDURE — 94761 N-INVAS EAR/PLS OXIMETRY MLT: CPT

## 2023-09-25 PROCEDURE — 99232 SBSQ HOSP IP/OBS MODERATE 35: CPT | Performed by: PHYSICAL MEDICINE & REHABILITATION

## 2023-09-25 RX ADMIN — ROSUVASTATIN CALCIUM 40 MG: 20 TABLET, FILM COATED ORAL at 21:31

## 2023-09-25 RX ADMIN — AMLODIPINE BESYLATE 10 MG: 10 TABLET ORAL at 09:21

## 2023-09-25 RX ADMIN — ACETAMINOPHEN 650 MG: 325 TABLET ORAL at 17:41

## 2023-09-25 RX ADMIN — LATANOPROST 1 DROP: 50 SOLUTION OPHTHALMIC at 21:33

## 2023-09-25 RX ADMIN — TIMOLOL MALEATE 1 DROP: 2.5 SOLUTION OPHTHALMIC at 09:25

## 2023-09-25 RX ADMIN — CLOPIDOGREL BISULFATE 75 MG: 75 TABLET ORAL at 09:21

## 2023-09-25 RX ADMIN — ACETAMINOPHEN 650 MG: 325 TABLET ORAL at 21:31

## 2023-09-25 RX ADMIN — ASPIRIN 81 MG CHEWABLE TABLET 81 MG: 81 TABLET CHEWABLE at 09:21

## 2023-09-25 ASSESSMENT — PAIN SCALES - GENERAL
PAINLEVEL_OUTOF10: 0
PAINLEVEL_OUTOF10: 10
PAINLEVEL_OUTOF10: 0
PAINLEVEL_OUTOF10: 3

## 2023-09-25 ASSESSMENT — PAIN DESCRIPTION - DESCRIPTORS
DESCRIPTORS: ACHING
DESCRIPTORS: ACHING

## 2023-09-25 ASSESSMENT — PAIN - FUNCTIONAL ASSESSMENT
PAIN_FUNCTIONAL_ASSESSMENT: ACTIVITIES ARE NOT PREVENTED
PAIN_FUNCTIONAL_ASSESSMENT: ACTIVITIES ARE NOT PREVENTED

## 2023-09-25 ASSESSMENT — PAIN DESCRIPTION - ORIENTATION
ORIENTATION: RIGHT
ORIENTATION: RIGHT;LEFT

## 2023-09-25 ASSESSMENT — PAIN DESCRIPTION - LOCATION
LOCATION: SHOULDER
LOCATION: SHOULDER

## 2023-09-25 NOTE — PROGRESS NOTES
09/25/23 1454   Encounter Summary   Encounter Overview/Reason  Volunteer Encounter   Service Provided For: Patient   Referral/Consult From: [address]   Last Encounter  09/25/23  (Visit by Cecilio More. charted by Salvador Jensen.  PT doing ok today.)   Complexity of Encounter Low   Begin Time 1450   End Time  1455   Total Time Calculated 5 min   Spiritual/Emotional needs   Type Spiritual Support   Rituals, Rites and Sacraments   Type Blessings   Assessment/Intervention/Outcome   Assessment Calm   Intervention Active listening;Sustaining Presence/Ministry of presence   Outcome Comfort;Coping   Plan and Referrals   Plan/Referrals Continue to visit, (comment)

## 2023-09-25 NOTE — PATIENT CARE CONFERENCE
SBA  Short Term Goal 4: Pt will ascend/descend curb step with 2ww and SBA,  4 steps, 1-2 rails min assist  Short Term Goal 5: pt will  light object from floor with 2ww and reacher with mod I          Impairments/deficits, barriers:     Body Structures, Functions, Activity Limitations Requiring Skilled Therapeutic Intervention: Decreased functional mobility , Decreased ROM, Decreased body mechanics, Decreased balance, Decreased sensation, Decreased endurance, Decreased cognition, Decreased safe awareness, Decreased strength, Decreased high-level IADLs, Decreased fine motor control, Decreased coordination, Increased pain, Decreased posture     Therapy Prognosis: Good  Decision Making: High Complexity  Clinical Presentation: unpredicatable characteristics  Equipment needed at discharge:2ww      PT IRF-ANGELY scores since initial assessment  Bed Mobility:   Roll Left and Right  Assistance Needed: Independent  Comment: no bed features  CARE Score: 6  Discharge Goal: Independent    Sit to Lying  Assistance Needed: Supervision or touching assistance  Comment: CG, no bed features  CARE Score: 4  Discharge Goal: Independent    Lying to Sitting on Side of Bed  Assistance Needed: Supervision or touching assistance  Comment: CGA , no bed features  CARE Score: 4  Discharge Goal: Independent    Transfers:    Sit to Stand  Assistance Needed: Supervision or touching assistance  Comment: SB-CGA for balance with RW  CARE Score: 4  Discharge Goal: Independent    Chair/Bed-to-Chair Transfer  Assistance Needed: Supervision or touching assistance  Comment: CGA with RW  CARE Score: 4  Discharge Goal: Independent         Car Transfer  Assistance Needed: Partial/moderate assistance  Comment: (late entry, performed on 9/25) pt req CG-Min A to stand from seat of car  Reason if not Attempted: Not attempted due to medical condition or safety concerns  CARE Score: 3  Discharge Goal: Independent    Ambulation:    Walking Ability  Does the Pleasant    Justification for Continued Stay  Based on my medical assessment of the patient and review of information from the interdisciplinary team as part of this weekly team conference, the patient continues to meet the following criteria for IRF level of care: The patient requires active and ongoing intervention of multiple therapy disciplines  The patient requires and intensive rehabilitation therapy program  The patient requires continued physician supervision by a rehabilitation physician  The patient requires 24 hours rehab nursing care  The patient requires an intensive and coordinated interdisciplinary team approach to the delivery of rehabilitative care. Assessment/Plan   [x]  The patient is making good progression towards their long term goals and is actively participating in and has a reasonable expectation to continue to benefit from the intensive rehabilitation therapy program   []  The estimated discharge date has been changed from initial team conference due to:   []  The estimated discharge destination has been changed from initial team conference due to:         Ongoing tx following discharge: [x]HHC OT  PT NSG  []OUTPATIENT     [] No Further Treatment     [] Family/Caregiver Training  []  Transitional Living Arrangement   [] Home Assessment (date  )     [] Family Conference   []  Therapeutic Pass       []  Other: (specify)    Estimated Discharge Date: 10/3/23    Estimated Discharge Destination: []home alone   [x]home alone with assist prn  []Continuous supervision []Return home with s/o/spouse/family   [] Assisted living    []SNF     Team members participating in today's conference.     [x] Merna Hanson, Medical Director      [x] Sheyla Parish DPT,         [] Bee Staley, RN Nurse Manager   [] Raf Anna RN Nurse Manager     []  Joao Silva, PT  [x]  Natalie Florence, PT       [x] Los Flaherty OT   [] Nina Thomas OT  [] Jennifer Llamas, OT     [x]  Marta Tinsley,

## 2023-09-25 NOTE — PROGRESS NOTES
Occupational Therapy    Physical Rehabilitation: OCCUPATIONAL THERAPY     [x] daily progress note       [] discharge       Patient Name:  Jason Odonnell   :  6/15/1927 MRN: [id number]  Room:  [address] [place] [address] Date of Admission: 2023  Rehabilitation Diagnosis:   CVA (cerebral vascular accident) [facility]) [I63.9]  Acute CVA (cerebrovascular accident) ([address]) [I63.9]       Date 2023       Day of ARU Week:  5   Time IN/OUT 2859-8214  1186-9783   Individual Tx Minutes 70+50   Group Tx Minutes    Co-Treat Minutes    Concurrent Tx Minutes    TOTAL Tx Time Mins 120   Variance Time    Variance Time []   Refusal due to:     []   Medical hold/reason:    []   Illness   []   Off Unit for test/procedure  []   Extra time needed to complete task  []   Therapeutic need  []   Other (specify):   Restrictions Restrictions/Precautions: General Precautions, Fall Risk         Communication with other providers: [x]   OK to see per nursing:     []   Spoke with team member regarding:      Subjective observations and cognitive status: AM and PM: Pt resting in bed on approach; pleasant and agreeable to therapy. Pain level/location:    /10       Location:    Discharge recommendations  Anticipated discharge date:  TBD   Destination: []home alone   []home alone w assist prn   [] home w/ family    [] Continuous supervision       []SNF    [] Assisted living     [] Other:   Continued therapy: []HHC OT  []OUTPATIENT  OT   [] No Further OT  Equipment needs:   Jason Odonnell requires a 3 in 1 bedside commode due to being unable to use the toilet within the home and confined to a single room.         ADLs:      UB/LB Bathing: Shower/Bathe Self  Assistance Needed: Supervision or touching assistance  Comment: Pt performed sinkside ADL c SBA in stance to bathe perineal area  CARE Score: 4  Discharge Goal: Independent    UB Dressing: Upper Body Dressing  Assistance Needed: Partial/moderate assistance  Comment: Min A to don bra around shoulders; pt able

## 2023-09-25 NOTE — PROGRESS NOTES
Physical Therapy      [x] daily progress note       [] discharge       Patient Name:  Jason Odonnell   :  6/15/1927 MRN: [id number]  Room:  [address] [place] [address] Date of Admission: 2023  Rehabilitation Diagnosis:   CVA (cerebral vascular accident) [facility]) [I63.9]  Acute CVA (cerebrovascular accident) ([facility]) [I63.9]       Date 2023       Day of ARU Week:  5   Time IN/OUT 4062-0126   Individual Tx Minutes 60   TOTAL Tx Time Mins 60   Variance Time    Variance Time []   Refusal due to:     []   Medical hold/reason:    []   Illness   []   Off Unit for test/procedure  []   Extra time needed to complete task  []   Therapeutic need  []   Other (specify):   Restrictions Restrictions/Precautions  Restrictions/Precautions: General Precautions, Fall Risk  Required Braces or Orthoses?: No (pt uses B knee braces from orthopedic doctor, but not required)      Communication with other providers: [x]   OK to see per nursing:     []   Spoke with team member regarding:      Subjective observations and cognitive status: Pt received from OT, motivated to partcipate; dgtr present at end of session and ed provided on pt's current mobility status. Pain level/location:  0  /10       Location:    Discharge recommendations  Anticipated discharge date:  TBD  Destination: []home alone   []home with assist PRN     [] home with continuous supervision     []SNF      [] Assisted living     [] Other:   Continued therapy: []HHC PT  []OUTPATIENT  PT   [] No Further PT  Equipment needs: fatemeh 2WW  Jason Odonnell requires the assistance of a wheeled walker to successfully ambulate from room to room at home to allow completion of daily living tasks such as: bathing, toileting, dressing and grooming. A wheeled walker is necessary due to the patient's unsteady gait, upper body weakness, inability to  a standard walker. This patient can ambulate only by pushing a walker instead of using a lesser assistive device such as a cane or crutch. Times per week: 5 days per week for a minimum of 60 minutes/day plus group as appropriate for 60 minutes.   Treatment to include Current Treatment Recommendations: Strengthening, ROM, Balance training, Functional mobility training, Transfer training, IADL training, Neuromuscular re-education, Stair training, Gait training, Endurance training, Cognitive/Perceptual training, Pain management, Home exercise program, Positioning, Modalities, Equipment evaluation, education, & procurement, Patient/Caregiver education & training, Safety education & training, Therapeutic activities, Group Therapy    Electronically signed by   Júnior Huizar, PTA #[id number]  9/25/2023, 10:58 AM

## 2023-09-26 PROCEDURE — 97110 THERAPEUTIC EXERCISES: CPT

## 2023-09-26 PROCEDURE — 94761 N-INVAS EAR/PLS OXIMETRY MLT: CPT

## 2023-09-26 PROCEDURE — 94150 VITAL CAPACITY TEST: CPT

## 2023-09-26 PROCEDURE — 97530 THERAPEUTIC ACTIVITIES: CPT

## 2023-09-26 PROCEDURE — 97116 GAIT TRAINING THERAPY: CPT

## 2023-09-26 PROCEDURE — 97112 NEUROMUSCULAR REEDUCATION: CPT

## 2023-09-26 PROCEDURE — 97535 SELF CARE MNGMENT TRAINING: CPT

## 2023-09-26 PROCEDURE — 6370000000 HC RX 637 (ALT 250 FOR IP): Performed by: PHYSICAL MEDICINE & REHABILITATION

## 2023-09-26 PROCEDURE — 1280000000 HC REHAB R&B

## 2023-09-26 PROCEDURE — 6370000000 HC RX 637 (ALT 250 FOR IP): Performed by: STUDENT IN AN ORGANIZED HEALTH CARE EDUCATION/TRAINING PROGRAM

## 2023-09-26 RX ADMIN — ACETAMINOPHEN 650 MG: 325 TABLET ORAL at 05:28

## 2023-09-26 RX ADMIN — LATANOPROST 1 DROP: 50 SOLUTION OPHTHALMIC at 22:53

## 2023-09-26 RX ADMIN — ACETAMINOPHEN 650 MG: 325 TABLET ORAL at 16:44

## 2023-09-26 RX ADMIN — TIMOLOL MALEATE 1 DROP: 2.5 SOLUTION OPHTHALMIC at 08:26

## 2023-09-26 RX ADMIN — CLOPIDOGREL BISULFATE 75 MG: 75 TABLET ORAL at 08:26

## 2023-09-26 RX ADMIN — ASPIRIN 81 MG CHEWABLE TABLET 81 MG: 81 TABLET CHEWABLE at 08:26

## 2023-09-26 RX ADMIN — ROSUVASTATIN CALCIUM 40 MG: 20 TABLET, FILM COATED ORAL at 22:52

## 2023-09-26 RX ADMIN — AMLODIPINE BESYLATE 10 MG: 10 TABLET ORAL at 08:26

## 2023-09-26 ASSESSMENT — PAIN DESCRIPTION - ORIENTATION: ORIENTATION: RIGHT;LEFT

## 2023-09-26 ASSESSMENT — PAIN SCALES - GENERAL: PAINLEVEL_OUTOF10: 5

## 2023-09-26 ASSESSMENT — PAIN DESCRIPTION - FREQUENCY: FREQUENCY: INTERMITTENT

## 2023-09-26 ASSESSMENT — PAIN DESCRIPTION - PAIN TYPE: TYPE: CHRONIC PAIN

## 2023-09-26 ASSESSMENT — PAIN DESCRIPTION - DESCRIPTORS: DESCRIPTORS: ACHING;DISCOMFORT

## 2023-09-26 ASSESSMENT — PAIN DESCRIPTION - LOCATION: LOCATION: KNEE;SHOULDER

## 2023-09-26 ASSESSMENT — PAIN - FUNCTIONAL ASSESSMENT: PAIN_FUNCTIONAL_ASSESSMENT: ACTIVITIES ARE NOT PREVENTED

## 2023-09-26 ASSESSMENT — PAIN DESCRIPTION - ONSET: ONSET: ON-GOING

## 2023-09-26 NOTE — PROGRESS NOTES
Physical Therapy        [x] daily progress note       [] discharge       Patient Name:  Chelsi Frazier   :  6/15/1927 MRN: [id number]  Room:  [address] [place] [address] Date of Admission: 2023  Rehabilitation Diagnosis:   CVA (cerebral vascular accident) [facility]) [I63.9]  Acute CVA (cerebrovascular accident) ([address]) [I63.9]       Date 2023       Day of ARU Week:  6   Time IN/OUT 1016-2520   Individual Tx Minutes 60   TOTAL Tx Time Mins 60   Variance Time    Variance Time []   Refusal due to:     []   Medical hold/reason:    []   Illness   []   Off Unit for test/procedure  []   Extra time needed to complete task  []   Therapeutic need  []   Other (specify):   Restrictions Restrictions/Precautions  Restrictions/Precautions: General Precautions, Fall Risk  Required Braces or Orthoses?: No (pt uses B knee braces from orthopedic doctor, but not required)      Communication with other providers: [x]   OK to see per nursing:     []   Spoke with team member regarding:      Subjective observations and cognitive status: Pt up in chair, son present during session, Pt reports disappointment in her discharge date however rationale explained, education provided per pt inquiry about care conference weekly report given by CM; Ed to pt regarding differences in QI score of \"4\" and difference between Supervision and Indep. Pt choosing what she wanted to work on today from list.      Pain level/location: None with rest, some B knee pain with WB but does not rate.     Discharge recommendations  Anticipated discharge date:  TBD  Destination: []home alone   []home with assist PRN     [] home with continuous supervision     []SNF      [] Assisted living     [] Other:   Continued therapy: []C PT  []OUTPATIENT  PT   [] No Further PT  Equipment needs: fatemeh 2WW  Chelsi Frazier requires the assistance of a fatemeh wheeled walker (due to pt height of 4'7\")  to successfully ambulate from room to room at home to allow completion of daily living tasks such carryover to sidestepping down steps. []   Postural training    []   Supine ther ex (reps/sets):     []   Seated ther ex (reps/sets):     []   Standing ther ex (reps/sets):     []   Picked up object from floor                       []   Reacher used   []   Other:   []   Other:   []   Other:      Patient/Caregiver Education and Training:   []    Role of PT  [x]   Education about Dx  [x]   Use of call light for assist   []   HEP provided and explained   [x]   Treatment plan reviewed  [x]   Home safety  []   Wheelchair mobility/management   []   Body mechanics  [x]   Bed Mobility/Transfer technique  [x]   Gait technique/sequencing  []   Proper use of assistive device/adaptive equipment  [x]   Stair training/Advanced mobility safety and technique  []   Reinforced patient's precautions/mobility while maintaining precautions  []   Postural awareness  []   Family/caregiver training  [x]   Progress was updated and reviewed with patient  this date.   []   Other:       Treatment Plan for Next Session: Practice amb 4\" steps bkwd on descent vs side step with B UEs on one rail, if successful attempt on 6\" steps; Progress Indep with car transfer, object retrieval, 10' and 48' with 2 turns amb for improved QI scores      Treatment/Activity Tolerance:   [x] Tolerated treatment with no adverse effects    [] Patient limited by fatigue  [] Patient limited by pain   [] Patient limited by medical complications:    [] Adverse reaction to Tx:   [] Significant change in status    Safety:       []  bed alarm set    [x]  chair alarm set    []  Pt refused alarms                []  Telesitter activated      [x]  Gait belt used during tx session      [] Call light and belongings in reach       [] Other      Number of Minutes/Billable Intervention  Gait Training 22   Therapeutic Exercise    Neuro Re-Ed 15   Therapeutic Activity 23   Wheelchair Propulsion    Group    Other:    TOTAL 60         Social History  Social/Functional

## 2023-09-26 NOTE — PROGRESS NOTES
Occupational Therapy    Physical Rehabilitation: OCCUPATIONAL THERAPY     [x] daily progress note       [] discharge       Patient Name:  Jason Odonnell   :  6/15/1927 MRN: [id number]  Room:  [address] [place] [address] Date of Admission: 2023  Rehabilitation Diagnosis:   CVA (cerebral vascular accident) [facility]) [I63.9]  Acute CVA (cerebrovascular accident) ([address] [facility]) [I63.9]       Date 2023       Day of ARU Week:  6   Time IN/OUT 3624-0056   Individual Tx Minutes 60   Group Tx Minutes    Co-Treat Minutes    Concurrent Tx Minutes    TOTAL Tx Time Mins 60   Variance Time    Variance Time []   Refusal due to:     []   Medical hold/reason:    []   Illness   []   Off Unit for test/procedure  []   Extra time needed to complete task  []   Therapeutic need  []   Other (specify):   Restrictions Restrictions/Precautions: General Precautions, Fall Risk         Communication with other providers: [x]   OK to see per nursing:     []   Spoke with team member regarding:      Subjective observations and cognitive status: Pt sitting up in recliner on approach; pleasant and agreeable to therapy. Pain level/location:    /10       Location:    Discharge recommendations  Anticipated discharge date:  10/3  Destination: []home alone   [x]home alone w assist prn   [] home w/ family    [] Continuous supervision       []SNF    [] Assisted living     [] Other:   Continued therapy: [x]HHC OT  []OUTPATIENT  OT   [] No Further OT  Equipment needs: has recommended DME        ADLs:    Oral Hygiene: Pt reported she already completed this prior to therapy session.      UB/LB Bathing: Shower/Bathe Self  Assistance Needed: Supervision or touching assistance  Comment: Pt stood for majority of sinkside ADL c Superivsion  CARE Score: 4  Discharge Goal: Independent    UB Dressing: Upper Body Dressing  Assistance Needed: Supervision or touching assistance  Comment: Pt doff/jaden pull over shirt  CARE Score: 4  Discharge Goal: Independent         LB Dressing: Lower Standard (attached handrails on toilet)  Bathroom Equipment: Grab bars around toilet  Bathroom Accessibility: Walker accessible  Home Equipment: [address], Reacher, Walker, 4 wheeled, [employer] (transport w/c)  Has the patient had two or more falls in the past year or any fall with injury in the past year?: No (Pt fell once, but was not injured)  [place] Help From:  (daughter comes over about 2 hours every day, can stay longer if needed. other family can also increase assist as needed)  ADL Assistance: Independent  Homemaking Responsibilities: Yes  Meal Prep Responsibility: Primary  Laundry Responsibility: No  Cleaning Responsibility: No  Bill Paying/Finance Responsibility: No  Shopping Responsibility: No  Health Care Management: Primary  Ambulation Assistance: Independent  Transfer Assistance: Independent  Active : No  Patient's  Info: Hasn't driven in about 4 years.  daughter has Bertin Luo that is good height for pt  Mode of Transportation: SUV  Additional Comments: Pt sleeps in flat bed    Objective                                                                                    Goals:  (Update in navigator)  Short Term Goals  Time Frame for Short Term Goals: STGs=LTGs:  Long Term Goals  Time Frame for Long Term Goals : 7-10 days or until d/c  Long Term Goal 1: Pt will complete grooming tasks Ind  Long Term Goal 2: Pt will complete total body bathing mod I  Long Term Goal 3: Pt will complete UB dressing Ind  Long Term Goal 4: Pt will complete LB dressing Ind  Long Term Goal 5: Pt will doff/don footwear Ind  Additional Goals?: Yes  Long Term Goal 6: Pt will complete toileting mod I  Long Term Goal 7: Pt will complete functional transfers (bed, chair, toilet, shower if desired) c DME PRN and mod I  Long Term Goal 8: Pt will perform therex/therax to facilitate increased strength/endurance/ax tolerance (c emphasis on LUE neuro re-ed and dynamic standing balance >10 mins) c SBA  Long Term Goal 9: Pt will

## 2023-09-26 NOTE — CARE COORDINATION
LSW met with patient and provided written communication following Care Conference. Spoke with daughter by phone. LSW informed patient of recommendations for 2WW, HHC PT, OT. Patient's daughter stated that she has a 2WW at home. Patient verbalized understanding and will choose an agency closer to discharge. Whiteboard updated. Patient provided with list of Medicare participating [facility] in the geographic area of the patient served. Patient selected [facility] and was provided with a comparative data handout from [address] [place] [address] website. The patient (and/or Family) was educated on the quality outcomes for each provider. Patient (and/or Family) demonstrated understanding. Per patient/family request, referral made to [facility]. D/C Plan:  Estimated Date: Oct 3  DME:  has recommended DME  HHC:  PT, OT (Agency [facility])  To:   Home alone (family will transport)

## 2023-09-26 NOTE — FLOWSHEET NOTE
[x] daily progress note       [] discharge       Patient Name:  Mejia Rai   :  6/15/1927 MRN: [id number]  Room:  [address] [place] [address] Date of Admission: 2023  Rehabilitation Diagnosis:   CVA (cerebral vascular accident) [facility]) [I63.9]  Acute CVA (cerebrovascular accident) ([address]) [I63.9]       Date 2023       Day of ARU Week:  6   Time IN//930   Individual Tx Minutes 60   Group Tx Minutes    Co-Treat Minutes    Concurrent Tx Minutes    TOTAL Tx Time Mins 60   Variance Time    Variance Time []   Refusal due to:     []   Medical hold/reason:    []   Illness   []   Off Unit for test/procedure  []   Extra time needed to complete task  []   Therapeutic need  []   Other (specify):   Restrictions Restrictions/Precautions  Restrictions/Precautions: General Precautions, Fall Risk  Required Braces or Orthoses?: No (pt uses B knee braces from orthopedic doctor, but not required)      Communication with other providers: [x]   OK to see per nursing:     []   Spoke with team member regarding:      Subjective observations and cognitive status: Pt in bed, pleasant and cooperative. Pt stating she has no pain at the moment, but had pain in her R shoulder and kind of all over yesterday after therapy tx sessions. Pain level/location:   0 /10       Location:    Discharge recommendations  Anticipated discharge date:  TBD  Destination: []home alone   []home with assist PRN     [] home with continuous supervision     []SNF      [] Assisted living     [] Other:   Continued therapy: []C PT  []OUTPATIENT  PT   [] No Further PT  Equipment needs: fatemeh 2WW  Mejia Rai requires the assistance of a wheeled walker to successfully ambulate from room to room at home to allow completion of daily living tasks such as: bathing, toileting, dressing and grooming. A wheeled walker is necessary due to the patient's unsteady gait, upper body weakness, inability to  a standard walker.   This patient can ambulate only by pushing a

## 2023-09-27 ENCOUNTER — CARE COORDINATION (OUTPATIENT)
Dept: MEDSURG UNIT | Age: 88
End: 2023-09-27

## 2023-09-27 PROCEDURE — 94664 DEMO&/EVAL PT USE INHALER: CPT

## 2023-09-27 PROCEDURE — 97535 SELF CARE MNGMENT TRAINING: CPT

## 2023-09-27 PROCEDURE — 97116 GAIT TRAINING THERAPY: CPT

## 2023-09-27 PROCEDURE — 6370000000 HC RX 637 (ALT 250 FOR IP): Performed by: STUDENT IN AN ORGANIZED HEALTH CARE EDUCATION/TRAINING PROGRAM

## 2023-09-27 PROCEDURE — 97530 THERAPEUTIC ACTIVITIES: CPT

## 2023-09-27 PROCEDURE — 6370000000 HC RX 637 (ALT 250 FOR IP): Performed by: PHYSICAL MEDICINE & REHABILITATION

## 2023-09-27 PROCEDURE — 94761 N-INVAS EAR/PLS OXIMETRY MLT: CPT

## 2023-09-27 PROCEDURE — 1280000000 HC REHAB R&B

## 2023-09-27 PROCEDURE — 97112 NEUROMUSCULAR REEDUCATION: CPT

## 2023-09-27 PROCEDURE — 97150 GROUP THERAPEUTIC PROCEDURES: CPT

## 2023-09-27 PROCEDURE — 94150 VITAL CAPACITY TEST: CPT

## 2023-09-27 RX ADMIN — TIMOLOL MALEATE 1 DROP: 2.5 SOLUTION OPHTHALMIC at 08:12

## 2023-09-27 RX ADMIN — ACETAMINOPHEN 650 MG: 325 TABLET ORAL at 16:50

## 2023-09-27 RX ADMIN — LATANOPROST 1 DROP: 50 SOLUTION OPHTHALMIC at 20:33

## 2023-09-27 RX ADMIN — ASPIRIN 81 MG CHEWABLE TABLET 81 MG: 81 TABLET CHEWABLE at 08:12

## 2023-09-27 RX ADMIN — AMLODIPINE BESYLATE 10 MG: 10 TABLET ORAL at 08:12

## 2023-09-27 RX ADMIN — CLOPIDOGREL BISULFATE 75 MG: 75 TABLET ORAL at 08:12

## 2023-09-27 RX ADMIN — ROSUVASTATIN CALCIUM 40 MG: 20 TABLET, FILM COATED ORAL at 20:33

## 2023-09-27 ASSESSMENT — PAIN SCALES - GENERAL
PAINLEVEL_OUTOF10: 0
PAINLEVEL_OUTOF10: 0

## 2023-09-27 NOTE — PROGRESS NOTES
09/27/23 1427   Encounter Summary   Encounter Overview/Reason  Volunteer Encounter   Service Provided For: Patient   Referral/Consult From: [address]   Last Encounter  09/27/23  (Visit of [address]  chater by mo Yuan)   Complexity of Encounter Low   Begin Time 1000   End Time  1030   Total Time Calculated 30 min   Spiritual/Emotional needs   Type Spiritual Support   Rituals, Rites and Sacraments   Type [employer] Communion   Grief, Loss, and Adjustments   Type Adjustment to illness   Assessment/Intervention/Outcome   Assessment Calm   Intervention Active listening;Empowerment;Sustaining Presence/Ministry of presence   Outcome Encouraged;Expressed Gratitude   Plan and Referrals   Plan/Referrals Continue to visit, (comment)

## 2023-09-27 NOTE — FLOWSHEET NOTE
[x] daily progress note       [] discharge       Patient Name:  Justin Joiner   :  6/15/1927 MRN: [id number]  Room:  [address] [place] [address] Date of Admission: 2023  Rehabilitation Diagnosis:   CVA (cerebral vascular accident) [facility]) [I63.9]  Acute CVA (cerebrovascular accident) ([address]) [I63.9]       Date 2023       Day of ARU Week:  7   Time IN/OUT 8:30/9:30   Individual Tx Minutes 60   Group Tx Minutes    Co-Treat Minutes    Concurrent Tx Minutes    TOTAL Tx Time Mins 60   Variance Time    Variance Time []   Refusal due to:     []   Medical hold/reason:    []   Illness   []   Off Unit for test/procedure  []   Extra time needed to complete task  []   Therapeutic need  []   Other (specify):   Restrictions Restrictions/Precautions  Restrictions/Precautions: General Precautions, Fall Risk  Required Braces or Orthoses?: No (pt uses B knee braces from orthopedic doctor, but not required)      Communication with other providers: [x]   OK to see per nursing:     []   Spoke with team member regarding:      Subjective observations and cognitive status: Pt supine in bed, agreeable to tx. Pain level/location:    0/10       Location:    Discharge recommendations  Anticipated discharge date:  TBD  Destination: []home alone   []home with assist PRN     [] home with continuous supervision     []SNF      [] Assisted living     [] Other:   Continued therapy: []C PT  []OUTPATIENT  PT   [] No Further PT  Equipment needs: fatemeh 2WW  Justin Joiner requires the assistance of a fatemeh wheeled walker (due to pt height of 4'7\")  to successfully ambulate from room to room at home to allow completion of daily living tasks such as: bathing, toileting, dressing and grooming. A wheeled walker is necessary due to the patient's unsteady gait, upper body weakness, inability to  a standard walker. This patient can ambulate only by pushing a walker instead of using a lesser assistive device such as a cane or crutch.            Bed Patient/Caregiver education & training, Safety education & training, Therapeutic activities, Group Therapy    Electronically signed by   PAUL Montes,[id number]  9/27/2023, 8:01 AM

## 2023-09-27 NOTE — CARE COORDINATION
Spoke with patient's daughter to discuss discharge plans. Patient chose [id number] and referral will be made closer to discharge. Family is working on obtaining a ramp.

## 2023-09-27 NOTE — CARE COORDINATION
Provided stroke education folder. Discussed effects of stroke, stroke support group and community resources with pt. Made pt aware that I can be contacted for any additional questions regarding stroke and left my contact information.

## 2023-09-27 NOTE — PROGRESS NOTES
Occupational Therapy    Physical Rehabilitation: OCCUPATIONAL THERAPY     [x] daily progress note       [] discharge       Patient Name:  Chelsi Frazier   :  6/15/1927 MRN: [id number]  Room:  [address] [place] [address] Date of Admission: 2023  Rehabilitation Diagnosis:   CVA (cerebral vascular accident) [facility]) [I63.9]  Acute CVA (cerebrovascular accident) ([facility]) [I63.9]       Date 2023       Day of ARU Week:  7   Time IN/OUT 1107/1207   Individual Tx Minutes 60   Group Tx Minutes    Co-Treat Minutes    Concurrent Tx Minutes    TOTAL Tx Time Mins 60   Variance Time    Variance Time []   Refusal due to:     []   Medical hold/reason:    []   Illness   []   Off Unit for test/procedure  []   Extra time needed to complete task  []   Therapeutic need  []   Other (specify):   Restrictions Restrictions/Precautions: General Precautions, Fall Risk         Communication with other providers: [x]   OK to see per nursing:     []   Spoke with team member regarding:      Subjective observations and cognitive status: Pt in recliner on approach, agreeable to tx session, in good spirits and motivated.      Pain level/location:    /10       Location: Denied \"Haven't had any yet today at least\"   Discharge recommendations  Anticipated discharge date:  10/3  Destination: []home alone   [x]home alone w assist prn   [] home w/ family    [] Continuous supervision       []SNF    [] Assisted living     [] Other:   Continued therapy: [x]HHC OT  []OUTPATIENT  OT   [] No Further OT  Equipment needs: has recommended DME        ADLs:     UB/LB Bathing: Shower/Bathe Self  Assistance Needed: Supervision or touching assistance  Comment: supervision for sinkside bathing, majority performed in stance  CARE Score: 4  Discharge Goal: Independent    UB Dressing: Upper Body Dressing  Assistance Needed: Supervision or touching assistance  Comment: to doff/don pullover shirt  CARE Score: 4  Discharge Goal: Independent         LB Dressing: Lower Body

## 2023-09-27 NOTE — PROGRESS NOTES
Physical Therapy      [x] daily progress note       [] discharge       Patient Name:  Rebekah Claudio   :  6/15/1927 MRN: [id number]  Room:  [address] [place], [place] [address] Date of Admission: 2023    Rehabilitation Diagnosis:     CVA (cerebral vascular accident) [facility]) [I63.9]  Acute CVA (cerebrovascular accident) [facility]) [I63.9]    Date  2023   TIMES IN/OUT   1489-4373   Group Tx Minutes 60   TOTAL Tx time seen 60   Variance Time    Variance Reason    [] Refusal due to   [] Medical hold/reason  [] Illness   [] Off Unit for test/procedure  [] Extra time needed to complete task  [] Other (specify)   Restrictions/Precautions Restrictions/Precautions  Restrictions/Precautions: General Precautions, Fall Risk  Required Braces or Orthoses?: No (pt uses B knee braces from orthopedic doctor, but not required)      Current Diet/Swallowing Issues ADULT DIET; Regular  ADULT ORAL NUTRITION SUPPLEMENT; Breakfast; Standard High Calorie/High Protein Oral Supplement   Communication with other providers: [x] Ok to see per nursing at morning huddle  [] Medical hold and reason  [] Spoke with (team    member) regarding   Subjective observations: Pt agreeable to group session. Pain level/location: Intermittent R shoulder pain   Alarm placed/where? Fall Risk  [] Pt taken to DR for lunch with nsg present   [] Pt taken back to room with chair/bed alarm in place   [x] Pt taken back to room by other staff member       Group Activity:  Exercise Group:Patient participated in exercise and discussion on benefits and precautions during exercise. This provided the opportunity to have fun, build camaraderie, increase endurance, improve breathing techniques, balance, and strength. Patient performed a variety of seated and standing activities as able, with focus on technique and safety during exercise.  Also focused on warm-up, warm-down, endurance monitoring, strengthening & strategies, function, safety, and general social & communication opportunities with

## 2023-09-28 PROCEDURE — 94150 VITAL CAPACITY TEST: CPT

## 2023-09-28 PROCEDURE — 97535 SELF CARE MNGMENT TRAINING: CPT

## 2023-09-28 PROCEDURE — 6370000000 HC RX 637 (ALT 250 FOR IP): Performed by: PHYSICAL MEDICINE & REHABILITATION

## 2023-09-28 PROCEDURE — 97530 THERAPEUTIC ACTIVITIES: CPT

## 2023-09-28 PROCEDURE — 94761 N-INVAS EAR/PLS OXIMETRY MLT: CPT

## 2023-09-28 PROCEDURE — 97110 THERAPEUTIC EXERCISES: CPT

## 2023-09-28 PROCEDURE — 6370000000 HC RX 637 (ALT 250 FOR IP): Performed by: STUDENT IN AN ORGANIZED HEALTH CARE EDUCATION/TRAINING PROGRAM

## 2023-09-28 PROCEDURE — 97116 GAIT TRAINING THERAPY: CPT

## 2023-09-28 PROCEDURE — 1280000000 HC REHAB R&B

## 2023-09-28 RX ADMIN — TIMOLOL MALEATE 1 DROP: 2.5 SOLUTION OPHTHALMIC at 09:36

## 2023-09-28 RX ADMIN — AMLODIPINE BESYLATE 10 MG: 10 TABLET ORAL at 09:36

## 2023-09-28 RX ADMIN — ROSUVASTATIN CALCIUM 40 MG: 20 TABLET, FILM COATED ORAL at 22:12

## 2023-09-28 RX ADMIN — LATANOPROST 1 DROP: 50 SOLUTION OPHTHALMIC at 22:13

## 2023-09-28 RX ADMIN — CLOPIDOGREL BISULFATE 75 MG: 75 TABLET ORAL at 09:36

## 2023-09-28 RX ADMIN — ASPIRIN 81 MG CHEWABLE TABLET 81 MG: 81 TABLET CHEWABLE at 09:36

## 2023-09-28 RX ADMIN — ACETAMINOPHEN 650 MG: 325 TABLET ORAL at 22:12

## 2023-09-28 RX ADMIN — ACETAMINOPHEN 650 MG: 325 TABLET ORAL at 12:56

## 2023-09-28 ASSESSMENT — PAIN SCALES - WONG BAKER
WONGBAKER_NUMERICALRESPONSE: 0

## 2023-09-28 ASSESSMENT — PAIN SCALES - GENERAL
PAINLEVEL_OUTOF10: 0
PAINLEVEL_OUTOF10: 5
PAINLEVEL_OUTOF10: 0
PAINLEVEL_OUTOF10: 1
PAINLEVEL_OUTOF10: 4
PAINLEVEL_OUTOF10: 0

## 2023-09-28 ASSESSMENT — PAIN DESCRIPTION - PAIN TYPE
TYPE: SURGICAL PAIN
TYPE: SURGICAL PAIN

## 2023-09-28 ASSESSMENT — PAIN DESCRIPTION - FREQUENCY
FREQUENCY: INTERMITTENT
FREQUENCY: INTERMITTENT

## 2023-09-28 ASSESSMENT — PAIN - FUNCTIONAL ASSESSMENT
PAIN_FUNCTIONAL_ASSESSMENT: ACTIVITIES ARE NOT PREVENTED
PAIN_FUNCTIONAL_ASSESSMENT: PREVENTS OR INTERFERES SOME ACTIVE ACTIVITIES AND ADLS
PAIN_FUNCTIONAL_ASSESSMENT: ACTIVITIES ARE NOT PREVENTED

## 2023-09-28 ASSESSMENT — PAIN DESCRIPTION - ORIENTATION
ORIENTATION: RIGHT;LEFT

## 2023-09-28 ASSESSMENT — PAIN DESCRIPTION - DESCRIPTORS
DESCRIPTORS: ACHING

## 2023-09-28 ASSESSMENT — PAIN DESCRIPTION - ONSET
ONSET: ON-GOING
ONSET: ON-GOING

## 2023-09-28 ASSESSMENT — PAIN DESCRIPTION - LOCATION
LOCATION: SHOULDER

## 2023-09-28 NOTE — FLOWSHEET NOTE
[x] daily progress note       [] discharge       Patient Name:  Citlali Bonilla   :  6/15/1927 MRN: [id number]  Room:  [address] [place] [address] Date of Admission: 2023  Rehabilitation Diagnosis:   CVA (cerebral vascular accident) [facility]) [I63.9]  Acute CVA (cerebrovascular accident) ([address] ) [I63.9]       Date 2023       Day of ARU Week:  1   Time IN//930   Individual Tx Minutes 60   Group Tx Minutes    Co-Treat Minutes    Concurrent Tx Minutes    TOTAL Tx Time Mins 60   Variance Time    Variance Time []   Refusal due to:     []   Medical hold/reason:    []   Illness   []   Off Unit for test/procedure  []   Extra time needed to complete task  []   Therapeutic need  []   Other (specify):   Restrictions Restrictions/Precautions  Restrictions/Precautions: General Precautions, Fall Risk  Required Braces or Orthoses?: No (pt uses B knee braces from orthopedic doctor, but not required)      Communication with other providers: [x]   OK to see per nursing:     []   Spoke with team member regarding:      Subjective observations and cognitive status: Pt stating she feels good this am.  Pt in bed with HOB slightly elevated. Pain level/location:   0 /10       Location:    Discharge recommendations  Anticipated discharge date:  TBD  Destination: []home alone   []home with assist PRN     [] home with continuous supervision     []SNF      [] Assisted living     [] Other:   Continued therapy: []HHC PT  []OUTPATIENT  PT   [] No Further PT  Equipment needs: fatemeh 2WW  Citlali Bonilla requires the assistance of a fatemeh wheeled walker (due to pt height of 4'7\")  to successfully ambulate from room to room at home to allow completion of daily living tasks such as: bathing, toileting, dressing and grooming. A wheeled walker is necessary due to the patient's unsteady gait, upper body weakness, inability to  a standard walker.   This patient can ambulate only by pushing a walker instead of using a lesser assistive device such as a ready to go home. The patient is making  progress toward established goals as evidenced by QI scores. Ongoing deficits are observed in the areas of stairs and continued focus on training is recommended. Treatment/Activity Tolerance:   [] Tolerated treatment with no adverse effects    [x] Patient limited by fatigue  [] Patient limited by pain   [] Patient limited by medical complications:    [] Adverse reaction to Tx:   [] Significant change in status    Safety:       []  bed alarm set    [x]  chair alarm set    []  Pt refused alarms                []  Telesitter activated      [x]  Gait belt used during tx session      []other:         Number of Minutes/Billable Intervention  Gait Training 30   Therapeutic Exercise 15   Neuro Re-Ed    Therapeutic Activity 15   Wheelchair Propulsion    Group    Other:    TOTAL 60         Social History  Social/Functional History  Lives With: Alone  Type of Home: House  Home Layout: One level, Laundry in basement (daughter does laundry, \"I'm not allowed in the basement\")  Home Access: Stairs to enter with rails  Entrance Stairs - Number of Steps: 4  Entrance Stairs - Rails: Both  Bathroom Shower/Tub: Tub/Shower unit (Pt sponge bathes at baseline)  Bathroom Toilet: Standard (attached handrails on toilet)  Bathroom Equipment: Grab bars around toilet  Bathroom Accessibility: Walker accessible  Home Equipment: Chio Phenes, Reacher, Walker, 4 wheeled, Alert Button (transport w/c)  Has the patient had two or more falls in the past year or any fall with injury in the past year?: No (Pt fell once, but was not injured)  [place] Help From:  (daughter comes over about 2 hours every day, can stay longer if needed.  other family can also increase assist as needed)  ADL Assistance: Independent  Homemaking Responsibilities: Yes  Meal Prep Responsibility: Primary  Laundry Responsibility: No  Cleaning Responsibility: No  Bill Paying/Finance Responsibility: No  Shopping Responsibility: No  Health Care

## 2023-09-28 NOTE — PROGRESS NOTES
Physical Therapy      [x] daily progress note       [] discharge       Patient Name:  Mejia Rai   :  6/15/1927 MRN: [id number]  Room:  [address] [place] [address] Date of Admission: 2023  Rehabilitation Diagnosis:   CVA (cerebral vascular accident) [facility]) [I63.9]  Acute CVA (cerebrovascular accident) ([address]) [I63.9]       Date 2023       Day of ARU Week:  1   Time IN/OUT 5260-8937   Individual Tx Minutes 60   TOTAL Tx Time Mins 60   Variance Time    Variance Time []   Refusal due to:     []   Medical hold/reason:    []   Illness   []   Off Unit for test/procedure  []   Extra time needed to complete task  []   Therapeutic need  []   Other (specify):   Restrictions Restrictions/Precautions  Restrictions/Precautions: General Precautions, Fall Risk  Required Braces or Orthoses?: No (pt uses B knee braces from orthopedic doctor, but not required)      Communication with other providers: [x]   OK to see per nursing:     []   Spoke with team member regarding:      Subjective observations and cognitive status: Pt asleep in recliner, motivated to participate     Pain level/location: Reports having R shoulder pain earlier. Tylenol received   Discharge recommendations  Anticipated discharge date:  10/3  Destination: []home alone   []home with assist PRN     [] home with continuous supervision     []SNF      [] Assisted living     [] Other:   Continued therapy: []C PT  []OUTPATIENT  PT   [] No Further PT  Equipment needs: fatemeh 2WW  Mejia Rai requires the assistance of a fatemeh wheeled walker (due to pt height of 4'7\")  to successfully ambulate from room to room at home to allow completion of daily living tasks such as: bathing, toileting, dressing and grooming. A wheeled walker is necessary due to the patient's unsteady gait, upper body weakness, inability to  a standard walker. This patient can ambulate only by pushing a walker instead of using a lesser assistive device such as a cane or crutch.       Bed Mobility: [x]   Pt received out of bed     Transfers:    Sit--> Stand:  Supervision  Stand --> Sit:   Supervision  Stand-Pivot:   Supervision  Assistive device required for transfer:   RW    Gait:    Distance:  123'+72'   Assistance:  Supervision  Device:  RW  Gait Quality:   slow recip pattern, pt reports distance is limited by pain vs fatigue this PM.      Curb       Assistance:    CGA, cues to step closer to edge of step before advancing RW  Supportive Device:  RW  Height:   4\"    Uneven Surfaces:       Assistance:    SBA, demos safety with AD over carpet edge and obstacles  Device:    RW  Surfaces Completed:   [x] Carpet with bean bags beneath       [] Throw rugs          [] Outdoor pavements      [] Grass       [] Loose gravel   [] Carpet      []  Multi grades     [] Threshold         [] Ramp           Additional Therapeutic activities/exercises completed this date:     []   Nu-step:  Time:        Level:         #Steps:       []   Rebounder:    []  Seated     []  Standing        [x]   Balance training; SLS ax in // bars tapping cone with R/L LE; pt with decreased smoothness/ control with L vs R; increased difficulty with hip flexion with abduction for lateral movement knocking over cone 3 our of 15 reps. Pt req SB-CGA for balance. []   Postural training    []   Supine ther ex (reps/sets):     []   Seated ther ex (reps/sets):     [x]   Standing ther ex (reps/sets): B LEs for heel raises x 15, hip flexion x 10 ; SBA for balance.     []   Picked up object from floor                       []   Reacher used   []   Other:   []   Other:   []   Other:      Patient/Caregiver Education and Training:   []    Role of PT  []   Education about Dx  [x]   Use of call light for assist   []   HEP provided and explained   [x]   Treatment plan reviewed  [x]   Home safety  []   Wheelchair mobility/management   []   Body mechanics  [x]   Bed Mobility/Transfer technique  [x]   Gait technique/sequencing  [x]   Proper use of

## 2023-09-28 NOTE — PROGRESS NOTES
09/28/23 1213   Encounter Summary   Encounter Overview/Reason  Renae Encounter   Service Provided For: Patient and family together   Referral/Consult From: [address] [place] [address]   Last Encounter  09/28/23  (Camden Burch: I visited patient in the presence of her son, Noemí Bah. I offered emotional and spiritual support. I prayed for patient and gave her Holy Communion. Both patient and son were grateful)   Complexity of Encounter Low   Begin Time 1200   End Time  1216   Total Time Calculated 16 min   Crisis   Type Follow up   Spiritual/Emotional needs   Type Spiritual Support   Rituals, Rites and Sacraments   Type Hindu Communion   Assessment/Intervention/Outcome   Assessment Calm;Coping; Hopeful   Intervention Active listening;Nurtured Hope;Prayer (assurance of)/[place]   Outcome Coping;Encouraged;Engaged in conversation;Expressed feelings of Guill[place]ina, Peace and/or Love   Plan and Referrals   Plan/Referrals Continue to visit, (comment)

## 2023-09-28 NOTE — PROGRESS NOTES
Comprehensive Nutrition Assessment    Type and Reason for Visit:  Reassess    Nutrition Recommendations/Plan:   Continue regular diet  D/c oral nutrition supplement per patient preference; encourage family to provide [employer] Breakfast shakes if desired  Monitor weights, po intakes, labs, POC     Malnutrition Assessment:  Malnutrition Status: At risk for malnutrition (Comment) (advanced age, CVA) (09/28/23 1325)    Context:  Social/Environmental Circumstances       Nutrition Assessment:    Pt up in chair. Per flowsheets, eating >50% of most meals, noted pt ate % of 2 meal trays in room upon visit. Pt states she only likes [employer] and doesn't care for Ensure, will d/c. Pt denies trouble chewing/swallowing. Noted possible 4# wt loss x6d, pt denies feeling as though she has lost weight, states she weighed 96# at recent doctor visit. Will monitor weight trends and continue to follow at moderate nutrition risk. Nutrition Related Findings:    Meds and labs reviewed. PMH: Osteoporosis, Dysarthria, HLD, Prediabetes. 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  Weight Source: Bed Scale  Current BMI (kg/m2): 22  Usual Body Weight: 100 lb (45.4 kg) (4/25/23)  % Weight Change (Calculated): -1.2  Weight Adjustment For: No Adjustment                 BMI Categories: Normal Weight (BMI 22.0 to 24.9) age over 72    Estimated Daily Nutrient Needs:  Energy Requirements Based On: Kcal/kg  Weight Used for Energy Requirements:

## 2023-09-28 NOTE — PLAN OF CARE
Problem: Discharge Planning  Goal: Discharge to home or other facility with appropriate resources  9/27/2023 2054 by Lourdes Tellez LPN  Outcome: Progressing  9/27/2023 0955 by Leesa Joy LPN  Outcome: Progressing     Problem: Safety - Adult  Goal: Free from fall injury  9/27/2023 2054 by Lourdes Tellez LPN  Outcome: Progressing  9/27/2023 0955 by Leesa Joy LPN  Outcome: Progressing     Problem: ABCDS Injury Assessment  Goal: Absence of physical injury  9/27/2023 2054 by Lourdes Tellez LPN  Outcome: Progressing  9/27/2023 0955 by Leesa Joy LPN  Outcome: Progressing     Problem: Nutrition Deficit:  Goal: Optimize nutritional status  9/27/2023 2054 by Lourdes Tellez LPN  Outcome: Progressing  9/27/2023 0955 by Leesa Joy LPN  Outcome: Progressing     Problem: Pain  Goal: Verbalizes/displays adequate comfort level or baseline comfort level  9/27/2023 2054 by Lourdes Tellez LPN  Outcome: Progressing  9/27/2023 0955 by Leesa Joy LPN  Outcome: Progressing     Problem: Skin/Tissue Integrity  Goal: Absence of new skin breakdown  Description: 1. Monitor for areas of redness and/or skin breakdown  2. Assess vascular access sites hourly  3. Every 4-6 hours minimum:  Change oxygen saturation probe site  4. Every 4-6 hours:  If on nasal continuous positive airway pressure, respiratory therapy assess nares and determine need for appliance change or resting period.   9/27/2023 2054 by Lourdes Tellez LPN  Outcome: Progressing  9/27/2023 0955 by Leesa Joy LPN  Outcome: Progressing

## 2023-09-29 ENCOUNTER — TELEPHONE (OUTPATIENT)
Dept: INTERNAL MEDICINE CLINIC | Age: 88
End: 2023-09-29

## 2023-09-29 PROCEDURE — 97535 SELF CARE MNGMENT TRAINING: CPT

## 2023-09-29 PROCEDURE — 97116 GAIT TRAINING THERAPY: CPT

## 2023-09-29 PROCEDURE — 97150 GROUP THERAPEUTIC PROCEDURES: CPT

## 2023-09-29 PROCEDURE — 1280000000 HC REHAB R&B

## 2023-09-29 PROCEDURE — 97110 THERAPEUTIC EXERCISES: CPT

## 2023-09-29 PROCEDURE — 97530 THERAPEUTIC ACTIVITIES: CPT

## 2023-09-29 PROCEDURE — 94761 N-INVAS EAR/PLS OXIMETRY MLT: CPT

## 2023-09-29 PROCEDURE — 6370000000 HC RX 637 (ALT 250 FOR IP): Performed by: STUDENT IN AN ORGANIZED HEALTH CARE EDUCATION/TRAINING PROGRAM

## 2023-09-29 PROCEDURE — 94150 VITAL CAPACITY TEST: CPT

## 2023-09-29 RX ADMIN — AMLODIPINE BESYLATE 10 MG: 10 TABLET ORAL at 09:31

## 2023-09-29 RX ADMIN — TIMOLOL MALEATE 1 DROP: 2.5 SOLUTION OPHTHALMIC at 09:32

## 2023-09-29 RX ADMIN — ASPIRIN 81 MG CHEWABLE TABLET 81 MG: 81 TABLET CHEWABLE at 09:31

## 2023-09-29 RX ADMIN — ROSUVASTATIN CALCIUM 40 MG: 20 TABLET, FILM COATED ORAL at 21:04

## 2023-09-29 RX ADMIN — LATANOPROST 1 DROP: 50 SOLUTION OPHTHALMIC at 21:04

## 2023-09-29 RX ADMIN — CLOPIDOGREL BISULFATE 75 MG: 75 TABLET ORAL at 09:31

## 2023-09-29 ASSESSMENT — PAIN SCALES - GENERAL
PAINLEVEL_OUTOF10: 0
PAINLEVEL_OUTOF10: 0

## 2023-09-29 ASSESSMENT — PAIN SCALES - WONG BAKER: WONGBAKER_NUMERICALRESPONSE: 0

## 2023-09-29 NOTE — PROGRESS NOTES
Physical Therapy  [x] daily progress note       [] discharge       Patient Name:  Eduardo Martinez   :  6/15/1927 MRN: [id number]  Room:  [address] [place], [place] [address] Date of Admission: 2023  Rehabilitation Diagnosis:   CVA (cerebral vascular accident) [facility]) [I63.9]  Acute CVA (cerebrovascular accident) ([address] [facility]) [I63.9]       Date 2023       Day of ARU Week:  2   Time IN/OUT 1500/1600   Individual Tx Minutes 60   Group Tx Minutes    Co-Treat Minutes    Concurrent Tx Minutes    TOTAL Tx Time Mins 60   Variance Time    Variance Time []   Refusal due to:     []   Medical hold/reason:    []   Illness   []   Off Unit for test/procedure  []   Extra time needed to complete task  []   Therapeutic need  []   Other (specify):   Restrictions Restrictions/Precautions  Restrictions/Precautions: General Precautions, Fall Risk  Required Braces or Orthoses?: No (pt uses B knee braces from orthopedic doctor, but not required)      Interdisciplinary communication [x]   Cleared for therapy per nursing     []   RN notified about issues during session  [x]   RN updated on pt performance-ok for family to walk with pt in room  []   Spoke with   []   Spoke with OT  []   Spoke with MD  []   Other:    Subjective observations and cognitive status: Pt with call lightl on. Began session with BR. Upon return to room, daughter present.  Educated in progress and assist needed     Pain level/location:   0 /10       Location:    Discharge recommendations  Anticipated discharge date:  10/3  Destination: []home alone   [x]home alone with assist PRN     [] home w/ family      [] Continuous supervision  []SNF    [] Assisted living     [] Other:  Continued therapy: [x]HHC PT  []OUTPATIENT  PT   [] No Further PT  []SNF PT  Caregiver training recommended: []Yes  [] No   Equipment needs: 2ww     Bed Mobility:           [x]   Pt received out of bed     Transfers:    Sit--> Stand:  supervision  Stand --> Sit:   supervision  Toilet Transfer (if applicable):

## 2023-09-29 NOTE — PROGRESS NOTES
Mejia Rai    : 6/15/1927  Acct #: [de-identified]  MRN: [id number]              PM&R Progress Note      Admitting diagnosis: ***    Comorbid diagnoses impacting rehabilitation: ***    Chief complaint: ***    Prior (baseline) level of function: Independent.     Current level of function:         Current  IRF-ANGELY and Goals:   Occupational Therapy:    Short Term Goals  Time Frame for Short Term Goals: STGs=LTGs :   Long Term Goals  Time Frame for Long Term Goals : 7-10 days or until d/c  Long Term Goal 1: Pt will complete grooming tasks Ind  Long Term Goal 2: Pt will complete total body bathing mod I  Long Term Goal 3: Pt will complete UB dressing Ind  Long Term Goal 4: Pt will complete LB dressing Ind  Long Term Goal 5: Pt will doff/don footwear Ind  Additional Goals?: Yes  Long Term Goal 6: Pt will complete toileting mod I  Long Term Goal 7: Pt will complete functional transfers (bed, chair, toilet, shower if desired) c DME PRN and mod I  Long Term Goal 8: Pt will perform therex/therax to facilitate increased strength/endurance/ax tolerance (c emphasis on LUE neuro re-ed and dynamic standing balance >10 mins) c SBA  Long Term Goal 9: Pt will complete simple homemaking tasks c DME PRN and S :                                       Eating: Eating  Assistance Needed: Independent  Comment: IND to open packages/containers  CARE Score: 6  Discharge Goal: Independent       Oral Hygiene: Oral Hygiene  Assistance Needed: Independent  Comment: in stance at sink  CARE Score: 6  Discharge Goal: Independent    UB/LB Bathing: Shower/Bathe Self  Assistance Needed: Independent  Comment: Pt completed sinkside ADL in stance IND  CARE Score: 6  Discharge Goal: Independent    UB Dressing: Upper Body Dressing  Assistance Needed: Setup or clean-up assistance  Comment: S/U to doff/don shirt  CARE Score: 5  Discharge Goal: Independent         LB Dressing: Lower Body Dressing  Assistance Needed: Setup or clean-up assistance  Comment: pt able to thread BLEs into underwear and pants and manage over hips c S/U  CARE Score: 5  Discharge Goal: Independent    [employer] Footwear: Putting On/Taking Off Footwear  Assistance Needed: Setup or clean-up assistance  Comment: to doff/don socks and shoes  CARE Score: 5  Discharge Goal: Independent      [address]ile[address] needed: Supervision or touching assistance  Comment: Supervision for clothing management and BM hgyiene  CARE Score: 4  Discharge Goal: Independent      Toilet Transfers:   Toilet Transfer  Assistance needed: Supervision or touching assistance  Comment: Supervision c RW  CARE Score: 4  Discharge Goal: Independent    Physical Therapy:   Short Term Goals  Time Frame for Short Term Goals: 10 days STG=LTG  Short Term Goal 1: Pt will perform bed mobility mod I  Short Term Goal 2: Pt will perform sit to stand, w/c<>bed and car transfers with mod I  Short Term Goal 3: Pt will ambulate with 2ww on level surface 50' with mod I, on unlevel surface 10' and level surface 150' with SBA  Short Term Goal 4: Pt will ascend/descend curb step with 2ww and SBA,  4 steps, 1-2 rails min assist  Short Term Goal 5: pt will  light object from floor with 2ww and reacher with mod I            Bed Mobility:   Sit to Lying  Assistance Needed: Supervision or touching assistance  Comment: CG, no bed features  CARE Score: 4  Discharge Goal: Independent  Roll Left and Right  Assistance Needed: Independent  Comment: no bed features  CARE Score: 6  Discharge Goal: Independent  Lying to Sitting on Side of Bed  Assistance Needed: Supervision or touching assistance  Comment: CGA , no bed features  CARE Score: 4  Discharge Goal: Independent    Transfers:    Sit to Stand  Assistance Needed: Supervision or touching assistance  Comment: SB-CGA for balance with RW  CARE Score: 4  Discharge Goal: Independent  Chair/Bed-to-Chair Transfer  Assistance Needed: Supervision or touching assistance  Comment:

## 2023-09-29 NOTE — PLAN OF CARE
Problem: Discharge Planning  Goal: Discharge to home or other facility with appropriate resources  Outcome: Progressing  Flowsheets (Taken 9/28/2023 1550 by Haley Bonilla, HARMONY)  Discharge to home or other facility with appropriate resources:   Identify barriers to discharge with patient and caregiver   Arrange for needed discharge resources and transportation as appropriate   Identify discharge learning needs (meds, wound care, etc)   Refer to discharge planning if patient needs post-hospital services based on physician order or complex needs related to functional status, cognitive ability or social support system     Problem: Safety - Adult  Goal: Free from fall injury  Outcome: Progressing     Problem: ABCDS Injury Assessment  Goal: Absence of physical injury  Outcome: Progressing     Problem: Nutrition Deficit:  Goal: Optimize nutritional status  9/29/2023 0051 by Evy Joseph LPN  Outcome: Progressing  9/28/2023 1328 by Gino Blanco RD  Outcome: Progressing  Flowsheets (Taken 9/28/2023 1258)  Nutrient intake appropriate for improving, restoring, or maintaining nutritional needs:   Assess nutritional status and recommend course of action   Monitor oral intake, labs, and treatment plans   Recommend appropriate diets, oral nutritional supplements, and vitamin/mineral supplements     Problem: Pain  Goal: Verbalizes/displays adequate comfort level or baseline comfort level  Outcome: Progressing  Flowsheets (Taken 9/28/2023 0973 by Haley Bonilla RN)  Verbalizes/displays adequate comfort level or baseline comfort level:   Encourage patient to monitor pain and request assistance   Assess pain using appropriate pain scale   Administer analgesics based on type and severity of pain and evaluate response   Implement non-pharmacological measures as appropriate and evaluate response   Consider cultural and social influences on pain and pain management   Notify Licensed Independent Practitioner if

## 2023-09-29 NOTE — TELEPHONE ENCOUNTER
[facility] called requesting verbal order for PT, OT, patient to be discharged from ARU. Verbal given this date.

## 2023-09-29 NOTE — PROGRESS NOTES
Occupational Therapy    Physical Rehabilitation: OCCUPATIONAL THERAPY     [x] daily progress note       [] discharge       Patient Name:  Sanam Van   :  6/15/1927 MRN: [id number]  Room:  [address] [place] [address] Date of Admission: 2023  Rehabilitation Diagnosis:   CVA (cerebral vascular accident) [facility]) [I63.9]  Acute CVA (cerebrovascular accident) ([address] [facility]) [I63.9]       Date 2023       Day of ARU Week:  2   Time IN/OUT 9077-8354   Individual Tx Minutes 60   Group Tx Minutes    Co-Treat Minutes    Concurrent Tx Minutes    TOTAL Tx Time Mins 60   Variance Time    Variance Time []   Refusal due to:     []   Medical hold/reason:    []   Illness   []   Off Unit for test/procedure  []   Extra time needed to complete task  []   Therapeutic need  []   Other (specify):   Restrictions Restrictions/Precautions: General Precautions, Fall Risk         Communication with other providers: [x]   OK to see per nursing:     []   Spoke with team member regarding:      Subjective observations and cognitive status: Pt sitting on the EOB on approach with nsg tech going to take her to the bathroom. Pt pleasant and agreeable to therapy. Pain level/location:    /10       Location:    Discharge recommendations  Anticipated discharge date:  10/3  Destination: []home alone   [x]home alone w assist prn   [] home w/ family    [] Continuous supervision       []SNF    [] Assisted living     [] Other:   Continued therapy: [x]HHC OT  []OUTPATIENT  OT   [] No Further OT  Equipment needs: has recommended DME          Total time in group = 60min   Barriers (Home & Community) Group: Completed discussion and practice of barriers encountered in home and community settings including hazards, weather considerations, travel preparations, safe use of assistive device(s), and assistance needs.  Focused on problem-solving a variety of situations related to being at home and in the community; performing physical tasks safely (device safety, maneuvering Rehabtracker with patient and/or family this         date. Treatment Plan for Next Session: Continue OT POC      Assessment: This pt demonstrated a positive response to today's treatment as evidenced by QI scores. The patient is making progress toward established goals as evidenced by QI scores. Treatment/Activity Tolerance:   [x] Tolerated treatment with no adverse effects    [] Patient limited by fatigue  [] Patient limited by pain   [] Patient limited by medical complications:    [] Adverse reaction to Tx:   [] Significant change in status    Safety:       []  bed alarm set    [x]  chair alarm set    []  Pt refused alarms                []  Telesitter activated      [x]  Gait belt used during tx session      []other:       Number of Minutes/Billable Intervention  Therapeutic Exercise 15   ADL Self-care 30   Neuro Re-Ed    Therapeutic Activity 15   Group 60   Other:    TOTAL 120       Social History  Social/Functional History  Lives With: Alone  Type of Home: House  Home Layout: One level, Laundry in basement (daughter does laundry, \"I'm not allowed in the basement\")  Home Access: Stairs to enter with rails  Entrance Stairs - Number of Steps: 4  Entrance Stairs - Rails: Both  Bathroom Shower/Tub: Tub/Shower unit (Pt sponge bathes at baseline)  Bathroom Toilet: Standard (attached handrails on toilet)  Bathroom Equipment: Grab bars around toilet  Bathroom Accessibility: Walker accessible  Home Equipment: Hershell Vini, Reacher, Walker, 4 wheeled, Alert Button (transport w/c)  Has the patient had two or more falls in the past year or any fall with injury in the past year?: No (Pt fell once, but was not injured)  [place] Help From:  (daughter comes over about 2 hours every day, can stay longer if needed.  other family can also increase assist as needed)  ADL Assistance: Independent  Homemaking Responsibilities: Yes  Meal Prep Responsibility: Primary  Laundry Responsibility: No  Cleaning Responsibility: No  Syble Sears

## 2023-09-30 PROCEDURE — 1280000000 HC REHAB R&B

## 2023-09-30 PROCEDURE — 6370000000 HC RX 637 (ALT 250 FOR IP): Performed by: STUDENT IN AN ORGANIZED HEALTH CARE EDUCATION/TRAINING PROGRAM

## 2023-09-30 PROCEDURE — 94761 N-INVAS EAR/PLS OXIMETRY MLT: CPT

## 2023-09-30 PROCEDURE — 6370000000 HC RX 637 (ALT 250 FOR IP): Performed by: PHYSICAL MEDICINE & REHABILITATION

## 2023-09-30 PROCEDURE — 94150 VITAL CAPACITY TEST: CPT

## 2023-09-30 RX ADMIN — AMLODIPINE BESYLATE 10 MG: 10 TABLET ORAL at 10:48

## 2023-09-30 RX ADMIN — LATANOPROST 1 DROP: 50 SOLUTION OPHTHALMIC at 20:57

## 2023-09-30 RX ADMIN — ROSUVASTATIN CALCIUM 40 MG: 20 TABLET, FILM COATED ORAL at 20:55

## 2023-09-30 RX ADMIN — CLOPIDOGREL BISULFATE 75 MG: 75 TABLET ORAL at 10:48

## 2023-09-30 RX ADMIN — ACETAMINOPHEN 650 MG: 325 TABLET ORAL at 20:55

## 2023-09-30 RX ADMIN — ASPIRIN 81 MG CHEWABLE TABLET 81 MG: 81 TABLET CHEWABLE at 10:48

## 2023-09-30 RX ADMIN — TIMOLOL MALEATE 1 DROP: 2.5 SOLUTION OPHTHALMIC at 10:48

## 2023-09-30 ASSESSMENT — PAIN SCALES - GENERAL
PAINLEVEL_OUTOF10: 3
PAINLEVEL_OUTOF10: 2

## 2023-09-30 ASSESSMENT — PAIN DESCRIPTION - PAIN TYPE: TYPE: CHRONIC PAIN

## 2023-09-30 ASSESSMENT — PAIN - FUNCTIONAL ASSESSMENT: PAIN_FUNCTIONAL_ASSESSMENT: ACTIVITIES ARE NOT PREVENTED

## 2023-09-30 ASSESSMENT — PAIN DESCRIPTION - ORIENTATION: ORIENTATION: RIGHT;LEFT

## 2023-09-30 ASSESSMENT — PAIN DESCRIPTION - DESCRIPTORS: DESCRIPTORS: ACHING

## 2023-09-30 ASSESSMENT — PAIN DESCRIPTION - LOCATION: LOCATION: GENERALIZED

## 2023-09-30 NOTE — PLAN OF CARE
Problem: Discharge Planning  Goal: Discharge to home or other facility with appropriate resources  Outcome: Progressing  Flowsheets (Taken 9/29/2023 2100)  Discharge to home or other facility with appropriate resources: Identify barriers to discharge with patient and caregiver     Problem: Safety - Adult  Goal: Free from fall injury  Outcome: Progressing     Problem: ABCDS Injury Assessment  Goal: Absence of physical injury  Outcome: Progressing     Problem: Nutrition Deficit:  Goal: Optimize nutritional status  Outcome: Progressing     Problem: Pain  Goal: Verbalizes/displays adequate comfort level or baseline comfort level  Outcome: Progressing     Problem: Skin/Tissue Integrity  Goal: Absence of new skin breakdown  Description: 1. Monitor for areas of redness and/or skin breakdown  2. Assess vascular access sites hourly  3. Every 4-6 hours minimum:  Change oxygen saturation probe site  4. Every 4-6 hours:  If on nasal continuous positive airway pressure, respiratory therapy assess nares and determine need for appliance change or resting period.   Outcome: Progressing

## 2023-10-01 VITALS
WEIGHT: 96 LBS | RESPIRATION RATE: 18 BRPM | OXYGEN SATURATION: 91 % | BODY MASS INDEX: 22.21 KG/M2 | HEART RATE: 91 BPM | SYSTOLIC BLOOD PRESSURE: 138 MMHG | HEIGHT: 55 IN | DIASTOLIC BLOOD PRESSURE: 67 MMHG | TEMPERATURE: 97.8 F

## 2023-10-01 PROCEDURE — 94150 VITAL CAPACITY TEST: CPT

## 2023-10-01 PROCEDURE — 6370000000 HC RX 637 (ALT 250 FOR IP): Performed by: PHYSICAL MEDICINE & REHABILITATION

## 2023-10-01 PROCEDURE — 6370000000 HC RX 637 (ALT 250 FOR IP): Performed by: STUDENT IN AN ORGANIZED HEALTH CARE EDUCATION/TRAINING PROGRAM

## 2023-10-01 PROCEDURE — 94761 N-INVAS EAR/PLS OXIMETRY MLT: CPT

## 2023-10-01 PROCEDURE — 1280000000 HC REHAB R&B

## 2023-10-01 RX ADMIN — ROSUVASTATIN CALCIUM 40 MG: 20 TABLET, FILM COATED ORAL at 21:53

## 2023-10-01 RX ADMIN — CLOPIDOGREL BISULFATE 75 MG: 75 TABLET ORAL at 09:39

## 2023-10-01 RX ADMIN — TIMOLOL MALEATE 1 DROP: 2.5 SOLUTION OPHTHALMIC at 09:39

## 2023-10-01 RX ADMIN — AMLODIPINE BESYLATE 10 MG: 10 TABLET ORAL at 09:39

## 2023-10-01 RX ADMIN — ASPIRIN 81 MG CHEWABLE TABLET 81 MG: 81 TABLET CHEWABLE at 09:39

## 2023-10-01 RX ADMIN — ACETAMINOPHEN 650 MG: 325 TABLET ORAL at 21:56

## 2023-10-01 RX ADMIN — LATANOPROST 1 DROP: 50 SOLUTION OPHTHALMIC at 21:54

## 2023-10-01 ASSESSMENT — PAIN DESCRIPTION - LOCATION: LOCATION: KNEE

## 2023-10-01 ASSESSMENT — PAIN SCALES - GENERAL
PAINLEVEL_OUTOF10: 4
PAINLEVEL_OUTOF10: 0

## 2023-10-01 ASSESSMENT — PAIN DESCRIPTION - FREQUENCY: FREQUENCY: INTERMITTENT

## 2023-10-01 ASSESSMENT — PAIN DESCRIPTION - ONSET: ONSET: ON-GOING

## 2023-10-01 ASSESSMENT — PAIN DESCRIPTION - ORIENTATION: ORIENTATION: LEFT

## 2023-10-01 ASSESSMENT — PAIN DESCRIPTION - DESCRIPTORS: DESCRIPTORS: ACHING

## 2023-10-01 ASSESSMENT — PAIN - FUNCTIONAL ASSESSMENT: PAIN_FUNCTIONAL_ASSESSMENT: PREVENTS OR INTERFERES SOME ACTIVE ACTIVITIES AND ADLS

## 2023-10-01 ASSESSMENT — PAIN DESCRIPTION - PAIN TYPE: TYPE: CHRONIC PAIN

## 2023-10-01 ASSESSMENT — PAIN SCALES - WONG BAKER: WONGBAKER_NUMERICALRESPONSE: 0

## 2023-10-02 ENCOUNTER — TELEPHONE (OUTPATIENT)
Age: 88
End: 2023-10-02

## 2023-10-02 PROCEDURE — 97535 SELF CARE MNGMENT TRAINING: CPT

## 2023-10-02 PROCEDURE — 97530 THERAPEUTIC ACTIVITIES: CPT

## 2023-10-02 PROCEDURE — 97116 GAIT TRAINING THERAPY: CPT

## 2023-10-02 PROCEDURE — 97110 THERAPEUTIC EXERCISES: CPT

## 2023-10-02 PROCEDURE — 94761 N-INVAS EAR/PLS OXIMETRY MLT: CPT

## 2023-10-02 PROCEDURE — 6370000000 HC RX 637 (ALT 250 FOR IP): Performed by: PHYSICAL MEDICINE & REHABILITATION

## 2023-10-02 PROCEDURE — 1280000000 HC REHAB R&B

## 2023-10-02 PROCEDURE — 6370000000 HC RX 637 (ALT 250 FOR IP): Performed by: STUDENT IN AN ORGANIZED HEALTH CARE EDUCATION/TRAINING PROGRAM

## 2023-10-02 RX ORDER — ROSUVASTATIN CALCIUM 40 MG/1
40 TABLET, COATED ORAL NIGHTLY
Qty: 30 TABLET | Refills: 0 | Status: SHIPPED | OUTPATIENT
Start: 2023-10-02 | End: 2023-10-05 | Stop reason: SDUPTHER

## 2023-10-02 RX ORDER — CLOPIDOGREL BISULFATE 75 MG/1
75 TABLET ORAL DAILY
Qty: 30 TABLET | Refills: 0 | Status: SHIPPED | OUTPATIENT
Start: 2023-10-03 | End: 2023-10-05 | Stop reason: SDUPTHER

## 2023-10-02 RX ORDER — ASPIRIN 81 MG/1
81 TABLET, CHEWABLE ORAL DAILY
Qty: 30 TABLET | Refills: 3 | COMMUNITY
Start: 2023-10-03

## 2023-10-02 RX ADMIN — ROSUVASTATIN CALCIUM 40 MG: 20 TABLET, FILM COATED ORAL at 21:33

## 2023-10-02 RX ADMIN — LATANOPROST 1 DROP: 50 SOLUTION OPHTHALMIC at 21:33

## 2023-10-02 RX ADMIN — TIMOLOL MALEATE 1 DROP: 2.5 SOLUTION OPHTHALMIC at 10:49

## 2023-10-02 RX ADMIN — AMLODIPINE BESYLATE 10 MG: 10 TABLET ORAL at 10:49

## 2023-10-02 RX ADMIN — ACETAMINOPHEN 650 MG: 325 TABLET ORAL at 21:34

## 2023-10-02 RX ADMIN — ACETAMINOPHEN 650 MG: 325 TABLET ORAL at 10:50

## 2023-10-02 RX ADMIN — CLOPIDOGREL BISULFATE 75 MG: 75 TABLET ORAL at 10:49

## 2023-10-02 RX ADMIN — ASPIRIN 81 MG CHEWABLE TABLET 81 MG: 81 TABLET CHEWABLE at 10:49

## 2023-10-02 ASSESSMENT — PAIN DESCRIPTION - ORIENTATION
ORIENTATION: RIGHT;LEFT
ORIENTATION: RIGHT;LEFT

## 2023-10-02 ASSESSMENT — PAIN DESCRIPTION - ONSET
ONSET: ON-GOING
ONSET: ON-GOING

## 2023-10-02 ASSESSMENT — PAIN DESCRIPTION - LOCATION
LOCATION: KNEE
LOCATION: KNEE

## 2023-10-02 ASSESSMENT — PAIN DESCRIPTION - PAIN TYPE
TYPE: CHRONIC PAIN
TYPE: CHRONIC PAIN

## 2023-10-02 ASSESSMENT — PAIN DESCRIPTION - DESCRIPTORS
DESCRIPTORS: ACHING;DISCOMFORT
DESCRIPTORS: ACHING

## 2023-10-02 ASSESSMENT — PAIN SCALES - GENERAL
PAINLEVEL_OUTOF10: 3
PAINLEVEL_OUTOF10: 5
PAINLEVEL_OUTOF10: 4

## 2023-10-02 ASSESSMENT — PAIN DESCRIPTION - FREQUENCY
FREQUENCY: INTERMITTENT
FREQUENCY: INTERMITTENT

## 2023-10-02 ASSESSMENT — PAIN - FUNCTIONAL ASSESSMENT
PAIN_FUNCTIONAL_ASSESSMENT: ACTIVITIES ARE NOT PREVENTED
PAIN_FUNCTIONAL_ASSESSMENT: PREVENTS OR INTERFERES SOME ACTIVE ACTIVITIES AND ADLS

## 2023-10-02 NOTE — PROGRESS NOTES
10/02/23 0748   Encounter Summary   Encounter Overview/Reason  Volunteer Encounter   Service Provided For: Patient   Referral/Consult From: [address]   Last Encounter  10/01/23  (Volunteer visit by Radha Head, [facility] ; documented by RevALBERT Van)   Complexity of Encounter Low   Begin Time 1120   End Time  1130   Total Time Calculated 10 min   Spiritual/Emotional needs   Type Spiritual Support   Rituals, Rites and Sacraments   Type Blessings  (Rosary and pamphlet given)   Assessment/Intervention/Outcome   Assessment Calm   Intervention Active listening   Outcome Comfort;Coping   Plan and Referrals   Plan/Referrals Assisted with [place] community resources;Guidance concerning next steps/process to be expected;Placed on [employer] Communion List;Continue Support (comment)

## 2023-10-02 NOTE — PROGRESS NOTES
basement\")  Home Access: Stairs to enter with rails  Entrance Stairs - Number of Steps: 4  Entrance Stairs - Rails: Both  Bathroom Shower/Tub: Tub/Shower unit (Pt sponge bathes at baseline)  Bathroom Toilet: Standard (attached handrails on toilet)  Bathroom Equipment: Grab bars around toilet  Bathroom Accessibility: Walker accessible  Home Equipment: Katheleen Raring, Reacher, Walker, 4 wheeled, Alert Button (transport w/c)  Has the patient had two or more falls in the past year or any fall with injury in the past year?: No (Pt fell once, but was not injured)  [place] Help From:  (daughter comes over about 2 hours every day, can stay longer if needed. other family can also increase assist as needed)  ADL Assistance: Independent  Homemaking Responsibilities: Yes  Meal Prep Responsibility: Primary  Laundry Responsibility: No  Cleaning Responsibility: No  Bill Paying/Finance Responsibility: No  Shopping Responsibility: No  Health Care Management: Primary  Ambulation Assistance: Independent  Transfer Assistance: Independent  Active : No  Patient's  Info: Hasn't driven in about 4 years.  daughter has Charito Mcdonald that is good height for pt  Mode of Transportation: SUV  Additional Comments: Pt sleeps in flat bed    Objective                                                                                    Goals:  (Update in navigator)  Short Term Goals  Time Frame for Short Term Goals: STGs=LTGs:  Long Term Goals  Time Frame for Long Term Goals : 7-10 days or until d/c  Long Term Goal 1: Pt will complete grooming tasks Ind  Long Term Goal 2: Pt will complete total body bathing mod I  Long Term Goal 3: Pt will complete UB dressing Ind  Long Term Goal 4: Pt will complete LB dressing Ind  Long Term Goal 5: Pt will doff/don footwear Ind  Additional Goals?: Yes  Long Term Goal 6: Pt will complete toileting mod I  Long Term Goal 7: Pt will complete functional transfers (bed, chair, toilet, shower if desired) c DME PRN and mod

## 2023-10-02 NOTE — PROGRESS NOTES
Blake Kocher    : 6/15/1927  Acct #: [de-identified]  MRN: [id number]              PM&R Progress Note      Admitting diagnosis: ***    Comorbid diagnoses impacting rehabilitation: ***    Chief complaint: ***    Prior (baseline) level of function: Independent.     Current level of function:         Current  IRF-ANGELY and Goals:   Occupational Therapy:    Short Term Goals  Time Frame for Short Term Goals: STGs=LTGs :   Long Term Goals  Time Frame for Long Term Goals : 7-10 days or until d/c  Long Term Goal 1: Pt will complete grooming tasks Ind  Long Term Goal 2: Pt will complete total body bathing mod I  Long Term Goal 3: Pt will complete UB dressing Ind  Long Term Goal 4: Pt will complete LB dressing Ind  Long Term Goal 5: Pt will doff/don footwear Ind  Additional Goals?: Yes  Long Term Goal 6: Pt will complete toileting mod I  Long Term Goal 7: Pt will complete functional transfers (bed, chair, toilet, shower if desired) c DME PRN and mod I  Long Term Goal 8: Pt will perform therex/therax to facilitate increased strength/endurance/ax tolerance (c emphasis on LUE neuro re-ed and dynamic standing balance >10 mins) c SBA  Long Term Goal 9: Pt will complete simple homemaking tasks c DME PRN and S :                                       Eating: Eating  Assistance Needed: Independent  Comment: IND to open packages/containers and feed self  CARE Score: 6  Discharge Goal: Independent       Oral Hygiene: Oral Hygiene  Assistance Needed: Independent  Comment: in stance at sink  CARE Score: 6  Discharge Goal: Independent    UB/LB Bathing: Shower/Bathe Self  Assistance Needed: Independent  Comment: Pt completed sinkside ADL seated and in stance IND  CARE Score: 6  Discharge Goal: Independent    UB Dressing: Upper Body Dressing  Assistance Needed: Independent  Comment: Pt able to retrieve clothing and doff/don shirt  CARE Score: 6  Discharge Goal: Independent         LB Dressing: Lower Body Dressing  Assistance Needed:

## 2023-10-02 NOTE — DISCHARGE INSTRUCTIONS
Your information:  Name: Sanam Van  : 6/15/1927    Your instructions:    Pt is discharging to home with [facility] [place] [address]  [phone]  A representative from [facility] will contact you at home to schedule your home care needs    What to do after you leave the hospital:    Recommended diet: regular diet    Recommended activity: activity as tolerated and no driving until released by your primary provider    The following personal items were collected during your admission and were returned to you:    Belongings  Dental Appliances: At bedside, Uppers, Lowers  Vision - Corrective Lenses: Eyeglasses  Hearing Aid: Bilateral hearing aids  Clothing: Footwear, Shirt, Pants, Socks, Undergarments, At bedside  Jewelry: Sent home, Ring  Electronic Devices: None  Weapons (Notify Protective Services/Security): None  Home Medications: None  Valuables Given To: Family (Comment), Other (Comment) (Daughter Pa Morgan)  Provide Name(s) of Who Valuable(s) Were Given To: Monae    Information obtained by:  By signing below, I understand that if any problems occur once I leave the hospital I am to contact ***. I understand and acknowledge receipt of the instructions indicated above.

## 2023-10-02 NOTE — TELEPHONE ENCOUNTER
Spoke to [facility] at [facility]. She states patient is being discharged tomorrow 10/3/23 and the patient needs a follow up appointment with our office in stroke clinic. Mario Velásquez seen patient in hospital on 9/19/23. Appointment scheduled on Thursday, 10/12/23 @ 930 am with Roberta MENA. Nothing further needed at this time.

## 2023-10-02 NOTE — PATIENT CARE CONFERENCE
toileting mod I  Long Term Goal 7: Pt will complete functional transfers (bed, chair, toilet, shower if desired) c DME PRN and mod I  Long Term Goal 8: Pt will perform therex/therax to facilitate increased strength/endurance/ax tolerance (c emphasis on LUE neuro re-ed and dynamic standing balance >10 mins) c SBA  Long Term Goal 9: Pt will complete simple homemaking tasks c DME PRN and S :                                       OT IRF-ANGELY scores and goals since initial assessment:    ADLs:    Eating: Eating  Assistance Needed: Independent  Comment: IND to open packages/containers and feed self  CARE Score: 6  Discharge Goal: Independent       Oral Hygiene: Oral Hygiene  Assistance Needed: Independent  Comment: in stance at sink  CARE Score: 6  Discharge Goal: Independent    Toile[address] needed: Independent  Comment: IND with clothing management and bladder hygiene  CARE Score: 6  Discharge Goal: Independent      UB/LB Bathing: Shower/Bathe Self  Assistance Needed: Independent  Comment: Pt completed sinkside ADL seated and in stance IND  CARE Score: 6  Discharge Goal: Independent    UB Dressing: Upper Body Dressing  Assistance Needed: Independent  Comment: Pt able to retrieve clothing and doff/don shirt  CARE Score: 6  Discharge Goal: Independent         LB Dressing: Lower Body Dressing  Assistance Needed: Independent  Comment: Pt able to retrieve clothing and doff/don underwear and pants IND  CARE Score: 6  Discharge Goal: Independent    Donning and [place] Footwear: Putting On/Taking Off Footwear  Assistance Needed: Independent  Comment: pt able to doff hospital socks and don personal socks and shoes  CARE Score: 6  Discharge Goal: Independent      Toilet Transfers:    Toilet Transfer  Assistance needed: Independent  Comment: IND c RW and grab bars  CARE Score: 6  Discharge Goal: Independent      Impairments/deficits, barriers:  Mild L [facility] deficits  Assessment  Performance deficits /

## 2023-10-02 NOTE — DISCHARGE INSTR - ACTIVITY
[facility] Stroke Survivor and Caregiver Support Group  First Thursday of each month  5:30 PM - 8:00 PM P[place]  [email or web address]  [facility]    [facility], [facility] [address]  [phone]  A representative from [facility] will contact you at home to schedule your home care needs

## 2023-10-02 NOTE — CARE COORDINATION
Discussed discharge with patient and daughter and provided a copy of the Important Message from Medicare form signed upon admission. Patient verbalized understanding.

## 2023-10-02 NOTE — PROGRESS NOTES
a cane or crutch.       PT QI     Bed Mobility:     Roll Left and Right  Assistance Needed: Independent  Comment: in standard bed  CARE Score: 6  Discharge Goal: Independent    Sit to Lying  Assistance Needed: Independent  Comment: standard bed  CARE Score: 6  Discharge Goal: Independent    Lying to Sitting on Side of Bed  Assistance Needed: Independent  Comment: standard bed  CARE Score: 6  Discharge Goal: Independent    Transfers:    Sit to Stand  Assistance Needed: Independent  Comment: with RWd, demos safety and balance  CARE Score: 6  Discharge Goal: Independent    Chair/Bed-to-Chair Transfer  Assistance Needed: Independent  Comment: demos safety and balance with RW  CARE Score: 6  Discharge Goal: Independent         Car Transfer  Assistance Needed: Independent  Comment: using RW for aproach, leaves one hand on RW but not unsafe  CARE Score: 6  Discharge Goal: Independent           Object: Picking Up Object  Assistance Needed: Independent  Comment: at RW using reacher  CARE Score: 6  Discharge Goal: Independent    Ambulation:    Walking Ability  Does the Patient Walk?: Yes     Walk 10 Feet  Assistance Needed: Independent  Comment: with RW  CARE Score: 6  Discharge Goal: Independent     Walk 50 Feet with Two Turns  Assistance Needed: Supervision or touching assistance  Comment: Supervision for balance with turns  CARE Score: 4  Discharge Goal: Independent  Part met     Walk 150 Feet  Assistance Needed: Supervision or touching assistance  Comment: SB-supervision with RW  CARE Score: 4  Discharge Goal: Supervision or touching assistance  Other amb distance: 46'     Walking 10 Feet on Uneven Surfaces  Assistance Needed: Supervision or touching assistance  Comment: SBA for balance with RW  CARE Score: 4  Discharge Goal: Supervision or touching assistance     1 Step (Curb)  Assistance Needed: Supervision or touching assistance  Comment: SBA-CGA for balance with RW, cues due to over reaching when advancing AD

## 2023-10-03 VITALS
WEIGHT: 95.24 LBS | OXYGEN SATURATION: 96 % | BODY MASS INDEX: 22.04 KG/M2 | RESPIRATION RATE: 18 BRPM | DIASTOLIC BLOOD PRESSURE: 75 MMHG | HEIGHT: 55 IN | HEART RATE: 84 BPM | TEMPERATURE: 98.1 F | SYSTOLIC BLOOD PRESSURE: 148 MMHG

## 2023-10-03 PROCEDURE — 94150 VITAL CAPACITY TEST: CPT

## 2023-10-03 PROCEDURE — 94761 N-INVAS EAR/PLS OXIMETRY MLT: CPT

## 2023-10-03 PROCEDURE — 6370000000 HC RX 637 (ALT 250 FOR IP): Performed by: STUDENT IN AN ORGANIZED HEALTH CARE EDUCATION/TRAINING PROGRAM

## 2023-10-03 RX ADMIN — ASPIRIN 81 MG CHEWABLE TABLET 81 MG: 81 TABLET CHEWABLE at 09:24

## 2023-10-03 RX ADMIN — AMLODIPINE BESYLATE 10 MG: 10 TABLET ORAL at 09:24

## 2023-10-03 RX ADMIN — CLOPIDOGREL BISULFATE 75 MG: 75 TABLET ORAL at 09:24

## 2023-10-03 RX ADMIN — TIMOLOL MALEATE 1 DROP: 2.5 SOLUTION OPHTHALMIC at 09:24

## 2023-10-03 ASSESSMENT — PAIN SCALES - GENERAL: PAINLEVEL_OUTOF10: 0

## 2023-10-03 ASSESSMENT — PAIN SCALES - WONG BAKER: WONGBAKER_NUMERICALRESPONSE: 0

## 2023-10-03 NOTE — PLAN OF CARE
Problem: Discharge Planning  Goal: Discharge to home or other facility with appropriate resources  10/2/2023 2338 by Araseli Frey LPN  Outcome: Adequate for Discharge  10/2/2023 1555 by Josh Uriostegui LPN  Outcome: Progressing     Problem: Safety - Adult  Goal: Free from fall injury  10/2/2023 2338 by Araseli Frey LPN  Outcome: Adequate for Discharge  10/2/2023 1555 by Josh Uriostegui LPN  Outcome: Progressing     Problem: ABCDS Injury Assessment  Goal: Absence of physical injury  10/2/2023 2338 by Araseli Frey LPN  Outcome: Adequate for Discharge  10/2/2023 1555 by Josh Uriostegui LPN  Outcome: Progressing     Problem: Nutrition Deficit:  Goal: Optimize nutritional status  10/2/2023 2338 by Araseli Frey LPN  Outcome: Adequate for Discharge  10/2/2023 1555 by Josh Uriostegui LPN  Outcome: Progressing     Problem: Pain  Goal: Verbalizes/displays adequate comfort level or baseline comfort level  10/2/2023 2338 by Araseli Frey LPN  Outcome: Adequate for Discharge  10/2/2023 1555 by Josh Uriostegui LPN  Outcome: Progressing     Problem: Skin/Tissue Integrity  Goal: Absence of new skin breakdown  Description: 1. Monitor for areas of redness and/or skin breakdown  2. Assess vascular access sites hourly  3. Every 4-6 hours minimum:  Change oxygen saturation probe site  4. Every 4-6 hours:  If on nasal continuous positive airway pressure, respiratory therapy assess nares and determine need for appliance change or resting period.   10/2/2023 2338 by Araseli Frey LPN  Outcome: Adequate for Discharge  10/2/2023 1555 by Josh Uriostegui LPN  Outcome: Progressing

## 2023-10-03 NOTE — CARE COORDINATION
ARU  Discharge Summary    D/C Date: 10/03/2023    Patient discharged to: Home    Transported by: Family    Referrals made to: [facility]    Additional information: Follow up appointments scheduled with Hospital Follow Up with CHULA Kumar CNP Thursday Oct 5, 2023 11:20 AM, CHULA Rodrigues Mai, CNP Thursday Oct 12, 2023 9:30 AM, Patient notified, AVS updated    Caregiver training: none requested    Discharge BIMS completed:  [x]

## 2023-10-03 NOTE — PROGRESS NOTES
10/03/23 1058   Encounter Summary   Encounter Overview/Reason  Volunteer Encounter   Service Provided For: Patient   Referral/Consult From: [address]   Last Encounter  10/03/23  (Volunteer visit by Kamilla Mondragon, [facility] ; [facility]   Complexity of Encounter Low   Begin Time 0900   End Time  0910   Total Time Calculated 10 min   Spiritual/Emotional needs   Type Spiritual Support   Rituals, Rites and Sacraments   Type Anabaptist Communion   Assessment/Intervention/Outcome   Assessment Calm   Intervention Active listening   Outcome Comfort;Coping   Plan and Referrals   Plan/Referrals Continue to visit, (comment)

## 2023-10-03 NOTE — PROGRESS NOTES
ARU Discharge Assessment - [facility]    Transportation  \"In the past 6-12 months, has lack of transportation kept you from medical appointments, meetings, work, or from getting things needed for daily living? \"Check all that apply:  [] A.  Yes, it has kept me from medical appointments or from getting my medications  [] B.  Yes, it has kept me from non-medical meetings, appointments, work, or from getting things that I need  [x] C.  No  [] X. Patient unable to respond    Provision of Current Reconciled Medication List to Subsequent Provider at Discharge     [] No, current reconciled medication list not provided to the subsequent provider. NO if D/C home with Outpatient or no services   [x] Yes, current reconciled medication list provided to the subsequent provider. YES if D/C to Acute hospital, SNF, home with home health  (**We MUST Select route of transmission below**)      [] Via Electronic Health Record   [] [facility] NoDaysOff  [x] Verbal (e.g. in person, telephone, video conferencing)  [x] Paper-based (e.g. fax, copies, printouts)   [] Other Methods (e.g. texting, email, CDs)    Provision of Current Reconciled Medication List to Patient at Discharge  [x] No, current reconciled medication list not provided to the patient, family and/or caregiver. NO If D/C to Acute hospital, SNF, home with home health   [] Yes, current reconciled medication list provided to the patient, family and/or caregiver.   YES if D/C home with Outpatient or no services   (**WE MUST Select route of transmission below**)     [] Via Electronic Health Record (e.g., electronic access to patient portal)   [] Via [employer]  [] Verbal (e.g. in person, telephone, video conferencing)  [] Paper-based (e.g. fax, copies, printouts)   [] Other Methods (e.g. texting, email, CDs)    Health Literacy  \"How often do you need to have someone help you when you read instructions,

## 2023-10-03 NOTE — PROGRESS NOTES
Discharge instructions given to patient. All questions answered at this time. Patient discharging from ARU to home with home health care. Patient left via wheelchair to daughters vehicle.

## 2023-10-03 NOTE — PLAN OF CARE
Problem: Discharge Planning  Goal: Discharge to home or other facility with appropriate resources  10/3/2023 3408 by Dave Medrano RN  Outcome: Progressing  Flowsheets (Taken 10/3/2023 0800)  Discharge to home or other facility with appropriate resources:   Identify barriers to discharge with patient and caregiver   Arrange for needed discharge resources and transportation as appropriate   Identify discharge learning needs (meds, wound care, etc)   Refer to discharge planning if patient needs post-hospital services based on physician order or complex needs related to functional status, cognitive ability or social support system  10/2/2023 2338 by Chelle Boss LPN  Outcome: Adequate for Discharge     Problem: Safety - Adult  Goal: Free from fall injury  10/3/2023 6870 by Dave Medrano RN  Outcome: Progressing  10/2/2023 2338 by Chelle Boss LPN  Outcome: Adequate for Discharge     Problem: ABCDS Injury Assessment  Goal: Absence of physical injury  10/3/2023 8365 by Dave Medrano RN  Outcome: Progressing  10/2/2023 2338 by Chelle Boss LPN  Outcome: Adequate for Discharge     Problem: Nutrition Deficit:  Goal: Optimize nutritional status  10/3/2023 0653 by Dave Medrano RN  Outcome: Progressing  10/2/2023 2338 by Chelle Boss LPN  Outcome: Adequate for Discharge     Problem: Skin/Tissue Integrity  Goal: Absence of new skin breakdown  Description: 1. Monitor for areas of redness and/or skin breakdown  2. Assess vascular access sites hourly  3. Every 4-6 hours minimum:  Change oxygen saturation probe site  4. Every 4-6 hours:  If on nasal continuous positive airway pressure, respiratory therapy assess nares and determine need for appliance change or resting period.   10/3/2023 7278 by Dave Medrano RN  Outcome: Progressing  10/2/2023 2338 by Chelle Boss LPN  Outcome: Adequate for Discharge

## 2023-10-04 ENCOUNTER — CARE COORDINATION (OUTPATIENT)
Dept: CASE MANAGEMENT | Age: 88
End: 2023-10-04

## 2023-10-04 DIAGNOSIS — I63.9 ACUTE CVA (CEREBROVASCULAR ACCIDENT) (HCC): Primary | ICD-10-CM

## 2023-10-04 PROCEDURE — 1111F DSCHRG MED/CURRENT MED MERGE: CPT | Performed by: FAMILY MEDICINE

## 2023-10-04 NOTE — CARE COORDINATION
appointment-10/5/23 PCP? 10/12/23 stroke clinic?  medication management-changes?     Juan Miguel Alejandro, RN

## 2023-10-05 ENCOUNTER — OFFICE VISIT (OUTPATIENT)
Dept: INTERNAL MEDICINE CLINIC | Age: 88
End: 2023-10-05

## 2023-10-05 VITALS
BODY MASS INDEX: 22.36 KG/M2 | DIASTOLIC BLOOD PRESSURE: 64 MMHG | TEMPERATURE: 97.1 F | OXYGEN SATURATION: 96 % | WEIGHT: 96.2 LBS | HEART RATE: 69 BPM | SYSTOLIC BLOOD PRESSURE: 120 MMHG

## 2023-10-05 DIAGNOSIS — Z23 FLU VACCINE NEED: ICD-10-CM

## 2023-10-05 DIAGNOSIS — Z09 HOSPITAL DISCHARGE FOLLOW-UP: Primary | ICD-10-CM

## 2023-10-05 DIAGNOSIS — I63.9 ACUTE CVA (CEREBROVASCULAR ACCIDENT) (HCC): ICD-10-CM

## 2023-10-05 DIAGNOSIS — E78.2 MIXED HYPERLIPIDEMIA: ICD-10-CM

## 2023-10-05 DIAGNOSIS — D64.9 ANEMIA, UNSPECIFIED TYPE: ICD-10-CM

## 2023-10-05 RX ORDER — ROSUVASTATIN CALCIUM 40 MG/1
40 TABLET, COATED ORAL NIGHTLY
Qty: 90 TABLET | Refills: 1 | Status: SHIPPED | OUTPATIENT
Start: 2023-10-05 | End: 2024-04-02

## 2023-10-05 RX ORDER — CLOPIDOGREL BISULFATE 75 MG/1
75 TABLET ORAL DAILY
Qty: 90 TABLET | Refills: 1 | Status: SHIPPED | OUTPATIENT
Start: 2023-10-05 | End: 2024-04-02

## 2023-10-05 ASSESSMENT — ENCOUNTER SYMPTOMS
DIARRHEA: 0
WHEEZING: 0
CONSTIPATION: 0
EYE DISCHARGE: 0
SHORTNESS OF BREATH: 0
SORE THROAT: 0
TROUBLE SWALLOWING: 0
NAUSEA: 0
ABDOMINAL PAIN: 0
RHINORRHEA: 0
CHEST TIGHTNESS: 0
COUGH: 0
COLOR CHANGE: 0
EYE PAIN: 0
STRIDOR: 0
CHOKING: 0
EYE REDNESS: 0
VOMITING: 0
FACIAL SWELLING: 0

## 2023-10-05 ASSESSMENT — VISUAL ACUITY: OU: 1

## 2023-10-05 NOTE — PROGRESS NOTES
TRIG 112 09/19/2023     Lab Results   Component Value Date    HDL 71 09/19/2023     Lab Results   Component Value Date    LDLCALC 149 (H) 09/19/2023     Lab Results   Component Value Date    LABVLDL 11 12/14/2015    VLDL 16 03/04/2017     No results found for: \"CHOLHDLRATIO\"      IMAGING:      CTA   IMPRESSION:  Severe stenosis in the proximal P2 segment of the left PCA. Severe stenosis in the P3 segment of the right PCA. 70% stenosis in the distal M1 segment of the right MCA. 50% stenosis in the distal M1 segment of the left MCA. 50% stenosis at the origin of the left vertebral artery. Chronic distracted non-healed type 2 dens fracture. MRI  IMPRESSION:  1. Small acute ischemic infarct in the high posterior right frontal corona  radiata white matter. 2. No hemorrhage or mass effect. 3. Moderate chronic white matter microvascular ischemic changes. ASSESSMENT/PLAN:     Arterial ischemic stroke  -Continue aspirin and Plavix for secondary stroke prevention. -. Continue statin for secondary prevention of stroke. Recommend follow-up PCP to recheck and adjust statin to maintain less than 70.  -Echocardiogram no stroke participants. Discussed event monitor to screen for PAF, politely declined at this time.   -Continue PT/OT as able  -Stroke risk factors include: Age, HTN, prediabetes. Discussed stroke risk factor modification with the patient and her family.     Intracranial stenosis  -CTA with multiple areas of intracranial stenosis, Neuro intervention, Dr Perla Remy  recommends medical management, no plans for intervention.  -Recommend DAPT with aspirin and Plavix for 90 days, then monotherapy with aspirin thereafter for secondary stroke prevention     Stroke risk factor modification:  Long-term goal BP < 130/80  Per AHA/ASA prevention guidelines recommend LDL less than 70 for stroke prevention  Goal HgbA1c < 7  Limited alcohol consumption, weight control/reduction and regular

## 2023-10-05 NOTE — PROGRESS NOTES
Psychiatric:         Attention and Perception: Attention and perception normal.         Mood and Affect: Mood and affect normal.         Speech: Speech normal.         Behavior: Behavior normal. Behavior is cooperative. Thought Content: Thought content normal.         Cognition and Memory: Cognition and memory normal.         Judgment: Judgment normal.         An electronic signature was used to authenticate this note.   --CHULA Bonilla - CNP

## 2023-10-06 ENCOUNTER — TELEPHONE (OUTPATIENT)
Dept: INTERNAL MEDICINE CLINIC | Age: 88
End: 2023-10-06

## 2023-10-06 LAB
BASOPHILS # BLD: 0.1 K/UL (ref 0–0.2)
BASOPHILS NFR BLD: 0.8 %
DEPRECATED RDW RBC AUTO: 15.9 % (ref 12.4–15.4)
EOSINOPHIL # BLD: 0.4 K/UL (ref 0–0.6)
EOSINOPHIL NFR BLD: 4.5 %
HCT VFR BLD AUTO: 36.7 % (ref 36–48)
HGB BLD-MCNC: 12.6 G/DL (ref 12–16)
LYMPHOCYTES # BLD: 1.5 K/UL (ref 1–5.1)
LYMPHOCYTES NFR BLD: 19 %
MCH RBC QN AUTO: 31.7 PG (ref 26–34)
MCHC RBC AUTO-ENTMCNC: 34.3 G/DL (ref 31–36)
MCV RBC AUTO: 92.4 FL (ref 80–100)
MONOCYTES # BLD: 1 K/UL (ref 0–1.3)
MONOCYTES NFR BLD: 12 %
NEUTROPHILS # BLD: 5.1 K/UL (ref 1.7–7.7)
NEUTROPHILS NFR BLD: 63.7 %
PLATELET # BLD AUTO: 352 K/UL (ref 135–450)
PMV BLD AUTO: 7.9 FL (ref 5–10.5)
RBC # BLD AUTO: 3.97 M/UL (ref 4–5.2)
WBC # BLD AUTO: 8 K/UL (ref 4–11)

## 2023-10-06 NOTE — TELEPHONE ENCOUNTER
After review of flu tests low dose was administered to the Pt instead of high dose. Error was reported and documented.

## 2023-10-12 ENCOUNTER — OFFICE VISIT (OUTPATIENT)
Age: 88
End: 2023-10-12
Payer: MEDICARE

## 2023-10-12 VITALS
SYSTOLIC BLOOD PRESSURE: 130 MMHG | RESPIRATION RATE: 16 BRPM | DIASTOLIC BLOOD PRESSURE: 76 MMHG | BODY MASS INDEX: 22.63 KG/M2 | OXYGEN SATURATION: 95 % | WEIGHT: 97.38 LBS | HEART RATE: 72 BPM

## 2023-10-12 DIAGNOSIS — I63.9 ARTERIAL ISCHEMIC STROKE (HCC): Primary | ICD-10-CM

## 2023-10-12 DIAGNOSIS — I67.9 INTRACRANIAL VASCULAR STENOSIS: ICD-10-CM

## 2023-10-12 PROCEDURE — 1123F ACP DISCUSS/DSCN MKR DOCD: CPT | Performed by: NURSE PRACTITIONER

## 2023-10-12 PROCEDURE — 99214 OFFICE O/P EST MOD 30 MIN: CPT | Performed by: NURSE PRACTITIONER

## 2023-10-12 RX ORDER — ACETAMINOPHEN 500 MG
500 TABLET ORAL EVERY 6 HOURS PRN
COMMUNITY

## 2023-10-13 ENCOUNTER — CARE COORDINATION (OUTPATIENT)
Dept: CASE MANAGEMENT | Age: 88
End: 2023-10-13

## 2023-10-13 NOTE — CARE COORDINATION
Care Transitions Outreach Attempt    Patient: Jose Vu Patient : 6/15/1927 MRN: [id number]    Attempted to reach patient for transitions of care follow up. Unable to reach patient. (#1 subsquent) CTN will try again    Last Discharge Facility       Date Complaint Diagnosis Description Type Department Provider    23   Admission (Discharged) Zaina Price MD            Was this an external facility discharge?  No Discharge Facility Name: [facility]    Noted following upcoming appointments from discharge chart review:   [facility] follow up appointment(s):   Future Appointments   Date Time Provider [address]   2024  8:30 AM MD PEEWEE Lackey   3/4/2024  1:00 PM Sammy Ayon [place]     Non-[facility]  follow up appointment(s): FABIAN Lucas RN [phone]

## 2023-10-20 ENCOUNTER — CARE COORDINATION (OUTPATIENT)
Dept: CASE MANAGEMENT | Age: 88
End: 2023-10-20

## 2023-10-20 NOTE — CARE COORDINATION
Care Transitions Follow Up Call    Patient Current Location:  Home: [address] [facility]    Care Transition Nurse contacted the patient by telephone to follow up after ARU discharge on 10/3/23  Verified name and  with patient as identifiers. Patient: Vi Coombs  Patient : 6/15/1927   MRN: [id number]  Reason for Admission: Ac CVA  Discharge Date: 10/3/23 RARS: Readmission Risk Score: 10.7  Facility: [facility]    Needs to be reviewed by the provider   Additional needs identified to be addressed with provider: No       Method of communication with provider: none. Reviewed BEFAST (stroke):    BALANCE  Sudden dizziness, loss of balance or coordination. EYES  Sudden trouble seeing out of one or both eyes. FACE  Facial weakness. An uneven smile or weakness on one side could mean trouble. ARMS  Arm weakness. Inability to raise both arms evenly could be another sign. SPEECH  Impaired speech. Slurred speech or difficulty repeating simple phrases could mean a stroke. TIME  Immediately call 911 if noting any of above sx. Patient v/u. Denies new or worsening stroke like sx. Noted to be a&o, speech clear. Reports taking Asa, Plavix, Crestor as directed. Advised to not skip doses or stop unless otherwise directed by MD. Reports she is a non-smoker, no second hand smoke exposure. Was seen by neuro at [facility] 10/12/23, no new rx. Reports appetite, fluid intake good w/ b&b wnl. Active w/ [facility]. Reminded of  [facility] RN coverage for healthcare questions, concerns. Denies dme needs. Reports ambulating well w. 1WW. Denies need for MOW, USS, community assistance.        Future Appointments   Date Time Provider [address]   2024  8:30 AM MD PEEWEE Wright   3/4/2024  1:00 PM Lpn, [facility] [place]     Care Transition Nurse reviewed medical action plan and red flags with patient and discussed any barriers to care and/or

## 2023-10-23 RX ORDER — AMLODIPINE BESYLATE 10 MG/1
10 TABLET ORAL DAILY
Qty: 30 TABLET | Refills: 5 | Status: SHIPPED | OUTPATIENT
Start: 2023-10-23

## 2023-10-27 ENCOUNTER — CARE COORDINATION (OUTPATIENT)
Dept: CASE MANAGEMENT | Age: 88
End: 2023-10-27

## 2023-10-27 NOTE — CARE COORDINATION
Care Transitions Outreach Attempt      Patient: Sarah Monk Patient : 6/15/1927 MRN: [id number]   Reason for Admission: Ac CVA  Discharge Date: 10/3/23       RARS: Readmission Risk Score: 10.7  Facility: [address] [place] [address] Discharge Facility       Date Complaint Diagnosis Description Type Department Provider    23   Admission (Discharged) Marilia Floyd MD          Noted following upcoming appointments from discharge chart review:   [facility] follow up appointment(s):   Future Appointments   Date Time Provider [address] [place] [address]   2024  8:30 AM MD PEEWEE Whittington   3/4/2024  1:00 PM Lpn, [facility] [place] [place]       Attempt to reach for Care Transition follow up call unsuccessful. HIPAA compliant message left requesting call back.

## 2023-11-01 ENCOUNTER — TELEPHONE (OUTPATIENT)
Dept: INTERNAL MEDICINE CLINIC | Age: 88
End: 2023-11-01

## 2023-11-01 ENCOUNTER — CARE COORDINATION (OUTPATIENT)
Dept: CASE MANAGEMENT | Age: 88
End: 2023-11-01

## 2023-11-01 NOTE — TELEPHONE ENCOUNTER
Patient had EKG completed a few weeks ago in clinic showing PVCs. If patient is asymptomatic (and the rest of her vitals are normal), we can just monitor. If she does develop symptoms, I can evaluate her in clinic. Thanks.

## 2023-11-01 NOTE — TELEPHONE ENCOUNTER
Called the home care nurse-alvin-to speak with ab out the patient and the message that she left with the  staff-she's not positive that it's PVC's because she can't run a EKG strip but when she listens to HR apically she hear it-also correction on her BP it is 115/55-because I questioned the BP when she said her vitals were stable-the BP was not 115/155-she says she is not having any symptoms but she will check patient for us on Friday and let us know how she is doing

## 2023-11-01 NOTE — TELEPHONE ENCOUNTER
Geoffrey Grove from [facility] called stating that the patient's /155 and is having frequent PDC's and wanted to inform doctor. FYI Any questions or concerns call Geoffrey Grove [phone]

## 2023-11-02 NOTE — CARE COORDINATION
It  has been noted that pt had 2 unsuccessful subsequent outreach calls. Therefore, per protocol, Care Transitions episode will be closed , this CTN's name will be taken off the care team & no further calls will be made.       Sharita Trammell RN [phone]

## 2023-11-06 ENCOUNTER — TELEPHONE (OUTPATIENT)
Dept: INTERNAL MEDICINE CLINIC | Age: 88
End: 2023-11-06

## 2023-11-06 DIAGNOSIS — D64.9 ANEMIA, UNSPECIFIED TYPE: Primary | ICD-10-CM

## 2023-11-06 DIAGNOSIS — I10 HYPERTENSION, ESSENTIAL: ICD-10-CM

## 2023-11-06 NOTE — TELEPHONE ENCOUNTER
Patient called wanting to know if she should still be taking potassium. She sates the hospital took her off of it & doesn't know why.

## 2023-11-06 NOTE — TELEPHONE ENCOUNTER
I'm not sure why either but she can stay off of it for now. I ordered labs for her to complete before her next appointment in Jan so we can see what her K is at that time and if she needs to be restarted on it, we can. Please make sure she gets those completed before her appointment, thanks.

## 2023-11-20 PROBLEM — M17.0 BILATERAL PRIMARY OSTEOARTHRITIS OF KNEE: Status: ACTIVE | Noted: 2023-11-20

## 2024-01-02 ENCOUNTER — OFFICE VISIT (OUTPATIENT)
Dept: INTERNAL MEDICINE CLINIC | Age: 89
End: 2024-01-02
Payer: MEDICARE

## 2024-01-02 VITALS
OXYGEN SATURATION: 95 % | HEIGHT: 55 IN | DIASTOLIC BLOOD PRESSURE: 70 MMHG | SYSTOLIC BLOOD PRESSURE: 128 MMHG | HEART RATE: 66 BPM | WEIGHT: 97 LBS | BODY MASS INDEX: 22.45 KG/M2

## 2024-01-02 DIAGNOSIS — E78.2 MIXED HYPERLIPIDEMIA: ICD-10-CM

## 2024-01-02 DIAGNOSIS — I10 ESSENTIAL HYPERTENSION: ICD-10-CM

## 2024-01-02 DIAGNOSIS — L98.9 SKIN LESION: ICD-10-CM

## 2024-01-02 DIAGNOSIS — R73.03 PREDIABETES: ICD-10-CM

## 2024-01-02 DIAGNOSIS — I63.9 ACUTE CVA (CEREBROVASCULAR ACCIDENT) (HCC): Primary | ICD-10-CM

## 2024-01-02 DIAGNOSIS — M17.0 BILATERAL PRIMARY OSTEOARTHRITIS OF KNEE: ICD-10-CM

## 2024-01-02 PROBLEM — I69.354 HEMIPARESIS OF LEFT NONDOMINANT SIDE AS LATE EFFECT OF CEREBRAL INFARCTION (HCC): Status: RESOLVED | Noted: 2023-09-21 | Resolved: 2024-01-02

## 2024-01-02 PROBLEM — R47.1 DYSARTHRIA DUE TO ACUTE STROKE (HCC): Status: RESOLVED | Noted: 2023-09-21 | Resolved: 2024-01-02

## 2024-01-02 PROCEDURE — G8420 CALC BMI NORM PARAMETERS: HCPCS | Performed by: FAMILY MEDICINE

## 2024-01-02 PROCEDURE — G8484 FLU IMMUNIZE NO ADMIN: HCPCS | Performed by: FAMILY MEDICINE

## 2024-01-02 PROCEDURE — 99214 OFFICE O/P EST MOD 30 MIN: CPT | Performed by: FAMILY MEDICINE

## 2024-01-02 PROCEDURE — 1090F PRES/ABSN URINE INCON ASSESS: CPT | Performed by: FAMILY MEDICINE

## 2024-01-02 PROCEDURE — G8427 DOCREV CUR MEDS BY ELIG CLIN: HCPCS | Performed by: FAMILY MEDICINE

## 2024-01-02 PROCEDURE — 1036F TOBACCO NON-USER: CPT | Performed by: FAMILY MEDICINE

## 2024-01-02 PROCEDURE — 1123F ACP DISCUSS/DSCN MKR DOCD: CPT | Performed by: FAMILY MEDICINE

## 2024-01-02 ASSESSMENT — PATIENT HEALTH QUESTIONNAIRE - PHQ9
SUM OF ALL RESPONSES TO PHQ QUESTIONS 1-9: 0
SUM OF ALL RESPONSES TO PHQ QUESTIONS 1-9: 0
SUM OF ALL RESPONSES TO PHQ9 QUESTIONS 1 & 2: 0
1. LITTLE INTEREST OR PLEASURE IN DOING THINGS: 0
SUM OF ALL RESPONSES TO PHQ QUESTIONS 1-9: 0
2. FEELING DOWN, DEPRESSED OR HOPELESS: 0
SUM OF ALL RESPONSES TO PHQ QUESTIONS 1-9: 0

## 2024-01-02 ASSESSMENT — ENCOUNTER SYMPTOMS
DIARRHEA: 0
ABDOMINAL PAIN: 0
VOMITING: 0
CONSTIPATION: 0
CHEST TIGHTNESS: 0
SORE THROAT: 0
COLOR CHANGE: 1
SHORTNESS OF BREATH: 0

## 2024-01-02 NOTE — PROGRESS NOTES
Subjective:      Chief Complaint   Patient presents with    Follow-up       HPI:  Doretha Melo is a 89 y.o. female who presents today for follow up of chronic conditions as listed below.    Was hospitalized a few months ago for CVA, was started on ASA and plavix.  Daughter states she almost completed her plavix course but about 3-4 days prior to the end of her treatment, she developed a large blood blister on her lower leg, so was told by neurosurgeon that she could discontinue.  Did see Dr. Grossman after hospitalization, but has been cleared and does not have any more follow up scheduled.  Is still taking ASA and crestor, tolerating without issues.  L sided weakness has completely resolved.     HTN:  BP's have been 120-130's/60-70's at home.  Denies any symptoms.      Osteoporosis:  Taking fosamax.       Prediabetes:  No significant changes in diet.      Will be restarted injections in knees with ortho.    Has had a lesion on her L cheek (right in front of L ear) for the past several months.  Believes it is due to her putting her hearing aids in/out.  Does have a dermatologist but has not been seen in over a year.       Otherwise feeling well today, no acute concerns.  Labs reviewed.         Past Medical History:   Diagnosis Date    BCC (basal cell carcinoma), leg 09/2017    Family history of pancreatic cancer     Glaucoma of right eye 05/2016    Dr Jean Baptiste    Hyperlipidemia     Hypertension     Macular degeneration, dry     Dr. Jean Baptiste    Osteoarthritis of knee 2011    Dr Trujillo    Osteoporosis 2002    Fosamax [phone]    Pelvic fracture (HCC) 05/2018    Right rotator cuff tear 2014    Trujillo / injection; PT    T10 vertebral fracture (HCC) 02/2018    fall and fragility fx; also L1 ; had kyphoplasty 2/2018    Vitamin B12 deficiency 03/2017    B 12 266 by Ronnie at [facility]        Past Surgical History:   Procedure Laterality Date    ABDOMINAL EXPLORATION SURGERY  1997    With Lysis of Adhesions    APPENDECTOMY  1953

## 2024-02-29 ENCOUNTER — TELEPHONE (OUTPATIENT)
Dept: INTERNAL MEDICINE CLINIC | Age: 89
End: 2024-02-29

## 2024-02-29 NOTE — TELEPHONE ENCOUNTER
----- Message from Daniellewillis Arnett sent at 2/29/2024 10:39 AM EST -----  Subject: Message to Provider    QUESTIONS  Information for Provider? Yasmin the daughter of Doretha called today say that   she can do a phone call but not a VV Telehealth visit. Please call her to   assist if this is possible. (daughter)   ---------------------------------------------------------------------------  --------------  CALL BACK INFO  [phone]; OK to leave message on voicemail  ---------------------------------------------------------------------------  --------------  SCRIPT ANSWERS  Relationship to Patient? Other/Third Party  Representative Name? daughter Yasmin  Is the representative on the Communication Release of Information (MORGAN)   form in Epic? Yes

## 2024-03-04 ENCOUNTER — TELEMEDICINE (OUTPATIENT)
Dept: INTERNAL MEDICINE CLINIC | Age: 89
End: 2024-03-04
Payer: MEDICARE

## 2024-03-04 DIAGNOSIS — Z00.00 MEDICARE ANNUAL WELLNESS VISIT, SUBSEQUENT: Primary | ICD-10-CM

## 2024-03-04 PROCEDURE — 1123F ACP DISCUSS/DSCN MKR DOCD: CPT | Performed by: FAMILY MEDICINE

## 2024-03-04 PROCEDURE — G8484 FLU IMMUNIZE NO ADMIN: HCPCS | Performed by: FAMILY MEDICINE

## 2024-03-04 PROCEDURE — G0439 PPPS, SUBSEQ VISIT: HCPCS | Performed by: FAMILY MEDICINE

## 2024-03-04 ASSESSMENT — PATIENT HEALTH QUESTIONNAIRE - PHQ9
SUM OF ALL RESPONSES TO PHQ9 QUESTIONS 1 & 2: 0
SUM OF ALL RESPONSES TO PHQ QUESTIONS 1-9: 0
2. FEELING DOWN, DEPRESSED OR HOPELESS: 0
1. LITTLE INTEREST OR PLEASURE IN DOING THINGS: 0
SUM OF ALL RESPONSES TO PHQ QUESTIONS 1-9: 0

## 2024-03-04 ASSESSMENT — LIFESTYLE VARIABLES
HOW OFTEN DO YOU HAVE A DRINK CONTAINING ALCOHOL: NEVER
HOW MANY STANDARD DRINKS CONTAINING ALCOHOL DO YOU HAVE ON A TYPICAL DAY: PATIENT DOES NOT DRINK

## 2024-03-04 NOTE — PATIENT INSTRUCTIONS
Personalized Preventive Plan for Doretha Melo - 3/4/2024  Medicare offers a range of preventive health benefits. Some of the tests and screenings are paid in full while other may be subject to a deductible, co-insurance, and/or copay.    Some of these benefits include a comprehensive review of your medical history including lifestyle, illnesses that may run in your family, and various assessments and screenings as appropriate.    After reviewing your medical record and screening and assessments performed today your provider may have ordered immunizations, labs, imaging, and/or referrals for you.  A list of these orders (if applicable) as well as your Preventive Care list are included within your After Visit Summary for your review.    Other Preventive Recommendations:    A preventive eye exam performed by an eye specialist is recommended every 1-2 years to screen for glaucoma; cataracts, macular degeneration, and other eye disorders.  A preventive dental visit is recommended every 6 months.  Try to get at least 150 minutes of exercise per week or 10,000 steps per day on a pedometer .  Order or download the FREE \"Exercise & Physical Activity: Your Everyday Guide\" from [employer]. Call 1-825.230.2499 or search [employer] online.  You need 6824-8066 mg of calcium and 7673-1485 IU of vitamin D per day. It is possible to meet your calcium requirement with diet alone, but a vitamin D supplement is usually necessary to meet this goal.  When exposed to the sun, use a sunscreen that protects against both UVA and UVB radiation with an SPF of 30 or greater. Reapply every 2 to 3 hours or after sweating, drying off with a towel, or swimming.  Always wear a seat belt when traveling in a car. Always wear a helmet when riding a bicycle or motorcycle.

## 2024-03-04 NOTE — PROGRESS NOTES
Medicare Annual Wellness Visit    Doretha Melo is here for Medicare AWV    Assessment & Plan   Medicare annual wellness visit, subsequent  Recommendations for Preventive Services Due: see orders and patient instructions/AVS.  Recommended screening schedule for the next 5-10 years is provided to the patient in written form: see Patient Instructions/AVS.     No follow-ups on file.     Subjective       Patient's complete Health Risk Assessment and screening values have been reviewed and are found in Flowsheets. The following problems were reviewed today and where indicated follow up appointments were made and/or referrals ordered.    Positive Risk Factor Screenings with Interventions:    Fall Risk:  Do you feel unsteady or are you worried about falling? : (!) yes (walker always)  2 or more falls in past year?: (!) yes  Fall with injury in past year?: (!) yes     Interventions:    Patient comments: patient states she always uses a walker for stability.  Reviewed medications, home hazards, visual acuity, and co-morbidities that can increase risk for falls  Patient declines any further evaluation or treatment                  ADL's:   Patient reports needing help with:  Select all that apply: (!) Laundry, Housekeeping, Banking/Finances, Shopping, Food Preparation, Transportation (dgtr)  Interventions:  Patient comments: patient states her daughter, who is a retired nurse,  helps her with her ADLs.  Patient declined any further interventions or treatment                  Objective      Patient-Reported Vitals  No data recorded     Unable to obtain 3 vital signs due to patient not having equipment to take blood pressure/temperature.           Allergies   Allergen Reactions    Bee Venom Swelling    Iodides Hives    Shrimp Flavor Hives     Prior to Visit Medications    Medication Sig Taking? Authorizing Provider   amLODIPine (NORVASC) 10 MG tablet TAKE 1 TABLET BY MOUTH DAILY Yes Kenna Ceron MD   acetaminophen

## 2024-03-11 ENCOUNTER — TELEPHONE (OUTPATIENT)
Dept: INTERNAL MEDICINE CLINIC | Age: 89
End: 2024-03-11

## 2024-03-11 NOTE — TELEPHONE ENCOUNTER
Patient called in stating that she is feeling light headed.  She states that she has urgency with urination but when she goes to the bathroom it takes a while to urinate and when she does it burns.  Patient states she is not sure if her daughter can bring her in for a urine sample would like a prescription called.

## 2024-03-11 NOTE — TELEPHONE ENCOUNTER
Patient called 2 more times about this message . Would like a call when medication is sent to the pharmacy.

## 2024-03-12 ENCOUNTER — OFFICE VISIT (OUTPATIENT)
Dept: INTERNAL MEDICINE CLINIC | Age: 89
End: 2024-03-12
Payer: MEDICARE

## 2024-03-12 DIAGNOSIS — N30.00 ACUTE CYSTITIS WITHOUT HEMATURIA: Primary | ICD-10-CM

## 2024-03-12 PROCEDURE — 1090F PRES/ABSN URINE INCON ASSESS: CPT | Performed by: FAMILY MEDICINE

## 2024-03-12 PROCEDURE — G2211 COMPLEX E/M VISIT ADD ON: HCPCS | Performed by: FAMILY MEDICINE

## 2024-03-12 PROCEDURE — G8420 CALC BMI NORM PARAMETERS: HCPCS | Performed by: FAMILY MEDICINE

## 2024-03-12 PROCEDURE — 1123F ACP DISCUSS/DSCN MKR DOCD: CPT | Performed by: FAMILY MEDICINE

## 2024-03-12 PROCEDURE — 1036F TOBACCO NON-USER: CPT | Performed by: FAMILY MEDICINE

## 2024-03-12 PROCEDURE — G8484 FLU IMMUNIZE NO ADMIN: HCPCS | Performed by: FAMILY MEDICINE

## 2024-03-12 PROCEDURE — G8428 CUR MEDS NOT DOCUMENT: HCPCS | Performed by: FAMILY MEDICINE

## 2024-03-12 PROCEDURE — 99213 OFFICE O/P EST LOW 20 MIN: CPT | Performed by: FAMILY MEDICINE

## 2024-03-12 RX ORDER — SULFAMETHOXAZOLE AND TRIMETHOPRIM 800; 160 MG/1; MG/1
1 TABLET ORAL 2 TIMES DAILY
Qty: 10 TABLET | Refills: 0 | Status: SHIPPED | OUTPATIENT
Start: 2024-03-12 | End: 2024-03-17

## 2024-03-12 ASSESSMENT — ENCOUNTER SYMPTOMS
VOMITING: 0
CHEST TIGHTNESS: 0
CONSTIPATION: 0
COLOR CHANGE: 0
SHORTNESS OF BREATH: 0
ABDOMINAL PAIN: 0
DIARRHEA: 0
SORE THROAT: 0

## 2024-03-12 NOTE — PROGRESS NOTES
3/12/2024    TELEHEALTH EVALUATION -- Audio/Visual    HPI:    Doretha Melo (:  6/15/1927) has requested an audio/video evaluation for the following concern(s):    Patient has been having UTI symptoms for 5 days.  Reports dysuria, urinary frequency, urgency.  No hematuria, fevers, flank pain.     Has not tried any treatment.       Review of Systems   Constitutional:  Negative for activity change, appetite change, chills, fever and unexpected weight change.   HENT:  Negative for congestion and sore throat.    Respiratory:  Negative for chest tightness and shortness of breath.    Cardiovascular:  Negative for chest pain and palpitations.   Gastrointestinal:  Negative for abdominal pain, constipation, diarrhea and vomiting.   Genitourinary:  Positive for dysuria, frequency and urgency. Negative for flank pain and hematuria.   Skin:  Negative for color change.   Neurological:  Negative for dizziness and light-headedness.   Psychiatric/Behavioral:  Negative for dysphoric mood. The patient is not nervous/anxious.        Prior to Visit Medications    Medication Sig Taking? Authorizing Provider   sulfamethoxazole-trimethoprim (BACTRIM DS;SEPTRA DS) 800-160 MG per tablet Take 1 tablet by mouth 2 times daily for 5 days Yes Kenna Ceron MD   amLODIPine (NORVASC) 10 MG tablet TAKE 1 TABLET BY MOUTH DAILY  Kenna Ceron MD   acetaminophen (TYLENOL) 500 MG tablet Take 1 tablet by mouth every 6 hours as needed for Pain  ProviderRaquel MD   rosuvastatin (CRESTOR) 40 MG tablet Take 1 tablet by mouth nightly  Juliana Munguia, APRN - CNP   aspirin 81 MG chewable tablet Take 1 tablet by mouth daily  GISEL Zayas MD   Multiple Vitamins-Minerals (PRESERVISION AREDS PO) Take 1 tablet by mouth in the morning and 1 tablet in the evening.  ProviderRaquel MD   timolol (TIMOPTIC) 0.25 % ophthalmic solution 1 drop  Raquel Osborne MD   alendronate (FOSAMAX) 70 MG tablet TAKE 1 TABLET BY MOUTH ONCE A

## 2024-03-21 DIAGNOSIS — E78.2 MIXED HYPERLIPIDEMIA: ICD-10-CM

## 2024-03-21 RX ORDER — ROSUVASTATIN CALCIUM 40 MG/1
40 TABLET, COATED ORAL NIGHTLY
Qty: 90 TABLET | Refills: 1 | Status: SHIPPED | OUTPATIENT
Start: 2024-03-21 | End: 2024-09-17

## 2024-05-07 ENCOUNTER — OFFICE VISIT (OUTPATIENT)
Dept: INTERNAL MEDICINE CLINIC | Age: 89
End: 2024-05-07

## 2024-05-07 VITALS
OXYGEN SATURATION: 93 % | HEART RATE: 78 BPM | SYSTOLIC BLOOD PRESSURE: 128 MMHG | BODY MASS INDEX: 21.62 KG/M2 | WEIGHT: 93 LBS | DIASTOLIC BLOOD PRESSURE: 74 MMHG

## 2024-05-07 DIAGNOSIS — E55.9 VITAMIN D DEFICIENCY: ICD-10-CM

## 2024-05-07 DIAGNOSIS — M81.0 OSTEOPOROSIS, UNSPECIFIED OSTEOPOROSIS TYPE, UNSPECIFIED PATHOLOGICAL FRACTURE PRESENCE: ICD-10-CM

## 2024-05-07 DIAGNOSIS — E78.2 MIXED HYPERLIPIDEMIA: Primary | ICD-10-CM

## 2024-05-07 DIAGNOSIS — R73.03 PREDIABETES: ICD-10-CM

## 2024-05-07 DIAGNOSIS — I10 ESSENTIAL HYPERTENSION: ICD-10-CM

## 2024-05-07 ASSESSMENT — ENCOUNTER SYMPTOMS
ABDOMINAL PAIN: 0
COLOR CHANGE: 0
SHORTNESS OF BREATH: 0
VOMITING: 0
SORE THROAT: 0
CHEST TIGHTNESS: 0
DIARRHEA: 0
CONSTIPATION: 0

## 2024-05-07 NOTE — PROGRESS NOTES
Never    Smokeless tobacco: Never   Substance Use Topics    Alcohol use: Yes     Comment: wine rarely        Review of Systems   Constitutional:  Negative for activity change, appetite change, chills, fever and unexpected weight change.   HENT:  Negative for congestion and sore throat.    Respiratory:  Negative for chest tightness and shortness of breath.    Cardiovascular:  Negative for chest pain and palpitations.   Gastrointestinal:  Negative for abdominal pain, constipation, diarrhea and vomiting.   Genitourinary:  Negative for dysuria.   Musculoskeletal:  Positive for arthralgias and gait problem.   Skin:  Negative for color change.   Neurological:  Negative for dizziness and light-headedness.   Psychiatric/Behavioral:  Negative for dysphoric mood. The patient is not nervous/anxious.         Prior to Visit Medications    Medication Sig Taking? Authorizing Provider   rosuvastatin (CRESTOR) 40 MG tablet TAKE 1 TABLET BY MOUTH NIGHTLY Yes Juliana Munguia APRN - CNP   amLODIPine (NORVASC) 10 MG tablet TAKE 1 TABLET BY MOUTH DAILY Yes Kenna Ceron MD   acetaminophen (TYLENOL) 500 MG tablet Take 1 tablet by mouth every 6 hours as needed for Pain Yes Raquel Osborne MD   aspirin 81 MG chewable tablet Take 1 tablet by mouth daily Yes GISEL Zayas MD   Multiple Vitamins-Minerals (PRESERVISION AREDS PO) Take 1 tablet by mouth in the morning and 1 tablet in the evening. Yes Raquel Osborne MD   timolol (TIMOPTIC) 0.25 % ophthalmic solution 1 drop Yes Raquel Osborne MD   alendronate (FOSAMAX) 70 MG tablet TAKE 1 TABLET BY MOUTH ONCE A WEEK *TAKE 30 MINUTES BEFORE FOOD, DRINK, OR MEDS. STAY UPRIGHT* Yes Juliana Munguia APRN - CNP   Calcium Carb-Cholecalciferol (CALCIUM-VITAMIN D3) 600-400 MG-UNIT TABS Take 2 tablets by mouth daily Yes Raquel Osborne MD   vitamin D (CHOLECALCIFEROL) 1000 UNIT TABS tablet Take 1 tablet by mouth daily Yes Raquel Osborne MD   vitamin B-12

## 2024-05-23 RX ORDER — AMLODIPINE BESYLATE 10 MG/1
10 TABLET ORAL DAILY
Qty: 30 TABLET | Refills: 5 | Status: SHIPPED | OUTPATIENT
Start: 2024-05-23

## 2024-05-23 RX ORDER — ALENDRONATE SODIUM 70 MG/1
TABLET ORAL
Qty: 4 TABLET | Refills: 5 | Status: SHIPPED | OUTPATIENT
Start: 2024-05-23

## 2024-05-24 ENCOUNTER — OFFICE VISIT (OUTPATIENT)
Dept: INTERNAL MEDICINE CLINIC | Age: 89
End: 2024-05-24

## 2024-05-24 VITALS
WEIGHT: 95 LBS | SYSTOLIC BLOOD PRESSURE: 142 MMHG | BODY MASS INDEX: 22.08 KG/M2 | OXYGEN SATURATION: 95 % | DIASTOLIC BLOOD PRESSURE: 70 MMHG | HEART RATE: 67 BPM

## 2024-05-24 DIAGNOSIS — S89.91XA INJURY OF RIGHT LOWER EXTREMITY, INITIAL ENCOUNTER: Primary | ICD-10-CM

## 2024-05-24 ASSESSMENT — ENCOUNTER SYMPTOMS
ABDOMINAL PAIN: 0
COLOR CHANGE: 0
CHOKING: 0
RHINORRHEA: 0
SHORTNESS OF BREATH: 0
STRIDOR: 0
TROUBLE SWALLOWING: 0
WHEEZING: 0
CONSTIPATION: 0
SORE THROAT: 0
EYE DISCHARGE: 0
VOMITING: 0
EYE REDNESS: 0
DIARRHEA: 0
COUGH: 0
NAUSEA: 0
FACIAL SWELLING: 0
CHEST TIGHTNESS: 0
EYE PAIN: 0

## 2024-05-24 ASSESSMENT — VISUAL ACUITY: OU: 1

## 2024-05-24 NOTE — PROGRESS NOTES
Subjective:      Chief Complaint   Patient presents with    Other     Open wound on leg     HPI:  Doretha Melo is a 89 y.o. female who presents today for RLE wound. Reports hit lower leg on walker on Tuesday and had a skin tear. Had appeared to heal and incurred another skin tear on mid posterior calf and had clear discharge and a blister appearance. Denies warmth, swelling, shortness of breath, other associated symptoms.       Past Medical History:   Diagnosis Date    BCC (basal cell carcinoma), leg 09/2017    Family history of pancreatic cancer     Glaucoma of right eye 05/2016    Dr Jean Baptiste    Hyperlipidemia     Hypertension     Macular degeneration, dry     Dr. Jean Baptiste    Osteoarthritis of knee 2011    Dr Trujillo    Osteoporosis 2002    Fosamax [id number]    Pelvic fracture (HCC) 05/2018    Right rotator cuff tear 2014    Trujillo / injection; PT    T10 vertebral fracture (HCC) 02/2018    fall and fragility fx; also L1 ; had kyphoplasty 2/2018    Vitamin B12 deficiency 03/2017    B 12 266 by Ronnie at [facility]      Past Surgical History:   Procedure Laterality Date    ABDOMINAL EXPLORATION SURGERY  1997    With Lysis of Adhesions    APPENDECTOMY  1953    w/  rt.uso    CYSTOCELE REPAIR  1993    w/ rectocele    FIXATION KYPHOPLASTY  02/19/2018    T10 & L1    HYSTERECTOMY (CERVIX STATUS UNKNOWN)  1969     remaining ovary removed also    SKIN CANCER EXCISION Left 09/2017    BCC with skin graft    WRIST FRACTURE SURGERY Left 03/2017    ORIF     Social History     Tobacco Use    Smoking status: Never    Smokeless tobacco: Never   Substance Use Topics    Alcohol use: Yes     Comment: wine rarely      Review of Systems   Constitutional:  Negative for activity change, appetite change, chills, diaphoresis, fatigue, fever and unexpected weight change.   HENT:  Negative for congestion, facial swelling, rhinorrhea, sore throat and trouble swallowing.    Eyes:  Negative for pain, discharge, redness and visual disturbance.

## 2024-06-11 ENCOUNTER — TELEPHONE (OUTPATIENT)
Dept: INTERNAL MEDICINE CLINIC | Age: 89
End: 2024-06-11

## 2024-06-11 DIAGNOSIS — N39.0 RECURRENT UTI: Primary | ICD-10-CM

## 2024-06-11 RX ORDER — SULFAMETHOXAZOLE AND TRIMETHOPRIM 800; 160 MG/1; MG/1
1 TABLET ORAL 2 TIMES DAILY
Qty: 14 TABLET | Refills: 0 | Status: SHIPPED | OUTPATIENT
Start: 2024-06-11 | End: 2024-06-18

## 2024-06-11 NOTE — TELEPHONE ENCOUNTER
Spoke to Monae and informed her of the antibiotic and the referral. Monae Verbally understood to call our office if Doretha is no better within 24 hours. No questions or concerns at this time

## 2024-06-11 NOTE — TELEPHONE ENCOUNTER
Got a call from the patients daughter Monae. Believes she has another UTI, symptoms started yesterday. She has been very dizzy and lightheaded, lower right back pain, has the urge to use the bathroom but not going. Patient says she feels she is going to pass out when she is moving around. Wondering if something could be called in or if she can come by the office to get a cup and have patient try to give urine sample. Asked for call back.

## 2024-06-11 NOTE — TELEPHONE ENCOUNTER
I've called in antibiotics for patient but if symptoms are not significantly improving in 24 hours, please have her be evaluated in clinic as I want to ensure her symptoms are not due to something else.  I am also ordering a urology referral as this is patient's 3rd UTI in 3 months.  I recommend her being evaluated to see why she is having such frequent infections.

## 2024-07-01 ENCOUNTER — APPOINTMENT (OUTPATIENT)
Dept: GENERAL RADIOLOGY | Age: 89
DRG: 313 | End: 2024-07-01
Payer: MEDICARE

## 2024-07-01 ENCOUNTER — HOSPITAL ENCOUNTER (INPATIENT)
Age: 89
LOS: 2 days | Discharge: NURSING, REHAB OR LONG-TERM CARE | DRG: 313 | End: 2024-07-03
Attending: EMERGENCY MEDICINE | Admitting: STUDENT IN AN ORGANIZED HEALTH CARE EDUCATION/TRAINING PROGRAM
Payer: MEDICARE

## 2024-07-01 DIAGNOSIS — R89.9 ABNORMAL LABORATORY TEST: ICD-10-CM

## 2024-07-01 DIAGNOSIS — S22.31XA CLOSED FRACTURE OF ONE RIB OF RIGHT SIDE, INITIAL ENCOUNTER: Primary | ICD-10-CM

## 2024-07-01 DIAGNOSIS — I48.0 PAROXYSMAL ATRIAL FIBRILLATION (HCC): ICD-10-CM

## 2024-07-01 PROBLEM — R07.89 ATYPICAL CHEST PAIN: Status: ACTIVE | Noted: 2024-07-01

## 2024-07-01 LAB
ALBUMIN SERPL-MCNC: 3.7 GM/DL (ref 3.4–5)
ALP BLD-CCNC: 95 IU/L (ref 40–128)
ALT SERPL-CCNC: 10 U/L (ref 10–40)
ANION GAP SERPL CALCULATED.3IONS-SCNC: 14 MMOL/L (ref 7–16)
AST SERPL-CCNC: 16 IU/L (ref 15–37)
BASOPHILS ABSOLUTE: 0 K/CU MM
BASOPHILS RELATIVE PERCENT: 0.4 % (ref 0–1)
BILIRUB SERPL-MCNC: 0.7 MG/DL (ref 0–1)
BUN SERPL-MCNC: 10 MG/DL (ref 6–23)
CALCIUM SERPL-MCNC: 9.4 MG/DL (ref 8.3–10.6)
CHLORIDE BLD-SCNC: 102 MMOL/L (ref 99–110)
CO2: 25 MMOL/L (ref 21–32)
CREAT SERPL-MCNC: 0.5 MG/DL (ref 0.6–1.1)
D-DIMER QUANTITATIVE: 2.04 UG/ML (FEU)
DIFFERENTIAL TYPE: ABNORMAL
EKG ATRIAL RATE: 312 BPM
EKG DIAGNOSIS: NORMAL
EKG Q-T INTERVAL: 312 MS
EKG QRS DURATION: 86 MS
EKG QTC CALCULATION (BAZETT): 394 MS
EKG R AXIS: -2 DEGREES
EKG T AXIS: 60 DEGREES
EKG VENTRICULAR RATE: 96 BPM
EOSINOPHILS ABSOLUTE: 0.1 K/CU MM
EOSINOPHILS RELATIVE PERCENT: 0.8 % (ref 0–3)
GFR, ESTIMATED: 85 ML/MIN/1.73M2
GLUCOSE SERPL-MCNC: 106 MG/DL (ref 70–99)
HCT VFR BLD CALC: 43.2 % (ref 37–47)
HEMOGLOBIN: 13.8 GM/DL (ref 12.5–16)
IMMATURE NEUTROPHIL %: 0.3 % (ref 0–0.43)
LYMPHOCYTES ABSOLUTE: 1.8 K/CU MM
LYMPHOCYTES RELATIVE PERCENT: 22.6 % (ref 24–44)
MCH RBC QN AUTO: 30.1 PG (ref 27–31)
MCHC RBC AUTO-ENTMCNC: 31.9 % (ref 32–36)
MCV RBC AUTO: 94.3 FL (ref 78–100)
MONOCYTES ABSOLUTE: 0.8 K/CU MM
MONOCYTES RELATIVE PERCENT: 10.7 % (ref 0–4)
NEUTROPHILS ABSOLUTE: 5.2 K/CU MM
NEUTROPHILS RELATIVE PERCENT: 65.2 % (ref 36–66)
NUCLEATED RBC %: 0 %
PDW BLD-RTO: 14.3 % (ref 11.7–14.9)
PLATELET # BLD: 357 K/CU MM (ref 140–440)
PMV BLD AUTO: 9.3 FL (ref 7.5–11.1)
POTASSIUM SERPL-SCNC: 3.5 MMOL/L (ref 3.5–5.1)
RBC # BLD: 4.58 M/CU MM (ref 4.2–5.4)
SODIUM BLD-SCNC: 141 MMOL/L (ref 135–145)
T4 FREE SERPL-MCNC: 1.27 NG/DL (ref 0.9–1.8)
TOTAL IMMATURE NEUTOROPHIL: 0.02 K/CU MM
TOTAL NUCLEATED RBC: 0 K/CU MM
TOTAL PROTEIN: 8 GM/DL (ref 6.4–8.2)
TROPONIN, HIGH SENSITIVITY: 18 NG/L (ref 0–14)
TROPONIN, HIGH SENSITIVITY: 19 NG/L (ref 0–14)
TROPONIN, HIGH SENSITIVITY: 20 NG/L (ref 0–14)
TSH SERPL DL<=0.005 MIU/L-ACNC: 1.41 UIU/ML (ref 0.27–4.2)
WBC # BLD: 7.9 K/CU MM (ref 4–10.5)

## 2024-07-01 PROCEDURE — 2500000003 HC RX 250 WO HCPCS: Performed by: STUDENT IN AN ORGANIZED HEALTH CARE EDUCATION/TRAINING PROGRAM

## 2024-07-01 PROCEDURE — 85379 FIBRIN DEGRADATION QUANT: CPT

## 2024-07-01 PROCEDURE — 36415 COLL VENOUS BLD VENIPUNCTURE: CPT

## 2024-07-01 PROCEDURE — 1200000000 HC SEMI PRIVATE

## 2024-07-01 PROCEDURE — 99223 1ST HOSP IP/OBS HIGH 75: CPT | Performed by: INTERNAL MEDICINE

## 2024-07-01 PROCEDURE — 6370000000 HC RX 637 (ALT 250 FOR IP): Performed by: STUDENT IN AN ORGANIZED HEALTH CARE EDUCATION/TRAINING PROGRAM

## 2024-07-01 PROCEDURE — 84443 ASSAY THYROID STIM HORMONE: CPT

## 2024-07-01 PROCEDURE — 94761 N-INVAS EAR/PLS OXIMETRY MLT: CPT

## 2024-07-01 PROCEDURE — 93005 ELECTROCARDIOGRAM TRACING: CPT | Performed by: EMERGENCY MEDICINE

## 2024-07-01 PROCEDURE — 84439 ASSAY OF FREE THYROXINE: CPT

## 2024-07-01 PROCEDURE — 2580000003 HC RX 258: Performed by: STUDENT IN AN ORGANIZED HEALTH CARE EDUCATION/TRAINING PROGRAM

## 2024-07-01 PROCEDURE — 71101 X-RAY EXAM UNILAT RIBS/CHEST: CPT

## 2024-07-01 PROCEDURE — 96374 THER/PROPH/DIAG INJ IV PUSH: CPT

## 2024-07-01 PROCEDURE — 99285 EMERGENCY DEPT VISIT HI MDM: CPT

## 2024-07-01 PROCEDURE — 85025 COMPLETE CBC W/AUTO DIFF WBC: CPT

## 2024-07-01 PROCEDURE — 80053 COMPREHEN METABOLIC PANEL: CPT

## 2024-07-01 PROCEDURE — 6370000000 HC RX 637 (ALT 250 FOR IP): Performed by: EMERGENCY MEDICINE

## 2024-07-01 PROCEDURE — 84484 ASSAY OF TROPONIN QUANT: CPT

## 2024-07-01 PROCEDURE — 6360000002 HC RX W HCPCS: Performed by: STUDENT IN AN ORGANIZED HEALTH CARE EDUCATION/TRAINING PROGRAM

## 2024-07-01 PROCEDURE — 93010 ELECTROCARDIOGRAM REPORT: CPT | Performed by: INTERNAL MEDICINE

## 2024-07-01 RX ORDER — SODIUM CHLORIDE 0.9 % (FLUSH) 0.9 %
5-40 SYRINGE (ML) INJECTION EVERY 12 HOURS SCHEDULED
Status: DISCONTINUED | OUTPATIENT
Start: 2024-07-01 | End: 2024-07-03 | Stop reason: HOSPADM

## 2024-07-01 RX ORDER — VITAMIN B COMPLEX
1000 TABLET ORAL DAILY
Status: DISCONTINUED | OUTPATIENT
Start: 2024-07-01 | End: 2024-07-03 | Stop reason: HOSPADM

## 2024-07-01 RX ORDER — SODIUM CHLORIDE 0.9 % (FLUSH) 0.9 %
5-40 SYRINGE (ML) INJECTION PRN
Status: DISCONTINUED | OUTPATIENT
Start: 2024-07-01 | End: 2024-07-03 | Stop reason: HOSPADM

## 2024-07-01 RX ORDER — METOPROLOL TARTRATE 1 MG/ML
5 INJECTION, SOLUTION INTRAVENOUS ONCE
Status: COMPLETED | OUTPATIENT
Start: 2024-07-01 | End: 2024-07-01

## 2024-07-01 RX ORDER — ONDANSETRON 4 MG/1
4 TABLET, ORALLY DISINTEGRATING ORAL EVERY 8 HOURS PRN
Status: DISCONTINUED | OUTPATIENT
Start: 2024-07-01 | End: 2024-07-03 | Stop reason: HOSPADM

## 2024-07-01 RX ORDER — ACETAMINOPHEN 650 MG/1
650 SUPPOSITORY RECTAL EVERY 6 HOURS PRN
Status: DISCONTINUED | OUTPATIENT
Start: 2024-07-01 | End: 2024-07-03 | Stop reason: HOSPADM

## 2024-07-01 RX ORDER — ACETAMINOPHEN 500 MG
500 TABLET ORAL 2 TIMES DAILY
Status: DISCONTINUED | OUTPATIENT
Start: 2024-07-01 | End: 2024-07-03 | Stop reason: HOSPADM

## 2024-07-01 RX ORDER — AMLODIPINE BESYLATE 10 MG/1
10 TABLET ORAL DAILY
Status: DISCONTINUED | OUTPATIENT
Start: 2024-07-01 | End: 2024-07-03 | Stop reason: HOSPADM

## 2024-07-01 RX ORDER — ROSUVASTATIN CALCIUM 20 MG/1
40 TABLET, COATED ORAL NIGHTLY
Status: DISCONTINUED | OUTPATIENT
Start: 2024-07-01 | End: 2024-07-03 | Stop reason: HOSPADM

## 2024-07-01 RX ORDER — MAGNESIUM SULFATE IN WATER 40 MG/ML
2000 INJECTION, SOLUTION INTRAVENOUS PRN
Status: DISCONTINUED | OUTPATIENT
Start: 2024-07-01 | End: 2024-07-03 | Stop reason: HOSPADM

## 2024-07-01 RX ORDER — POTASSIUM CHLORIDE 20 MEQ/1
40 TABLET, EXTENDED RELEASE ORAL PRN
Status: DISCONTINUED | OUTPATIENT
Start: 2024-07-01 | End: 2024-07-03 | Stop reason: HOSPADM

## 2024-07-01 RX ORDER — DIPHENHYDRAMINE HCL 25 MG
50 TABLET ORAL ONCE
Status: COMPLETED | OUTPATIENT
Start: 2024-07-01 | End: 2024-07-01

## 2024-07-01 RX ORDER — POLYETHYLENE GLYCOL 3350 17 G/17G
17 POWDER, FOR SOLUTION ORAL DAILY PRN
Status: DISCONTINUED | OUTPATIENT
Start: 2024-07-01 | End: 2024-07-03 | Stop reason: HOSPADM

## 2024-07-01 RX ORDER — ENOXAPARIN SODIUM 100 MG/ML
30 INJECTION SUBCUTANEOUS DAILY
Status: DISCONTINUED | OUTPATIENT
Start: 2024-07-01 | End: 2024-07-03 | Stop reason: HOSPADM

## 2024-07-01 RX ORDER — ONDANSETRON 2 MG/ML
4 INJECTION INTRAMUSCULAR; INTRAVENOUS EVERY 6 HOURS PRN
Status: DISCONTINUED | OUTPATIENT
Start: 2024-07-01 | End: 2024-07-03 | Stop reason: HOSPADM

## 2024-07-01 RX ORDER — ASPIRIN 81 MG/1
81 TABLET, CHEWABLE ORAL DAILY
Status: DISCONTINUED | OUTPATIENT
Start: 2024-07-01 | End: 2024-07-03 | Stop reason: HOSPADM

## 2024-07-01 RX ORDER — SODIUM CHLORIDE 9 MG/ML
INJECTION, SOLUTION INTRAVENOUS PRN
Status: DISCONTINUED | OUTPATIENT
Start: 2024-07-01 | End: 2024-07-03 | Stop reason: HOSPADM

## 2024-07-01 RX ORDER — POTASSIUM CHLORIDE 7.45 MG/ML
10 INJECTION INTRAVENOUS PRN
Status: DISCONTINUED | OUTPATIENT
Start: 2024-07-01 | End: 2024-07-03 | Stop reason: HOSPADM

## 2024-07-01 RX ORDER — ACETAMINOPHEN 500 MG
500 TABLET ORAL ONCE
Status: COMPLETED | OUTPATIENT
Start: 2024-07-01 | End: 2024-07-01

## 2024-07-01 RX ORDER — ACETAMINOPHEN 325 MG/1
650 TABLET ORAL EVERY 6 HOURS PRN
Status: DISCONTINUED | OUTPATIENT
Start: 2024-07-01 | End: 2024-07-03 | Stop reason: HOSPADM

## 2024-07-01 RX ORDER — ACETAMINOPHEN 500 MG
500 TABLET ORAL EVERY 4 HOURS PRN
Qty: 60 TABLET | Refills: 0 | Status: SHIPPED | OUTPATIENT
Start: 2024-07-01

## 2024-07-01 RX ORDER — 0.9 % SODIUM CHLORIDE 0.9 %
500 INTRAVENOUS SOLUTION INTRAVENOUS ONCE
Status: COMPLETED | OUTPATIENT
Start: 2024-07-01 | End: 2024-07-01

## 2024-07-01 RX ADMIN — Medication 1 TABLET: at 21:26

## 2024-07-01 RX ADMIN — ACETAMINOPHEN 500 MG: 500 TABLET ORAL at 21:26

## 2024-07-01 RX ADMIN — SODIUM CHLORIDE 500 ML: 9 INJECTION, SOLUTION INTRAVENOUS at 14:26

## 2024-07-01 RX ADMIN — DIPHENHYDRAMINE HYDROCHLORIDE 50 MG: 25 TABLET ORAL at 10:41

## 2024-07-01 RX ADMIN — ASPIRIN 81 MG: 81 TABLET, CHEWABLE ORAL at 17:21

## 2024-07-01 RX ADMIN — ROSUVASTATIN CALCIUM 40 MG: 20 TABLET, COATED ORAL at 21:26

## 2024-07-01 RX ADMIN — Medication 1000 UNITS: at 20:08

## 2024-07-01 RX ADMIN — SODIUM CHLORIDE, PRESERVATIVE FREE 10 ML: 5 INJECTION INTRAVENOUS at 21:27

## 2024-07-01 RX ADMIN — AMLODIPINE BESYLATE 10 MG: 10 TABLET ORAL at 17:21

## 2024-07-01 RX ADMIN — POTASSIUM CHLORIDE 40 MEQ: 1500 TABLET, EXTENDED RELEASE ORAL at 21:26

## 2024-07-01 RX ADMIN — ACETAMINOPHEN 500 MG: 500 TABLET ORAL at 08:00

## 2024-07-01 RX ADMIN — PREDNISONE 50 MG: 20 TABLET ORAL at 10:40

## 2024-07-01 RX ADMIN — PREDNISONE 50 MG: 20 TABLET ORAL at 17:21

## 2024-07-01 RX ADMIN — METOPROLOL TARTRATE 5 MG: 5 INJECTION INTRAVENOUS at 14:26

## 2024-07-01 ASSESSMENT — PAIN SCALES - GENERAL
PAINLEVEL_OUTOF10: 3
PAINLEVEL_OUTOF10: 0
PAINLEVEL_OUTOF10: 5
PAINLEVEL_OUTOF10: 8
PAINLEVEL_OUTOF10: 0

## 2024-07-01 ASSESSMENT — PAIN - FUNCTIONAL ASSESSMENT
PAIN_FUNCTIONAL_ASSESSMENT: PREVENTS OR INTERFERES SOME ACTIVE ACTIVITIES AND ADLS
PAIN_FUNCTIONAL_ASSESSMENT: 0-10

## 2024-07-01 ASSESSMENT — PAIN DESCRIPTION - ORIENTATION: ORIENTATION: RIGHT

## 2024-07-01 ASSESSMENT — PAIN DESCRIPTION - LOCATION: LOCATION: SHOULDER

## 2024-07-01 ASSESSMENT — PAIN DESCRIPTION - DESCRIPTORS: DESCRIPTORS: ACHING;SORE

## 2024-07-01 NOTE — FLOWSHEET NOTE
07/01/24 1610 07/01/24 1613 07/01/24 1615   Vitals   BP (!) 144/78 (!) 143/90 137/85   MAP (Calculated) 100 108 102   MAP (mmHg)  --  107 100   Patient Position Supine Sitting Standing     Orthostatic BP

## 2024-07-01 NOTE — PROGRESS NOTES
4 Eyes Skin Assessment     NAME:  Doretha Melo  YOB: 1927  MEDICAL RECORD NUMBER:  [id number]    The patient is being assessed for  Admission    I agree that at least one RN has performed a thorough Head to Toe Skin Assessment on the patient. ALL assessment sites listed below have been assessed.      Areas assessed by both nurses:    Head, Face, Ears, Shoulders, Back, Chest, Arms, Elbows, Hands, Sacrum. Buttock, Coccyx, Ischium, Legs. Feet and Heels, Under Medical Devices , and Other          Does the Patient have a Wound? Yes wound(s) were present on assessment. LDA wound assessment was Initiated and completed by RN       Donell Prevention initiated by RN: Yes  Wound Care Orders initiated by RN: Yes    Pressure Injury (Stage 3,4, Unstageable, DTI, NWPT, and Complex wounds) if present, place Wound referral order by RN under : Yes    New Ostomies, if present place, Ostomy referral order under : No     Nurse 1 eSignature: Electronically signed by Porsha Boo RN on 7/1/24 at 6:10 PM EDT    **SHARE this note so that the co-signing nurse can place an eSignature**    Nurse 2 eSignature: Electronically signed by Yas Hill RN on 7/1/24 at 6:44 PM EDT

## 2024-07-01 NOTE — ED NOTES
vitamin B-12 (CYANOCOBALAMIN) 500 MCG tablet Take 1 tablet by mouth daily    ProviderRaquel MD   latanoprost (XALATAN) 0.005 % ophthalmic solution Place 1 drop into both eyes nightly    Provider, MD Raquel     Comments:  Medication list reviewed with patient and insurance claims verified.  Patient reports she has taken no medications prior to ER visit.    To my knowledge the above medication history is accurate as of 7/1/2024 2:14 PM.   Maddie Slaughter CPhT   7/1/2024 2:14 PM     
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 Jean Baptiste    Osteoarthritis of knee 2011    Dr Trujillo    Osteoporosis 2002    Fosamax [id number]    Pelvic fracture (HCC) 05/2018    Right rotator cuff tear 2014    Trujillo / injection; PT    T10 vertebral fracture (HCC) 02/2018    fall and fragility fx; also L1 ; had kyphoplasty 2/2018    Vitamin B12 deficiency 03/2017    B 12 266 by Ronnie at [facility]       Assessment    Vitals: MEWS Score: 2  Level of Consciousness: Alert (0)   Vitals:    07/01/24 1102 07/01/24 1131 07/01/24 1245 07/01/24 1246   BP: (!) 149/93 128/86  (!) 139/92   Pulse: 89 (!) 113  (!) 104   Resp: 21 23 26   Temp:   98.2 °F (36.8 °C) 98.2 °F (36.8 °C)   TempSrc:   Oral Oral   SpO2: 94% 93%  95%   Weight:       Height:         PO Status: Regular  O2 Flow Rate: O2 Device: None (Room air)    Cardiac Rhythm: a fib  Last documented pain medication administered: 0800  NIH Score: NIH     Active LDA's:   Peripheral IV 07/01/24 Left Antecubital (Active)   Site Assessment Clean, dry & intact 07/01/24 0756   Line Status Blood return noted;Brisk blood return;Flushed;Specimen collected 07/01/24 0756   Phlebitis Assessment No symptoms 07/01/24 0756   Infiltration Assessment 0 07/01/24 0756   Alcohol Cap Used Yes 07/01/24 0756       Pertinent or High Risk Medications/Drips: no   If Yes, please provide details:   Blood Product Administration: no  If Yes, please provide details:     Recommendation    Incomplete orders   Additional Comments:    If any further questions, please call Sending RN at 1912    Electronically signed by: Electronically signed by Carley Wells RN on 7/1/2024 at 2:19 PM   
applicable):  Kenna Ceron MD  [address]  [facility] [address]  [phone]    In 3 days      Domi Meza MD  [address]  [facility] [address]  [phone]      Follow-up with primary care doctor and cardiologist, call for appointment    Disposition medications (if applicable):  New Prescriptions    ACETAMINOPHEN (TYLENOL) 500 MG TABLET    Take 1 tablet by mouth every 4 hours as needed for Pain or Fever     ED Provider Disposition Time  DISPOSITION  07/01/2024 12:19:18 PM      Comment: Please note this report has been produced using speech recognition software and may contain errors related to that system including errors in grammar, punctuation, and spelling, as well as words and phrases that may be inappropriate.  Efforts were made to edit the dictations.        Franklin Clemente MD  07/01/24 8704

## 2024-07-01 NOTE — CONSULTS
CARDIOLOGY CONSULT NOTE   Reason for consultation:  s/p fall    Referring physician:  Danay Alcantara MD     Primary care physician: Kenna Ceron MD      Dear  Dr. Danay Alcantara MD   Thanks for the consult.    Chief Complaints :  Chief Complaint   Patient presents with    Rib Pain (injury)     Right. Fell a few days ago.     Dizziness     Increased this morning         History of present illness:Doretha is a 89 y.o.year old who presents with after a fall she says that she had recently fallen resulting in rib injury today she got up and felt that her rib pain was intense she felt the room spinning and fell backwards did not really pass out she has had several falls in the last few days or weeks but it is mostly because her left knee is bothering her a lot she has significant bilateral knee arthritis which makes it difficult for her to ambulate and being unstable.  She has history of A-fib?  But currently in sinus rhythm she is on aspirin at home she is history of hypertension but denies getting any dizziness or lightheadedness she says this morning when she felt the room was spinning    Past medical history:    has a past medical history of BCC (basal cell carcinoma), leg, Family history of pancreatic cancer, Glaucoma of right eye, Hyperlipidemia, Hypertension, Macular degeneration, dry, Osteoarthritis of knee, Osteoporosis, Pelvic fracture (HCC), Right rotator cuff tear, T10 vertebral fracture (HCC), and Vitamin B12 deficiency.  Past surgical history:   has a past surgical history that includes Appendectomy (1953); Hysterectomy (1969); Cystocele repair (1993); Abdominal exploration surgery (1997); Wrist fracture surgery (Left, 03/2017); Skin cancer excision (Left, 09/2017); and Fixation Kyphoplasty (02/19/2018).  Social History:   reports that she has never smoked. She has never used smokeless tobacco. She reports current alcohol use. She reports that she does not use drugs.  Family history:   no family history of  recent echo reviewed she has no significant valvular disease she has recurrent falls mostly due to arthritis and unsteadiness  She does not have any vertigo right now she does have intracranial carotid stenosis which is being managed conservatively she is on aspirin  Can consider outpatient 30-day monitor if she agrees and family agree  Check orthostatic  HTN: stable, continue present medications   Atrial Fibrillation: Currently in sinus rhythm on aspirin she is having recurrent falls not a candidate for anticoagulation not interested in invasive procedures continue conservative management  Pain management for right rib pain  6.   Dyslipidemia: continue statins   Discussed with primary team, hospitalist service ,bedside staff ,nursing staff, patient and family        Thank you  much for consult and giving us the opportunity in contributing in the care of this patient. Please feel free to call me for any questions.       Sayed Darrell Garcia MD, 7/1/2024 4:34 PM     The above note is prepared with the intention to serve as communication with trained medical care practitioners only. Some of the information is provided by secondary sources as well as from our patient and/or family member(s) recollection, thus inaccuracies may occur. In addition, other professionals integrate data into the electronic medical record, and the Heart Team MD and NPs are not responsible for these. Any errors will be corrected upon verification with documented reports.

## 2024-07-01 NOTE — H&P
V2.0  History and Physical      Name:  Doretha Melo /Age/Sex: 6/15/1927  (89 y.o. female)   MRN & CSN:  [id number] & [id number] Encounter Date/Time: 2024 3:01 PM EDT   Location:  ED26/ED-26 PCP: Kenna Ceron MD       Hospital Day: 1    Assessment and Plan:     Patient is a 89 y.o. female who presented with right-sided chest pain and mechanical fall.    Right-sided chest pain  -Less likely from cardiac etiology, likely musculoskeletal based on the history troponin initially 19 followed by 20, EKG with no acute ST-T wave changes will trend troponin, x-ray chest without evidence of rib fracture    Suspected atrial fibrillation  -EKG on presentation with atrial fibrillation, during my assessment rhythm was regular likely sinus tachycardia, no history of A-fib in the past, consult cardiology, check TSH, ordered 500 mL fluid bolus and IV metoprolol 5 mg x 1    Mechanical falls: Patient has had 2 falls in the past 2 weeks, usually due to her left knee giving out, patient lives alone at home, consult PT OT for further assessment.    Hypertension  -Continue amlodipine    HLD  -Continue statin     History of ischemic stroke: On aspirin and statin    Checklist:  Advanced directive: full  Diet: cardiac  DVT ppx: Lovenox  Sugar: BG goal of 140-180 while inpatient    Disposition: admit to inpatient.  Estimated discharge: 2 day(s).  Current living situation: home.  Expected disposition: TBD    Spoke with ED provider who recommended admission for the patient and I agree with that plan.  Personally reviewed lab studies and imaging.  EKG interpreted personally and results as stated above.  Imaging that was interpreted personally and results as stated above.      Comment: Please note this report has been produced using speech recognition software and may contain errors related to that system including errors in grammar, punctuation, and spelling, as well as words and phrases that may be inappropriate. If there are  NEGATIVE 09/18/2023 05:47 PM    GLUCOSEU NEGATIVE 09/18/2023 05:47 PM    GLUCOSEU neg 08/29/2023 01:33 PM    GLUCOSEU NEGATIVE 01/22/2013 08:04 PM    KETUA NEGATIVE 09/18/2023 05:47 PM     Urine Cultures:   Lab Results   Component Value Date/Time    LABURIN  08/29/2023 02:55 PM     >100,000 CFU/ml  Susceptibility testing of penicillin and other beta lactams is  not necessary for beta hemolytic Streptococci since resistant  strains have not been identified. (CLSI M100)      LABURIN  08/29/2023 02:55 PM     >100,000 CFU/ml  Susceptibility testing not routinely done. No further work up.       Blood Cultures: No results found for: \"BC\"  No results found for: \"BLOODCULT2\"  Organism:   Lab Results   Component Value Date/Time    ORG Strep agalactiae (Beta Strep Group B) 08/29/2023 02:55 PM    ORG Aerococcus species 08/29/2023 02:55 PM       Imaging/Diagnostics Last 24 Hours   XR RIBS RIGHT INCLUDE CHEST (MIN 3 VIEWS)    Result Date: 7/1/2024  Chest and right ribs INDICATION: Right rib pain after fall COMPARISON: Portable chest, 9/18/2023 TECHNIQUE: A PA view of the chest and several views of the right ribs were obtained. FINDINGS: Lungs are clear.  There is no pneumothorax, infiltrate, or effusion. The heart is enlarged. There is calcific vascular disease of the thoracic aorta. There is a rounded foreign body projected over the lower lung zone on the left. There is a minimally displaced fracture of the right 10th rib, laterally. There is no pneumothorax, pleural effusion or pulmonary contusion. There are multiple thoracolumbar compression fractures status post vertebroplasty at T11 and L2. There is moderate dextroscoliosis of the thoracolumbar spine. There has been osteolysis of the right humeral head and the adjacent glenohumeral joint with a resultant humero-scapular pseudoarthrosis.     Minimally displaced fracture of the right 10th rib. No evidence of acute cardiopulmonary pathology. Electronically signed by Jun

## 2024-07-02 ENCOUNTER — APPOINTMENT (OUTPATIENT)
Dept: NON INVASIVE DIAGNOSTICS | Age: 89
DRG: 313 | End: 2024-07-02
Payer: MEDICARE

## 2024-07-02 PROBLEM — E44.0 MODERATE MALNUTRITION (HCC): Status: ACTIVE | Noted: 2024-07-02

## 2024-07-02 LAB
ANION GAP SERPL CALCULATED.3IONS-SCNC: 14 MMOL/L (ref 7–16)
BASOPHILS ABSOLUTE: 0 K/CU MM
BASOPHILS RELATIVE PERCENT: 0 % (ref 0–1)
BUN SERPL-MCNC: 13 MG/DL (ref 6–23)
CALCIUM SERPL-MCNC: 9.8 MG/DL (ref 8.3–10.6)
CHLORIDE BLD-SCNC: 104 MMOL/L (ref 99–110)
CO2: 23 MMOL/L (ref 21–32)
CREAT SERPL-MCNC: 0.4 MG/DL (ref 0.6–1.1)
DIFFERENTIAL TYPE: ABNORMAL
ECHO AO ROOT DIAM: 2.6 CM
ECHO AO ROOT INDEX: 2.13 CM/M2
ECHO AV AREA PEAK VELOCITY: 1.8 CM2
ECHO AV AREA VTI: 1.9 CM2
ECHO AV AREA/BSA PEAK VELOCITY: 1.5 CM2/M2
ECHO AV AREA/BSA VTI: 1.6 CM2/M2
ECHO AV MEAN GRADIENT: 5 MMHG
ECHO AV MEAN VELOCITY: 1 M/S
ECHO AV PEAK GRADIENT: 10 MMHG
ECHO AV PEAK VELOCITY: 1.6 M/S
ECHO AV VELOCITY RATIO: 0.56
ECHO AV VTI: 25 CM
ECHO BSA: 1.35 M2
ECHO EST RA PRESSURE: 3 MMHG
ECHO LA AREA 4C: 13.5 CM2
ECHO LA DIAMETER INDEX: 2.21 CM/M2
ECHO LA DIAMETER: 2.7 CM
ECHO LA MAJOR AXIS: 4.9 CM
ECHO LA TO AORTIC ROOT RATIO: 1.04
ECHO LA VOL MOD A4C: 26 ML (ref 22–52)
ECHO LA VOLUME INDEX MOD A4C: 21 ML/M2 (ref 16–34)
ECHO LV E' LATERAL VELOCITY: 7 CM/S
ECHO LV E' SEPTAL VELOCITY: 5 CM/S
ECHO LV EDV A4C: 34 ML
ECHO LV EDV INDEX A4C: 28 ML/M2
ECHO LV EJECTION FRACTION A4C: 56 %
ECHO LV ESV A4C: 15 ML
ECHO LV ESV INDEX A4C: 12 ML/M2
ECHO LV FRACTIONAL SHORTENING: 33 % (ref 28–44)
ECHO LV INTERNAL DIMENSION DIASTOLE INDEX: 3.44 CM/M2
ECHO LV INTERNAL DIMENSION DIASTOLIC: 4.2 CM (ref 3.9–5.3)
ECHO LV INTERNAL DIMENSION SYSTOLIC INDEX: 2.3 CM/M2
ECHO LV INTERNAL DIMENSION SYSTOLIC: 2.8 CM
ECHO LV IVSD: 0.9 CM (ref 0.6–0.9)
ECHO LV MASS 2D: 118.7 G (ref 67–162)
ECHO LV MASS INDEX 2D: 97.3 G/M2 (ref 43–95)
ECHO LV POSTERIOR WALL DIASTOLIC: 0.9 CM (ref 0.6–0.9)
ECHO LV RELATIVE WALL THICKNESS RATIO: 0.43
ECHO LVOT AREA: 2.8 CM2
ECHO LVOT AV VTI INDEX: 0.62
ECHO LVOT DIAM: 1.9 CM
ECHO LVOT MEAN GRADIENT: 2 MMHG
ECHO LVOT PEAK GRADIENT: 3 MMHG
ECHO LVOT PEAK VELOCITY: 0.9 M/S
ECHO LVOT STROKE VOLUME INDEX: 36.2 ML/M2
ECHO LVOT SV: 44.2 ML
ECHO LVOT VTI: 15.6 CM
ECHO MV A VELOCITY: 1.06 M/S
ECHO MV E VELOCITY: 0.73 M/S
ECHO MV E/A RATIO: 0.69
ECHO MV E/E' LATERAL: 10.43
ECHO MV E/E' RATIO (AVERAGED): 12.51
ECHO MV E/E' SEPTAL: 14.6
ECHO RIGHT VENTRICULAR SYSTOLIC PRESSURE (RVSP): 24 MMHG
ECHO RV MID DIMENSION: 2.2 CM
ECHO TV REGURGITANT MAX VELOCITY: 2.31 M/S
ECHO TV REGURGITANT PEAK GRADIENT: 21 MMHG
EOSINOPHILS ABSOLUTE: 0 K/CU MM
EOSINOPHILS RELATIVE PERCENT: 0 % (ref 0–3)
GFR, ESTIMATED: 90 ML/MIN/1.73M2
GLUCOSE SERPL-MCNC: 127 MG/DL (ref 70–99)
HCT VFR BLD CALC: 40.3 % (ref 37–47)
HEMOGLOBIN: 13 GM/DL (ref 12.5–16)
IMMATURE NEUTROPHIL %: 0.5 % (ref 0–0.43)
LYMPHOCYTES ABSOLUTE: 1 K/CU MM
LYMPHOCYTES RELATIVE PERCENT: 23.6 % (ref 24–44)
MCH RBC QN AUTO: 30.4 PG (ref 27–31)
MCHC RBC AUTO-ENTMCNC: 32.3 % (ref 32–36)
MCV RBC AUTO: 94.2 FL (ref 78–100)
MONOCYTES ABSOLUTE: 0.2 K/CU MM
MONOCYTES RELATIVE PERCENT: 3.9 % (ref 0–4)
NEUTROPHILS ABSOLUTE: 3.1 K/CU MM
NEUTROPHILS RELATIVE PERCENT: 72 % (ref 36–66)
NUCLEATED RBC %: 0 %
PDW BLD-RTO: 14.3 % (ref 11.7–14.9)
PLATELET # BLD: 372 K/CU MM (ref 140–440)
PMV BLD AUTO: 9.4 FL (ref 7.5–11.1)
POTASSIUM SERPL-SCNC: 4.3 MMOL/L (ref 3.5–5.1)
RBC # BLD: 4.28 M/CU MM (ref 4.2–5.4)
SODIUM BLD-SCNC: 141 MMOL/L (ref 135–145)
TOTAL IMMATURE NEUTOROPHIL: 0.02 K/CU MM
TOTAL NUCLEATED RBC: 0 K/CU MM
WBC # BLD: 4.3 K/CU MM (ref 4–10.5)

## 2024-07-02 PROCEDURE — 99233 SBSQ HOSP IP/OBS HIGH 50: CPT | Performed by: INTERNAL MEDICINE

## 2024-07-02 PROCEDURE — 93306 TTE W/DOPPLER COMPLETE: CPT

## 2024-07-02 PROCEDURE — 93306 TTE W/DOPPLER COMPLETE: CPT | Performed by: INTERNAL MEDICINE

## 2024-07-02 PROCEDURE — 97162 PT EVAL MOD COMPLEX 30 MIN: CPT

## 2024-07-02 PROCEDURE — 85025 COMPLETE CBC W/AUTO DIFF WBC: CPT

## 2024-07-02 PROCEDURE — 80048 BASIC METABOLIC PNL TOTAL CA: CPT

## 2024-07-02 PROCEDURE — APPNB15 APP NON BILLABLE TIME 0-15 MINS

## 2024-07-02 PROCEDURE — 97116 GAIT TRAINING THERAPY: CPT

## 2024-07-02 PROCEDURE — 94761 N-INVAS EAR/PLS OXIMETRY MLT: CPT

## 2024-07-02 PROCEDURE — 97530 THERAPEUTIC ACTIVITIES: CPT

## 2024-07-02 PROCEDURE — 1200000000 HC SEMI PRIVATE

## 2024-07-02 PROCEDURE — 6370000000 HC RX 637 (ALT 250 FOR IP): Performed by: STUDENT IN AN ORGANIZED HEALTH CARE EDUCATION/TRAINING PROGRAM

## 2024-07-02 PROCEDURE — 36415 COLL VENOUS BLD VENIPUNCTURE: CPT

## 2024-07-02 PROCEDURE — 97166 OT EVAL MOD COMPLEX 45 MIN: CPT

## 2024-07-02 PROCEDURE — 2580000003 HC RX 258: Performed by: STUDENT IN AN ORGANIZED HEALTH CARE EDUCATION/TRAINING PROGRAM

## 2024-07-02 RX ORDER — LATANOPROST 50 UG/ML
1 SOLUTION/ DROPS OPHTHALMIC NIGHTLY
Status: DISCONTINUED | OUTPATIENT
Start: 2024-07-02 | End: 2024-07-03 | Stop reason: HOSPADM

## 2024-07-02 RX ORDER — GLIMEPIRIDE 2 MG/1
1 TABLET ORAL DAILY
Status: DISCONTINUED | OUTPATIENT
Start: 2024-07-02 | End: 2024-07-03 | Stop reason: HOSPADM

## 2024-07-02 RX ADMIN — Medication 1000 UNITS: at 09:25

## 2024-07-02 RX ADMIN — SODIUM CHLORIDE, PRESERVATIVE FREE 10 ML: 5 INJECTION INTRAVENOUS at 09:25

## 2024-07-02 RX ADMIN — LATANOPROST 1 DROP: 50 SOLUTION/ DROPS OPHTHALMIC at 20:29

## 2024-07-02 RX ADMIN — AMLODIPINE BESYLATE 10 MG: 10 TABLET ORAL at 09:25

## 2024-07-02 RX ADMIN — Medication 1 TABLET: at 09:25

## 2024-07-02 RX ADMIN — ASPIRIN 81 MG: 81 TABLET, CHEWABLE ORAL at 09:25

## 2024-07-02 RX ADMIN — ROSUVASTATIN CALCIUM 40 MG: 20 TABLET, COATED ORAL at 20:29

## 2024-07-02 RX ADMIN — ACETAMINOPHEN 500 MG: 500 TABLET ORAL at 20:29

## 2024-07-02 RX ADMIN — ACETAMINOPHEN 500 MG: 500 TABLET ORAL at 09:25

## 2024-07-02 RX ADMIN — SODIUM CHLORIDE, PRESERVATIVE FREE 10 ML: 5 INJECTION INTRAVENOUS at 20:29

## 2024-07-02 RX ADMIN — TIMOLOL MALEATE 1 DROP: 2.5 SOLUTION OPHTHALMIC at 15:09

## 2024-07-02 ASSESSMENT — PAIN - FUNCTIONAL ASSESSMENT
PAIN_FUNCTIONAL_ASSESSMENT: PREVENTS OR INTERFERES SOME ACTIVE ACTIVITIES AND ADLS
PAIN_FUNCTIONAL_ASSESSMENT: PREVENTS OR INTERFERES SOME ACTIVE ACTIVITIES AND ADLS

## 2024-07-02 ASSESSMENT — PAIN SCALES - WONG BAKER: WONGBAKER_NUMERICALRESPONSE: NO HURT

## 2024-07-02 ASSESSMENT — PAIN DESCRIPTION - ORIENTATION
ORIENTATION: RIGHT

## 2024-07-02 ASSESSMENT — PAIN SCALES - GENERAL
PAINLEVEL_OUTOF10: 5
PAINLEVEL_OUTOF10: 3
PAINLEVEL_OUTOF10: 6
PAINLEVEL_OUTOF10: 0
PAINLEVEL_OUTOF10: 4

## 2024-07-02 ASSESSMENT — PAIN DESCRIPTION - LOCATION
LOCATION: RIB CAGE
LOCATION: SHOULDER
LOCATION: SHOULDER

## 2024-07-02 ASSESSMENT — PAIN DESCRIPTION - DESCRIPTORS
DESCRIPTORS: ACHING;SHARP;SHOOTING
DESCRIPTORS: ACHING;SORE

## 2024-07-02 NOTE — CARE COORDINATION
07/02/24 1615   Service Assessment   Patient Orientation Alert and Oriented   Cognition Alert   History Provided By Patient   Primary Caregiver Self   Support Systems Children;Family Members   PCP Verified by CM Yes   Last Visit to PCP Within last 3 months   Prior Functional Level Independent in ADLs/IADLs   Current Functional Level Assistance with the following:;Bathing;Toileting;Mobility  (hospital policy)   Can patient return to prior living arrangement Yes   Ability to make needs known: Good   Family able to assist with home care needs: Yes   Would you like for me to discuss the discharge plan with any other family members/significant others, and if so, who? Yes  (family members)   Financial Resources Medicare   Social/Functional History   Lives With Alone   Type of Home House   Home Layout One level   Home Access Stairs to enter with rails   Entrance Stairs - Number of Steps 4   Bathroom Shower/Tub Tub only  (pt takes a sponge bath at the sink)   Bathroom Toilet Standard   Bathroom Equipment Toilet raiser   Bathroom Accessibility Accessible   Home Equipment Rollator   Receives Help From Family   ADL Assistance Independent   Homemaking Assistance Needs assistance   Homemaking Responsibilities Yes   Ambulation Assistance Independent  (with Rollator)   Transfer Assistance Independent   Patient's  Info DaughterMonae, drives   Mode of Transportation SUV   Occupation Retired   Discharge Planning   Type of Residence House   Living Arrangements Alone   Current Services Prior To Admission None   Type of Home Care Services None   Patient expects to be discharged to: Acute rehab     CM reviewed chart and discussed in IDR. CM into see pt. DaughterMonae, at bedside. Pt gave permission to speak in Monae's presence. Pt is from home alone. Son lives two streets away and Monae lives close and is at pts house daily. Pt cooks and is independent with ADLs. Monae cleans & does laundry. Plan is for pt to go to [facility]  and return home after discharge from ARU.

## 2024-07-02 NOTE — PLAN OF CARE
Problem: Safety - Adult  Goal: Free from fall injury  Outcome: Progressing  Flowsheets (Taken 7/2/2024 0052)  Free From Fall Injury:   Instruct family/caregiver on patient safety   Based on caregiver fall risk screen, instruct family/caregiver to ask for assistance with transferring infant if caregiver noted to have fall risk factors     Problem: Discharge Planning  Goal: Discharge to home or other facility with appropriate resources  Outcome: Progressing  Flowsheets (Taken 7/1/2024 2100)  Discharge to home or other facility with appropriate resources:   Identify barriers to discharge with patient and caregiver   Arrange for needed discharge resources and transportation as appropriate   Identify discharge learning needs (meds, wound care, etc)   Refer to discharge planning if patient needs post-hospital services based on physician order or complex needs related to functional status, cognitive ability or social support system     Problem: Pain  Goal: Verbalizes/displays adequate comfort level or baseline comfort level  Outcome: Progressing  Flowsheets (Taken 7/2/2024 0052)  Verbalizes/displays adequate comfort level or baseline comfort level:   Encourage patient to monitor pain and request assistance   Assess pain using appropriate pain scale   Administer analgesics based on type and severity of pain and evaluate response   Implement non-pharmacological measures as appropriate and evaluate response   Consider cultural and social influences on pain and pain management   Notify Licensed Independent Practitioner if interventions unsuccessful or patient reports new pain     Problem: ABCDS Injury Assessment  Goal: Absence of physical injury  Outcome: Progressing  Flowsheets (Taken 7/2/2024 0052)  Absence of Physical Injury: Implement safety measures based on patient assessment

## 2024-07-02 NOTE — PROGRESS NOTES
Occupational Therapy  [facility] ACUTE CARE OCCUPATIONAL THERAPY EVALUATION    Doretha Melo, 6/15/1927, 2021/2021-A, 7/2/2024    Discharge Recommendation: Encourage intensive OT services at discharge, typically 3 hours/day, 5 days/week.     History:  [place]:  The primary encounter diagnosis was Closed fracture of one rib of right side, initial encounter. A diagnosis of Abnormal laboratory test was also pertinent to this visit.    Subjective:  Patient states: \"I don't think I can put my socks on today\"  Pain: Pt denied pain this date  Communication with other providers: RN, PT Marian  Restrictions: General Precautions, Fall Risk, Telemetry, Bed/Chair Alarm    Home Setup/Prior level of function:  Social/Functional History  Lives With: Alone  Type of Home: House  Home Layout: One level  Home Access: Stairs to enter with rails  Entrance Stairs - Number of Steps: 4  Entrance Stairs - Rails: Both  Bathroom Shower/Tub: Tub only (Sponge bathes)  Bathroom Toilet: Standard (Risers on toilet)  Home Equipment: Rollator  Has the patient had two or more falls in the past year or any fall with injury in the past year?: Yes  Receives Help From: Family  ADL Assistance: Independent  Homemaking Assistance: Needs assistance  Ambulation Assistance: Independent (Ginger w/ rollator)  Transfer Assistance: Independent  Active : No  Occupation: Retired    Examination:  Observation: Supine in bed with family in room upon arrival, agreeable to session  Vision: WFL, glasses  Hearing: [place]  Vitals: Stable vitals throughout session    Body Systems and functions:  ROM: 0-45' in BL shoulder flexors, WFL in BL elbow flexors/extensors and   Strength: DNT shoulder flexors, 3+/5 in BL elbow flexors/extensors and  strength  Sensation: WFL  Tone: Normal  Coordination: WFL  Perception: WNL    Activities of Daily Living (ADLs):  Feeding: SetupA  Grooming: SBA seated  UB bathing: SBA  LB bathing: ModA to reach distal extremities  UB

## 2024-07-02 NOTE — PROGRESS NOTES
V2.0  [facility] [place]      Name:  Doretha Melo /Age/Sex: 6/15/1927  (89 y.o. female)   MRN & CSN:  [id number] & [id number] Encounter Date/Time: 2024 9:21 AM EDT    Location:  -A PCP: Kenna Ceron MD       Hospital Day: 2    Assessment and Plan:     Patient is a 89 y.o. female who presented with right-sided chest pain and mechanical fall.     Right-sided chest pain  -Less likely from cardiac etiology, likely musculoskeletal based on the history troponin initially 19 followed by 20, EKG with no acute ST-T wave changes will trend troponin, x-ray chest without evidence of rib fracture  PT OT for placement     Suspected atrial fibrillation  -EKG on presentation with atrial fibrillation, during my assessment rhythm was regular likely sinus tachycardia, no history of A-fib in the past, consult cardiology, check TSH, ordered 500 mL fluid bolus and IV metoprolol 5 mg x 1     Mechanical falls: Patient has had 2 falls in the past 2 weeks, usually due to her left knee giving out, patient lives alone at home, consult PT OT for placement     Hypertension  -Continue amlodipine     HLD  -Continue statin      History of ischemic stroke: On aspirin and statin     Medical Decision Making:  The following items were considered in medical decision making:  Discussion of patient care with other providers  Reviewed clinical lab tests if any  Reviewed radiology tests if any  Reviewed other diagnostic tests/interventions  Independent review of radiologic images if any  Microbiology cultures and other micro tests if any    Estimated time spent for medical decision-making encompassing complexity of the case, history taking, medication review, physical examination, communication with family, RN, , discussion with specialists, and ancillary staff members to provide accurate care for the patient was around 15 minutes.    The billing in this case is level 2 due to the combination of above and

## 2024-07-02 NOTE — PROGRESS NOTES
Comprehensive Nutrition Assessment    Type and Reason for Visit:  Initial (low BMI for age)    Nutrition Recommendations/Plan:   Continue regular diet  Offer standard oral nutrition supplement daily  Monitor weights, po intakes, labs, POC     Malnutrition Assessment:  Malnutrition Status:  Moderate malnutrition (07/02/24 1149)    Context:  Acute Illness     Findings of the 6 clinical characteristics of malnutrition:  Energy Intake:  No significant decrease in energy intake  Weight Loss:  Greater than 7.5% over 3 months (9.7% x1.5mo)     Body Fat Loss:  Unable to assess     Muscle Mass Loss:  Mild muscle mass loss Clavicles (pectoralis & deltoids), Thigh (quadriceps), Calf (gastrocnemius)  Fluid Accumulation:  No significant fluid accumulation     Strength:  Not Performed    Nutrition Assessment:    Admitted w/ chest pain, pmh of hypertension, ischemic stroke, hyperlipidemia, macular degeneration, glaucoma. Pt up in chair, denies significant changes in appetite/wt, denies trouble chewing/swallowing, endorses dentures fitting well. Consuming >50% of her meals here. Did note significant 9.7% wt loss over the past 1.5mo per chart review, ? accuracy, will monitor wt trends. Mild muscle wasting noted but likely attributed to advanced age. Meets criteria for malnutrition. Pt agreeable to add oral supplement daily. Follow at moderate nutrition risk.    Nutrition Related Findings:    +supplemental ca + vit D; elevated troponin. Feeds self, admitted from home. Wound Type: None       Current Nutrition Intake & Therapies:    Average Meal Intake: 51-75%, %  Average Supplements Intake: None Ordered  ADULT DIET; Regular    Anthropometric Measures:  Height: 139.7 cm (4' 7\")  Ideal Body Weight (IBW): 75 lbs (34 kg)    Admission Body Weight: 38.9 kg (85 lb 12.1 oz)  Current Body Weight: 38.9 kg (85 lb 12.1 oz),   IBW. Weight Source: Bed Scale  Current BMI (kg/m2): 19.9  Usual Body Weight: 43.1 kg (95 lb 0.3 oz) (5/24/24)  %  Weight Change (Calculated): -9.7  Weight Adjustment For: No Adjustment                 BMI Categories: Underweight (BMI less than 22) age over 65    Estimated Daily Nutrient Needs:  Energy Requirements Based On: Kcal/kg  Weight Used for Energy Requirements: Current  Energy (kcal/day): 4418-9246 (30-35kcal/kg)  Weight Used for Protein Requirements: Current  Protein (g/day): 47-55 (1.2-1.4g/kg)  Method Used for Fluid Requirements: 1 ml/kcal  Fluid (ml/day): 1300    Nutrition Diagnosis:   Moderate malnutrition, In context of acute illness or injury related to cardiac dysfunction as evidenced by weight loss, mild muscle loss (9.7% wt loss x1.5mo)    Nutrition Interventions:   Food and/or Nutrient Delivery: Continue Current Diet, Start Oral Nutrition Supplement  Nutrition Education/Counseling: Survival skills/brief education completed (Malnutrition education bag/handouts given)  Coordination of Nutrition Care: Continue to monitor while inpatient, Coordination of Care  Plan of Care discussed with: pt, loved one, MD    Goals:     Goals: PO intake 75% or greater, prior to discharge       Nutrition Monitoring and Evaluation:   Behavioral-Environmental Outcomes: None Identified  Food/Nutrient Intake Outcomes: Food and Nutrient Intake, Supplement Intake  Physical Signs/Symptoms Outcomes: Weight, Nutrition Focused Physical Findings, Biochemical Data, Meal Time Behavior    Discharge Planning:    Continue current diet, Continue Oral Nutrition Supplement     Bee An RD  Contact: [phone]

## 2024-07-02 NOTE — CONSULTS
[facility]ound Ostomy Continence Nurse  Consult Note       Doretha Melo  AGE: 89 y.o.   GENDER: female  : 6/15/1927  TODAY'S DATE:  2024    Subjective:     Reason for CWOCN Evaluation and Assessment: wound assessment      Doretha Melo is a 89 y.o. female referred by:   [x] Physician  [] Nursing  [] Other:     Wound Identification:  Wound Type: traumatic  Contributing Factors: decreased mobility, malnutrition, and falls        PAST MEDICAL HISTORY        Diagnosis Date    BCC (basal cell carcinoma), leg 2017    Family history of pancreatic cancer     Glaucoma of right eye 2016    Dr Jean Baptiste    Hyperlipidemia     Hypertension     Macular degeneration, dry     Dr. Jean Baptiste    Osteoarthritis of knee     Dr Trujillo    Osteoporosis 2002    Fosamax [id number]    Pelvic fracture (HCC) 2018    Right rotator cuff tear     Trujillo / injection; PT    T10 vertebral fracture (HCC) 2018    fall and fragility fx; also L1 ; had kyphoplasty 2018    Vitamin B12 deficiency 2017    B 12 266 by Ronnie at [facility]       PAST SURGICAL HISTORY    Past Surgical History:   Procedure Laterality Date    ABDOMINAL EXPLORATION SURGERY      With Lysis of Adhesions    APPENDECTOMY      w/  rt.uso    CYSTOCELE REPAIR      w/ rectocele    FIXATION KYPHOPLASTY  2018    T10 & L1    HYSTERECTOMY (CERVIX STATUS UNKNOWN)  1969     remaining ovary removed also    SKIN CANCER EXCISION Left 2017    BCC with skin graft    WRIST FRACTURE SURGERY Left 2017    ORIF       FAMILY HISTORY    Family History   Problem Relation Age of Onset    Heart Disease Mother     Pancreatic Cancer Father     Pancreatic Cancer Sister     Heart Disease Sister     Diabetes Sister     Diabetes Sister     Pancreatic Cancer Brother     Cancer Other         pancreas       SOCIAL HISTORY    Social History     Tobacco Use    Smoking status: Never    Smokeless tobacco: Never   Vaping Use    Vaping Use: Never used   Substance Use Topics

## 2024-07-02 NOTE — DISCHARGE INSTR - DIET
Good nutrition is important when healing from an illness, injury, or surgery.  Follow any nutrition recommendations given to you during your hospital stay.   If you were given an oral nutrition supplement while in the hospital, continue to take this supplement at home.  You can take it with meals, in-between meals, and/or before bedtime. These supplements can be purchased at most local grocery stores, pharmacies, and chain super-stores.   If you have any questions about your diet or nutrition, call the hospital and ask for the dietitian.  Due to malnutrition diagnosis, after discharge, RD recommends patient to drink high calorie, high protein oral nutrition supplements of greater than 200 calories per serving with at least or greater than 10 gm protein per serving, at least 2-3 times per day, to promote increased po intakes and weight gain. Coupons and High Calorie, High Protein Nutrition Therapy handouts provided.     Suggestions to follow at home to improve appetite:   Aim for small, frequent eating sessions. (I.e. 5-8 times per day of smaller portions)   Schedule eating times (I.e. every 2-4 hours would have a snack)   Enhance the eating environment (I.e. Eating with family and friends, watching favorite movie, or listening to favorite music with meal)   Identify problem smells taste, textures, and temperatures  Choose foods that are easy to chew and swallow   Avoid drinking fluids during eating session. Save drinks and sips between meals.     There is no need to apply all of these strategies at once.     Many patients benefit from adding 1 or 2 suggestions at a time to see how they affect their ability to eat, and then making an informed decision on how to proceed.   Please read Nutrition handout below:     High Calorie, High Protein Nutrition Therapy from Nutrition Care Manual     A high-calorie, high-protein diet has been recommended to you. Your registered dietitian nutritionist (RDN) may have recommended  226   Fiber, total dietary g 35   Sugars, total g 77   Minerals   Calcium, Ca mg 1466   Iron, Fe mg 14   Sodium, Na mg 3256   Vitamins   Vitamin C, total ascorbic acid mg 80   Vitamin A, IU IU 22287   Vitamin D    Lipids   Fatty acids, total saturated g 37   Fatty acids, total monounsaturated g 52   Fatty acids, total polyunsaturated g 30   Cholesterol mg 549        High-Calorie, High-Protein Sample 1-Day Menu View Nutrient Info  Update  Delete  Breakfast 1 egg, scrambled  1 ounce cheddar cheese  1 English muffin, whole wheat  1 tablespoon margarine  1 tablespoon jam  ½ cup orange juice, fortified with calcium and vitamin D   Morning Snack 1 tablespoon peanut butter  1 banana  1 cup 1% milk   Lunch Tuna salad sandwich made with: 2 slices bread, whole wheat  3 ounces tuna mixed with:  1 tablespoon mayonnaise  ½ cup pudding   Afternoon Snack ¼ cup hummus  ½ cup carrots  1 javy   Evening Meal Enchilada casserole made with: 2 corn tortillas  3 ounces ground beef, cooked  ½ cup black beans, cooked  ½ cup corn, cooked  1 ounce grated cheddar cheese  ¼ cup enchilada sauce  ½ avocado, sliced, topping for enchilada  1 tablespoon sour cream, topping for enchilada  Salad: ½ cup lettuce, shredded  ½ cup tomatoes, chopped, for salad  1 tablespoon olive oil and vinegar dressing, for salad   Evening Snack ¾ cup Greek yogurt  ½ cup blueberries  ½ cup granola   Daily Sum  Nutrient Unit Value   Macronutrients   Energy kcal 3013   Energy kJ 22962   Protein g 144   Total lipid (fat) g 130   Carbohydrate, by difference g 336   Fiber, total dietary g 47   Sugars, total g 110   Minerals   Calcium, Ca mg 1882   Iron, Fe mg 19   Sodium, Na mg 2823   Vitamins   Vitamin C, total ascorbic acid mg 95   Vitamin A, IU IU 72882   Vitamin D    Lipids   Fatty acids, total saturated g 38   Fatty acids, total monounsaturated g 51   Fatty acids, total polyunsaturated g 30   Cholesterol mg 520        High-Calorie, High-Protein Vegan Sample

## 2024-07-02 NOTE — PROGRESS NOTES
Cardiology Progress Note    Subjective/Overnight Events:  Major complaint now is rib discomfort.  Patient states that she is breathing well and is not having any difficulty with chest pressure, nausea, vomiting, diaphoresis or lower extremity edema.    Patient is very pleasant, slightly hard of hearing.    States that she does live alone and is pretty independent.  Informed her of her rhythm at this time.  Voiced understanding    Assessment/Plan:  Paroxysmal atrial fibrillation  -Has since converted back to sinus rhythm.  -[id number]: 6  -Poor anticoagulant candidate due to age and falls.  -Repeat echocardiogram.  -Consider outpatient 30-day event monitor  -Very high risk for recurrent CVA.  -Not interested in undergoing invasive procedures, no plan for outpatient watchman.  Continue conservative management.  Mechanical falls  Right-sided rib fracture  -Per primary care  Intracranial carotid artery stenosis  History of CVA 09/2023  -Could contribute to some of her dizziness.  Being managed conservatively at this time  -Continue aspirin and Crestor 40 mg daily  Hypertension  -Blood pressure stable at this time  -On Norvasc 10 mg daily            Claude Pedraza PA-C 07/02/24 3:18 PM

## 2024-07-02 NOTE — CARE COORDINATION
CM reviewed chart. Family wants pt to go to ARU if it is recommended.   CM saw OT/PT note that pt is appropriate for ARU.  CM called Bonnie with ARU and left the referral on a secure VM.

## 2024-07-02 NOTE — PROGRESS NOTES
07/02/24 1433   Encounter Summary   Encounter Overview/Reason Initial Encounter   Service Provided For Patient   Referral/Consult From [facility]   Support System Family members;Children   Last Encounter  07/02/24  (Patient said she was doing quite well and is going to a care facility soon. Grateful that her son, Hakan, works here in the hospital and there is plenty of family support. -[place]au)   Complexity of Encounter Low   Begin Time 1430   End Time  1434   Total Time Calculated 4 min   Assessment/Intervention/Outcome   Assessment Calm;Coping;Hopeful;Peaceful   Intervention Active listening   Outcome Expressed Gratitude   Plan and Referrals   Plan/Referrals No future visits requested

## 2024-07-02 NOTE — PROGRESS NOTES
07/02/24 1614   Encounter Summary   Encounter Overview/Reason Volunteer Encounter   Service Provided For Patient   Referral/Consult From [facility]   Support System Family members   Last Encounter  07/02/24  (Visit by Eucharist sulaiman Lomeli, charted by  Doug)   Complexity of Encounter Low   Begin Time 1000   End Time  1010   Total Time Calculated 10 min   Rituals, Rites and Sacraments   Type Mormonism Communion   Assessment/Intervention/Outcome   Assessment Calm   Intervention Active listening   Outcome Coping   Plan and Referrals   Plan/Referrals Continue Support (comment)

## 2024-07-02 NOTE — CONSULTS
[facility] ACUTE CARE PHYSICAL THERAPY EVALUATION  Doretha Melo, 6/15/1927, 2021/2021-A, 7/2/2024    History  [place]:  The primary encounter diagnosis was Closed fracture of one rib of right side, initial encounter. A diagnosis of Abnormal laboratory test was also pertinent to this visit.  Patient  has a past medical history of BCC (basal cell carcinoma), leg, Family history of pancreatic cancer, Glaucoma of right eye, Hyperlipidemia, Hypertension, Macular degeneration, dry, Osteoarthritis of knee, Osteoporosis, Pelvic fracture (HCC), Right rotator cuff tear, T10 vertebral fracture (HCC), and Vitamin B12 deficiency.  Patient  has a past surgical history that includes Appendectomy (1953); Hysterectomy (1969); Cystocele repair (1993); Abdominal exploration surgery (1997); Wrist fracture surgery (Left, 03/2017); Skin cancer excision (Left, 09/2017); and Fixation Kyphoplasty (02/19/2018).    Recommendation: Encourage facility for intensive rehabilitation, anticipate 3 hours per day and 5 days per week.    Subjective:  Patient states: \"I usually wear leg braces\".    Pain:  endorses some knee pain, L > R, does not rate numerically.    Communication with other providers:  RN approval for PT session, co-eval with OT michelle uQeen and OT Anamaria  Restrictions: general precautions, falls    Home Setup/Prior level of function  Social/Functional History  Lives With: Alone  Type of Home: House  Home Layout: One level  Home Access: Stairs to enter with rails  Entrance Stairs - Number of Steps: 4  Entrance Stairs - Rails: Both  Bathroom Shower/Tub: Tub only (Sponge bathes)  Bathroom Toilet: Standard (Risers on toilet)  Home Equipment: Rollator  Has the patient had two or more falls in the past year or any fall with injury in the past year?: Yes  Receives Help From: Family  ADL Assistance: Independent  Homemaking Assistance: Needs assistance  Ambulation Assistance: Independent (Ginger w/ rollator)  Transfer Assistance:  for intensive rehabilitation, anticipate 3 hours per day and 5 days per week.. At baseline, patient is Ginger with rollator for gross mobility, independent with ADLs, and receives assistance with some IADLs. They performed below their baseline with impaired gait, balance, strength, and activity tolerance. They would benefit from continued therapy to address their deficits, reduce fall risk, decrease burden of care, and restore PLOF.    Complexity: Moderate  Prognosis: Good, no significant barriers to participation at this time.    General Plan: 3-5 times per week  Equipment: continue to assess    Goals:  Short Term Goals  Time Frame for Short Term Goals: 2 weeks  Short Term Goal 1: Pt will complete supine <> sit independent  Short Term Goal 2: Pt will complete sit <> stand SBA  Short Term Goal 3: Pt will ambulate 25ft with LRAD, SBA  Short Term Goal 4: Pt will complete light dynamic standing activity x3 minutes with single UE support as needed, SBA  Short Term Goal 5: Pt will complete 4 steps with use of handrail, Lisseth       Treatment plan:  Bed mobility, transfers, balance, gait, TA, TX, stairs    Recommendations for NURSING mobility: ~10ft RW, CGA, gait belt    Time:   Time in: 0947  Time out: 1007  Timed treatment minutes: 8  Total time: 20    Electronically signed by:    Marian Chaidez, PT  7/2/2024, 12:22 PM

## 2024-07-02 NOTE — PROGRESS NOTES
Cardiology Progress Note     Admit Date:  7/1/2024    Consult reason/ Seen today for :       Subjective and  Overnight Events : In sinus rhythm no events on telemetry echo did not show any significant abnormality      Chief complain on admission : 89 y.o.year old who is admitted for  Chief Complaint   Patient presents with    Rib Pain (injury)     Right. Fell a few days ago.     Dizziness     Increased this morning       Assessment / Plan:  Most recent echo reviewed she has no significant valvular disease she has recurrent falls mostly due to arthritis and unsteadiness  She does not have any vertigo right now she does have intracranial carotid stenosis which is being managed conservatively she is on aspirin  Can consider outpatient 30-day monitor if she agrees and family agree  She is quite orthostatic on standing up currently on amlodipine 10 mg daily  Consider compression stocking  HTN: stable, continue present medications   Atrial Fibrillation: Currently in sinus rhythm on aspirin she is having recurrent falls not a candidate for anticoagulation not interested in invasive procedures continue conservative management  Pain management for right rib pain  6.   Dyslipidemia: continue statins   We will see as needed basis call with question  Discussed with primary team, hospitalist service, bedside nursing staff and family  Past medical history:    has a past medical history of BCC (basal cell carcinoma), leg, Family history of pancreatic cancer, Glaucoma of right eye, Hyperlipidemia, Hypertension, Macular degeneration, dry, Osteoarthritis of knee, Osteoporosis, Pelvic fracture (HCC), Right rotator cuff tear, T10 vertebral fracture (HCC), and Vitamin B12 deficiency.  Past surgical history:   has a past surgical history that includes Appendectomy (1953); Hysterectomy (1969); Cystocele repair (1993); Abdominal exploration surgery (1997); Wrist  MD ARIELLE 7/2/2024 7:10 PM     The above note is prepared with the intention to serve as communication with trained medical care practitioners only. Some of the information is provided by secondary sources as well as from our patient and/or family member(s) recollection, thus inaccuracies may occur. In addition, other professionals integrate data into the electronic medical record, and the Heart Team MD and NPs are not responsible for these. Any errors will be corrected upon verification with documented reports.

## 2024-07-03 ENCOUNTER — HOSPITAL ENCOUNTER (INPATIENT)
Age: 89
DRG: 948 | End: 2024-07-03
Attending: PHYSICAL MEDICINE & REHABILITATION | Admitting: PHYSICAL MEDICINE & REHABILITATION
Payer: MEDICARE

## 2024-07-03 VITALS
WEIGHT: 91.2 LBS | RESPIRATION RATE: 19 BRPM | HEART RATE: 78 BPM | BODY MASS INDEX: 21.11 KG/M2 | OXYGEN SATURATION: 98 % | HEIGHT: 55 IN | SYSTOLIC BLOOD PRESSURE: 100 MMHG | TEMPERATURE: 97.8 F | DIASTOLIC BLOOD PRESSURE: 70 MMHG

## 2024-07-03 PROBLEM — I95.1 ORTHOSTATIC HYPOTENSION: Status: ACTIVE | Noted: 2024-07-03

## 2024-07-03 PROCEDURE — 99223 1ST HOSP IP/OBS HIGH 75: CPT | Performed by: PHYSICAL MEDICINE & REHABILITATION

## 2024-07-03 PROCEDURE — 1280000000 HC REHAB R&B

## 2024-07-03 PROCEDURE — 2580000003 HC RX 258: Performed by: STUDENT IN AN ORGANIZED HEALTH CARE EDUCATION/TRAINING PROGRAM

## 2024-07-03 PROCEDURE — 94761 N-INVAS EAR/PLS OXIMETRY MLT: CPT

## 2024-07-03 PROCEDURE — 6370000000 HC RX 637 (ALT 250 FOR IP): Performed by: STUDENT IN AN ORGANIZED HEALTH CARE EDUCATION/TRAINING PROGRAM

## 2024-07-03 RX ORDER — ENOXAPARIN SODIUM 100 MG/ML
30 INJECTION SUBCUTANEOUS DAILY
Status: CANCELLED | OUTPATIENT
Start: 2024-07-04

## 2024-07-03 RX ORDER — ACETAMINOPHEN 500 MG
500 TABLET ORAL 2 TIMES DAILY
Status: DISPENSED | OUTPATIENT
Start: 2024-07-03

## 2024-07-03 RX ORDER — SODIUM CHLORIDE 9 MG/ML
INJECTION, SOLUTION INTRAVENOUS PRN
Status: ACTIVE | OUTPATIENT
Start: 2024-07-03

## 2024-07-03 RX ORDER — ACETAMINOPHEN 500 MG
500 TABLET ORAL 2 TIMES DAILY
Status: CANCELLED | OUTPATIENT
Start: 2024-07-03

## 2024-07-03 RX ORDER — ENOXAPARIN SODIUM 100 MG/ML
30 INJECTION SUBCUTANEOUS DAILY
Status: DISCONTINUED | OUTPATIENT
Start: 2024-07-04 | End: 2024-07-05

## 2024-07-03 RX ORDER — POLYETHYLENE GLYCOL 3350 17 G/17G
17 POWDER, FOR SOLUTION ORAL DAILY PRN
Status: CANCELLED | OUTPATIENT
Start: 2024-07-03

## 2024-07-03 RX ORDER — AMLODIPINE BESYLATE 10 MG/1
10 TABLET ORAL DAILY
Status: CANCELLED | OUTPATIENT
Start: 2024-07-04

## 2024-07-03 RX ORDER — BISACODYL 10 MG
10 SUPPOSITORY, RECTAL RECTAL DAILY PRN
Status: ACTIVE | OUTPATIENT
Start: 2024-07-03

## 2024-07-03 RX ORDER — VITAMIN B COMPLEX
1000 TABLET ORAL DAILY
Status: DISPENSED | OUTPATIENT
Start: 2024-07-04

## 2024-07-03 RX ORDER — AMLODIPINE BESYLATE 10 MG/1
10 TABLET ORAL DAILY
Status: DISPENSED | OUTPATIENT
Start: 2024-07-04

## 2024-07-03 RX ORDER — ASPIRIN 81 MG/1
81 TABLET, CHEWABLE ORAL DAILY
Status: CANCELLED | OUTPATIENT
Start: 2024-07-04

## 2024-07-03 RX ORDER — GLIMEPIRIDE 2 MG/1
1 TABLET ORAL DAILY
Status: DISPENSED | OUTPATIENT
Start: 2024-07-04

## 2024-07-03 RX ORDER — VITAMIN B COMPLEX
1000 TABLET ORAL DAILY
Status: CANCELLED | OUTPATIENT
Start: 2024-07-04

## 2024-07-03 RX ORDER — ASPIRIN 81 MG/1
81 TABLET, CHEWABLE ORAL DAILY
Status: DISPENSED | OUTPATIENT
Start: 2024-07-04

## 2024-07-03 RX ORDER — GLIMEPIRIDE 2 MG/1
1 TABLET ORAL DAILY
Status: CANCELLED | OUTPATIENT
Start: 2024-07-04

## 2024-07-03 RX ORDER — ROSUVASTATIN CALCIUM 20 MG/1
40 TABLET, COATED ORAL NIGHTLY
Status: DISPENSED | OUTPATIENT
Start: 2024-07-03

## 2024-07-03 RX ORDER — POLYETHYLENE GLYCOL 3350 17 G/17G
17 POWDER, FOR SOLUTION ORAL DAILY PRN
Status: ACTIVE | OUTPATIENT
Start: 2024-07-03

## 2024-07-03 RX ORDER — SODIUM CHLORIDE 9 MG/ML
INJECTION, SOLUTION INTRAVENOUS PRN
Status: CANCELLED | OUTPATIENT
Start: 2024-07-03

## 2024-07-03 RX ORDER — BISACODYL 10 MG
10 SUPPOSITORY, RECTAL RECTAL DAILY PRN
Status: CANCELLED | OUTPATIENT
Start: 2024-07-03

## 2024-07-03 RX ORDER — ROSUVASTATIN CALCIUM 20 MG/1
40 TABLET, COATED ORAL NIGHTLY
Status: CANCELLED | OUTPATIENT
Start: 2024-07-03

## 2024-07-03 RX ADMIN — SODIUM CHLORIDE, PRESERVATIVE FREE 10 ML: 5 INJECTION INTRAVENOUS at 09:06

## 2024-07-03 RX ADMIN — ROSUVASTATIN CALCIUM 40 MG: 20 TABLET, COATED ORAL at 19:58

## 2024-07-03 RX ADMIN — TIMOLOL MALEATE 1 DROP: 2.5 SOLUTION OPHTHALMIC at 09:06

## 2024-07-03 RX ADMIN — ACETAMINOPHEN 500 MG: 500 TABLET ORAL at 09:05

## 2024-07-03 RX ADMIN — Medication 1000 UNITS: at 09:06

## 2024-07-03 RX ADMIN — ASPIRIN 81 MG: 81 TABLET, CHEWABLE ORAL at 09:06

## 2024-07-03 RX ADMIN — AMLODIPINE BESYLATE 10 MG: 10 TABLET ORAL at 09:06

## 2024-07-03 RX ADMIN — ACETAMINOPHEN 500 MG: 500 TABLET ORAL at 19:58

## 2024-07-03 RX ADMIN — Medication 1 TABLET: at 09:06

## 2024-07-03 ASSESSMENT — PAIN DESCRIPTION - PAIN TYPE: TYPE: ACUTE PAIN

## 2024-07-03 ASSESSMENT — PAIN - FUNCTIONAL ASSESSMENT: PAIN_FUNCTIONAL_ASSESSMENT: ACTIVITIES ARE NOT PREVENTED

## 2024-07-03 ASSESSMENT — PAIN DESCRIPTION - ONSET: ONSET: GRADUAL

## 2024-07-03 ASSESSMENT — PAIN SCALES - GENERAL
PAINLEVEL_OUTOF10: 0
PAINLEVEL_OUTOF10: 0
PAINLEVEL_OUTOF10: 2
PAINLEVEL_OUTOF10: 0
PAINLEVEL_OUTOF10: 2

## 2024-07-03 ASSESSMENT — PAIN SCALES - WONG BAKER: WONGBAKER_NUMERICALRESPONSE: NO HURT

## 2024-07-03 ASSESSMENT — PAIN DESCRIPTION - ORIENTATION: ORIENTATION: RIGHT

## 2024-07-03 ASSESSMENT — PAIN DESCRIPTION - DESCRIPTORS: DESCRIPTORS: ACHING

## 2024-07-03 ASSESSMENT — PAIN DESCRIPTION - FREQUENCY: FREQUENCY: INTERMITTENT

## 2024-07-03 ASSESSMENT — PAIN DESCRIPTION - LOCATION: LOCATION: ARM;SHOULDER

## 2024-07-03 NOTE — PROGRESS NOTES
07/03/24 1531   Encounter Summary   Encounter Overview/Reason Volunteer Encounter   Service Provided For Patient   [facility]   Support System Family members   Last Encounter  07/03/24  (Visit by Eucharist  Judy, charted by  Doug)   Complexity of Encounter Low   Begin Time 1000   End Time  1010   Total Time Calculated 10 min   Rituals, Rites and Sacraments   Type Anabaptism Communion   Assessment/Intervention/Outcome   Assessment Calm   Intervention Active listening   Outcome Coping   Plan and Referrals   Plan/Referrals Continue Support (comment)

## 2024-07-03 NOTE — PROGRESS NOTES
4 Eyes Skin Assessment     NAME:  Doretha Melo  YOB: 1927  MEDICAL RECORD NUMBER:  [id number]    The patient is being assessed for  Admission    I agree that at least one RN has performed a thorough Head to Toe Skin Assessment on the patient. ALL assessment sites listed below have been assessed.      Areas assessed by both nurses:    Head, Face, Ears, Shoulders, Back, Chest, Arms, Elbows, Hands, Sacrum. Buttock, Coccyx, Ischium, Legs. Feet and Heels, and Under Medical Devices         Does the Patient have a Wound? No noted wound(s)Has dried area on back of right leg.         Donell Prevention initiated by RN: Yes  Wound Care Orders initiated by RN: No    Pressure Injury (Stage 3,4, Unstageable, DTI, NWPT, and Complex wounds) if present, place Wound referral order by RN under : No    New Ostomies, if present place, Ostomy referral order under : No     Nurse 1 eSignature: Electronically signed by KATHLEEN BANEGAS RN on 7/3/24 at 4:04 PM EDT    **SHARE this note so that the co-signing nurse can place an eSignature**    Nurse 2 eSignature: Electronically signed by Krystle Odom LPN on 7/3/24 at 4:15 PM EDT

## 2024-07-03 NOTE — PLAN OF CARE
indication noted for all meds in the drug class (must be documented) Is taking  (check all that apply) Indication noted (check all that apply)   Antipsychotic [] []   Anticoagulant [x] [x]   Antibiotic [] []   Opioid [] []   Antiplatelet [x] [x]   Hypoglycemic (including insulin) [] []   None of the above []   * Applies to meds during first three days of admission      Medical Prognosis:               [] Good        [x] Fair     [] Guarded      Total expected IRF days 12                                            Physician anticipated functional outcomes:  FWW and [facility] RN/OT/PT and supervision.    Functional Prognosis: [] Good       [x] Fair     [] Guarded        Rehab Goals:   [] Return to premorbid function of_______________________________.    [] Independent   [] Mod I  [x] Supervision  [] CGA   [] Min A   [] Mod A  Level for ambulation []without assistive device  [x] with assistive device        [] Independent   [] Mod I  [x] Supervision [] CGA   [] Min A   [] Mod A  Level for transfers []without assistive device  [x] with assistive device         [] Independent   [] Mod I  [x] Supervision [] CGA   [] Min A   [] Mod A Level with ADL's []without assistive device   [x] with assistive device     ___________________     Level with cognitive skills requiring [] No assist [x] Supervision  [] Active Assist/Cues     [] Maximize level of mobility and ADL's to decrease burden on caregiver    IPOC brief synthesis of Preadmission Screen, Post-Admission Evaluation, and Therapy Evaluations: Acute inpatient rehabilitation with occupational and physical therapy 180 minutes 5 out of every 7 days. Will address basic and  advancing mobility with self-care instruction and adaptive equipment training. Caregiver education will be offered. Expected length of stay  prior to a supervised level of function for discharge home with a walker and [facility] OT/PT is 2 weeks.     Additional recommendation:     Orthostatic hypotension with gait  blood pressure is 120-140.  Encouraging consistent oral hydration.  History of CVA: Being cognizant of her discoordination from her prior stroke as we planned our therapy interventions.  Continuing her antiplatelet therapy with baby aspirin and her statin.  Right 10th rib fracture: Encouraging compliance with spirometry.  Monitoring O2 saturations and respiratory rate at rest and with activities.  Cautious use of acetaminophen for pain control.     Anticipated discharge destination:    [] Home Independently   [x] Home with supervision    []SNF     [] Other       This plan has been reviewed with me in a language I understand. I have had the opportunity to include my input with my therapy team.    ________________________________________________   ______________________  Patient/Significant Other      Date    I have reviewed this initial plan of care and agree with its contents:    Title   Name    Date    Time    Physician:   GISEL Zayas MD 7/5/2024 11:20 PM    Case Mgmt: Ester VEGA 7/5/2024 8:28 AM     OT: Brianna Velasquez MS, OTR/L 07/05/2024 1622     PT: Ester Lindsey, PT 7/5/24, 0151    RN: Daja Harrison RN    ST:    Dietician: Linsey Evans RD, AUGUST  07/05/2024 15:05     ADMIT DATE:7/3/2024

## 2024-07-03 NOTE — PLAN OF CARE
Problem: Safety - Adult  Goal: Free from fall injury  Outcome: Progressing  Flowsheets (Taken 7/2/2024 0052)  Free From Fall Injury:   Instruct family/caregiver on patient safety   Based on caregiver fall risk screen, instruct family/caregiver to ask for assistance with transferring infant if caregiver noted to have fall risk factors     Problem: Discharge Planning  Goal: Discharge to home or other facility with appropriate resources  Outcome: Progressing  Flowsheets (Taken 7/2/2024 2030)  Discharge to home or other facility with appropriate resources:   Identify barriers to discharge with patient and caregiver   Arrange for needed discharge resources and transportation as appropriate   Identify discharge learning needs (meds, wound care, etc)   Refer to discharge planning if patient needs post-hospital services based on physician order or complex needs related to functional status, cognitive ability or social support system     Problem: Pain  Goal: Verbalizes/displays adequate comfort level or baseline comfort level  Outcome: Progressing  Flowsheets (Taken 7/2/2024 0052)  Verbalizes/displays adequate comfort level or baseline comfort level:   Encourage patient to monitor pain and request assistance   Assess pain using appropriate pain scale   Administer analgesics based on type and severity of pain and evaluate response   Implement non-pharmacological measures as appropriate and evaluate response   Consider cultural and social influences on pain and pain management   Notify Licensed Independent Practitioner if interventions unsuccessful or patient reports new pain     Problem: ABCDS Injury Assessment  Goal: Absence of physical injury  Outcome: Progressing  Flowsheets (Taken 7/2/2024 0052)  Absence of Physical Injury: Implement safety measures based on patient assessment     Problem: Chronic Conditions and Co-morbidities  Goal: Patient's chronic conditions and co-morbidity symptoms are monitored and maintained or  improved  Outcome: Progressing  Flowsheets (Taken 7/2/2024 2030)  Care Plan - Patient's Chronic Conditions and Co-Morbidity Symptoms are Monitored and Maintained or Improved:   Monitor and assess patient's chronic conditions and comorbid symptoms for stability, deterioration, or improvement   Collaborate with multidisciplinary team to address chronic and comorbid conditions and prevent exacerbation or deterioration   Update acute care plan with appropriate goals if chronic or comorbid symptoms are exacerbated and prevent overall improvement and discharge     Problem: Nutrition Deficit:  Goal: Optimize nutritional status  Outcome: Progressing  Flowsheets (Taken 7/3/2024 0056)  Nutrient intake appropriate for improving, restoring, or maintaining nutritional needs:   Assess nutritional status and recommend course of action   Recommend appropriate diets, oral nutritional supplements, and vitamin/mineral supplements   Order, calculate, and assess calorie counts as needed   Recommend, monitor, and adjust tube feedings and TPN/PPN based on assessed needs   Provide specific nutrition education to patient or family as appropriate   Monitor oral intake, labs, and treatment plans

## 2024-07-03 NOTE — PROGRESS NOTES
ARU Admission Assessment - [facility]      A Complete drug regimen review was completed for this patient this date.   [x]  No clinically significant medication issue was identified   []  Yes, a clinically significant medication issue was identified     []  Adverse Drug Event:    []  Allergy:    []  Side Effect:    []  Ineffective Therapy:    []  Drug interaction:     []  Duplicate Therapy:    []  Untreated Indication:    []  Non-adherence:    []  Other:  Nursing contacted the physician:       Date:   07-             Time:1600    Actions recommended by physician were completed:   Date:                 Time:  Action(s) Taken:             []  New Physician Order Received    []  Issue Noted by Physician; However No Action Required    []  Other:        Ethnicity  \"Are you of , /a, or Salvadorean origin?\"  Check all that apply:  [x] A.  No, not of , /a, or Salvadorean Origin  [] B.  Yes, Citizen of [place], Citizen of [place] American, Chicano/a  [] C.  Yes, Central African  [] D.  Yes, Zambian  [] E.  Yes, another , , or Salvadorean origin  [] X.  Patient unable to respond    Race  \"What is your race?\"  Check all that apply:  [x] A.  White  [] B.  Black or   [] C.   or   [] D.     [] E.  Chinese  [] F.  Australian  [] G. Czech  [] H.  Sinhala  [] I.  Kiswahili  [] J.  Other   [] K.    [] L.  Danish or Rachel  [] M.  Kosovan  [] N.  Other   [] X.  Patient unable to respond    Language  A.  \"What is your preferred language?\"   English    B.  \"Do you need or want an  to communicate with a doctor or health care staff?\"  Check only one:  [x] 0.  No  [] 1.  Yes  [] 9.  Unable to determine    Transportation  \"In the past 6-12 months, has lack of transportation kept you from medical appointments, meetings, work, or from getting things needed for daily living?\"  Check all that apply:  [] A.  Yes, it has kept  lonely or isolated from those around you?\"  [x] 0.  Never  [] 1.  Rarely  [] 2.  Sometimes  [] 3.  Often  [] 4.  Always  [] 8.  Patient unable to respond    Pain Effect on Sleep  \"Over the past 5 days, how much of the time has pain made it hard for you to sleep at night?\"  [x]  0.  Does not apply - I have not had any pain or hurting in the past 5 days  []  1.  Rarely or not at all  []  2.  Occasionally  []  3.  Frequently  []  4.  Almost constantly  []  8.  Unable to answer  **If the patient answers \"0.  Does not apply\" to this question, skip the next two \"Pain\" questions**      Pain Interference with Therapy Activities  \"Over the past 5 days, how often have you limited your participation in rehabilitation therapy sessions due to pain?\"  []  0.  Does not apply - I have not received rehabilitation therapy in the past 5 days  []  1.  Rarely or not at all  []  2.  Occasionally  []  3.  Frequently  []  4.  Almost constantly  []  8.  Unable to answer    Pain Interference with Day-to-Day Activities:  \"Over the past 5 days, how often have you limited your day-to-day activities (excluding rehabilitation therapy session)?\"  []  1.  Rarely or not at all  []  2.  Occasionally  []  3.  Frequently  []  4.  Almost constantly  []  8.  Unable to answer

## 2024-07-03 NOTE — PROGRESS NOTES
V2.0  [facility] Hospitalist Progress Note      Name:  Doretha Melo /Age/Sex: 6/15/1927  (89 y.o. female)   MRN & CSN:  [id number] & [id number] Encounter Date/Time: 7/3/2024 9:21 AM EDT    Location:  -A PCP: Kenna Ceron MD       Hospital Day: 3    Assessment and Plan:     Patient is a 89 y.o. female who presented with right-sided chest pain and mechanical fall.     Right-sided chest pain  -Less likely from cardiac etiology, likely musculoskeletal based on the history troponin initially 19 followed by 20, EKG with no acute ST-T wave changes will trend troponin, x-ray chest without evidence of rib fracture  PT OT for placement to ARU     Suspected atrial fibrillation  -EKG on presentation with atrial fibrillation, during my assessment rhythm was regular likely sinus tachycardia, no history of A-fib in the past, consult cardiology, check TSH, ordered 500 mL fluid bolus and IV metoprolol 5 mg x 1     Mechanical falls: Patient has had 2 falls in the past 2 weeks, usually due to her left knee giving out, patient lives alone at home, consult PT OT for placement     Hypertension  -Continue amlodipine     HLD  -Continue statin      History of ischemic stroke: On aspirin and statin     Medical Decision Making:  The following items were considered in medical decision making:  Discussion of patient care with other providers  Reviewed clinical lab tests if any  Reviewed radiology tests if any  Reviewed other diagnostic tests/interventions  Independent review of radiologic images if any  Microbiology cultures and other micro tests if any    Estimated time spent for medical decision-making encompassing complexity of the case, history taking, medication review, physical examination, communication with family, RN, , discussion with specialists, and ancillary staff members to provide accurate care for the patient was around 15 minutes.    The billing in this case is level 2 due to the combination of    Result Value Ref Range    LV EDV A4C 34 mL    LV ESV A4C 15 mL    IVSd 0.9 0.6 - 0.9 cm    LVIDd 4.2 3.9 - 5.3 cm    LVIDs 2.8 cm    LVOT Diameter 1.9 cm    LVOT Mean Gradient 2 mmHg    LVOT VTI 15.6 cm    LVOT Peak Velocity 0.9 m/s    LVOT Peak Gradient 3 mmHg    LVPWd 0.9 0.6 - 0.9 cm    LV E' Lateral Velocity 7 cm/s    LV E' Septal Velocity 5 cm/s    LV Ejection Fraction A4C 56 %    LVOT Area 2.8 cm2    LVOT SV 44.2 ml    LA Major [place] 4.9 cm    LA Area 4C 13.5 cm2    LA Volume MOD A4C 26 22 - 52 mL    LA Diameter 2.7 cm    AV Mean Gradient 5 mmHg    AV VTI 25.0 cm    AV Mean Velocity 1.0 m/s    AV Peak Velocity 1.6 m/s    AV Peak Gradient 10 mmHg    AV Area by VTI 1.9 cm2    AV Area by Peak Velocity 1.8 cm2    Aortic Root 2.6 cm    MV A Velocity 1.06 m/s    MV E Velocity 0.73 m/s    Est. RA Pressure 3 mmHg    RV Mid Dimension 2.2 cm    TR Max Velocity 2.31 m/s    TR Peak Gradient 21 mmHg    Body Surface Area 1.35 m2    Fractional Shortening 2D 33 28 - 44 %    LV ESV Index A4C 12 mL/m2    LV EDV Index A4C 28 mL/m2    LVIDd Index 3.44 cm/m2    LVIDs Index 2.30 cm/m2    LV RWT Ratio 0.43     LV Mass 2D 118.7 67 - 162 g    LV Mass 2D Index 97.3 (A) 43 - 95 g/m2    MV E/A 0.69     E/E' Ratio (Averaged) 12.51     E/E' Lateral 10.43     E/E' Septal 14.60     LVOT Stroke Volume Index 36.2 mL/m2    LA Volume Index MOD A4C 21 16 - 34 ml/m2    LA Size Index 2.21 cm/m2    LA/AO Root Ratio 1.04     Ao Root Index 2.13 cm/m2    AV Velocity Ratio 0.56     LVOT:AV VTI Index 0.62     ASHER/BSA VTI 1.6 cm2/m2    ASHER/BSA Peak Velocity 1.5 cm2/m2    RVSP 24 mmHg        Imaging/Diagnostics Last 24 Hours   XR RIBS RIGHT INCLUDE CHEST (MIN 3 VIEWS)    Result Date: 7/1/2024  Chest and right ribs INDICATION: Right rib pain after fall COMPARISON: Portable chest, 9/18/2023 TECHNIQUE: A PA view of the chest and several views of the right ribs were obtained. FINDINGS: Lungs are clear.  There is no pneumothorax, infiltrate, or effusion. The

## 2024-07-03 NOTE — CARE COORDINATION
Met with patient and discussed ARU.  Explained to patient the required 3 hours of therapy a day.  Also explained the average length of stay is 11 days, could be longer or shorter depending on recommendations of therapy and Dr. Zayas.  Patient expresses her understanding and states she's agreeable to admit to ARU.  Per patient her goal is to return home with help from family as needed.     Discussed patients discharge plan from ARU.  Per patient she wants to discharge home from ARU. Discussed the difference between ARU vs. SNF.  Patient expresses no interest in discharging to SNF     Spoke with patients family and discussed ARU.  Per family goal is for patient to regain her strength in ARU and then discharge home.    Patient meets criteria and is approved to come to ARU.   Patient able to admit once medically stable and after ARU Medical Director and  sign the pre-admission screen (PAS).    Notified MD and CM of approval.

## 2024-07-03 NOTE — CARE COORDINATION
Received referral from dietitian for possible nutritional supplement assistance at discharge.  I will continue to follow as she is currently pending ARU.

## 2024-07-03 NOTE — PLAN OF CARE
Problem: Safety - Adult  Goal: Free from fall injury  7/3/2024 1035 by Rosalba Bailey RN  Outcome: Progressing  7/3/2024 0056 by Melita Stallworth RN  Outcome: Progressing  Flowsheets (Taken 7/2/2024 0052)  Free From Fall Injury:   Instruct family/caregiver on patient safety   Based on caregiver fall risk screen, instruct family/caregiver to ask for assistance with transferring infant if caregiver noted to have fall risk factors     Problem: Discharge Planning  Goal: Discharge to home or other facility with appropriate resources  7/3/2024 1035 by Rosalba Bailey RN  Outcome: Progressing  7/3/2024 0056 by Melita Stallworth RN  Outcome: Progressing  Flowsheets (Taken 7/2/2024 2030)  Discharge to home or other facility with appropriate resources:   Identify barriers to discharge with patient and caregiver   Arrange for needed discharge resources and transportation as appropriate   Identify discharge learning needs (meds, wound care, etc)   Refer to discharge planning if patient needs post-hospital services based on physician order or complex needs related to functional status, cognitive ability or social support system     Problem: Pain  Goal: Verbalizes/displays adequate comfort level or baseline comfort level  7/3/2024 1035 by Rosalba Bailey RN  Outcome: Progressing  7/3/2024 0056 by Melita Stallworth RN  Outcome: Progressing  Flowsheets (Taken 7/2/2024 0052)  Verbalizes/displays adequate comfort level or baseline comfort level:   Encourage patient to monitor pain and request assistance   Assess pain using appropriate pain scale   Administer analgesics based on type and severity of pain and evaluate response   Implement non-pharmacological measures as appropriate and evaluate response   Consider cultural and social influences on pain and pain management   Notify Licensed Independent Practitioner if interventions unsuccessful or patient reports new pain     Problem: ABCDS Injury Assessment  Goal: Absence of

## 2024-07-04 PROBLEM — I48.0 PAROXYSMAL ATRIAL FIBRILLATION (HCC): Status: ACTIVE | Noted: 2024-07-04

## 2024-07-04 PROBLEM — Z86.73 HISTORY OF CVA (CEREBROVASCULAR ACCIDENT): Status: ACTIVE | Noted: 2024-07-04

## 2024-07-04 PROBLEM — S22.31XA CLOSED FRACTURE OF ONE RIB OF RIGHT SIDE: Status: ACTIVE | Noted: 2024-07-04

## 2024-07-04 PROBLEM — R53.1 GENERALIZED WEAKNESS: Status: ACTIVE | Noted: 2024-07-04

## 2024-07-04 PROCEDURE — 6360000002 HC RX W HCPCS: Performed by: STUDENT IN AN ORGANIZED HEALTH CARE EDUCATION/TRAINING PROGRAM

## 2024-07-04 PROCEDURE — 94150 VITAL CAPACITY TEST: CPT

## 2024-07-04 PROCEDURE — 6370000000 HC RX 637 (ALT 250 FOR IP): Performed by: STUDENT IN AN ORGANIZED HEALTH CARE EDUCATION/TRAINING PROGRAM

## 2024-07-04 PROCEDURE — 94761 N-INVAS EAR/PLS OXIMETRY MLT: CPT

## 2024-07-04 PROCEDURE — 1280000000 HC REHAB R&B

## 2024-07-04 RX ADMIN — ROSUVASTATIN CALCIUM 40 MG: 20 TABLET, COATED ORAL at 20:32

## 2024-07-04 RX ADMIN — ENOXAPARIN SODIUM 30 MG: 100 INJECTION SUBCUTANEOUS at 08:35

## 2024-07-04 RX ADMIN — Medication 1000 UNITS: at 08:35

## 2024-07-04 RX ADMIN — ACETAMINOPHEN 500 MG: 500 TABLET ORAL at 08:35

## 2024-07-04 RX ADMIN — TIMOLOL MALEATE 1 DROP: 2.5 SOLUTION OPHTHALMIC at 08:35

## 2024-07-04 RX ADMIN — AMLODIPINE BESYLATE 10 MG: 10 TABLET ORAL at 08:35

## 2024-07-04 RX ADMIN — ASPIRIN 81 MG: 81 TABLET, CHEWABLE ORAL at 08:35

## 2024-07-04 RX ADMIN — ACETAMINOPHEN 500 MG: 500 TABLET ORAL at 20:32

## 2024-07-04 ASSESSMENT — PAIN SCALES - GENERAL
PAINLEVEL_OUTOF10: 4
PAINLEVEL_OUTOF10: 0
PAINLEVEL_OUTOF10: 4
PAINLEVEL_OUTOF10: 8

## 2024-07-04 ASSESSMENT — PAIN DESCRIPTION - DESCRIPTORS
DESCRIPTORS: ACHING;DISCOMFORT
DESCRIPTORS: ACHING

## 2024-07-04 ASSESSMENT — PAIN DESCRIPTION - PAIN TYPE: TYPE: ACUTE PAIN

## 2024-07-04 ASSESSMENT — PAIN - FUNCTIONAL ASSESSMENT: PAIN_FUNCTIONAL_ASSESSMENT: PREVENTS OR INTERFERES SOME ACTIVE ACTIVITIES AND ADLS

## 2024-07-04 ASSESSMENT — PAIN DESCRIPTION - LOCATION
LOCATION: HIP
LOCATION: SHOULDER

## 2024-07-04 ASSESSMENT — PAIN DESCRIPTION - FREQUENCY: FREQUENCY: INTERMITTENT

## 2024-07-04 ASSESSMENT — PAIN DESCRIPTION - ORIENTATION: ORIENTATION: RIGHT

## 2024-07-04 ASSESSMENT — PAIN DESCRIPTION - ONSET: ONSET: GRADUAL

## 2024-07-04 NOTE — PROGRESS NOTES
07/04/24 1605   Encounter Summary   Encounter Overview/Reason Follow-up   Service Provided For Patient   Referral/Consult From [facility]   Support System Family members   Last Encounter  07/04/24  (Pt seemed to be doing very well, just waiting to get her arm strength up so she can go home. Very grateful for the visit. -Esau)   Complexity of Encounter Low   Begin Time 1559   End Time  1607   Total Time Calculated 8 min   Spiritual/Emotional needs   Type Spiritual Support   Assessment/Intervention/Outcome   Assessment Calm;Coping;Hopeful   Intervention Active listening   Outcome Comfort;Expressed Gratitude   Plan and Referrals   Plan/Referrals Continue Support (comment)

## 2024-07-04 NOTE — H&P
Doretha Melo    : 6/15/1927  [facility] #: [id number]  MRN: [id number]              History and physical    Date of face-to-face exam: 7/3/2024.  Time of face-to-face exam: 1630.    Admitting diagnosis: Orthostatic hypotension (IGC 16)    Comorbid diagnoses impacting rehabilitation: Generalized weakness, gait disturbance, paroxysmal atrial fibrillation, essential hypertension, history of CVA, macular degeneration, B12 deficiency    Chief complaint: Dizziness when moving.    History of present illness: The patient is a 89-year-old right-hand-dominant female previously treated on the inpatient rehab unit in 2023 following a stroke.  She was discharged to home with the use of a front wheeled walker with family supervision.  She had been doing reasonably well until mid  of this year when she suffered a mechanical fall.  She was able to arise and resume her daily habits, again using a walker for ambulation.  Unfortunately, in the ensuing days, she was having more more right chest wall pain and malaise.  She was feeling less and less secure on her feet and dizziness when being upright was significant.  On 2024 she presented to our ED being unable to manage herself independently in her home.   She was found to be tachycardic and orthostatic.  Her evaluation also revealed a right 10th rib fracture.  Cautious analgesic use followed and her blood pressures continue to vary significantly with symptomatic dizziness when she was upright.  Fluids were administered and medications were adjusted by cardiology.  She has become unable to do her own toileting, transfers and self-care and she cannot return directly home.  She requires rehabilitation to address these issues.    Review of systems: Right chest wall pain.  Positional dizziness.  Her sleep.  Poor appetite.  No change to vision, speech or swallowing.  Infrequent bowel movements.  Chronic struggle with urinary incontinence.  The remainder of their review of

## 2024-07-05 PROCEDURE — 6370000000 HC RX 637 (ALT 250 FOR IP): Performed by: PHYSICAL MEDICINE & REHABILITATION

## 2024-07-05 PROCEDURE — 99232 SBSQ HOSP IP/OBS MODERATE 35: CPT | Performed by: PHYSICAL MEDICINE & REHABILITATION

## 2024-07-05 PROCEDURE — 6370000000 HC RX 637 (ALT 250 FOR IP): Performed by: STUDENT IN AN ORGANIZED HEALTH CARE EDUCATION/TRAINING PROGRAM

## 2024-07-05 PROCEDURE — 97116 GAIT TRAINING THERAPY: CPT

## 2024-07-05 PROCEDURE — 97163 PT EVAL HIGH COMPLEX 45 MIN: CPT

## 2024-07-05 PROCEDURE — 97530 THERAPEUTIC ACTIVITIES: CPT

## 2024-07-05 PROCEDURE — 97110 THERAPEUTIC EXERCISES: CPT

## 2024-07-05 PROCEDURE — 87086 URINE CULTURE/COLONY COUNT: CPT

## 2024-07-05 PROCEDURE — 97535 SELF CARE MNGMENT TRAINING: CPT

## 2024-07-05 PROCEDURE — 1280000000 HC REHAB R&B

## 2024-07-05 PROCEDURE — 97167 OT EVAL HIGH COMPLEX 60 MIN: CPT

## 2024-07-05 RX ORDER — LATANOPROST 50 UG/ML
1 SOLUTION/ DROPS OPHTHALMIC NIGHTLY
Status: DISPENSED | OUTPATIENT
Start: 2024-07-05

## 2024-07-05 RX ORDER — LIDOCAINE 4 G/G
1 PATCH TOPICAL DAILY
Status: DISPENSED | OUTPATIENT
Start: 2024-07-05

## 2024-07-05 RX ADMIN — Medication 1000 UNITS: at 08:40

## 2024-07-05 RX ADMIN — ACETAMINOPHEN 500 MG: 500 TABLET ORAL at 20:34

## 2024-07-05 RX ADMIN — AMLODIPINE BESYLATE 10 MG: 10 TABLET ORAL at 08:40

## 2024-07-05 RX ADMIN — ROSUVASTATIN CALCIUM 40 MG: 20 TABLET, COATED ORAL at 20:34

## 2024-07-05 RX ADMIN — LATANOPROST 1 DROP: 50 SOLUTION OPHTHALMIC at 20:41

## 2024-07-05 RX ADMIN — ACETAMINOPHEN 500 MG: 500 TABLET ORAL at 08:40

## 2024-07-05 RX ADMIN — ASPIRIN 81 MG: 81 TABLET, CHEWABLE ORAL at 08:40

## 2024-07-05 RX ADMIN — TIMOLOL MALEATE 1 DROP: 2.5 SOLUTION OPHTHALMIC at 09:13

## 2024-07-05 ASSESSMENT — PAIN DESCRIPTION - LOCATION
LOCATION: SHOULDER
LOCATION: RIB CAGE

## 2024-07-05 ASSESSMENT — PAIN SCALES - GENERAL
PAINLEVEL_OUTOF10: 3
PAINLEVEL_OUTOF10: 4
PAINLEVEL_OUTOF10: 2

## 2024-07-05 ASSESSMENT — PAIN - FUNCTIONAL ASSESSMENT: PAIN_FUNCTIONAL_ASSESSMENT: ACTIVITIES ARE NOT PREVENTED

## 2024-07-05 ASSESSMENT — PAIN DESCRIPTION - DESCRIPTORS
DESCRIPTORS: ACHING;DISCOMFORT
DESCRIPTORS: ACHING;THROBBING

## 2024-07-05 ASSESSMENT — PAIN DESCRIPTION - ORIENTATION: ORIENTATION: RIGHT;LEFT

## 2024-07-05 ASSESSMENT — PAIN SCALES - WONG BAKER: WONGBAKER_NUMERICALRESPONSE: HURTS A LITTLE BIT

## 2024-07-05 NOTE — CARE COORDINATION
Case Management Admission Note    Patient:Doretha Melo      :6/15/1927  MRN:[id number]  Rehab Dx/Hx: Orthostatic hypotension [I95.1]  Orthostatic hypotension [I95.1]    Chief Complaint:   Past Medical History:   Diagnosis Date    BCC (basal cell carcinoma), leg 2017    Family history of pancreatic cancer     Glaucoma of right eye 2016    Dr Jean Baptiste    Hyperlipidemia     Hypertension     Macular degeneration, dry     Dr. Jean Baptiste    Osteoarthritis of knee     Dr Trujillo    Osteoporosis 2002    Fosamax [id number]    Pelvic fracture (HCC) 2018    Right rotator cuff tear 2014    Trujillo / injection; PT    T10 vertebral fracture (HCC) 2018    fall and fragility fx; also L1 ; had kyphoplasty 2018    Vitamin B12 deficiency 2017    B 12 266 by Ronnie at [facility]     Past Surgical History:   Procedure Laterality Date    ABDOMINAL EXPLORATION SURGERY      With Lysis of Adhesions    APPENDECTOMY      w/  rt.uso    CYSTOCELE REPAIR      w/ rectocele    FIXATION KYPHOPLASTY  2018    T10 & L1    HYSTERECTOMY (CERVIX STATUS UNKNOWN)  1969     remaining ovary removed also    SKIN CANCER EXCISION Left 2017    BCC with skin graft    WRIST FRACTURE SURGERY Left 2017    ORIF     Allergies   Allergen Reactions    Bee Venom Swelling    Iodides Hives    Shrimp Flavor Hives     Precautions: falls    Date of Admit: 7/3/2024  Room #: 1023/1023-A    Current functional status at time of admit:  Home Living/DME Available:  Type of Home: House  Home Access: Stairs to enter with rails  Bathroom Shower/Tub: Tub only (sponge bathes at sink)  Bathroom Toilet: Standard  Bathroom Equipment: Toilet raiser (raiser has handles)  Home Equipment: Rollator, Reacher, Alert button    IADL Hx:  Homemaking Responsibilities: Yes  Active : No  Mode of Transportation: SUV (pt uses a portable step to step into SUV)  Occupation: Retired    Spouse:   Family: Patient reported that her children are supportive.

## 2024-07-05 NOTE — PROGRESS NOTES
Comprehensive Nutrition Assessment    Type and Reason for Visit:  Initial, Consult (oral nutrition supplement)    Nutrition Recommendations/Plan:   Continue regular diet as tolerates  Will continue to offer high calorie oral nutrition supplement  Assist with meal set up as needed  Will continue to follow up during stay      Malnutrition Assessment:  Malnutrition Status:  Insufficient data (07/05/24 1502)    Context:  Acute Illness       Nutrition Assessment:    Admit to acute reahb unit with hx weakness, falls, hypotension. Able to tolerate regular diet with recent meal intake % and will consume daily supplement. Weight loss noted in past months, BMI low for age over 65. Will continue to follow at moderate nutrition risk at this time.    Nutrition Related Findings:    working with OT on visit,  hx CVA, HTN, macular degeneration Wound Type: None       Current Nutrition Intake & Therapies:    Average Meal Intake: 51-75%  Average Supplements Intake: 51-75%  ADULT DIET; Regular  ADULT ORAL NUTRITION SUPPLEMENT; Breakfast; Standard High Calorie/High Protein Oral Supplement    Anthropometric Measures:  Height: 139.7 cm (4' 7\")  Ideal Body Weight (IBW): 75 lbs (34 kg)    Admission Body Weight: 38.9 kg (85 lb 12.1 oz)  Current Body Weight: 41.3 kg (91 lb 0.8 oz), 121.4 % IBW. Weight Source: Bed Scale  Current BMI (kg/m2): 21.2  Usual Body Weight: 43.1 kg (95 lb 0.3 oz) (5/24/24)  % Weight Change (Calculated): -4.2  Weight Adjustment For: No Adjustment                 BMI Categories: Underweight (BMI less than 22) age over 65    Estimated Daily Nutrient Needs:  Energy Requirements Based On: Kcal/kg  Weight Used for Energy Requirements: Current  Energy (kcal/day): 0239-3924 (30-35 ben/kg)  Weight Used for Protein Requirements: Current  Protein (g/day): 49-58 (1.2-1.4 g/kg)  Method Used for Fluid Requirements: 1 ml/kcal  Fluid (ml/day): 1400    Nutrition Diagnosis:   Predicted inadequate energy intake related to other  (comment) (reduced appetite, advanced age) as evidenced by weight loss    Nutrition Interventions:   Food and/or Nutrient Delivery: Continue Current Diet, Continue Oral Nutrition Supplement  Nutrition Education/Counseling: No recommendation at this time  Coordination of Nutrition Care: Continue to monitor while inpatient  Plan of Care discussed with: n/a    Goals:     Goals: by next RD assessment, PO intake 75% or greater       Nutrition Monitoring and Evaluation:   Behavioral-Environmental Outcomes: None Identified  Food/Nutrient Intake Outcomes: Diet Advancement/Tolerance, Food and Nutrient Intake, Supplement Intake  Physical Signs/Symptoms Outcomes: Biochemical Data, Skin, Weight, Meal Time Behavior, Nutrition Focused Physical Findings    Discharge Planning:    Continue Oral Nutrition Supplement     Linsey Evans RD, LD  Contact: [phone]

## 2024-07-05 NOTE — PROGRESS NOTES
significantly reduced R shoulder ROM and pain, which pt reports is from one of her recent falls.  I documented during her 09/2023 stay that she had 75* shoulder flexion and already had a h/o rotator cuff tear; it is now reduced to 15* and xray although for ribs and chest, also commented no shoulder fracture.  This, combined with previous CVA affecting LUE, impacts pt's ability to complete ADLs and transfers.  While I am hopeful that with enough repetition pt can meet mod I goals for ADLs, I am concerned about pt's ability to manage IADLs and pt may need more support at home.  The QI, MMT, and ROM standardized assessments were used this date to determine the above performance deficits, which compromise pt's ability to safely complete ADLs/IADLs/mobility.  Pt will benefit from ARU OT services to increase functional performance and return to PLOF.          Decision Making: High Complexity  Clinical Presentation:  Evolving c unstable characteristics (I.e. pain)  Patient education:   ARU procotol, Role of O.T., O.T. plan of care  []   Patient goal was established and reviewed in Rehabtracker with patient and/or family this date.  REQUIRES OT FOLLOW-UP: Yes  Discharge Recommendations:  Home c increased assist, [facility] OT  Equipment Recommendations:  Likely none    Goals:     Short Term Goals  Time Frame for Short Term Goals: STGs=LTGs  Long Term Goals  Time Frame for Long Term Goals : ~10 days or until d/c  Long Term Goal 1: Pt will complete grooming tasks Ind  Long Term Goal 2: Pt will complete total body bathing mod I using AE PRN  Long Term Goal 3: Pt will complete UB dressing Ind  Long Term Goal 4: Pt will complete LB dressing mod I  Long Term Goal 5: Pt will doff/don footwear mod I using AE PRN  Additional Goals?: Yes  Long Term Goal 6: Pt will complete toileting mod I  Long Term Goal 7: Pt will complete functional transfers (bed, chair, toilet) c DME PRN and mod I  Long Term Goal 8: Pt will perform therex/therax to  facilitate increased strength/endurance/ax tolerance (c emphasis on dynamic standing balance/tolerance>8 mins, R shoulder ROM and BUE endurance) c SBA  Long Term Goal 9: Pt will complete simple homemaking tasks c DME PRN and S    Plan:    Pt will be seen at least 60 minutes per day for a minimum of 5 days per week, plus group therapy as appropriate  Current Treatment Recommendations: Strengthening, ROM, Balance training, Functional mobility training, Endurance training, Pain management, Safety education & training, Patient/Caregiver education & training, Equipment evaluation, education, & procurement, Self-Care / ADL, Home management training, Cognitive/Perceptual training, Coordination training    OT Individual Minutes  Time In: 1300  Time Out: 1430  Minutes: 90                   Evaluation was completed 1:1 session.      OT  Individual Evaluation 25   Individual tx performed as shown below   Therapeutic Exercise 10   ADL Self-care 55   Neuro Re-Ed    Therapeutic Activity    Group    Variance with Reason:    TOTAL 90     Electronically signed by:    Brianna Velasquez MS, OTR/L  License #[id number]  7/5/2024, 4:12 PM

## 2024-07-05 NOTE — PROGRESS NOTES
CGA for 4\" steps  CARE Score: 4  Discharge Goal: Independent     12 Steps  Reason if not Attempted: Not applicable  CARE Score: 9  Discharge Goal: Not Applicable    Gait Deviations: []None []Slow bhupinder  [] Increased JORGE LUIS  [] Staggers []Deviated Path  [] Decreased step length  [] Decreased step height  []Decreased arm swing  [] Shuffles  [] Decreased head and trunk rotation  []other:        Wheelchair:  w/c Ability: Wheelchair Ability  Uses a Wheelchair and/or Scooter?: No                Balance:        Object: Picking Up Object  Assistance Needed: Substantial/maximal assistance  Comment: max assist with 2ww and reacher due to R shoulder limitations  CARE Score: 2  Discharge Goal: Supervision or touching assistance               Assessment: The patient is a 89-year-old right-hand-dominant female previously treated on the inpatient rehab unit in November 2023 following a stroke. She was discharged to home with the use of a front wheeled walker with family supervision. She had been doing reasonably well until mid June of this year when she suffered a mechanical fall. She was able to arise and resume her daily habits, again using a walker for ambulation. Unfortunately, in the ensuing days, she was having more more right chest wall pain and malaise.(And falls) She was feeling less and less secure on her feet and dizziness when being upright was significant. On 7/1/2024 she presented to our ED being unable to manage herself independently in her home. She was found to be tachycardic and orthostatic. Her evaluation also revealed a right 10th rib fracture.   (Above taken from MD H&P except parentheses). Pt lived alone with significant family support but was independent in basic transfers, bed mobility and gait. Had assist with stairs    Pt presents today with significant pain and loss of movement in RUE, decreased coordination of LUE from prior CVA, BLE pain chronic in B knees, R hip tenderness from fall that are  greatly impacting mobility. Pt has no orthostatic BP issues during this evaluation. Also with Body Structures, Functions, Activity Limitations Requiring Skilled Therapeutic Intervention: Increased pain, Decreased posture, Decreased high-level IADLs, Decreased balance, Decreased cognition, Decreased safe awareness, Decreased strength, Decreased sensation, Decreased body mechanics, Decreased ROM, Decreased functional mobility.     Feel pt will benefit from ARU-PT to address deficits as noted and to teach compensatory skills for some aspects of her mobility. Recommending increased assist at home for pt as her loss of RUE function will likely persist and make IADL much more difficult with increase in fall risk.      Therapy Prognosis: Good  Decision Making: High Complexity  Clinical Presentation: unpredicatable characteristics      Patient education:   ARU schedule, ARU expectations for participation, plan of care,   Treatment Initiated:  Functional mobility training, gait training, patient education  Barriers to Improvement:  RUE pain and loss of ROM/strength, prior CVA, demonstrating some confusion today  Discharge Recommendations:  home with increased ADL and IADL assist, [facility]  Equipment Recommendations:  hopeful to return pt to her 4ww    Goals:  Patient Goals   Patient Goals : return home  Short Term Goals  Time Frame for Short Term Goals: 10 days STG=LTG  Short Term Goal 1: pt will perform bed mobitiy mod I  Short Term Goal 2: pt will perform all functional transfers with mod I  Short Term Goal 3: Pt will ambulate with LRAD 150' on level surface and 10' on unlevel surface mod I  Short Term Goal 4: Pt will perform curb step with rail and 4 steps with one rail with mod I  Short Term Goal 5: Pt will  object with LRAD and reacher with supervision     Plan:    Requires PT Follow-Up: Yes  Pt will be seen at least 60 minutes per day for a minimum of 5 days per week, plus group therapy as appropriate  Physical

## 2024-07-06 LAB
BACTERIA: NEGATIVE /HPF
BILIRUBIN, URINE: NEGATIVE MG/DL
BLOOD, URINE: ABNORMAL
CLARITY, UA: CLEAR
COLOR, UA: YELLOW
GLUCOSE URINE: NEGATIVE MG/DL
KETONES, URINE: NEGATIVE MG/DL
LEUKOCYTE ESTERASE, URINE: ABNORMAL
MUCUS: ABNORMAL HPF
NITRITE URINE, QUANTITATIVE: NEGATIVE
PH, URINE: 5.5 (ref 5–8)
PROTEIN UA: 100 MG/DL
RBC URINE: 4 /HPF (ref 0–6)
SPECIFIC GRAVITY UA: >1.03 (ref 1–1.03)
SQUAMOUS EPITHELIAL: 4 /HPF
TRICHOMONAS: ABNORMAL /HPF
UROBILINOGEN, URINE: 1 MG/DL (ref 0.2–1)
WBC UA: 218 /HPF (ref 0–5)

## 2024-07-06 PROCEDURE — 81001 URINALYSIS AUTO W/SCOPE: CPT

## 2024-07-06 PROCEDURE — 97530 THERAPEUTIC ACTIVITIES: CPT

## 2024-07-06 PROCEDURE — 6370000000 HC RX 637 (ALT 250 FOR IP): Performed by: PHYSICAL MEDICINE & REHABILITATION

## 2024-07-06 PROCEDURE — 97535 SELF CARE MNGMENT TRAINING: CPT

## 2024-07-06 PROCEDURE — 97110 THERAPEUTIC EXERCISES: CPT

## 2024-07-06 PROCEDURE — 6370000000 HC RX 637 (ALT 250 FOR IP): Performed by: STUDENT IN AN ORGANIZED HEALTH CARE EDUCATION/TRAINING PROGRAM

## 2024-07-06 PROCEDURE — 1280000000 HC REHAB R&B

## 2024-07-06 PROCEDURE — 97116 GAIT TRAINING THERAPY: CPT

## 2024-07-06 PROCEDURE — 94761 N-INVAS EAR/PLS OXIMETRY MLT: CPT

## 2024-07-06 PROCEDURE — 94664 DEMO&/EVAL PT USE INHALER: CPT

## 2024-07-06 PROCEDURE — 89220 SPUTUM SPECIMEN COLLECTION: CPT

## 2024-07-06 PROCEDURE — 94150 VITAL CAPACITY TEST: CPT

## 2024-07-06 RX ADMIN — ROSUVASTATIN CALCIUM 40 MG: 20 TABLET, COATED ORAL at 21:20

## 2024-07-06 RX ADMIN — TIMOLOL MALEATE 1 DROP: 2.5 SOLUTION OPHTHALMIC at 12:26

## 2024-07-06 RX ADMIN — ACETAMINOPHEN 500 MG: 500 TABLET ORAL at 10:39

## 2024-07-06 RX ADMIN — ACETAMINOPHEN 500 MG: 500 TABLET ORAL at 21:19

## 2024-07-06 RX ADMIN — Medication 1000 UNITS: at 10:38

## 2024-07-06 RX ADMIN — AMLODIPINE BESYLATE 10 MG: 10 TABLET ORAL at 10:39

## 2024-07-06 RX ADMIN — LATANOPROST 1 DROP: 50 SOLUTION OPHTHALMIC at 21:20

## 2024-07-06 RX ADMIN — ASPIRIN 81 MG: 81 TABLET, CHEWABLE ORAL at 10:39

## 2024-07-06 ASSESSMENT — PAIN SCALES - GENERAL
PAINLEVEL_OUTOF10: 0

## 2024-07-06 NOTE — PROGRESS NOTES
Occupational Therapy    Physical Rehabilitation: OCCUPATIONAL THERAPY     [x] daily progress note       [] discharge       Patient Name:  Doretha Melo   :  6/15/1927 MRN: [id number]  Room:  [address] [place] [address] Date of Admission: 7/3/2024  Rehabilitation Diagnosis:   Orthostatic hypotension [I95.1]  Orthostatic hypotension [I95.1]       Date 2024       Day of ARU Week:  4   Time IN//930   Individual Tx Minutes 60   Group Tx Minutes    Co-Treat Minutes    Concurrent Tx Minutes    TOTAL Tx Time Mins 60   Variance Time    Variance Reason []   Refusal due to:     []   Medical hold/reason:    []   Illness   []   Off Unit for test/procedure  []   Extra time needed to complete task  []   Therapeutic need  []   Make up mins were attempted but pt unable to complete due to (specify)  []   Other (specify):   Restrictions Restrictions/Precautions: Fall Risk, General Precautions (R shoulder pain)         Communication with other providers: [x]   OK to see per nursing:     []   Spoke with team member regarding:      Subjective observations and cognitive status: Pt in high fowlers on approach, finished breakfast, agreeable to getting dressed and completing grooming tasks.  Reported lidocaine patch provided significant relief to R shoulder last night     Pain level/location:   Up to 5/10       Location: R shoulder with therex   Discharge recommendations  Anticipated discharge date:  TBD  Destination: []home alone   []home alone w assist prn   [] home w/ family    [] Continuous supervision       []SNF    [] Assisted living     [] Other:   Continued therapy: []HHC OT  []OUTPATIENT  OT   [] No Further OT  Equipment needs: TBD       ADLs:      Oral Hygiene: Oral Hygiene  Assistance Needed: Supervision or touching assistance  Comment: SBA in stance at sink to clean dentures, apply Fixodent and replace in mouth  CARE Score: 4  Discharge Goal: Independent    UB Dressing: Upper Body Dressing  Assistance Needed: Supervision or touching  assistance  Comment: required significant increased time and mod verbal and visual cueing for technique, but able to doff button up pajama top, T shirt, and don polo shirt  CARE Score: 4  Discharge Goal: Independent         LB Dressing: Lower Body Dressing  Assistance Needed: Partial/moderate assistance  Comment: able to thread BLEs in jeans, pants management up c CGA, don R knee brace seated EOB with increased time.  Donned L initially however it fell down her leg 20 mins later requiring assist to readjust  CARE Score: 3  Discharge Goal: Independent    Donning and Weaver Footwear: Putting On/Taking Off Footwear  Assistance Needed: Partial/moderate assistance  Comment: able to doff and don socks, required partial assist to don shoes over heel despite providing shoe horn  CARE Score: 3  Discharge Goal: Independent      Toileting: Denied need    Bed Mobility:           []   Pt received out of bed   Supine --> Sit:  SBA using bed features       Transfers:    Sit--> Stand:  Ranged from CGA-min A, cues not to pull on RW  Stand --> Sit:   CGA-min A  Stand-Pivot:   CGA  Other:    Assistive device required for transfer:   RW      Functional Mobility:    Assistance:  CGA within room  Device:   [x]   Rolling Walker     []   Standard Walker []   Wheelchair        []   Cane       []   4-Wheeled Walker         []   Cardiac Walker       []   Other:      Additional Therapeutic activities/exercises completed this date:     [x]   ADL Training   []   Balance/Postural training     [x]   Bed/Transfer Training   [x]   Endurance Training   []   Neuromuscular Re-ed   []   Nu-step:  Time:        Level:         #Steps:       []   Rebounder:    []  Seated     []  Standing        []   Supine Ther Ex (reps/sets):     [x]   Seated Ther Ex (reps/sets):  To increase LUE strength pt completed 10 reps of chest presses and bicep curls c 1# weight; was unable to perform shoulder presses c 1# weight requiring AROM only     [x]   Standing Ther Ex

## 2024-07-06 NOTE — PLAN OF CARE
Problem: Chronic Conditions and Co-morbidities  Goal: Patient's chronic conditions and co-morbidity symptoms are monitored and maintained or improved  Outcome: Progressing     Problem: Nutrition Deficit:  Goal: Optimize nutritional status  Outcome: Progressing

## 2024-07-06 NOTE — PROGRESS NOTES
Doretha Melo    : 6/15/1927  Acct #: [id number]  MRN: [id number]              PM&R Progress Note      Admitting diagnosis: ***    Comorbid diagnoses impacting rehabilitation: ***    Chief complaint: ***    Prior (baseline) level of function: Independent.    Current level of function:         Current  IRF-ANGELY and Goals:   Occupational Therapy:    Short Term Goals  Time Frame for Short Term Goals: STGs=LTGs :   Long Term Goals  Time Frame for Long Term Goals : ~10 days or until d/c  Long Term Goal 1: Pt will complete grooming tasks Ind  Long Term Goal 2: Pt will complete total body bathing mod I using AE PRN  Long Term Goal 3: Pt will complete UB dressing Ind  Long Term Goal 4: Pt will complete LB dressing mod I  Long Term Goal 5: Pt will doff/don footwear mod I using AE PRN  Additional Goals?: Yes  Long Term Goal 6: Pt will complete toileting mod I  Long Term Goal 7: Pt will complete functional transfers (bed, chair, toilet) c DME PRN and mod I  Long Term Goal 8: Pt will perform therex/therax to facilitate increased strength/endurance/ax tolerance (c emphasis on dynamic standing balance/tolerance>8 mins, R shoulder ROM and BUE endurance) c SBA  Long Term Goal 9: Pt will complete simple homemaking tasks c DME PRN and S :                                       Eating: Eating  Assistance Needed: Setup or clean-up assistance  Comment: requires assist to open certain packages/containers 2* arthritis and 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

## 2024-07-06 NOTE — FLOWSHEET NOTE
[x] daily progress note       [] discharge       Patient Name:  Doretha Melo   :  6/15/1927 MRN: [id number]  Room:  [address] [place] [address] Date of Admission: 7/3/2024  Rehabilitation Diagnosis:   Orthostatic hypotension [I95.1]  Orthostatic hypotension [I95.1]       Date 2024       Day of ARU Week:  4   Time IN/-1030   Individual Tx Minutes 60   Group Tx Minutes    Co-Treat Minutes    Concurrent Tx Minutes    TOTAL Tx Time Mins 60   Variance Time    Variance Reason []   Refusal due to:     []   Medical hold/reason:    []   Illness   []   Off Unit for test/procedure  []   Extra time needed to complete task  []   Therapeutic need  []   Make up mins were attempted but pt unable to complete due to (specify)  []   Other (specify):   Restrictions Restrictions/Precautions  Restrictions/Precautions: Fall Risk, General Precautions (R shoulder pain)      Communication with other providers: [x]   OK to see per nursing:     []   Spoke with team member regarding:      Subjective observations and cognitive status: Pt was seen w/ OT at beginning of treatment session. Pt was alert and agreeable to treatment session.     Pain level/location:    /10       Location: none   Discharge recommendations  Anticipated discharge date:  TBD  Destination: []home alone   []home alone with assist PRN     [] home w/ family      [] Continuous supervision  []SNF    [] Assisted living     [] Other:   Continued therapy: []C PT  []OUTPATIENT  PT   [] No Further PT  Equipment needs: TBD     Transfers:    Sit--> Stand:  Min A-CGA  Stand --> Sit:   CGA  Toilet Transfer (if applicable):   Other:    Assistive device required for transfer:   2WW    Gait:    Distance:  70'+146'  Assistance:  CGA  Device:  2WW  Gait Quality:  slow reciprocal gait pattern, decrease width requiring cuing to keep feet apart and from tripping.     Uneven Surfaces:       Assistance:    CGA  Device:    2WW  Surfaces Completed:   [x]  Carpet with bean bags beneath      []  Throw  Goal 2: pt will perform all functional transfers with mod I  Short Term Goal 3: Pt will ambulate with LRAD 150' on level surface and 10' on unlevel surface mod I  Short Term Goal 4: Pt will perform curb step with rail and 4 steps with one rail with mod I  Short Term Goal 5: Pt will  object with LRAD and reacher with supervision:   :        Plan of Care                                                                              Times per week: 5 days per week for a minimum of 60 minutes/day plus group as appropriate for 60 minutes.  Treatment to include Current Treatment Recommendations: Strengthening, ROM, Balance training, Functional mobility training, Transfer training, Endurance training, IADL training, Gait training, Stair training, Neuromuscular re-education, Pain management, Home exercise program, Safety education & training, Patient/Caregiver education & training, Group Therapy, Equipment evaluation, education, & procurement, Positioning, Therapeutic activities    Electronically signed by   Candelaria Orozco PTA, [id number]   7/6/2024, 8:28 AM

## 2024-07-06 NOTE — PROGRESS NOTES
Occupational Therapy  Physical Rehabilitation: OCCUPATIONAL THERAPY     [x] daily progress note       [] discharge       Patient Name:  Doretha Melo   :  6/15/1927 MRN: [id number]  Room:  [address] [place] [address] Date of Admission: 7/3/2024  Rehabilitation Diagnosis:   Orthostatic hypotension [I95.1]  Orthostatic hypotension [I95.1]       Date 2024       Day of ARU Week:  4   Time IN/OUT 0370-5430   Individual Tx Minutes 60   Group Tx Minutes    Co-Treat Minutes    Concurrent Tx Minutes    TOTAL Tx Time Mins 60   Variance Time    Variance Reason []   Refusal due to:     []   Medical hold/reason:    []   Illness   []   Off Unit for test/procedure  []   Extra time needed to complete task  []   Therapeutic need  []   Make up mins were attempted but pt unable to complete due to (specify)  []   Other (specify):   Restrictions Restrictions/Precautions: Fall Risk, General Precautions (R shoulder pain)         Communication with other providers: [x]   OK to see per nursing:     []   Spoke with team member regarding:      Subjective observations and cognitive status:      Pain level/location:   0 /10       Location: Denied pain.    Discharge recommendations  Anticipated discharge date:  TBD  Destination: []home alone   []home alone w assist prn   [] home w/ family    [] Continuous supervision       []SNF    [] Assisted living     [] Other:   Continued therapy: []HHC OT  []OUTPATIENT  OT   [] No Further OT  Equipment needs: TBD     Toileting:   Denied need       Toilet Transfers:     Device Used:    []   Standard Toilet         []   Grab Bars           []  Bedside Commode       []   Elevated Toilet          []   Other:        Bed Mobility:           [x]   Pt received out of bed   Rolling R/L:    Scooting:    Supine --> Sit:    Sit --> Supine:      Transfers:    Sit--> Stand:  CGA  Stand --> Sit:   CGA  Stand-Pivot:   CGA  Other:    Assistive device required for transfer:   FWW      Functional Mobility:  Room><lobby   Assistance:

## 2024-07-07 VITALS
SYSTOLIC BLOOD PRESSURE: 113 MMHG | RESPIRATION RATE: 16 BRPM | BODY MASS INDEX: 21.38 KG/M2 | TEMPERATURE: 97.8 F | OXYGEN SATURATION: 93 % | WEIGHT: 92.37 LBS | HEART RATE: 83 BPM | DIASTOLIC BLOOD PRESSURE: 64 MMHG | HEIGHT: 55 IN

## 2024-07-07 PROCEDURE — 97110 THERAPEUTIC EXERCISES: CPT

## 2024-07-07 PROCEDURE — 1280000000 HC REHAB R&B

## 2024-07-07 PROCEDURE — 94150 VITAL CAPACITY TEST: CPT

## 2024-07-07 PROCEDURE — 6370000000 HC RX 637 (ALT 250 FOR IP): Performed by: STUDENT IN AN ORGANIZED HEALTH CARE EDUCATION/TRAINING PROGRAM

## 2024-07-07 PROCEDURE — 6370000000 HC RX 637 (ALT 250 FOR IP): Performed by: PHYSICAL MEDICINE & REHABILITATION

## 2024-07-07 PROCEDURE — 97530 THERAPEUTIC ACTIVITIES: CPT

## 2024-07-07 PROCEDURE — 97116 GAIT TRAINING THERAPY: CPT

## 2024-07-07 PROCEDURE — 97535 SELF CARE MNGMENT TRAINING: CPT

## 2024-07-07 PROCEDURE — 94761 N-INVAS EAR/PLS OXIMETRY MLT: CPT

## 2024-07-07 RX ADMIN — AMLODIPINE BESYLATE 10 MG: 10 TABLET ORAL at 08:50

## 2024-07-07 RX ADMIN — ROSUVASTATIN CALCIUM 40 MG: 20 TABLET, COATED ORAL at 21:22

## 2024-07-07 RX ADMIN — ASPIRIN 81 MG: 81 TABLET, CHEWABLE ORAL at 08:51

## 2024-07-07 RX ADMIN — Medication 1000 UNITS: at 08:51

## 2024-07-07 RX ADMIN — ACETAMINOPHEN 500 MG: 500 TABLET ORAL at 08:50

## 2024-07-07 RX ADMIN — TIMOLOL MALEATE 1 DROP: 2.5 SOLUTION OPHTHALMIC at 08:53

## 2024-07-07 RX ADMIN — ACETAMINOPHEN 500 MG: 500 TABLET ORAL at 21:22

## 2024-07-07 RX ADMIN — LATANOPROST 1 DROP: 50 SOLUTION OPHTHALMIC at 21:23

## 2024-07-07 ASSESSMENT — PAIN DESCRIPTION - ORIENTATION: ORIENTATION: RIGHT

## 2024-07-07 ASSESSMENT — PAIN DESCRIPTION - DESCRIPTORS: DESCRIPTORS: ACHING;DISCOMFORT

## 2024-07-07 ASSESSMENT — PAIN SCALES - GENERAL
PAINLEVEL_OUTOF10: 0
PAINLEVEL_OUTOF10: 0
PAINLEVEL_OUTOF10: 5

## 2024-07-07 ASSESSMENT — PAIN DESCRIPTION - LOCATION: LOCATION: SHOULDER

## 2024-07-07 NOTE — PROGRESS NOTES
for 8 minutes before seated rest break. Pt required demo for reacher positioning.    []   Bed/Transfer Training   [x]   Endurance Training   []   Neuromuscular Re-ed   []   Nu-step:  Time:        Level:         #Steps:       []   Rebounder:    []  Seated     []  Standing        []   Supine Ther Ex (reps/sets):     [x]   Seated Ther Ex (reps/sets):  Pt participated in 1 set r90opid of bicep curls using 1# dumbell to increase UE strength, endurance, and ind c ADLs. Pt required verbal prompt for techn and EC.    []   Standing Ther Ex (reps/sets):     []   Other:      Comments:      Patient/Caregiver Education and Training:   [x]   Adaptive Equipment Use  []   Bed Mobility/Transfer Technique/Safety  []   Energy Conservation Tips  []   Family training  []   Postural Awareness  [x]   Safety During Functional Activities  []   Reinforced Patient's Precautions       Treatment Plan for Next Session: OT POC to continue.     Treatment/Activity Tolerance:   [x] Tolerated treatment with no adverse effects    [] Patient limited by fatigue  [] Patient limited by pain   [] Patient limited by medical complications:    [] Adverse reaction to Tx:   [] Significant change in status    Safety:       []  bed alarm set    [x]  chair alarm set    []  Pt refused alarms                []  Telesitter activated      [x]  Gait belt used during tx session      []other:       Number of Minutes/Billable Intervention  Therapeutic Exercise    ADL Self-care 30   Neuro Re-Ed    Therapeutic Activity 30   Group    Other:    TOTAL 60       Social History  Social/Functional History  Lives With: Alone  Type of Home: House  Home Layout: One level  Home Access: Stairs to enter with rails  Entrance Stairs - Number of Steps: 4 at back entrance  Entrance Stairs - Rails: Right (house is on other side.)  Bathroom Shower/Tub: Tub only (sponge bathes at sink)  Bathroom Toilet: Standard  Bathroom Equipment: Toilet raiser (raiser has handles)  Bathroom Accessibility:

## 2024-07-07 NOTE — PROGRESS NOTES
Physician Progress Note      PATIENT:               YOVANI MENDEZ  CSN #:                  [id number]  :                       6/15/1927  ADMIT DATE:       2024 6:38 AM  DISCH DATE:        7/3/2024 3:47 PM  RESPONDING  PROVIDER #:        Cheryl Zarco MD          QUERY TEXT:    Pt admitted with chest pain and has malnutrition documented  by Registered   Dietician. Please further specify type of malnutrition with documentation in   the medical record.    The medical record reflects the following:  Risk Factors: 89 yo w/ recent fall, chest pain  Clinical Indicators:  Per RD : Moderate malnutrition.  Weight Loss:    Greater than 7.5% over 3 months (9.7% x1.5mo) Mild muscle mass loss Clavicles,   Thigh, Calf.  Treatment:    ASPEN Criteria:    https://aspenjournals.onlinelibrary.torres.com/doi/full/10.1177/085619611100699  5  Options provided:  -- Moderate Malnutrition  -- Other - I will add my own diagnosis  -- Disagree - Not applicable / Not valid  -- Disagree - Clinically unable to determine / Unknown  -- Refer to Clinical Documentation Reviewer    PROVIDER RESPONSE TEXT:    This patient has moderate malnutrition.    Query created by: Betzy Burr on 7/3/2024 6:57 AM      QUERY TEXT:    Pt admitted with chest pain. Pt noted to have afib, rib fracture. If possible,   please document in progress notes and discharge summary the relationship, if   any, between the chest pain and the afib, rib fracture.    The medical record reflects the following:  Risk Factors: 89 yo S/P fall with right 10th rib frx, afib  Clinical Indicators: Per IM : Right-sided chest pain  -Less likely from cardiac etiology, likely musculoskeletal based on the   history troponin initially 19 followed by 20, EKG with no acute ST-T wave   changes.  Per xray : There is a minimally displaced fracture of the right   10th rib, laterally.  Treatment: Xray, Cardiology consult  Options provided:  -- Chest pain is due to right 10th rib

## 2024-07-07 NOTE — PATIENT CARE CONFERENCE
care  The patient requires an intensive and coordinated interdisciplinary team approach to the delivery of rehabilitative care.     Assessment/Plan   [x]  The patient is making good progression towards their long term goals and is actively participating in and has a reasonable expectation to continue to benefit from the intensive rehabilitation therapy program   []  The estimated discharge date has been changed from initial team conference due to:   []  The estimated discharge destination has been changed from initial team conference due to:         Ongoing tx following discharge: [x]HHC PT OT    []OUTPATIENT     [] No Further Treatment     [] Family/Caregiver Training  []  Transitional Living Arrangement   [] Home Assessment (date  )     [] Family Conference   []  Therapeutic Pass       []  Other: (specify)    Estimated Discharge Date: 7/13/24    Estimated Discharge Destination: []home alone   [x]home alone with assist prn  []Continuous supervision []Return home with s/o/spouse/family   [] Assisted living    []SNF     Team members participating in today's conference.    [x] GISEL Zayas, Medical Director    []  Ishaan Churchill, Marylin Medical Director    [] Bora Paul, DPT,         [] [place], HARMONY Nurse Manager   [x] Reyes Flores RN Nurse Manager     []  Serena Escamilla, PT  [x]  Ester Lindsey, PT       [x] Brianna Velasquez, OT   [] Mohamud Falcon OT  [] Mona Frank, OT     [x]  Destiney Munguia, SLP    []  Janae Campos, SLP   [] Agata Arevalo, SLP     []Vicky Erazo,   [x]Ester VEGA,      [] Santi Murry, HARMONY   [] Savanah Poole, HARMONY    [] Diony Hager RN    [] Daja Harrison RN []       I have led this Team Conference and agree with the plan,  GISEL Zayas MD, 7/9/2024, 1:04 PM  []   I, the Medical Director, was required to lead today's conference via telephone due to an unanticipated scheduling conflict.  I agree with the decisions made by the

## 2024-07-07 NOTE — PROGRESS NOTES
navigator)  Short Term Goals  Time Frame for Short Term Goals: 10 days STG=LTG  Short Term Goal 1: pt will perform bed mobitiy mod I  Short Term Goal 2: pt will perform all functional transfers with mod I  Short Term Goal 3: Pt will ambulate with LRAD 150' on level surface and 10' on unlevel surface mod I  Short Term Goal 4: Pt will perform curb step with rail and 4 steps with one rail with mod I  Short Term Goal 5: Pt will  object with LRAD and reacher with supervision:   :        Plan of Care                                                                              Times per week: 5 days per week for a minimum of 60 minutes/day plus group as appropriate for 60 minutes.  Treatment to include Current Treatment Recommendations: Strengthening, ROM, Balance training, Functional mobility training, Transfer training, Endurance training, IADL training, Gait training, Stair training, Neuromuscular re-education, Pain management, Home exercise program, Safety education & training, Patient/Caregiver education & training, Group Therapy, Equipment evaluation, education, & procurement, Positioning, Therapeutic activities    Electronically signed by   Kash Campos, PTA [id number]  7/7/2024, 11:41 AM

## 2024-07-08 PROCEDURE — 1280000000 HC REHAB R&B

## 2024-07-08 PROCEDURE — 6370000000 HC RX 637 (ALT 250 FOR IP): Performed by: STUDENT IN AN ORGANIZED HEALTH CARE EDUCATION/TRAINING PROGRAM

## 2024-07-08 PROCEDURE — 97530 THERAPEUTIC ACTIVITIES: CPT

## 2024-07-08 PROCEDURE — 6370000000 HC RX 637 (ALT 250 FOR IP): Performed by: PHYSICAL MEDICINE & REHABILITATION

## 2024-07-08 PROCEDURE — 94150 VITAL CAPACITY TEST: CPT

## 2024-07-08 PROCEDURE — 97116 GAIT TRAINING THERAPY: CPT

## 2024-07-08 PROCEDURE — 94761 N-INVAS EAR/PLS OXIMETRY MLT: CPT

## 2024-07-08 PROCEDURE — 97110 THERAPEUTIC EXERCISES: CPT

## 2024-07-08 PROCEDURE — 99232 SBSQ HOSP IP/OBS MODERATE 35: CPT | Performed by: PHYSICAL MEDICINE & REHABILITATION

## 2024-07-08 PROCEDURE — 97535 SELF CARE MNGMENT TRAINING: CPT

## 2024-07-08 RX ADMIN — Medication 1000 UNITS: at 10:40

## 2024-07-08 RX ADMIN — ROSUVASTATIN CALCIUM 40 MG: 20 TABLET, COATED ORAL at 20:47

## 2024-07-08 RX ADMIN — ACETAMINOPHEN 500 MG: 500 TABLET ORAL at 10:38

## 2024-07-08 RX ADMIN — ACETAMINOPHEN 500 MG: 500 TABLET ORAL at 20:47

## 2024-07-08 RX ADMIN — AMLODIPINE BESYLATE 10 MG: 10 TABLET ORAL at 10:40

## 2024-07-08 RX ADMIN — LATANOPROST 1 DROP: 50 SOLUTION OPHTHALMIC at 20:52

## 2024-07-08 RX ADMIN — ASPIRIN 81 MG: 81 TABLET, CHEWABLE ORAL at 10:40

## 2024-07-08 RX ADMIN — TIMOLOL MALEATE 1 DROP: 2.5 SOLUTION OPHTHALMIC at 10:41

## 2024-07-08 ASSESSMENT — PAIN - FUNCTIONAL ASSESSMENT: PAIN_FUNCTIONAL_ASSESSMENT: ACTIVITIES ARE NOT PREVENTED

## 2024-07-08 ASSESSMENT — PAIN SCALES - GENERAL
PAINLEVEL_OUTOF10: 0
PAINLEVEL_OUTOF10: 0

## 2024-07-08 NOTE — PROGRESS NOTES
07/08/24 0827   Encounter Summary   Encounter Overview/Reason Volunteer Encounter   Service Provided For Patient   Referral/Consult From Volunteer   Support System Family members   Last Encounter  07/06/24  (Volunteer visit by Kian Walls, [facility] ; documented by Rev. Denisse Palacio.)   Complexity of Encounter Low   Begin Time 0910   End Time  0915   Total Time Calculated 5 min   Spiritual/Emotional needs   Type Spiritual Support   Rituals, Rites and Sacraments   Type Sikh Communion   Assessment/Intervention/Outcome   Assessment Hopeful   Intervention Active listening   Outcome Comfort   Plan and Referrals   Plan/Referrals Continue to visit, (comment)

## 2024-07-08 NOTE — PROGRESS NOTES
Physical Rehabilitation: OCCUPATIONAL THERAPY     [x] daily progress note       [] discharge       Patient Name:  Doretha Melo   :  6/15/1927 MRN: [id number]  Room:  [address] [place] [address] Date of Admission: 7/3/2024  Rehabilitation Diagnosis:   Orthostatic hypotension [I95.1]  Orthostatic hypotension [I95.1]       Date 2024       Day of ARU Week:  6   Time IN//915   Individual Tx Minutes 60   Group Tx Minutes    Co-Treat Minutes    Concurrent Tx Minutes    TOTAL Tx Time Mins 60   Variance Time    Variance Reason []   Refusal due to:     []   Medical hold/reason:    []   Illness   []   Off Unit for test/procedure  []   Extra time needed to complete task  []   Therapeutic need  []   Make up mins were attempted but pt unable to complete due to (specify)  []   Other (specify):   Restrictions Restrictions/Precautions: Fall Risk, General Precautions (R shoulder pain)         Communication with other providers: [x]   OK to see per nursing:     []   Spoke with team member regarding:      Subjective observations and cognitive status: Patient up in bed finishing breakfast meal upon approach, pleasant and agreeable to therapy      Pain level/location:    /10       Location: per patient no pain noted    Discharge recommendations  Anticipated discharge date:  TBD  Destination: []home alone   []home alone w assist prn   [] home w/ family    [] Continuous supervision       []SNF    [] Assisted living     [] Other:   Continued therapy: []HHC OT  []OUTPATIENT  OT   [] No Further OT  Equipment needs: TBD       ADLs:      UB/LB Bathing: Shower/Bathe Self  Assistance Needed: Supervision or touching assistance  Comment: Sup in stance,  seated for approx half of bathing  CARE Score: 4  Discharge Goal: Independent    UB Dressing: Upper Body Dressing  Assistance Needed: Independent  Comment: X  CARE Score: 6  Discharge Goal: Independent         LB Dressing: Lower Body Dressing  Assistance Needed: Supervision or touching

## 2024-07-08 NOTE — PROGRESS NOTES
Occupational Therapy  Physical Rehabilitation: OCCUPATIONAL THERAPY     [x] daily progress note       [] discharge       Patient Name:  Doretha Melo   :  6/15/1927 MRN: [id number]  Room:  [address] [place] [address] Date of Admission: 7/3/2024  Rehabilitation Diagnosis:   Orthostatic hypotension [I95.1]  Orthostatic hypotension [I95.1]       Date 2024       Day of ARU Week:  6   Time IN/OUT 1300/1400   Individual Tx Minutes 60   Group Tx Minutes    Co-Treat Minutes    Concurrent Tx Minutes    TOTAL Tx Time Mins 60   Variance Time    Variance Reason []   Refusal due to:     []   Medical hold/reason:    []   Illness   []   Off Unit for test/procedure  []   Extra time needed to complete task  []   Therapeutic need  []   Make up mins were attempted but pt unable to complete due to (specify)  []   Other (specify):   Restrictions Restrictions/Precautions: Fall Risk, General Precautions (R shoulder pain)         Communication with other providers: [x]   OK to see per nursing:     []   Spoke with team member regarding:      Subjective observations and cognitive status: Pt in recliner on approach, very pleasant and agreeable to tx session.      Pain level/location:    /10       Location: Intermittent in R shoulder   Discharge recommendations  Anticipated discharge date:  TBD  Destination: []home alone   []home alone w assist prn   [x] home w/ family    [] Continuous supervision       []SNF    [] Assisted living     [] Other:   Continued therapy: [x]HHC OT  []OUTPATIENT  OT   [] No Further OT  Equipment needs: None     Toileting:   Denied need       Bed Mobility:           [x]   Pt received out of bed        Transfers:    Sit--> Stand:  SBA-Sup  Stand --> Sit:   SBA  Stand-Pivot:   SBA  Other:    Assistive device required for transfer:   RW      Functional Mobility:    Assistance:  Supervision ~125 ft x2  Device:   [x]   Rolling Walker     []   Standard Walker []   Wheelchair        []   Cane       []   4-Wheeled Walker         []

## 2024-07-08 NOTE — PROGRESS NOTES
07/08/24 1343   Encounter Summary   Encounter Overview/Reason Volunteer Encounter   Service Provided For Patient   Referral/Consult From Volunteer   Support System Family members   Last Encounter  07/08/24  ([facility] ; documented by . TARUN Diallo.)   Complexity of Encounter Low   Begin Time 1200   End Time  1210   Total Time Calculated 10 min   Spiritual/Emotional needs   Type Spiritual Support   Rituals, Rites and Sacraments   Type Ep[employer]opalian Communion   Assessment/Intervention/Outcome   Assessment Hopeful   Intervention Active listening   Outcome Comfort   Plan and Referrals   Plan/Referrals Continue to visit, (comment)

## 2024-07-08 NOTE — PROGRESS NOTES
Physical Therapy    [x] daily progress note       [] discharge       Patient Name:  Doretha Melo   :  6/15/1927 MRN: [id number]  Room:  [address] [place] [address] Date of Admission: 7/3/2024  Rehabilitation Diagnosis:   Orthostatic hypotension [I95.1]  Orthostatic hypotension [I95.1]       Date 2024       Day of ARU Week:  6   Time IN/OUT 1100/1205   Individual Tx Minutes 65   Group Tx Minutes    Co-Treat Minutes    Concurrent Tx Minutes    TOTAL Tx Time Mins 65   Variance Time +5   Variance Time []   Refusal due to:     []   Medical hold/reason:    []   Illness   []   Off Unit for test/procedure  [x]   Extra time needed to complete tasks  []   Therapeutic need  []   Attempted make-up minutes, however pt was unable to tolerate due to (specify)   []   Other (specify):   Restrictions Restrictions/Precautions  Restrictions/Precautions: Fall Risk, General Precautions (R shoulder pain)      Interdisciplinary communication [x]   Cleared for therapy per nursing     []   RN notified about issues during session  []   RN updated on pt performance  []   Spoke with   []   Spoke with OT  []   Spoke with MD  []   Other:    Subjective observations and cognitive status: Pt resting in recliner, states sleeping well last night, at some breakfast, denies toileting need, bilat knee support in place. Pt states using rollator at baseline and feels comfortable with it.     Pain level/location: 0/10 at rest, reports some aggravation of pain during stair training       Location: R shoulder    Discharge recommendations  Anticipated discharge date:  TBD   Destination: []home alone   []home alone with assist PRN     [] home w/ family      [] Continuous supervision  []SNF    [] Assisted living     [] Other:  Continued therapy: []HHC PT  []OUTPATIENT PT   [] No Further PT  []SNF PT  Caregiver training recommended: []Yes  [] No   Equipment needs: has 2WW, rollator, and reacher      PT IRF-ANGELY scores and goals for discharge assessment:   Roll

## 2024-07-08 NOTE — PLAN OF CARE
Problem: Discharge Planning  Goal: Discharge to home or other facility with appropriate resources  7/8/2024 1425 by Elaine Alarcon, RN  Outcome: Progressing  7/8/2024 0123 by Jacquelyn Ojeda RN  Outcome: Progressing  Flowsheets (Taken 7/7/2024 1945)  Discharge to home or other facility with appropriate resources: Identify barriers to discharge with patient and caregiver     Problem: Safety - Adult  Goal: Free from fall injury  7/8/2024 0123 by Jacquelyn Ojeda, RN  Outcome: Progressing     Problem: Skin/Tissue Integrity  Goal: Absence of new skin breakdown  Description: 1.  Monitor for areas of redness and/or skin breakdown  2.  Assess vascular access sites hourly  3.  Every 4-6 hours minimum:  Change oxygen saturation probe site  4.  Every 4-6 hours:  If on nasal continuous positive airway pressure, respiratory therapy assess nares and determine need for appliance change or resting period.  7/8/2024 0123 by Jacquelyn Ojeda, RN  Outcome: Progressing     Problem: ABCDS Injury Assessment  Goal: Absence of physical injury  7/8/2024 0123 by Jacquelyn Ojeda, RN  Outcome: Progressing     Problem: Pain  Goal: Verbalizes/displays adequate comfort level or baseline comfort level  7/8/2024 0123 by Jacquelyn Ojeda RN  Outcome: Progressing     Problem: Chronic Conditions and Co-morbidities  Goal: Patient's chronic conditions and co-morbidity symptoms are monitored and maintained or improved  7/8/2024 0123 by Jacquelyn Ojeda RN  Outcome: Progressing  Flowsheets (Taken 7/7/2024 1945)  Care Plan - Patient's Chronic Conditions and Co-Morbidity Symptoms are Monitored and Maintained or Improved: Monitor and assess patient's chronic conditions and comorbid symptoms for stability, deterioration, or improvement     Problem: Nutrition Deficit:  Goal: Optimize nutritional status  7/8/2024 0123 by Jacquelyn Ojeda, RN  Outcome: Progressing

## 2024-07-08 NOTE — PLAN OF CARE
Problem: Discharge Planning  Goal: Discharge to home or other facility with appropriate resources  7/8/2024 0123 by Jacquelyn Ojeda RN  Outcome: Progressing  Flowsheets (Taken 7/7/2024 1945)  Discharge to home or other facility with appropriate resources: Identify barriers to discharge with patient and caregiver  7/7/2024 1318 by Savanah Poole RN  Outcome: Progressing     Problem: Safety - Adult  Goal: Free from fall injury  7/8/2024 0123 by Jacquelyn Ojeda, RN  Outcome: Progressing  7/7/2024 1318 by Savanah Poole RN  Outcome: Progressing     Problem: Skin/Tissue Integrity  Goal: Absence of new skin breakdown  Description: 1.  Monitor for areas of redness and/or skin breakdown  2.  Assess vascular access sites hourly  3.  Every 4-6 hours minimum:  Change oxygen saturation probe site  4.  Every 4-6 hours:  If on nasal continuous positive airway pressure, respiratory therapy assess nares and determine need for appliance change or resting period.  Outcome: Progressing     Problem: ABCDS Injury Assessment  Goal: Absence of physical injury  Outcome: Progressing     Problem: Pain  Goal: Verbalizes/displays adequate comfort level or baseline comfort level  Outcome: Progressing     Problem: Chronic Conditions and Co-morbidities  Goal: Patient's chronic conditions and co-morbidity symptoms are monitored and maintained or improved  Outcome: Progressing  Flowsheets (Taken 7/7/2024 1945)  Care Plan - Patient's Chronic Conditions and Co-Morbidity Symptoms are Monitored and Maintained or Improved: Monitor and assess patient's chronic conditions and comorbid symptoms for stability, deterioration, or improvement     Problem: Nutrition Deficit:  Goal: Optimize nutritional status  Outcome: Progressing

## 2024-07-08 NOTE — PROGRESS NOTES
Doretha Melo    : 6/15/1927  [id number]  MRN: [id number]              PM&R Progress Note      Admitting diagnosis: Orthostatic hypotension (IGC 16)     Comorbid diagnoses impacting rehabilitation: Generalized weakness, gait disturbance, paroxysmal atrial fibrillation, essential hypertension, history of CVA, macular degeneration, B12 deficiency     Chief complaint: Right shoulder pain.  Some positional dizziness.    Prior (baseline) level of function: Independent.    Current level of function:         Current  IRF-ANGELY and Goals:   Occupational Therapy:    Short Term Goals  Time Frame for Short Term Goals: STGs=LTGs :   Long Term Goals  Time Frame for Long Term Goals : ~10 days or until d/c  Long Term Goal 1: Pt will complete grooming tasks Ind  Long Term Goal 2: Pt will complete total body bathing mod I using AE PRN  Long Term Goal 3: Pt will complete UB dressing Ind  Long Term Goal 4: Pt will complete LB dressing mod I  Long Term Goal 5: Pt will doff/don footwear mod I using AE PRN  Additional Goals?: Yes  Long Term Goal 6: Pt will complete toileting mod I  Long Term Goal 7: Pt will complete functional transfers (bed, chair, toilet) c DME PRN and mod I  Long Term Goal 8: Pt will perform therex/therax to facilitate increased strength/endurance/ax tolerance (c emphasis on dynamic standing balance/tolerance>8 mins, R shoulder ROM and BUE endurance) c SBA  Long Term Goal 9: Pt will complete simple homemaking tasks c DME PRN and S :                                       Eating: Eating  Assistance Needed: Setup or clean-up assistance  Comment: requires assist to open certain packages/containers 2* arthritis and hand weakness from CVA  CARE Score: 5  Discharge Goal: Set-up or clean-up assistance       Oral Hygiene: Oral Hygiene  Assistance Needed: Supervision or touching assistance  Comment: SBA in stance at sink to clean dentures, apply Fixodent and replace in mouth  CARE Score: 4  Discharge Goal:

## 2024-07-09 ENCOUNTER — TELEPHONE (OUTPATIENT)
Dept: INTERNAL MEDICINE CLINIC | Age: 89
End: 2024-07-09

## 2024-07-09 PROCEDURE — 94150 VITAL CAPACITY TEST: CPT

## 2024-07-09 PROCEDURE — 99232 SBSQ HOSP IP/OBS MODERATE 35: CPT | Performed by: PHYSICAL MEDICINE & REHABILITATION

## 2024-07-09 PROCEDURE — 97530 THERAPEUTIC ACTIVITIES: CPT

## 2024-07-09 PROCEDURE — 97535 SELF CARE MNGMENT TRAINING: CPT

## 2024-07-09 PROCEDURE — 1280000000 HC REHAB R&B

## 2024-07-09 PROCEDURE — 6370000000 HC RX 637 (ALT 250 FOR IP): Performed by: STUDENT IN AN ORGANIZED HEALTH CARE EDUCATION/TRAINING PROGRAM

## 2024-07-09 PROCEDURE — 6370000000 HC RX 637 (ALT 250 FOR IP): Performed by: PHYSICAL MEDICINE & REHABILITATION

## 2024-07-09 PROCEDURE — 94761 N-INVAS EAR/PLS OXIMETRY MLT: CPT

## 2024-07-09 PROCEDURE — 97110 THERAPEUTIC EXERCISES: CPT

## 2024-07-09 PROCEDURE — 97116 GAIT TRAINING THERAPY: CPT

## 2024-07-09 RX ADMIN — TIMOLOL MALEATE 1 DROP: 2.5 SOLUTION OPHTHALMIC at 09:51

## 2024-07-09 RX ADMIN — Medication 1000 UNITS: at 09:51

## 2024-07-09 RX ADMIN — ASPIRIN 81 MG: 81 TABLET, CHEWABLE ORAL at 09:50

## 2024-07-09 RX ADMIN — LATANOPROST 1 DROP: 50 SOLUTION OPHTHALMIC at 22:03

## 2024-07-09 RX ADMIN — ACETAMINOPHEN 500 MG: 500 TABLET ORAL at 22:03

## 2024-07-09 RX ADMIN — ACETAMINOPHEN 500 MG: 500 TABLET ORAL at 09:51

## 2024-07-09 RX ADMIN — AMLODIPINE BESYLATE 10 MG: 10 TABLET ORAL at 09:50

## 2024-07-09 RX ADMIN — ROSUVASTATIN CALCIUM 40 MG: 20 TABLET, COATED ORAL at 22:03

## 2024-07-09 ASSESSMENT — PAIN SCALES - GENERAL
PAINLEVEL_OUTOF10: 2
PAINLEVEL_OUTOF10: 0

## 2024-07-09 ASSESSMENT — PAIN - FUNCTIONAL ASSESSMENT: PAIN_FUNCTIONAL_ASSESSMENT: ACTIVITIES ARE NOT PREVENTED

## 2024-07-09 NOTE — CARE COORDINATION
[facility] Liaison spoke with pt's daughter and she is agreeable to c at discharge. Zentrico info verified and aware that dc is planned for 7/13/2024.

## 2024-07-09 NOTE — PROGRESS NOTES
Physical Therapy      [x] daily progress note       [] discharge       Patient Name:  Doretha Melo   :  6/15/1927 MRN: [id number]  Room:  [address] [place] [address] Date of Admission: 7/3/2024  Rehabilitation Diagnosis:   Orthostatic hypotension [I95.1]  Orthostatic hypotension [I95.1]       Date 2024       Day of ARU Week:  7   Time IN/OUT 7427-0162   Individual Tx Minutes 60   TOTAL Tx Time Mins 60   Variance Time    Variance Time []   Refusal due to:     []   Medical hold/reason:    []   Illness   []   Off Unit for test/procedure  []   Extra time needed to complete task  []   Therapeutic need  []   Other (specify):   Restrictions Restrictions/Precautions  Restrictions/Precautions: Fall Risk, General Precautions (R shoulder pain)      Communication with other providers: [x]   OK to see per nursing:     []   Spoke with team member regarding:      Subjective observations and cognitive status: Pt received by OT, receiving meds by RN. Pt willing to participate; Dgtr reports there are 3 steps + 2 steps to get into back entrance, reporting back steps are ~ 4\" high; Has placed another short step ( aerobic step)  in front of threshold doorway step to decrease height with pt using a grab bar on L.      Pain level/location: 0/10       Location:    Discharge recommendations  Anticipated discharge date:  TBD   Destination: []home alone   []home alone with assist PRN     [] home w/ family      [] Continuous supervision  []SNF    [] Assisted living     [] Other:  Continued therapy: []HHC PT  []OUTPATIENT PT   [] No Further PT  []SNF PT  Caregiver training recommended: []Yes  [] No   Equipment needs: has 2WW, rollator, and reacher     Bed Mobility:           [x]   Pt received out of bed levated      [] Bed rail                                    [] No     Transfers:    Sit--> Stand:  Sup-Indep  Stand --> Sit:  Sup-  Indep  Practiced multiple transfers with rollator, pt req cues first attempt for pushing up from WC vs rollatorrosa

## 2024-07-09 NOTE — CONSULTS
07/02/2024 04:07 AM    HGB 13.0 07/02/2024 04:07 AM    HCT 40.3 07/02/2024 04:07 AM    MCV 94.2 07/02/2024 04:07 AM    MCH 30.4 07/02/2024 04:07 AM    MCHC 32.3 07/02/2024 04:07 AM    RDW 14.3 07/02/2024 04:07 AM     07/02/2024 04:07 AM    MPV 9.4 07/02/2024 04:07 AM     CMP:    Lab Results   Component Value Date/Time     07/02/2024 04:07 AM    K 4.3 07/02/2024 04:07 AM    K 4.1 02/20/2018 02:57 AM     07/02/2024 04:07 AM    CO2 23 07/02/2024 04:07 AM    BUN 13 07/02/2024 04:07 AM    CREATININE 0.4 07/02/2024 04:07 AM    GFRAA >60 08/15/2022 08:02 AM    GFRAA >60 02/05/2013 11:12 PM    AGRATIO 1.5 12/14/2015 08:29 PM    LABGLOM 90 07/02/2024 04:07 AM    LABGLOM >60 12/20/2023 08:41 AM    GLUCOSE 127 07/02/2024 04:07 AM    CALCIUM 9.8 07/02/2024 04:07 AM    BILITOT 0.7 07/01/2024 07:57 AM    ALKPHOS 95 07/01/2024 07:57 AM    AST 16 07/01/2024 07:57 AM    ALT 10 07/01/2024 07:57 AM     Albumin:  No results found for: \"LABALBU\"  PT/INR:    Lab Results   Component Value Date/Time    PROTIME 12.8 09/18/2023 04:25 PM    INR 0.9 09/18/2023 04:25 PM     HgBA1c:    Lab Results   Component Value Date/Time    LABA1C 5.8 12/20/2023 08:41 AM         Assessment:     Patient Active Problem List   Diagnosis    Essential hypertension    Osteoporosis    Hyperlipidemia    Osteoarthritis of knee    T10 vertebral fracture ([facility])    Gait disturbance    Fracture of inferior pubic ramus, right, closed, initial encounter ([facility])    Closed nondisplaced fracture of right ischium with routine healing    Closed odontoid fracture with delayed healing    Prediabetes    Acute CVA (cerebrovascular accident) (HCC)    Weakness of both lower extremities    Nonexudative age-related macular degeneration, bilateral, early dry stage    Bilateral primary osteoarthritis of knee    Atypical chest pain    Moderate malnutrition (HCC)    Orthostatic hypotension    Generalized weakness    Paroxysmal atrial fibrillation (HCC)    History of CVA

## 2024-07-09 NOTE — PROGRESS NOTES
remainder)  Bill Paying/Finance Responsibility: No (Monae does bills. pt states due to having trouble writing)  Shopping Responsibility: No  Health Care Management: Primary  Ambulation Assistance: Independent (4ww)  Active : No  Patient's  Info: Daughter, Monae, drives  Mode of Transportation: SUV (pt uses a portable step to step into SUV)  Occupation: Retired  Additional Comments: sleeps in flat bed    Objective                                                                                    Goals:  (Update in navigator)  Short Term Goals  Time Frame for Short Term Goals: STGs=LTGs:  Long Term Goals  Time Frame for Long Term Goals : ~10 days or until d/c  Long Term Goal 1: Pt will complete grooming tasks Ind  Long Term Goal 2: Pt will complete total body bathing mod I using AE PRN  Long Term Goal 3: Pt will complete UB dressing Ind  Long Term Goal 4: Pt will complete LB dressing mod I  Long Term Goal 5: Pt will doff/don footwear mod I using AE PRN  Additional Goals?: Yes  Long Term Goal 6: Pt will complete toileting mod I  Long Term Goal 7: Pt will complete functional transfers (bed, chair, toilet) c DME PRN and mod I  Long Term Goal 8: Pt will perform therex/therax to facilitate increased strength/endurance/ax tolerance (c emphasis on dynamic standing balance/tolerance>8 mins, R shoulder ROM and BUE endurance) c SBA  Long Term Goal 9: Pt will complete simple homemaking tasks c DME PRN and S:        Plan of Care                                                                              Times per week: 5 days per week for a minimum of 60 minutes/day plus group as appropriate for 60 minutes.  Treatment to include Occupational Therapy Plan  Current Treatment Recommendations: Strengthening, ROM, Balance training, Functional mobility training, Endurance training, Pain management, Safety education & training, Patient/Caregiver education & training, Equipment evaluation, education, & procurement,

## 2024-07-09 NOTE — TELEPHONE ENCOUNTER
Patient is being discharged from the hospital-calling to get a order for home care-verbal order to given

## 2024-07-09 NOTE — PROGRESS NOTES
Occupational Therapy    Physical Rehabilitation: OCCUPATIONAL THERAPY     [x] daily progress note       [] discharge       Patient Name:  Doretha Melo   :  6/15/1927 MRN: [id number]  Room:  [address] [place] [address] Date of Admission: 7/3/2024  Rehabilitation Diagnosis:   Orthostatic hypotension [I95.1]  Orthostatic hypotension [I95.1]       Date 2024       Day of ARU Week:  7   Time IN/OUT 7337-7175   Individual Tx Minutes 60   Group Tx Minutes    Co-Treat Minutes    Concurrent Tx Minutes    TOTAL Tx Time Mins 60   Variance Time    Variance Reason []   Refusal due to:     []   Medical hold/reason:    []   Illness   []   Off Unit for test/procedure  []   Extra time needed to complete task  []   Therapeutic need  []   Make up mins were attempted but pt unable to complete due to (specify)  []   Other (specify):   Restrictions Restrictions/Precautions: Fall Risk, General Precautions (R shoulder pain)         Communication with other providers: [x]   OK to see per nursing:     []   Spoke with team member regarding:      Subjective observations and cognitive status: Pt sitting up in recliner on approach; pleasant and agreeable to therapy.      Pain level/location:    /10       Location:    Discharge recommendations  Anticipated discharge date:  24  Destination: []home alone   [x]home alone w assist prn   [] home w/ family    [] Continuous supervision       []SNF    [] Assisted living     [] Other:   Continued therapy: [x]HHC OT  []OUTPATIENT  OT   [] No Further OT  Equipment needs: none        Toileting: denied need     Toilet Transfers:   NA   Device Used:    []   Standard Toilet         []   Grab Bars           []  Bedside Commode       []   Elevated Toilet          []   Other:        Bed Mobility:           [x]   Pt received out of bed       Transfers:    Sit--> Stand:  SUP  Stand --> Sit:   SUP  Stand-Pivot:   SUP  Other:    Assistive device required for transfer:   4WW       Functional Mobility:  97 ft   Assistance:

## 2024-07-09 NOTE — CARE COORDINATION
SILVIA met with patient and daughter. Provided written communication following Care Conference. SILVIA informed patient of recommendations for 4WW,  HHC PT, OT. Patient reported she has a 4WW at home.  Patient verbalized understanding. Whiteboard updated.     Patient provided with list of Medicare participating C in the geographic area of the patient served. Patient selected [facility] and was provided with a comparative data handout from [employer] Compare’s website. The patient (and/or Family) was educated on the quality outcomes for each provider. Patient (and/or Family) demonstrated understanding. Per patient/family request, referral made to HC.      D/C Plan:  Estimated Date: July 13  DME: has recommended DME  HHC:  PT, OT ([facility])  To:  Home alone (family will transport)

## 2024-07-10 PROCEDURE — 6370000000 HC RX 637 (ALT 250 FOR IP): Performed by: PHYSICAL MEDICINE & REHABILITATION

## 2024-07-10 PROCEDURE — 1280000000 HC REHAB R&B

## 2024-07-10 PROCEDURE — 94150 VITAL CAPACITY TEST: CPT

## 2024-07-10 PROCEDURE — 97530 THERAPEUTIC ACTIVITIES: CPT

## 2024-07-10 PROCEDURE — 97116 GAIT TRAINING THERAPY: CPT

## 2024-07-10 PROCEDURE — 6370000000 HC RX 637 (ALT 250 FOR IP): Performed by: STUDENT IN AN ORGANIZED HEALTH CARE EDUCATION/TRAINING PROGRAM

## 2024-07-10 PROCEDURE — 94761 N-INVAS EAR/PLS OXIMETRY MLT: CPT

## 2024-07-10 PROCEDURE — 97110 THERAPEUTIC EXERCISES: CPT

## 2024-07-10 PROCEDURE — 99232 SBSQ HOSP IP/OBS MODERATE 35: CPT | Performed by: PHYSICAL MEDICINE & REHABILITATION

## 2024-07-10 RX ADMIN — ROSUVASTATIN CALCIUM 40 MG: 20 TABLET, COATED ORAL at 22:27

## 2024-07-10 RX ADMIN — ASPIRIN 81 MG: 81 TABLET, CHEWABLE ORAL at 08:39

## 2024-07-10 RX ADMIN — ACETAMINOPHEN 500 MG: 500 TABLET ORAL at 08:39

## 2024-07-10 RX ADMIN — ACETAMINOPHEN 500 MG: 500 TABLET ORAL at 22:26

## 2024-07-10 RX ADMIN — TIMOLOL MALEATE 1 DROP: 2.5 SOLUTION OPHTHALMIC at 08:41

## 2024-07-10 RX ADMIN — Medication 1000 UNITS: at 08:39

## 2024-07-10 RX ADMIN — AMLODIPINE BESYLATE 10 MG: 10 TABLET ORAL at 08:39

## 2024-07-10 RX ADMIN — LATANOPROST 1 DROP: 50 SOLUTION OPHTHALMIC at 22:26

## 2024-07-10 ASSESSMENT — PAIN SCALES - GENERAL
PAINLEVEL_OUTOF10: 0
PAINLEVEL_OUTOF10: 0

## 2024-07-10 NOTE — PROGRESS NOTES
Current Diet, Continue Oral Nutrition Supplement  Nutrition Education/Counseling: No recommendation at this time  Coordination of Nutrition Care: Continue to monitor while inpatient  Plan of Care discussed with: n/a    Goals:  Previous Goal Met: Progressing toward Goal(s)  Goals: by next RD assessment, PO intake 75% or greater       Nutrition Monitoring and Evaluation:   Behavioral-Environmental Outcomes: None Identified  Food/Nutrient Intake Outcomes: Food and Nutrient Intake, Supplement Intake  Physical Signs/Symptoms Outcomes: Biochemical Data, Skin, Weight, Meal Time Behavior    Discharge Planning:    Continue Oral Nutrition Supplement, Continue current diet     Linsey Evans RD, LD  Contact: [phone]

## 2024-07-10 NOTE — PROGRESS NOTES
was at the lower end of the target range today on medication at rest.  Monitoring closely.  History of CVA: Her therapy activities must accommodate her discoordination from her prior stroke.   Continuing her antiplatelet therapy with baby aspirin and her statin.  Right 10th rib fracture: Encouraging compliance with spirometry.  No current issues with O2 desaturations or tachypnea.  Benefiting from acetaminophen use for the rib pain.

## 2024-07-10 NOTE — PROGRESS NOTES
07/10/24 1350   Encounter Summary   Encounter Overview/Reason Volunteer Encounter   Service Provided For Patient   Referral/Consult From Volunteer   Support System Family members   Last Encounter  07/10/24  (Volunteer visit by Carolann Walls [facility] [place] ; documented by . TARUN Diallo.)   Complexity of Encounter Low   Begin Time 1010   End Time  1020   Total Time Calculated 10 min   Spiritual/Emotional needs   Type Spiritual Support   Rituals, Rites and Sacraments   Type Latter day Communion   Assessment/Intervention/Outcome   Assessment Calm   Intervention Nurtured Hope   Outcome Comfort   Plan and Referrals   Plan/Referrals Continue to visit, (comment)

## 2024-07-10 NOTE — PROGRESS NOTES
Doretha Melo    : 6/15/1927  [id number]  MRN: [id number]              PM&R Progress Note      Admitting diagnosis: Orthostatic hypotension (IGC 16)     Comorbid diagnoses impacting rehabilitation: Generalized weakness, gait disturbance, paroxysmal atrial fibrillation, essential hypertension, history of CVA, macular degeneration, B12 deficiency     Chief complaint: Right shoulder pain and left arm weakness.    Prior (baseline) level of function: Independent.    Current level of function:         Current  IRF-ANGELY and Goals:   Occupational Therapy:    Short Term Goals  Time Frame for Short Term Goals: STGs=LTGs :   Long Term Goals  Time Frame for Long Term Goals : ~10 days or until d/c  Long Term Goal 1: Pt will complete grooming tasks Ind  Long Term Goal 2: Pt will complete total body bathing mod I using AE PRN  Long Term Goal 3: Pt will complete UB dressing Ind  Long Term Goal 4: Pt will complete LB dressing mod I  Long Term Goal 5: Pt will doff/don footwear mod I using AE PRN  Additional Goals?: Yes  Long Term Goal 6: Pt will complete toileting mod I  Long Term Goal 7: Pt will complete functional transfers (bed, chair, toilet) c DME PRN and mod I  Long Term Goal 8: Pt will perform therex/therax to facilitate increased strength/endurance/ax tolerance (c emphasis on dynamic standing balance/tolerance>8 mins, R shoulder ROM and BUE endurance) c SBA  Long Term Goal 9: Pt will complete simple homemaking tasks c DME PRN and S :                                       Eating: Eating  Assistance Needed: Setup or clean-up assistance  Comment: requires assist to open certain packages/containers 2* arthritis and hand weakness from CVA  CARE Score: 5  Discharge Goal: Set-up or clean-up assistance       Oral Hygiene: Oral Hygiene  Assistance Needed: Independent  Comment: Mod I in stance to manage dentures  CARE Score: 6  Discharge Goal: Independent    UB/LB Bathing: Shower/Bathe Self  Assistance Needed: Supervision or

## 2024-07-10 NOTE — PLAN OF CARE
Problem: Discharge Planning  Goal: Discharge to home or other facility with appropriate resources  7/9/2024 2246 by Veena Chacon LPN  Outcome: Progressing  7/9/2024 1601 by Elaine Alarcon RN  Outcome: Progressing     Problem: Safety - Adult  Goal: Free from fall injury  Outcome: Progressing     Problem: Skin/Tissue Integrity  Goal: Absence of new skin breakdown  Description: 1.  Monitor for areas of redness and/or skin breakdown  2.  Assess vascular access sites hourly  3.  Every 4-6 hours minimum:  Change oxygen saturation probe site  4.  Every 4-6 hours:  If on nasal continuous positive airway pressure, respiratory therapy assess nares and determine need for appliance change or resting period.  Outcome: Progressing     Problem: ABCDS Injury Assessment  Goal: Absence of physical injury  Outcome: Progressing     Problem: Pain  Goal: Verbalizes/displays adequate comfort level or baseline comfort level  Outcome: Progressing     Problem: Chronic Conditions and Co-morbidities  Goal: Patient's chronic conditions and co-morbidity symptoms are monitored and maintained or improved  Outcome: Progressing     Problem: Nutrition Deficit:  Goal: Optimize nutritional status  Outcome: Progressing

## 2024-07-10 NOTE — PLAN OF CARE
Problem: Discharge Planning  Goal: Discharge to home or other facility with appropriate resources  7/10/2024 0845 by Shlomo Jones LPN  Outcome: Progressing  7/9/2024 2246 by Veena Chacon LPN  Outcome: Progressing     Problem: Safety - Adult  Goal: Free from fall injury  7/10/2024 0845 by Shlomo Jones LPN  Outcome: Progressing  7/9/2024 2246 by Veena Chacon LPN  Outcome: Progressing     Problem: Skin/Tissue Integrity  Goal: Absence of new skin breakdown  Description: 1.  Monitor for areas of redness and/or skin breakdown  2.  Assess vascular access sites hourly  3.  Every 4-6 hours minimum:  Change oxygen saturation probe site  4.  Every 4-6 hours:  If on nasal continuous positive airway pressure, respiratory therapy assess nares and determine need for appliance change or resting period.  7/10/2024 0845 by Shlomo Jones LPN  Outcome: Progressing  7/9/2024 2246 by Veena Chacon LPN  Outcome: Progressing     Problem: ABCDS Injury Assessment  Goal: Absence of physical injury  7/10/2024 0845 by Shlomo Jones LPN  Outcome: Progressing  7/9/2024 2246 by Veena Chacon LPN  Outcome: Progressing     Problem: Pain  Goal: Verbalizes/displays adequate comfort level or baseline comfort level  7/10/2024 0845 by Shlomo Jones LPN  Outcome: Progressing  7/9/2024 2246 by Veena Chacon LPN  Outcome: Progressing     Problem: Chronic Conditions and Co-morbidities  Goal: Patient's chronic conditions and co-morbidity symptoms are monitored and maintained or improved  7/10/2024 0845 by Shlomo Jones LPN  Outcome: Progressing  7/9/2024 2246 by Veena Chacon LPN  Outcome: Progressing     Problem: Nutrition Deficit:  Goal: Optimize nutritional status  7/10/2024 0845 by Shlomo Jones LPN  Outcome: Progressing  7/9/2024 2246 by Veena Chacon LPN  Outcome: Progressing

## 2024-07-10 NOTE — PROGRESS NOTES
Occupational Therapy  Physical Rehabilitation: OCCUPATIONAL THERAPY     [x] daily progress note       [] discharge       Patient Name:  Doretha Melo   :  6/15/1927 MRN: [id number]  Room:  [address] [place] [address] Date of Admission: 7/3/2024  Rehabilitation Diagnosis:   Orthostatic hypotension [I95.1]  Orthostatic hypotension [I95.1]       Date 7/10/2024       Day of ARU Week:  1   Time IN/OUT 8433-2059   Individual Tx Minutes 60   Group Tx Minutes    Co-Treat Minutes    Concurrent Tx Minutes    TOTAL Tx Time Mins 60   Variance Time    Variance Reason []   Refusal due to:     []   Medical hold/reason:    []   Illness   []   Off Unit for test/procedure  []   Extra time needed to complete task  []   Therapeutic need  []   Make up mins were attempted but pt unable to complete due to (specify)  []   Other (specify):   Restrictions Restrictions/Precautions: Fall Risk, General Precautions (R shoulder pain)         Communication with other providers: [x]   OK to see per nursing:     []   Spoke with team member regarding:      Subjective observations and cognitive status: Pt sitting up in recliner on approach; pleasant and agreeable to therapy.      Pain level/location:    /10       Location:    Discharge recommendations  Anticipated discharge date:  24  Destination: []home alone   [x]home alone w assist prn   [] home w/ family    [] Continuous supervision       []SNF    [] Assisted living     [] Other:   Continued therapy: [x]HHC OT  []OUTPATIENT  OT   [] No Further OT  Equipment needs: none      Toileting:   denied need        Toilet Transfers:   NA   Device Used:    []   Standard Toilet         []   Grab Bars           []  Bedside Commode       []   Elevated Toilet          []   Other:        Bed Mobility:           [x]   Pt received out of bed       Transfers:    Sit--> Stand:  SUP  Stand --> Sit:   SUP  Stand-Pivot:   SUP  Other:    Assistive device required for transfer:   4WW       Functional Mobility:  267 ft

## 2024-07-10 NOTE — PROGRESS NOTES
Physical Therapy      [x] daily progress note       [] discharge       Patient Name:  Doretha Melo   :  6/15/1927 MRN: [id number]  Room:  [address] [place] [address] Date of Admission: 7/3/2024  Rehabilitation Diagnosis:   Orthostatic hypotension [I95.1]  Orthostatic hypotension [I95.1]       Date 7/10/2024       Day of ARU Week:  1   Time IN/OUT 3228-5913  8305-0228   Individual Tx Minutes 65+55   TOTAL Tx Time Mins 120   Variance Time    Variance Time []   Refusal due to:     []   Medical hold/reason:    []   Illness   []   Off Unit for test/procedure  []   Extra time needed to complete task  []   Therapeutic need  []   Other (specify):   Restrictions Restrictions/Precautions  Restrictions/Precautions: Fall Risk, General Precautions (R shoulder pain)      Communication with other providers: [x]   OK to see per nursing:     []   Spoke with team member regarding:      Subjective observations and cognitive status: Pt up in recliner, pleasant and agreeable to session  /73, HR 72; Pt with good ax tolerance with rest breaks given.   PM: Pt needing to toilet before leaving room.   VS at rest: /74, HR 75, O2 sats 96%   Pain level/location: 0/10       Location:    Discharge recommendations  Anticipated discharge date:     Destination: []home alone   []home alone with assist PRN     [] home w/ family      [] Continuous supervision  []SNF    [] Assisted living     [] Other:  Continued therapy: []HHC PT  []OUTPATIENT PT   [] No Further PT  []SNF PT  Caregiver training recommended: []Yes  [] No   Equipment needs: has 2WW, rollator, and reacher     Bed Mobility:           [x]   Pt received out of bed     Transfers:    Sit--> Stand:  Supervision  Stand --> Sit:   Supervision  Stand-Pivot:   Supervision  Demos brake safety with rollator  Toilet Transfer: Supervision with RW, grab bar   Toileting: Supervision for balance  Assistive device required for transfer:   rollator    Gait:    Distance:  327'+ 132'+ 248'  Assistance:

## 2024-07-11 PROCEDURE — 6370000000 HC RX 637 (ALT 250 FOR IP): Performed by: PHYSICAL MEDICINE & REHABILITATION

## 2024-07-11 PROCEDURE — 97530 THERAPEUTIC ACTIVITIES: CPT

## 2024-07-11 PROCEDURE — 97110 THERAPEUTIC EXERCISES: CPT

## 2024-07-11 PROCEDURE — 1280000000 HC REHAB R&B

## 2024-07-11 PROCEDURE — 99232 SBSQ HOSP IP/OBS MODERATE 35: CPT | Performed by: PHYSICAL MEDICINE & REHABILITATION

## 2024-07-11 PROCEDURE — 94150 VITAL CAPACITY TEST: CPT

## 2024-07-11 PROCEDURE — 97116 GAIT TRAINING THERAPY: CPT

## 2024-07-11 PROCEDURE — 6370000000 HC RX 637 (ALT 250 FOR IP): Performed by: STUDENT IN AN ORGANIZED HEALTH CARE EDUCATION/TRAINING PROGRAM

## 2024-07-11 PROCEDURE — 94761 N-INVAS EAR/PLS OXIMETRY MLT: CPT

## 2024-07-11 RX ADMIN — TIMOLOL MALEATE 1 DROP: 2.5 SOLUTION OPHTHALMIC at 08:23

## 2024-07-11 RX ADMIN — AMLODIPINE BESYLATE 10 MG: 10 TABLET ORAL at 08:22

## 2024-07-11 RX ADMIN — ACETAMINOPHEN 500 MG: 500 TABLET ORAL at 20:45

## 2024-07-11 RX ADMIN — ACETAMINOPHEN 500 MG: 500 TABLET ORAL at 08:23

## 2024-07-11 RX ADMIN — Medication 1000 UNITS: at 08:22

## 2024-07-11 RX ADMIN — ROSUVASTATIN CALCIUM 40 MG: 20 TABLET, COATED ORAL at 20:45

## 2024-07-11 RX ADMIN — LATANOPROST 1 DROP: 50 SOLUTION OPHTHALMIC at 20:45

## 2024-07-11 RX ADMIN — ASPIRIN 81 MG: 81 TABLET, CHEWABLE ORAL at 08:22

## 2024-07-11 ASSESSMENT — PAIN SCALES - GENERAL
PAINLEVEL_OUTOF10: 0
PAINLEVEL_OUTOF10: 0

## 2024-07-11 NOTE — DISCHARGE INSTR - ACTIVITY
Pt is discharging to home with [facility]  [address], [address], [place] [address]  [phone]  A representative from [facility] will contact you at home to schedule your home care needs.

## 2024-07-11 NOTE — PROGRESS NOTES
achieving 75* R shoulder flexion.  Educated pt that she can do this therex on wall at discharge, pt verbalized understanding.           Comments:  All intervention performed to increase pt's strength, endurance, ax tolerance, and balance in prep for increased I c ADL/IADLs and functional transfers/mobility.       Patient/Caregiver Education and Training:   []   Adaptive Equipment Use  [x]   Bed Mobility/Transfer Technique/Safety  []   Energy Conservation Tips  []   Family training  [x]   Postural Awareness  [x]   Safety During Functional Activities  []   Reinforced Patient's Precautions       Treatment Plan for Next Session: Continue OT POC, OT QI      Assessment:  This pt demonstrated a  positive response to today's treatment as evidenced by good engagement.  The patient is making progress toward established goals as evidenced by QI scores. Ongoing deficits are observed in the areas of endurance, R shoulder strength and continued focus on this is recommended.       Treatment/Activity Tolerance:   [x] Tolerated treatment with no adverse effects    [] Patient limited by fatigue  [] Patient limited by pain   [] Patient limited by medical complications:    [] Adverse reaction to Tx:   [] Significant change in status    Safety:       []  bed alarm set    [x]  chair alarm set    []  Pt refused alarms                []  Telesitter activated      [x]  Gait belt used during tx session      []other:       Number of Minutes/Billable Intervention  Therapeutic Exercise 60   ADL Self-care    Neuro Re-Ed    Therapeutic Activity 60   Group    Other:    TOTAL 120       Social History  Social/Functional History  Lives With: Alone  Type of Home: House  Home Layout: One level  Home Access: Stairs to enter with rails  Entrance Stairs - Number of Steps: 4 at back entrance  Entrance Stairs - Rails: Right (house is on other side.)  Bathroom Shower/Tub: Tub only (sponge bathes at sink)  Bathroom Toilet: Standard  Bathroom Equipment: Toilet

## 2024-07-11 NOTE — PLAN OF CARE
Problem: Discharge Planning  Goal: Discharge to home or other facility with appropriate resources  7/11/2024 1041 by Shlomo Jones LPN  Outcome: Progressing  7/11/2024 1041 by Shlomo Jones LPN  Outcome: Progressing  7/11/2024 0133 by Bea Dai LPN  Outcome: Progressing     Problem: Safety - Adult  Goal: Free from fall injury  7/11/2024 1041 by Shlomo Jones LPN  Outcome: Progressing  7/11/2024 1041 by Shlomo Jones LPN  Outcome: Progressing  7/11/2024 0133 by Bea Dai LPN  Outcome: Progressing     Problem: Skin/Tissue Integrity  Goal: Absence of new skin breakdown  Description: 1.  Monitor for areas of redness and/or skin breakdown  2.  Assess vascular access sites hourly  3.  Every 4-6 hours minimum:  Change oxygen saturation probe site  4.  Every 4-6 hours:  If on nasal continuous positive airway pressure, respiratory therapy assess nares and determine need for appliance change or resting period.  7/11/2024 1041 by Shlomo Jones LPN  Outcome: Progressing  7/11/2024 1041 by Shlomo Jones LPN  Outcome: Progressing  7/11/2024 0133 by Bea Dai LPN  Outcome: Progressing     Problem: ABCDS Injury Assessment  Goal: Absence of physical injury  7/11/2024 1041 by Shlomo Jones LPN  Outcome: Progressing  7/11/2024 1041 by Shlomo Jones LPN  Outcome: Progressing  7/11/2024 0133 by Bea Dai LPN  Outcome: Progressing     Problem: Pain  Goal: Verbalizes/displays adequate comfort level or baseline comfort level  7/11/2024 1041 by Shlomo Jones LPN  Outcome: Progressing  7/11/2024 1041 by Shlomo Jones LPN  Outcome: Progressing  7/11/2024 0133 by Bea Dai LPN  Outcome: Progressing     Problem: Chronic Conditions and Co-morbidities  Goal: Patient's chronic conditions and co-morbidity symptoms are monitored and maintained or improved  7/11/2024 1041 by Shlomo Jones LPN  Outcome: Progressing  7/11/2024 1041 by Shlomo Jones,

## 2024-07-11 NOTE — PLAN OF CARE
Problem: Discharge Planning  Goal: Discharge to home or other facility with appropriate resources  7/11/2024 1041 by Shlomo Jones LPN  Outcome: Progressing  7/11/2024 0133 by Bea Dai LPN  Outcome: Progressing     Problem: Safety - Adult  Goal: Free from fall injury  7/11/2024 1041 by Shlomo Jones LPN  Outcome: Progressing  7/11/2024 0133 by Bea Dai LPN  Outcome: Progressing     Problem: Skin/Tissue Integrity  Goal: Absence of new skin breakdown  Description: 1.  Monitor for areas of redness and/or skin breakdown  2.  Assess vascular access sites hourly  3.  Every 4-6 hours minimum:  Change oxygen saturation probe site  4.  Every 4-6 hours:  If on nasal continuous positive airway pressure, respiratory therapy assess nares and determine need for appliance change or resting period.  7/11/2024 1041 by Shlomo Jones LPN  Outcome: Progressing  7/11/2024 0133 by Bea Dai LPN  Outcome: Progressing     Problem: ABCDS Injury Assessment  Goal: Absence of physical injury  7/11/2024 1041 by Shlomo Jones LPN  Outcome: Progressing  7/11/2024 0133 by Bea Dai LPN  Outcome: Progressing     Problem: Pain  Goal: Verbalizes/displays adequate comfort level or baseline comfort level  7/11/2024 1041 by Shlomo Jones LPN  Outcome: Progressing  7/11/2024 0133 by Bea Dai LPN  Outcome: Progressing     Problem: Chronic Conditions and Co-morbidities  Goal: Patient's chronic conditions and co-morbidity symptoms are monitored and maintained or improved  7/11/2024 1041 by Shlomo Jones LPN  Outcome: Progressing  7/11/2024 0133 by Bea Dai LPN  Outcome: Progressing     Problem: Nutrition Deficit:  Goal: Optimize nutritional status  7/11/2024 1041 by Shlomo Jones LPN  Outcome: Progressing  7/11/2024 0133 by Bea Dai LPN  Outcome: Progressing  Flowsheets (Taken 7/10/2024 1339 by Linsey Evans, RD, LD)  Nutrient intake appropriate for

## 2024-07-11 NOTE — PROGRESS NOTES
Physical Therapy      [x] daily progress note       [] discharge       Patient Name:  Doretha Melo   :  6/15/1927 MRN: [id number]  Room:  [address] [place] [address] Date of Admission: 7/3/2024  Rehabilitation Diagnosis:   Orthostatic hypotension [I95.1]  Orthostatic hypotension [I95.1]       Date 2024       Day of ARU Week:  2   Time IN/OUT 9203-6054   Individual Tx Minutes 60   TOTAL Tx Time Mins 60   Variance Time    Variance Time []   Refusal due to:     []   Medical hold/reason:    []   Illness   []   Off Unit for test/procedure  []   Extra time needed to complete task  []   Therapeutic need  []   Other (specify):   Restrictions Restrictions/Precautions  Restrictions/Precautions: Fall Risk, General Precautions (R shoulder pain)      Communication with other providers: [x]   OK to see per nursing:     []   Spoke with team member regarding:      Subjective observations and cognitive status: Pt up in chair, motivated and pleasant, agreeable to session     Pain level/location: 0/10       Location:    Discharge recommendations  Anticipated discharge date:     Destination: []home alone   []home alone with assist PRN     [] home w/ family      [] Continuous supervision  []SNF    [] Assisted living     [] Other:  Continued therapy: []HHC PT  []OUTPATIENT PT   [] No Further PT  []SNF PT  Caregiver training recommended: []Yes  [] No   Equipment needs: has 2WW, rollator, and reacher     Bed Mobility:           [x]   Pt received out of bed     Transfers:    Sit--> Stand:  Indep   Stand --> Sit:   Indep  Stand-Pivot:   Indep  Assistive device required for transfer:   rollator    Gait:    Distance:  367'+ 127'+62'  Assistance:  Supervision  Device:  rollator  Gait Quality:   steady recip pattern, no change in gait quality amb longer distances    Stairs   # Completed:   2 x 4 consecutive trials  Assistance:    CGA in non-recip pattern  Supportive Device:  B UEs on one rail  Height:   6\"  Dgtr clarified that entry into back which

## 2024-07-11 NOTE — PROGRESS NOTES
07/11/24 1505   Encounter Summary   Encounter Overview/Reason Follow-up   Service Provided For Patient and family together   Referral/Consult From [facility]   Support System Family members   Last Encounter  07/11/24  (PT seems to be doing remarkably well. Being discharged on saturday. No needs expressed at this time. -Esau)   Complexity of Encounter Low   Begin Time 1500   End Time  1507   Total Time Calculated 7 min   Assessment/Intervention/Outcome   Assessment Calm;Hopeful;Peaceful   Intervention Active listening   Outcome Coping;Connection/Belonging   Plan and Referrals   Plan/Referrals Continue to visit, (comment)

## 2024-07-11 NOTE — CARE COORDINATION
Discussed discharge with patient and daughter. Provided a copy of the Important Message from Medicare form signed upon admission. Patient verbalized understanding.     Notified patient and daughter of follow up appointment with CHULA Qureshi CNP  Friday Jul 19, 2024 3:00 PM.

## 2024-07-12 ENCOUNTER — TELEPHONE (OUTPATIENT)
Dept: WOUND CARE | Age: 89
End: 2024-07-12

## 2024-07-12 PROCEDURE — 94761 N-INVAS EAR/PLS OXIMETRY MLT: CPT

## 2024-07-12 PROCEDURE — 99232 SBSQ HOSP IP/OBS MODERATE 35: CPT | Performed by: PHYSICAL MEDICINE & REHABILITATION

## 2024-07-12 PROCEDURE — 6370000000 HC RX 637 (ALT 250 FOR IP): Performed by: STUDENT IN AN ORGANIZED HEALTH CARE EDUCATION/TRAINING PROGRAM

## 2024-07-12 PROCEDURE — 97530 THERAPEUTIC ACTIVITIES: CPT

## 2024-07-12 PROCEDURE — 94150 VITAL CAPACITY TEST: CPT

## 2024-07-12 PROCEDURE — 6370000000 HC RX 637 (ALT 250 FOR IP): Performed by: PHYSICAL MEDICINE & REHABILITATION

## 2024-07-12 PROCEDURE — 1280000000 HC REHAB R&B

## 2024-07-12 PROCEDURE — 97110 THERAPEUTIC EXERCISES: CPT

## 2024-07-12 PROCEDURE — 97535 SELF CARE MNGMENT TRAINING: CPT

## 2024-07-12 PROCEDURE — 97116 GAIT TRAINING THERAPY: CPT

## 2024-07-12 RX ORDER — LIDOCAINE 4 G/G
1 PATCH TOPICAL DAILY
Qty: 10 PATCH | Refills: 0 | Status: SHIPPED | OUTPATIENT
Start: 2024-07-13

## 2024-07-12 RX ADMIN — TIMOLOL MALEATE 1 DROP: 2.5 SOLUTION OPHTHALMIC at 08:59

## 2024-07-12 RX ADMIN — LATANOPROST 1 DROP: 50 SOLUTION OPHTHALMIC at 20:01

## 2024-07-12 RX ADMIN — ACETAMINOPHEN 500 MG: 500 TABLET ORAL at 08:59

## 2024-07-12 RX ADMIN — AMLODIPINE BESYLATE 10 MG: 10 TABLET ORAL at 08:59

## 2024-07-12 RX ADMIN — ACETAMINOPHEN 500 MG: 500 TABLET ORAL at 20:01

## 2024-07-12 RX ADMIN — Medication 1000 UNITS: at 08:59

## 2024-07-12 RX ADMIN — ASPIRIN 81 MG: 81 TABLET, CHEWABLE ORAL at 08:59

## 2024-07-12 RX ADMIN — ROSUVASTATIN CALCIUM 40 MG: 20 TABLET, COATED ORAL at 20:01

## 2024-07-12 ASSESSMENT — PAIN SCALES - GENERAL
PAINLEVEL_OUTOF10: 0
PAINLEVEL_OUTOF10: 0

## 2024-07-12 NOTE — CARE COORDINATION
ARU  Discharge Summary    D/C Date: 07/13/2024    Patient discharged to: Home alone    Transported by: Daughter    Referrals made to: [facility]    Additional information: Follow up appointments scheduled with WOUND CARE INITIAL VISIT with Bran James MD Wednesday Jul 17, 2024 8:45 AM, CHULA Qureshi - CNP Friday Jul 19, 2024 3:00 PM. Patient and daughter notified, AVS updated    Caregiver training: none requested    Discharge BIMS completed:  [x]      IMM: [x]       Discharge Assessment: At the time of discharge   [x] OT Note 07/12 -Patient fully participated in the program and made good progress towards established goals.  [x] PT Note 07/12 -Patient fully participated in the program and met established goals. Pt is recommended to use 4ww, have some increased IADL assist and to follow with [facility] PT

## 2024-07-12 NOTE — PROGRESS NOTES
Occupational Therapy    Physical Rehabilitation: OCCUPATIONAL THERAPY     [x] daily progress note       [x] discharge       Patient Name:  Doretha Melo   :  6/15/1927 MRN: [id number]  Room:  [address] [place] [address] Date of Admission: 7/3/2024  Rehabilitation Diagnosis:   Orthostatic hypotension [I95.1]  Orthostatic hypotension [I95.1]       Date 2024       Day of ARU Week:  3   Time IN//1054  1405/1505   Individual Tx Minutes 60+60   Group Tx Minutes    Co-Treat Minutes    Concurrent Tx Minutes    TOTAL Tx Time Mins 120   Variance Time    Variance Reason []   Refusal due to:     []   Medical hold/reason:    []   Illness   []   Off Unit for test/procedure  []   Extra time needed to complete task  []   Therapeutic need  []   Make up mins were attempted but pt unable to complete due to (specify)  []   Other (specify):   Restrictions Restrictions/Precautions: Fall Risk, General Precautions (R shoulder pain)         Communication with other providers: [x]   OK to see per nursing:     []   Spoke with team member regarding:      Subjective observations and cognitive status: AM: Pt in recliner, pleasant and agreeable to sinkside ADL.    PM: Pt in recliner, daughter present for tx session and supportive.       Pain level/location:    /10       Location: Intermittent in R shoulder   Discharge recommendations  Anticipated discharge date:  24  Destination: []home alone   [x]home alone w assist prn   [] home w/ family    [] Continuous supervision       []SNF    [] Assisted living     [] Other:   Continued therapy: [x]HHC OT  []OUTPATIENT  OT   [] No Further OT  Equipment needs: none        ADLs:      Eating: Eating  Assistance Needed: Independent  Comment: reports being able to open packages/containers  CARE Score: 6  Discharge Goal: Set-up or clean-up assistance       Oral Hygiene: Oral Hygiene  Assistance Needed: Independent  Comment: in stance at sink to clean dentures  CARE Score: 6  Discharge Goal:

## 2024-07-12 NOTE — PROGRESS NOTES
Stand  Assistance Needed: Independent  Comment: at times forgets to lock rollator brakes prior to standing but not unsafe  CARE Score: 6  Discharge Goal: Independent    Chair/Bed-to-Chair Transfer  Assistance Needed: Independent  Comment: with rollator  CARE Score: 6  Discharge Goal: Independent    Toilet Transfer  Assistance needed: Partial/moderate assistance  Comment: min assist from regular toilet height with grab bar  CARE Score: 3    Car Transfer  Assistance Needed: Independent  Comment: using rollator for approach, uses one hand on rollator to A self out of car seat but not unsafe  CARE Score: 6  Discharge Goal: Independent           Object: Picking Up Object  Assistance Needed: Independent  Comment: at rollator using reacher  CARE Score: 6  Discharge Goal: Supervision or touching assistance    Ambulation:    Walking Ability  Does the Patient Walk?: Yes     Walk 10 Feet  Assistance Needed: Independent  Comment: with rollator  CARE Score: 6  Discharge Goal: Independent     Walk 50 Feet with Two Turns  Assistance Needed: Independent  Comment: with rollator  CARE Score: 6  Discharge Goal: Independent     Walk 150 Feet  Assistance Needed: Independent  Comment: with rollator  CARE Score: 6  Discharge Goal: Independent     Walking 10 Feet on Uneven Surfaces  Assistance Needed: Independent  Comment: with rollator  CARE Score: 6  Discharge Goal: Independent     1 Step (Curb)  Assistance Needed: Supervision or touching assistance  Comment: supervision for balance, continues to req safety for locking brakes before ascent/descent  CARE Score: 4  Discharge Goal: Independent     4 Steps  Assistance Needed: Independent  Comment: B rails non-recip pattern  CARE Score: 6  Discharge Goal: Independent     12 Steps  Assistance Needed: Supervision or touching assistance  Comment: SB-Supervision B rails on 4\" / 6\" steps in step to pattern  CARE Score: 4  Discharge Goal: Not Applicable      Wheelchair:  W/C Ability:

## 2024-07-12 NOTE — PLAN OF CARE
Problem: Discharge Planning  Goal: Discharge to home or other facility with appropriate resources  7/12/2024 0904 by Shlomo Jones LPN  Outcome: Progressing  7/11/2024 2349 by Bea Dai LPN  Outcome: Progressing     Problem: Safety - Adult  Goal: Free from fall injury  7/12/2024 0904 by Shlomo Jones LPN  Outcome: Progressing  7/11/2024 2349 by Bea Dai LPN  Outcome: Progressing     Problem: Skin/Tissue Integrity  Goal: Absence of new skin breakdown  Description: 1.  Monitor for areas of redness and/or skin breakdown  2.  Assess vascular access sites hourly  3.  Every 4-6 hours minimum:  Change oxygen saturation probe site  4.  Every 4-6 hours:  If on nasal continuous positive airway pressure, respiratory therapy assess nares and determine need for appliance change or resting period.  7/12/2024 0904 by Shlomo Jones LPN  Outcome: Progressing  7/11/2024 2349 by Bea Dai LPN  Outcome: Progressing     Problem: ABCDS Injury Assessment  Goal: Absence of physical injury  7/12/2024 0904 by Shlomo Jones LPN  Outcome: Progressing  7/11/2024 2349 by Bea Dai LPN  Outcome: Progressing     Problem: Pain  Goal: Verbalizes/displays adequate comfort level or baseline comfort level  7/12/2024 0904 by Shlomo Jones LPN  Outcome: Progressing  7/11/2024 2349 by Bea Dai LPN  Outcome: Progressing     Problem: Chronic Conditions and Co-morbidities  Goal: Patient's chronic conditions and co-morbidity symptoms are monitored and maintained or improved  7/12/2024 0904 by Shlomo Jones LPN  Outcome: Progressing  7/11/2024 2349 by Bea Dai LPN  Outcome: Progressing     Problem: Nutrition Deficit:  Goal: Optimize nutritional status  7/12/2024 0904 by Shlomo Jones LPN  Outcome: Progressing  7/11/2024 2349 by Bea Dai LPN  Outcome: Progressing

## 2024-07-13 VITALS
HEART RATE: 98 BPM | HEIGHT: 55 IN | OXYGEN SATURATION: 97 % | RESPIRATION RATE: 16 BRPM | SYSTOLIC BLOOD PRESSURE: 125 MMHG | DIASTOLIC BLOOD PRESSURE: 78 MMHG | TEMPERATURE: 97.7 F | BODY MASS INDEX: 21.63 KG/M2 | WEIGHT: 93.47 LBS

## 2024-07-13 LAB
ANION GAP SERPL CALCULATED.3IONS-SCNC: 10 MMOL/L (ref 7–16)
BUN SERPL-MCNC: 12 MG/DL (ref 6–23)
CALCIUM SERPL-MCNC: 9.1 MG/DL (ref 8.3–10.6)
CHLORIDE BLD-SCNC: 105 MMOL/L (ref 99–110)
CO2: 27 MMOL/L (ref 21–32)
CREAT SERPL-MCNC: 0.5 MG/DL (ref 0.6–1.1)
GFR, ESTIMATED: 85 ML/MIN/1.73M2
GLUCOSE SERPL-MCNC: 109 MG/DL (ref 70–99)
HCT VFR BLD CALC: 39.9 % (ref 37–47)
HEMOGLOBIN: 12.5 GM/DL (ref 12.5–16)
MCH RBC QN AUTO: 29.8 PG (ref 27–31)
MCHC RBC AUTO-ENTMCNC: 31.3 % (ref 32–36)
MCV RBC AUTO: 95 FL (ref 78–100)
PDW BLD-RTO: 14.5 % (ref 11.7–14.9)
PLATELET # BLD: 381 K/CU MM (ref 140–440)
PMV BLD AUTO: 9.1 FL (ref 7.5–11.1)
POTASSIUM SERPL-SCNC: 3.2 MMOL/L (ref 3.5–5.1)
RBC # BLD: 4.2 M/CU MM (ref 4.2–5.4)
SODIUM BLD-SCNC: 142 MMOL/L (ref 135–145)
WBC # BLD: 9.3 K/CU MM (ref 4–10.5)

## 2024-07-13 PROCEDURE — 36415 COLL VENOUS BLD VENIPUNCTURE: CPT

## 2024-07-13 PROCEDURE — 85027 COMPLETE CBC AUTOMATED: CPT

## 2024-07-13 PROCEDURE — 6370000000 HC RX 637 (ALT 250 FOR IP): Performed by: STUDENT IN AN ORGANIZED HEALTH CARE EDUCATION/TRAINING PROGRAM

## 2024-07-13 PROCEDURE — 94761 N-INVAS EAR/PLS OXIMETRY MLT: CPT

## 2024-07-13 PROCEDURE — 94150 VITAL CAPACITY TEST: CPT

## 2024-07-13 PROCEDURE — 6370000000 HC RX 637 (ALT 250 FOR IP): Performed by: PHYSICAL MEDICINE & REHABILITATION

## 2024-07-13 PROCEDURE — 99239 HOSP IP/OBS DSCHRG MGMT >30: CPT | Performed by: PHYSICAL MEDICINE & REHABILITATION

## 2024-07-13 PROCEDURE — 80048 BASIC METABOLIC PNL TOTAL CA: CPT

## 2024-07-13 RX ORDER — POTASSIUM CHLORIDE 20 MEQ/1
20 TABLET, EXTENDED RELEASE ORAL ONCE
Status: DISCONTINUED | OUTPATIENT
Start: 2024-07-13 | End: 2024-07-13 | Stop reason: HOSPADM

## 2024-07-13 RX ADMIN — AMLODIPINE BESYLATE 10 MG: 10 TABLET ORAL at 09:12

## 2024-07-13 RX ADMIN — ASPIRIN 81 MG: 81 TABLET, CHEWABLE ORAL at 09:12

## 2024-07-13 RX ADMIN — ACETAMINOPHEN 500 MG: 500 TABLET ORAL at 09:13

## 2024-07-13 RX ADMIN — TIMOLOL MALEATE 1 DROP: 2.5 SOLUTION OPHTHALMIC at 09:17

## 2024-07-13 RX ADMIN — Medication 1000 UNITS: at 09:12

## 2024-07-13 ASSESSMENT — PAIN DESCRIPTION - LOCATION: LOCATION: SHOULDER

## 2024-07-13 ASSESSMENT — PAIN SCALES - GENERAL: PAINLEVEL_OUTOF10: 1

## 2024-07-13 NOTE — PROGRESS NOTES
recovery with discharge from the rehab unit tomorrow.  She has been initiating strategies to manage her orthostatic issues.  Caregiver training has been completed.  Screening labs ordered for tomorrow.  Shoulder pain and positional dizziness persist but are manageable now.  With her generalized weakness, positional dizziness and rib pain, she remains at risk for fall with injury during standing ADLs and mobility activities.  Continue providing her adaptive equipment training with strengthening exercises, balance recovery training and conditioning development.  I will be prescribing Lidoderm for her shoulders after discharge.   Continue holding midodrine at the moment due to her episodes of supine hypertension.   Outpatient follow-up with her PCP after rehab.  Verbal cues and SBA-CGA for transfers.  DVT prophylaxis: Continuing Lovenox 30 mg subcu daily.  This raises her risk for spontaneous hemorrhage and GI bleeding.  Monitoring her hemoglobin and platelet count regularly.  Holding off with the GI prophylaxis with a PPI.  Will stop Lovenox as she is discharged.  No new bruising or swelling.         Poorly controlled pain: Continuing the scheduled acetaminophen twice a day for her rib pains.  Incentive spirometry seems to help as well.  Frequent repositioning his encouraged.  Adjusting bowel intervention while on the analgesics.  Providing Lidoderm to her right shoulder.  Paroxysmal atrial fibrillation: Currently she is rate controlled off medication.  Her fall risk makes using a DOAC unwise.  Daily weights do not indicate any decompensation of CHF.  Providing a antiplatelet therapy (baby aspirin) and a statin for general heart health.  Essential hypertension: Although she has orthostatic drops when upright, her supine hypertension requires the use of Norvasc with parameters.  Target supine blood pressure is 120-140.  Encouraging consistent oral hydration.  Her blood pressure was just above the upper end of the

## 2024-07-13 NOTE — PROGRESS NOTES
Patient discharged home, exited facility by wheelchair to her daughter vehicle , discharge instruction provided, patient verbalizes understanding. Patient prescription are available for  at patient pharmacy.

## 2024-07-13 NOTE — PLAN OF CARE
Problem: Discharge Planning  Goal: Discharge to home or other facility with appropriate resources  7/12/2024 2012 by Cheryl Oro LPN  Outcome: Progressing  7/12/2024 0904 by Shlomo Jones LPN  Outcome: Progressing     Problem: Safety - Adult  Goal: Free from fall injury  7/12/2024 2012 by Cheryl Oro LPN  Outcome: Progressing  7/12/2024 0904 by Shlomo Jones LPN  Outcome: Progressing     Problem: Skin/Tissue Integrity  Goal: Absence of new skin breakdown  Description: 1.  Monitor for areas of redness and/or skin breakdown  2.  Assess vascular access sites hourly  3.  Every 4-6 hours minimum:  Change oxygen saturation probe site  4.  Every 4-6 hours:  If on nasal continuous positive airway pressure, respiratory therapy assess nares and determine need for appliance change or resting period.  7/12/2024 2012 by Cheryl Oro LPN  Outcome: Progressing  7/12/2024 0904 by Shlomo Jones LPN  Outcome: Progressing     Problem: ABCDS Injury Assessment  Goal: Absence of physical injury  7/12/2024 2012 by Cheryl Oro LPN  Outcome: Progressing  7/12/2024 0904 by Shlomo Jones LPN  Outcome: Progressing     Problem: Pain  Goal: Verbalizes/displays adequate comfort level or baseline comfort level  7/12/2024 2012 by Cheryl Oro LPN  Outcome: Progressing  7/12/2024 0904 by Shlomo Jones LPN  Outcome: Progressing     Problem: Chronic Conditions and Co-morbidities  Goal: Patient's chronic conditions and co-morbidity symptoms are monitored and maintained or improved  7/12/2024 2012 by Cheryl Oro LPN  Outcome: Progressing  7/12/2024 0904 by Shlomo Jones LPN  Outcome: Progressing     Problem: Nutrition Deficit:  Goal: Optimize nutritional status  7/12/2024 2012 by Cheryl Oro LPN  Outcome: Progressing  7/12/2024 0904 by Shlomo Jones LPN  Outcome: Progressing

## 2024-07-13 NOTE — DISCHARGE INSTRUCTIONS
Low Blood Pressure: Care Instructions  Overview     Blood pressure is a measure of how hard blood pushes against the walls of your arteries as it moves through your body. Low blood pressure is also called hypotension. It means that your blood pressure is much lower than normal. Some people may have slightly low blood pressure without symptoms. But in many people, low blood pressure can make you feel dizzy or lightheaded. When your blood pressure is too low, your heart, brain, and other organs do not get enough blood.  Low blood pressure can be caused by many things, including heart problems and some medicines. Diabetes that is not under control can cause your blood pressure to drop. And so can a severe allergic reaction or infection. Another cause is dehydration, which is when your body loses too much fluid.  Treatment for low blood pressure depends on the cause.  Follow-up care is a key part of your treatment and safety. Be sure to make and go to all appointments, and call your doctor if you are having problems. It's also a good idea to know your test results and keep a list of the medicines you take.  How can you care for yourself at home?  Be safe with medicines. Call your doctor if you think you are having a problem with your medicine. You will get more details on the specific medicines your doctor prescribes.  If you feel dizzy or lightheaded, sit down or lie down for a few minutes. Or you can sit down and put your head between your knees. This will help your blood pressure go back to normal and help your symptoms go away.  Follow your doctor's suggestions for ways to prevent symptoms like dizziness. These suggestions may include:  Get up slowly from bed or after sitting for a long time. If you are in bed, roll to your side and swing your legs over the edge of the bed and onto the floor. Push your body up to a sitting position. Wait for a while before you slowly stand up.  Add more salt to your diet, if

## 2024-07-14 NOTE — DISCHARGE SUMMARY
if the patient is taking any medications (or has them prescribed) by pharmacological classification, not how it is used, in the following classes  Indication noted: If column 1 is checked, check if there is an indication noted for all meds in the drug class Is taking  (check all that apply) Indication noted (check all that apply)   Antipsychotic [] []   Anticoagulant [] []   Antibiotic [] []   Opioid [] []   Antiplatelet [x] [x]   Hypoglycemic (including insulin) [] []   None of the above []        Medication List        START taking these medications      lidocaine 4 % external patch  Place 1 patch onto the skin daily            CHANGE how you take these medications      acetaminophen 500 MG tablet  Commonly known as: TYLENOL  Take 1 tablet by mouth every 4 hours as needed for Pain or Fever  What changed: Another medication with the same name was removed. Continue taking this medication, and follow the directions you see here.     alendronate 70 MG tablet  Commonly known as: FOSAMAX  TAKE 1 TABLET ONCE A WEEK 30 MINUTES BEFORE FOOD, DRINK OR MEDS. STAY UPRIGHT.  What changed:   how much to take  how to take this  when to take this  additional instructions            CONTINUE taking these medications      amLODIPine 10 MG tablet  Commonly known as: NORVASC  TAKE 1 TABLET BY MOUTH DAILY     aspirin 81 MG chewable tablet  Take 1 tablet by mouth daily     Calcium-Vitamin D3 600-400 MG-UNIT Tabs     latanoprost 0.005 % ophthalmic solution  Commonly known as: XALATAN     PRESERVISION AREDS PO     rosuvastatin 40 MG tablet  Commonly known as: CRESTOR  TAKE 1 TABLET BY MOUTH NIGHTLY     timolol 0.25 % ophthalmic solution  Commonly known as: TIMOPTIC     vitamin B-12 500 MCG tablet  Commonly known as: CYANOCOBALAMIN     vitamin D 1000 UNIT Tabs tablet  Commonly known as: CHOLECALCIFEROL               Where to Get Your Medications        You can get these medications from any pharmacy    Bring a paper prescription for each

## 2024-07-15 NOTE — PROGRESS NOTES
07/15/24 0712   Encounter Summary   Encounter Overview/Reason Volunteer Encounter   Service Provided For Patient   Referral/Consult From Volunteer   Support System Family members   Last Encounter  07/13/24  (Volunteer visit by [facility] [place] [facility] Kian Walls, documented by TARUN Diallo.)   Complexity of Encounter Low   Begin Time 0900   End Time  0900   Total Time Calculated 0 min   Spiritual/Emotional needs   Type Spiritual Support   Rituals, Rites and Sacraments   Type Holiness Communion   Assessment/Intervention/Outcome   Assessment Calm;Hopeful   Intervention Active listening   Outcome Comfort   Plan and Referrals   Plan/Referrals Continue to visit, (comment)

## 2024-07-22 ENCOUNTER — OFFICE VISIT (OUTPATIENT)
Dept: INTERNAL MEDICINE CLINIC | Age: 89
End: 2024-07-22

## 2024-07-22 VITALS
SYSTOLIC BLOOD PRESSURE: 134 MMHG | DIASTOLIC BLOOD PRESSURE: 72 MMHG | WEIGHT: 89.4 LBS | BODY MASS INDEX: 20.78 KG/M2 | HEART RATE: 87 BPM | OXYGEN SATURATION: 94 %

## 2024-07-22 DIAGNOSIS — R53.1 GENERALIZED WEAKNESS: ICD-10-CM

## 2024-07-22 DIAGNOSIS — I48.0 PAROXYSMAL ATRIAL FIBRILLATION (HCC): ICD-10-CM

## 2024-07-22 DIAGNOSIS — Z09 HOSPITAL DISCHARGE FOLLOW-UP: Primary | ICD-10-CM

## 2024-07-22 DIAGNOSIS — Z86.73 HISTORY OF CVA (CEREBROVASCULAR ACCIDENT): ICD-10-CM

## 2024-07-22 DIAGNOSIS — S22.31XD CLOSED FRACTURE OF ONE RIB OF RIGHT SIDE WITH ROUTINE HEALING, SUBSEQUENT ENCOUNTER: ICD-10-CM

## 2024-07-22 NOTE — PROGRESS NOTES
Post-Discharge Transitional Care  Follow Up      Doretha Melo   YOB: 1927    Date of Office Visit:  7/22/2024  Date of Hospital Admission: 7/3/24  Date of Hospital Discharge: 7/13/24  Risk of hospital readmission (high >=14%. Medium >=10%) :Readmission Risk Score: 15.4      Care management risk score Rising risk (score 2-5) and Complex Care (Scores >=6): No Risk Score On File     Non face to face  following discharge, date last encounter closed (first attempt may have been earlier): *No documented post hospital discharge outreach found in the last 14 days    Call initiated 2 business days of discharge: *No response recorded in the last 14 days    ASSESSMENT/PLAN:   Hospital discharge follow-up  -     [facility] DISCHARGE MEDS RECONCILED W/ CURRENT OUTPATIENT MED LIST  Paroxysmal atrial fibrillation (HCC)  Closed fracture of one rib of right side with routine healing, subsequent encounter  Generalized weakness  History of CVA (cerebrovascular accident)  Continue current regimen at home.  Continue following with home health care, physical therapy, Occupational Therapy outpatient.  Continue lidocaine patches every 12 hours for pain.  Discussed possible plan of care if lab work from tomorrow's visit with home health reveals elevated BNP.  Discussed risk versus benefit of additional diuretic therapies with patient's age and fall history.  Will have further discussion if needed at that time.  Keep current follow-up appointment with primary care provider.  Contact office with any additional questions.    Medical Decision Making: moderate complexity  Return if symptoms worsen or fail to improve.    On this date 7/22/2024 I have spent 32 minutes reviewing previous notes, test results and face to face with the patient discussing the diagnosis and importance of compliance with the treatment plan as well as documenting on the day of the visit.       Subjective:   HPI:  Follow up of Hospital problems/diagnosis(es):

## 2024-07-23 ENCOUNTER — HOSPITAL ENCOUNTER (OUTPATIENT)
Age: 89
Setting detail: SPECIMEN
Discharge: HOME | End: 2024-07-23
Payer: MEDICARE

## 2024-07-23 DIAGNOSIS — E87.6 HYPOKALEMIA: Primary | ICD-10-CM

## 2024-07-23 LAB
ANION GAP SERPL CALCULATED.3IONS-SCNC: 12 MMOL/L (ref 7–16)
BUN SERPL-MCNC: 15 MG/DL (ref 6–23)
CALCIUM SERPL-MCNC: 9.5 MG/DL (ref 8.3–10.6)
CHLORIDE BLD-SCNC: 98 MMOL/L (ref 99–110)
CO2: 29 MMOL/L (ref 21–32)
CREAT SERPL-MCNC: 0.6 MG/DL (ref 0.6–1.1)
GFR, ESTIMATED: 82 ML/MIN/1.73M2
GLUCOSE SERPL-MCNC: 135 MG/DL (ref 70–99)
POTASSIUM SERPL-SCNC: 3 MMOL/L (ref 3.5–5.1)
SODIUM BLD-SCNC: 139 MMOL/L (ref 135–145)

## 2024-07-23 PROCEDURE — 80048 BASIC METABOLIC PNL TOTAL CA: CPT

## 2024-07-23 RX ORDER — POTASSIUM CHLORIDE 20 MEQ/1
20 TABLET, EXTENDED RELEASE ORAL DAILY
Qty: 30 TABLET | Refills: 0 | Status: SHIPPED | OUTPATIENT
Start: 2024-07-23

## 2024-07-23 NOTE — PROGRESS NOTES
Patient with K 3.0.  Contacted patient's daughter, will start KCl supplement daily and recheck labs in 1 week.  [facility]- thanks!

## 2024-07-24 ENCOUNTER — TELEPHONE (OUTPATIENT)
Dept: INTERNAL MEDICINE CLINIC | Age: 89
End: 2024-07-24

## 2024-07-24 NOTE — TELEPHONE ENCOUNTER
Per PCP- disappearing note   Patient with K 3.0.  Contacted patient's daughter, will start KCl supplement daily and recheck labs in 1 week.  Please fax labs to [facility] for lab draw in 1 week- thanks!          Faxed order this date

## 2024-07-30 ENCOUNTER — HOSPITAL ENCOUNTER (OUTPATIENT)
Age: 89
Setting detail: SPECIMEN
Discharge: HOME | End: 2024-07-30
Payer: MEDICARE

## 2024-07-30 LAB — POTASSIUM SERPL-SCNC: 3.7 MMOL/L (ref 3.5–5.1)

## 2024-07-30 PROCEDURE — 84132 ASSAY OF SERUM POTASSIUM: CPT

## 2024-08-05 DIAGNOSIS — E78.2 MIXED HYPERLIPIDEMIA: ICD-10-CM

## 2024-08-05 RX ORDER — ROSUVASTATIN CALCIUM 40 MG/1
40 TABLET, COATED ORAL NIGHTLY
Qty: 90 TABLET | Refills: 1 | Status: SHIPPED | OUTPATIENT
Start: 2024-08-05 | End: 2025-02-01

## 2024-08-21 ENCOUNTER — TELEPHONE (OUTPATIENT)
Dept: INTERNAL MEDICINE CLINIC | Age: 89
End: 2024-08-21

## 2024-08-21 ENCOUNTER — HOSPITAL ENCOUNTER (OUTPATIENT)
Age: 89
Setting detail: SPECIMEN
Discharge: HOME | End: 2024-08-21
Payer: MEDICARE

## 2024-08-21 LAB
ALBUMIN SERPL-MCNC: 3.8 GM/DL (ref 3.4–5)
ALP BLD-CCNC: 69 IU/L (ref 40–128)
ALT SERPL-CCNC: 11 U/L (ref 10–40)
ANION GAP SERPL CALCULATED.3IONS-SCNC: 10 MMOL/L (ref 7–16)
AST SERPL-CCNC: 17 IU/L (ref 15–37)
BILIRUB SERPL-MCNC: 0.7 MG/DL (ref 0–1)
BUN SERPL-MCNC: 11 MG/DL (ref 6–23)
CALCIUM SERPL-MCNC: 9.6 MG/DL (ref 8.3–10.6)
CHLORIDE BLD-SCNC: 102 MMOL/L (ref 99–110)
CO2: 28 MMOL/L (ref 21–32)
CREAT SERPL-MCNC: 0.5 MG/DL (ref 0.6–1.1)
GFR, ESTIMATED: 85 ML/MIN/1.73M2
GLUCOSE SERPL-MCNC: 142 MG/DL (ref 70–99)
MAGNESIUM: 2.2 MG/DL (ref 1.8–2.4)
POTASSIUM SERPL-SCNC: 4.3 MMOL/L (ref 3.5–5.1)
SODIUM BLD-SCNC: 140 MMOL/L (ref 135–145)
TOTAL PROTEIN: 6.8 GM/DL (ref 6.4–8.2)

## 2024-08-21 PROCEDURE — 83735 ASSAY OF MAGNESIUM: CPT

## 2024-08-21 PROCEDURE — 80053 COMPREHEN METABOLIC PANEL: CPT

## 2024-08-21 NOTE — TELEPHONE ENCOUNTER
[facility] nurse called-she was out seeing patient-she is c/o lightheaded sitting BP was 118/74-then standing it was 92/54-patient is not having other issues-spoke daughter and she thinks its the potassium-so gave the home care nurse to get cmp and potassium today-she verbalizes understanding-also instructed to make sure she is staying well hydrated

## 2024-08-22 NOTE — TELEPHONE ENCOUNTER
Yes, that is fine to discontinue K.   As long as her blood pressures remain consistently 110's or higher and she does not have any recurrence of symptoms, that's fine to watch.  But if it does get low again, she needs to decrease her medications as I want to prevent falls/passing out, etc.  Thanks!

## 2024-08-22 NOTE — TELEPHONE ENCOUNTER
Please instruct patient/daughter to decrease her amlodipine dose to 1/2 tab every day (5mg).  If it is too difficult to cut the pills, I can prescribe a 5mg dose.  I will be seeing her in a few weeks so we will follow up at that time.  Thanks.

## 2024-08-27 ENCOUNTER — TELEPHONE (OUTPATIENT)
Dept: INTERNAL MEDICINE CLINIC | Age: 89
End: 2024-08-27

## 2024-08-27 NOTE — TELEPHONE ENCOUNTER
[facility] nurse called in today to inform that patients Left leg wound has healed. Patients BP running low at 79/54 but patient not symptomatic.  Patient reports a little dizziness when standing quickly. Patient is not in any pain.  Will be discharged from nurse services.

## 2024-09-03 ENCOUNTER — HOSPITAL ENCOUNTER (OUTPATIENT)
Age: 89
Discharge: HOME | End: 2024-09-03
Payer: MEDICARE

## 2024-09-03 DIAGNOSIS — R73.03 PREDIABETES: ICD-10-CM

## 2024-09-03 DIAGNOSIS — I10 ESSENTIAL HYPERTENSION: ICD-10-CM

## 2024-09-03 DIAGNOSIS — E55.9 VITAMIN D DEFICIENCY: ICD-10-CM

## 2024-09-03 DIAGNOSIS — M81.0 OSTEOPOROSIS, UNSPECIFIED OSTEOPOROSIS TYPE, UNSPECIFIED PATHOLOGICAL FRACTURE PRESENCE: ICD-10-CM

## 2024-09-03 DIAGNOSIS — E78.2 MIXED HYPERLIPIDEMIA: ICD-10-CM

## 2024-09-03 LAB
25(OH)D3 SERPL-MCNC: 54.38 NG/ML
BASOPHILS ABSOLUTE: 0 K/CU MM
BASOPHILS RELATIVE PERCENT: 0.3 % (ref 0–1)
CHOLEST SERPL-MCNC: 128 MG/DL
DIFFERENTIAL TYPE: ABNORMAL
EOSINOPHILS ABSOLUTE: 0.1 K/CU MM
EOSINOPHILS RELATIVE PERCENT: 1 % (ref 0–3)
ESTIMATED AVERAGE GLUCOSE: 123 MG/DL
HBA1C MFR BLD: 5.9 % (ref 4.2–6.3)
HCT VFR BLD CALC: 41.1 % (ref 37–47)
HDLC SERPL-MCNC: 56 MG/DL
HEMOGLOBIN: 12.6 GM/DL (ref 12.5–16)
IMMATURE NEUTROPHIL %: 0.3 % (ref 0–0.43)
LDLC SERPL CALC-MCNC: 59 MG/DL
LYMPHOCYTES ABSOLUTE: 2.7 K/CU MM
LYMPHOCYTES RELATIVE PERCENT: 31.1 % (ref 24–44)
MCH RBC QN AUTO: 29.7 PG (ref 27–31)
MCHC RBC AUTO-ENTMCNC: 30.7 % (ref 32–36)
MCV RBC AUTO: 96.9 FL (ref 78–100)
MONOCYTES ABSOLUTE: 1.1 K/CU MM
MONOCYTES RELATIVE PERCENT: 12.2 % (ref 0–4)
NEUTROPHILS ABSOLUTE: 4.8 K/CU MM
NEUTROPHILS RELATIVE PERCENT: 55.1 % (ref 36–66)
NUCLEATED RBC %: 0 %
PDW BLD-RTO: 16.4 % (ref 11.7–14.9)
PLATELET # BLD: 305 K/CU MM (ref 140–440)
PMV BLD AUTO: 9.9 FL (ref 7.5–11.1)
RBC # BLD: 4.24 M/CU MM (ref 4.2–5.4)
TOTAL IMMATURE NEUTOROPHIL: 0.03 K/CU MM
TOTAL NUCLEATED RBC: 0 K/CU MM
TRIGL SERPL-MCNC: 63 MG/DL
WBC # BLD: 8.7 K/CU MM (ref 4–10.5)

## 2024-09-03 PROCEDURE — 85025 COMPLETE CBC W/AUTO DIFF WBC: CPT

## 2024-09-03 PROCEDURE — 80061 LIPID PANEL: CPT

## 2024-09-03 PROCEDURE — 82306 VITAMIN D 25 HYDROXY: CPT

## 2024-09-03 PROCEDURE — 36415 COLL VENOUS BLD VENIPUNCTURE: CPT

## 2024-09-03 PROCEDURE — 83036 HEMOGLOBIN GLYCOSYLATED A1C: CPT

## 2024-09-09 ENCOUNTER — TELEPHONE (OUTPATIENT)
Dept: INTERNAL MEDICINE CLINIC | Age: 89
End: 2024-09-09

## 2024-09-09 RX ORDER — SULFAMETHOXAZOLE/TRIMETHOPRIM 800-160 MG
1 TABLET ORAL 2 TIMES DAILY
Qty: 10 TABLET | Refills: 0 | Status: SHIPPED | OUTPATIENT
Start: 2024-09-09 | End: 2024-09-14

## 2024-09-12 ENCOUNTER — OFFICE VISIT (OUTPATIENT)
Dept: INTERNAL MEDICINE CLINIC | Age: 89
End: 2024-09-12

## 2024-09-12 VITALS
DIASTOLIC BLOOD PRESSURE: 66 MMHG | SYSTOLIC BLOOD PRESSURE: 126 MMHG | BODY MASS INDEX: 20.64 KG/M2 | WEIGHT: 88.8 LBS | OXYGEN SATURATION: 95 % | HEART RATE: 83 BPM

## 2024-09-12 DIAGNOSIS — E87.6 HYPOKALEMIA: ICD-10-CM

## 2024-09-12 DIAGNOSIS — E55.9 VITAMIN D DEFICIENCY: ICD-10-CM

## 2024-09-12 DIAGNOSIS — E78.2 MIXED HYPERLIPIDEMIA: ICD-10-CM

## 2024-09-12 DIAGNOSIS — I10 ESSENTIAL HYPERTENSION: Primary | ICD-10-CM

## 2024-09-12 DIAGNOSIS — R73.03 PREDIABETES: ICD-10-CM

## 2024-09-12 SDOH — ECONOMIC STABILITY: FOOD INSECURITY: WITHIN THE PAST 12 MONTHS, YOU WORRIED THAT YOUR FOOD WOULD RUN OUT BEFORE YOU GOT MONEY TO BUY MORE.: NEVER TRUE

## 2024-09-12 SDOH — ECONOMIC STABILITY: INCOME INSECURITY: HOW HARD IS IT FOR YOU TO PAY FOR THE VERY BASICS LIKE FOOD, HOUSING, MEDICAL CARE, AND HEATING?: NOT HARD AT ALL

## 2024-09-12 SDOH — ECONOMIC STABILITY: FOOD INSECURITY: WITHIN THE PAST 12 MONTHS, THE FOOD YOU BOUGHT JUST DIDN'T LAST AND YOU DIDN'T HAVE MONEY TO GET MORE.: NEVER TRUE

## 2024-09-12 ASSESSMENT — ENCOUNTER SYMPTOMS
COLOR CHANGE: 0
SORE THROAT: 0
CONSTIPATION: 0
SHORTNESS OF BREATH: 0
VOMITING: 0
ABDOMINAL PAIN: 0
CHEST TIGHTNESS: 0
DIARRHEA: 0

## 2024-09-13 LAB — POTASSIUM SERPL-SCNC: 3.9 MMOL/L (ref 3.5–5.1)

## 2024-11-04 RX ORDER — AMLODIPINE BESYLATE 10 MG/1
10 TABLET ORAL DAILY
Qty: 90 TABLET | Refills: 1 | Status: SHIPPED | OUTPATIENT
Start: 2024-11-04

## 2024-12-12 RX ORDER — ALENDRONATE SODIUM 70 MG/1
TABLET ORAL
Qty: 4 TABLET | Refills: 11 | Status: SHIPPED | OUTPATIENT
Start: 2024-12-12

## 2025-01-13 ENCOUNTER — TELEPHONE (OUTPATIENT)
Dept: INTERNAL MEDICINE CLINIC | Age: 89
End: 2025-01-13

## 2025-01-13 RX ORDER — SULFAMETHOXAZOLE AND TRIMETHOPRIM 800; 160 MG/1; MG/1
1 TABLET ORAL 2 TIMES DAILY
Qty: 10 TABLET | Refills: 0 | Status: SHIPPED | OUTPATIENT
Start: 2025-01-13 | End: 2025-01-16 | Stop reason: ALTCHOICE

## 2025-01-13 NOTE — TELEPHONE ENCOUNTER
Monae [phone] Patient's daughter called because patient is having UTI symptoms and would like to know if a medication can be sent to the pharmacy or if you would like to hold off until tomorrow at the appointment time 9:00am.

## 2025-01-14 ENCOUNTER — OFFICE VISIT (OUTPATIENT)
Dept: INTERNAL MEDICINE CLINIC | Age: 89
End: 2025-01-14
Payer: MEDICARE

## 2025-01-14 VITALS
OXYGEN SATURATION: 97 % | BODY MASS INDEX: 19.67 KG/M2 | WEIGHT: 85 LBS | DIASTOLIC BLOOD PRESSURE: 60 MMHG | RESPIRATION RATE: 16 BRPM | SYSTOLIC BLOOD PRESSURE: 118 MMHG | HEIGHT: 55 IN | HEART RATE: 55 BPM

## 2025-01-14 DIAGNOSIS — N39.0 URINARY TRACT INFECTION WITHOUT HEMATURIA, SITE UNSPECIFIED: ICD-10-CM

## 2025-01-14 DIAGNOSIS — I10 ESSENTIAL HYPERTENSION: Primary | ICD-10-CM

## 2025-01-14 DIAGNOSIS — R73.03 PREDIABETES: ICD-10-CM

## 2025-01-14 DIAGNOSIS — E78.2 MIXED HYPERLIPIDEMIA: ICD-10-CM

## 2025-01-14 DIAGNOSIS — E55.9 VITAMIN D DEFICIENCY: ICD-10-CM

## 2025-01-14 DIAGNOSIS — I48.0 PAROXYSMAL ATRIAL FIBRILLATION (HCC): ICD-10-CM

## 2025-01-14 PROCEDURE — 1036F TOBACCO NON-USER: CPT | Performed by: FAMILY MEDICINE

## 2025-01-14 PROCEDURE — 1159F MED LIST DOCD IN RCRD: CPT | Performed by: FAMILY MEDICINE

## 2025-01-14 PROCEDURE — G8420 CALC BMI NORM PARAMETERS: HCPCS | Performed by: FAMILY MEDICINE

## 2025-01-14 PROCEDURE — G8427 DOCREV CUR MEDS BY ELIG CLIN: HCPCS | Performed by: FAMILY MEDICINE

## 2025-01-14 PROCEDURE — 1090F PRES/ABSN URINE INCON ASSESS: CPT | Performed by: FAMILY MEDICINE

## 2025-01-14 PROCEDURE — G2211 COMPLEX E/M VISIT ADD ON: HCPCS | Performed by: FAMILY MEDICINE

## 2025-01-14 PROCEDURE — 1123F ACP DISCUSS/DSCN MKR DOCD: CPT | Performed by: FAMILY MEDICINE

## 2025-01-14 PROCEDURE — 99213 OFFICE O/P EST LOW 20 MIN: CPT | Performed by: FAMILY MEDICINE

## 2025-01-14 SDOH — ECONOMIC STABILITY: FOOD INSECURITY: WITHIN THE PAST 12 MONTHS, THE FOOD YOU BOUGHT JUST DIDN'T LAST AND YOU DIDN'T HAVE MONEY TO GET MORE.: NEVER TRUE

## 2025-01-14 SDOH — ECONOMIC STABILITY: FOOD INSECURITY: WITHIN THE PAST 12 MONTHS, YOU WORRIED THAT YOUR FOOD WOULD RUN OUT BEFORE YOU GOT MONEY TO BUY MORE.: NEVER TRUE

## 2025-01-14 ASSESSMENT — ENCOUNTER SYMPTOMS
VOMITING: 0
SORE THROAT: 0
DIARRHEA: 0
SHORTNESS OF BREATH: 0
CONSTIPATION: 0
CHEST TIGHTNESS: 0
ABDOMINAL PAIN: 0
COLOR CHANGE: 0

## 2025-01-14 ASSESSMENT — PATIENT HEALTH QUESTIONNAIRE - PHQ9
SUM OF ALL RESPONSES TO PHQ QUESTIONS 1-9: 0
SUM OF ALL RESPONSES TO PHQ9 QUESTIONS 1 & 2: 0
1. LITTLE INTEREST OR PLEASURE IN DOING THINGS: NOT AT ALL
2. FEELING DOWN, DEPRESSED OR HOPELESS: NOT AT ALL
SUM OF ALL RESPONSES TO PHQ QUESTIONS 1-9: 0

## 2025-01-14 NOTE — PROGRESS NOTES
Subjective:      Chief Complaint   Patient presents with    Follow-up     4 month follow up    Needs script for handicap placard    Hypertension     Patient has brought in her BP readings to appointment. BP has been ranging from 123//50       HPI:  Doretha Melo is a 89 y.o. female who presents today for follow up of chronic conditions as listed below.    BP's at home have been stable/improved. Has not had any further hypotension.  Readings are all 120-140/60's.  Denies any symptoms.     Started treatment for UTI yesterday, states symptoms have already improved significantly.     Requesting handicap placard.    Feeling well today, no acute concerns.  Was not able to get labs completed.        Past Medical History:   Diagnosis Date    BCC (basal cell carcinoma), leg 09/2017    Family history of pancreatic cancer     Glaucoma of right eye 05/2016    Dr Jean Baptiste    Hyperlipidemia     Hypertension     Macular degeneration, dry     Dr. Jean Baptiste    Osteoarthritis of knee 2011    Dr Trujillo    Osteoporosis 2002    Fosamax [id number]    Pelvic fracture (HCC) 05/2018    Right rotator cuff tear 2014    Trujillo / injection; PT    T10 vertebral fracture (HCC) 02/2018    fall and fragility fx; also L1 ; had kyphoplasty 2/2018    Vitamin B12 deficiency 03/2017    B 12 266 by Ronnie at [facility]        Past Surgical History:   Procedure Laterality Date    ABDOMINAL EXPLORATION SURGERY  1997    With Lysis of Adhesions    APPENDECTOMY  1953    w/  rt.uso    CYSTOCELE REPAIR  1993    w/ rectocele    FIXATION KYPHOPLASTY  02/19/2018    T10 & L1    HYSTERECTOMY (CERVIX STATUS UNKNOWN)  1969     remaining ovary removed also    SKIN CANCER EXCISION Left 09/2017    BCC with skin graft    WRIST FRACTURE SURGERY Left 03/2017    ORIF       Social History     Tobacco Use    Smoking status: Never    Smokeless tobacco: Never   Substance Use Topics    Alcohol use: Yes     Comment: wine rarely        Review of Systems   Constitutional:  Negative for

## 2025-01-16 ENCOUNTER — TELEPHONE (OUTPATIENT)
Dept: INTERNAL MEDICINE CLINIC | Age: 89
End: 2025-01-16
Payer: MEDICARE

## 2025-01-16 DIAGNOSIS — N39.0 RECURRENT UTI: Primary | ICD-10-CM

## 2025-01-16 LAB
BILIRUBIN, POC: NORMAL
BLOOD URINE, POC: NORMAL
CLARITY, POC: NORMAL
COLOR, POC: YELLOW
GLUCOSE URINE, POC: NORMAL MG/DL
KETONES, POC: NORMAL MG/DL
LEUKOCYTE EST, POC: NORMAL
NITRITE, POC: NORMAL
PH, POC: 7
PROTEIN, POC: 30 MG/DL
SPECIFIC GRAVITY, POC: 1.02
UROBILINOGEN, POC: 1 MG/DL

## 2025-01-16 PROCEDURE — 81002 URINALYSIS NONAUTO W/O SCOPE: CPT | Performed by: FAMILY MEDICINE

## 2025-01-16 RX ORDER — CIPROFLOXACIN 500 MG/1
500 TABLET, FILM COATED ORAL DAILY
Qty: 7 TABLET | Refills: 0 | Status: SHIPPED | OUTPATIENT
Start: 2025-01-16 | End: 2025-01-23

## 2025-01-16 NOTE — TELEPHONE ENCOUNTER
Called and spoke w/daughter-she will try to obtain a specimen today and bring it today but its hard for her-instructions given

## 2025-01-16 NOTE — TELEPHONE ENCOUNTER
Patient was treated for UTI, patient's daughter stated that the patient does not look good today and is asking for something else to be sent to the pharmacy. Please call Monae [phone] with any suggestions.

## 2025-01-16 NOTE — TELEPHONE ENCOUNTER
I've ordered a different antibiotic for the patient to be switched to, but can the daughter bring in a urine sample so that we can check a culture?    If patient still continues to worsen despite switching antibiotics, please have her get evaluated especially as I will not be back in clinic until Monday.  Thanks.

## 2025-01-17 LAB — BACTERIA UR CULT: NORMAL

## 2025-01-20 ENCOUNTER — HOSPITAL ENCOUNTER (OUTPATIENT)
Age: 89
Setting detail: OBSERVATION
Discharge: HOME HEALTH | End: 2025-01-22
Attending: STUDENT IN AN ORGANIZED HEALTH CARE EDUCATION/TRAINING PROGRAM | Admitting: STUDENT IN AN ORGANIZED HEALTH CARE EDUCATION/TRAINING PROGRAM
Payer: MEDICARE

## 2025-01-20 ENCOUNTER — APPOINTMENT (OUTPATIENT)
Dept: GENERAL RADIOLOGY | Age: 89
End: 2025-01-20
Payer: MEDICARE

## 2025-01-20 ENCOUNTER — APPOINTMENT (OUTPATIENT)
Dept: CT IMAGING | Age: 89
End: 2025-01-20
Payer: MEDICARE

## 2025-01-20 DIAGNOSIS — E87.6 HYPOKALEMIA: ICD-10-CM

## 2025-01-20 DIAGNOSIS — R29.90 STROKE-LIKE SYMPTOMS: Primary | ICD-10-CM

## 2025-01-20 DIAGNOSIS — R51.9 ACUTE NONINTRACTABLE HEADACHE, UNSPECIFIED HEADACHE TYPE: ICD-10-CM

## 2025-01-20 PROBLEM — R42 VERTIGO: Status: ACTIVE | Noted: 2025-01-20

## 2025-01-20 LAB
ALBUMIN SERPL-MCNC: 3.9 G/DL (ref 3.4–5)
ALBUMIN/GLOB SERPL: 1.7 {RATIO} (ref 1.1–2.2)
ALP SERPL-CCNC: 53 U/L (ref 40–129)
ALT SERPL-CCNC: 24 U/L (ref 10–40)
ANION GAP SERPL CALCULATED.3IONS-SCNC: 11 MMOL/L (ref 9–17)
AST SERPL-CCNC: 25 U/L (ref 15–37)
BASOPHILS # BLD: 0.03 K/UL
BASOPHILS NFR BLD: 0 % (ref 0–1)
BILIRUB SERPL-MCNC: 0.6 MG/DL (ref 0–1)
BUN SERPL-MCNC: 12 MG/DL (ref 7–20)
CALCIUM SERPL-MCNC: 9.9 MG/DL (ref 8.3–10.6)
CHLORIDE SERPL-SCNC: 102 MMOL/L (ref 99–110)
CHP ED QC CHECK: NORMAL
CO2 SERPL-SCNC: 26 MMOL/L (ref 21–32)
CREAT SERPL-MCNC: 0.6 MG/DL (ref 0.6–1.2)
EOSINOPHIL # BLD: 0.08 K/UL
EOSINOPHILS RELATIVE PERCENT: 1 % (ref 0–3)
ERYTHROCYTE [DISTWIDTH] IN BLOOD BY AUTOMATED COUNT: 16.1 % (ref 11.7–14.9)
ETHANOLAMINE SERPL-MCNC: <10 MG/DL (ref 0–0.08)
GFR, ESTIMATED: >90 ML/MIN/1.73M2
GLUCOSE BLD-MCNC: 125 MG/DL
GLUCOSE BLD-MCNC: 125 MG/DL (ref 74–99)
GLUCOSE SERPL-MCNC: 126 MG/DL (ref 74–99)
HCT VFR BLD AUTO: 37.4 % (ref 37–47)
HGB BLD-MCNC: 11.8 G/DL (ref 12.5–16)
IMM GRANULOCYTES # BLD AUTO: 0.03 K/UL
IMM GRANULOCYTES NFR BLD: 0 %
INR PPP: 0.9
LYMPHOCYTES NFR BLD: 2.62 K/UL
LYMPHOCYTES RELATIVE PERCENT: 30 % (ref 24–44)
MAGNESIUM SERPL-MCNC: 2 MG/DL (ref 1.8–2.4)
MCH RBC QN AUTO: 29.9 PG (ref 27–31)
MCHC RBC AUTO-ENTMCNC: 31.6 G/DL (ref 32–36)
MCV RBC AUTO: 94.9 FL (ref 78–100)
MONOCYTES NFR BLD: 0.92 K/UL
MONOCYTES NFR BLD: 11 % (ref 0–4)
NEUTROPHILS NFR BLD: 57 % (ref 36–66)
NEUTS SEG NFR BLD: 4.93 K/UL
PLATELET # BLD AUTO: 316 K/UL (ref 140–440)
PMV BLD AUTO: 9.1 FL (ref 7.5–11.1)
POTASSIUM SERPL-SCNC: 3.2 MMOL/L (ref 3.5–5.1)
PROT SERPL-MCNC: 6.1 G/DL (ref 6.4–8.2)
PROTHROMBIN TIME: 12.6 SEC (ref 11.7–14.5)
RBC # BLD AUTO: 3.94 M/UL (ref 4.2–5.4)
SODIUM SERPL-SCNC: 140 MMOL/L (ref 136–145)
TROPONIN I SERPL HS-MCNC: 21 NG/L (ref 0–14)
TROPONIN I SERPL HS-MCNC: 21 NG/L (ref 0–14)
WBC OTHER # BLD: 8.6 K/UL (ref 4–10.5)

## 2025-01-20 PROCEDURE — 93005 ELECTROCARDIOGRAM TRACING: CPT | Performed by: STUDENT IN AN ORGANIZED HEALTH CARE EDUCATION/TRAINING PROGRAM

## 2025-01-20 PROCEDURE — 82962 GLUCOSE BLOOD TEST: CPT

## 2025-01-20 PROCEDURE — 70450 CT HEAD/BRAIN W/O DYE: CPT

## 2025-01-20 PROCEDURE — 85610 PROTHROMBIN TIME: CPT

## 2025-01-20 PROCEDURE — G0480 DRUG TEST DEF 1-7 CLASSES: HCPCS

## 2025-01-20 PROCEDURE — 6370000000 HC RX 637 (ALT 250 FOR IP): Performed by: STUDENT IN AN ORGANIZED HEALTH CARE EDUCATION/TRAINING PROGRAM

## 2025-01-20 PROCEDURE — 80053 COMPREHEN METABOLIC PANEL: CPT

## 2025-01-20 PROCEDURE — 6360000002 HC RX W HCPCS: Performed by: STUDENT IN AN ORGANIZED HEALTH CARE EDUCATION/TRAINING PROGRAM

## 2025-01-20 PROCEDURE — 84484 ASSAY OF TROPONIN QUANT: CPT

## 2025-01-20 PROCEDURE — 85025 COMPLETE CBC W/AUTO DIFF WBC: CPT

## 2025-01-20 PROCEDURE — 83735 ASSAY OF MAGNESIUM: CPT

## 2025-01-20 PROCEDURE — 70496 CT ANGIOGRAPHY HEAD: CPT

## 2025-01-20 PROCEDURE — 71045 X-RAY EXAM CHEST 1 VIEW: CPT

## 2025-01-20 PROCEDURE — 6360000004 HC RX CONTRAST MEDICATION: Performed by: STUDENT IN AN ORGANIZED HEALTH CARE EDUCATION/TRAINING PROGRAM

## 2025-01-20 PROCEDURE — 99285 EMERGENCY DEPT VISIT HI MDM: CPT

## 2025-01-20 PROCEDURE — G0378 HOSPITAL OBSERVATION PER HR: HCPCS

## 2025-01-20 PROCEDURE — 96374 THER/PROPH/DIAG INJ IV PUSH: CPT

## 2025-01-20 PROCEDURE — 2500000003 HC RX 250 WO HCPCS: Performed by: STUDENT IN AN ORGANIZED HEALTH CARE EDUCATION/TRAINING PROGRAM

## 2025-01-20 RX ORDER — MAGNESIUM SULFATE IN WATER 40 MG/ML
2000 INJECTION, SOLUTION INTRAVENOUS PRN
Status: DISCONTINUED | OUTPATIENT
Start: 2025-01-20 | End: 2025-01-22 | Stop reason: HOSPADM

## 2025-01-20 RX ORDER — ASPIRIN 81 MG/1
81 TABLET, CHEWABLE ORAL DAILY
Status: DISCONTINUED | OUTPATIENT
Start: 2025-01-21 | End: 2025-01-22 | Stop reason: HOSPADM

## 2025-01-20 RX ORDER — POTASSIUM CHLORIDE 7.45 MG/ML
10 INJECTION INTRAVENOUS PRN
Status: DISCONTINUED | OUTPATIENT
Start: 2025-01-20 | End: 2025-01-22 | Stop reason: HOSPADM

## 2025-01-20 RX ORDER — ROSUVASTATIN CALCIUM 20 MG/1
40 TABLET, COATED ORAL NIGHTLY
Status: DISCONTINUED | OUTPATIENT
Start: 2025-01-20 | End: 2025-01-22 | Stop reason: HOSPADM

## 2025-01-20 RX ORDER — IOPAMIDOL 755 MG/ML
75 INJECTION, SOLUTION INTRAVASCULAR
Status: COMPLETED | OUTPATIENT
Start: 2025-01-20 | End: 2025-01-20

## 2025-01-20 RX ORDER — SODIUM CHLORIDE 9 MG/ML
INJECTION, SOLUTION INTRAVENOUS PRN
Status: DISCONTINUED | OUTPATIENT
Start: 2025-01-20 | End: 2025-01-22 | Stop reason: HOSPADM

## 2025-01-20 RX ORDER — METOCLOPRAMIDE 10 MG/1
10 TABLET ORAL ONCE
Status: COMPLETED | OUTPATIENT
Start: 2025-01-20 | End: 2025-01-20

## 2025-01-20 RX ORDER — ONDANSETRON 4 MG/1
4 TABLET, ORALLY DISINTEGRATING ORAL EVERY 8 HOURS PRN
Status: DISCONTINUED | OUTPATIENT
Start: 2025-01-20 | End: 2025-01-22 | Stop reason: HOSPADM

## 2025-01-20 RX ORDER — POLYETHYLENE GLYCOL 3350 17 G/17G
17 POWDER, FOR SOLUTION ORAL DAILY PRN
Status: DISCONTINUED | OUTPATIENT
Start: 2025-01-20 | End: 2025-01-22 | Stop reason: HOSPADM

## 2025-01-20 RX ORDER — ACETAMINOPHEN 500 MG
500 TABLET ORAL EVERY 4 HOURS PRN
Status: DISCONTINUED | OUTPATIENT
Start: 2025-01-20 | End: 2025-01-22 | Stop reason: HOSPADM

## 2025-01-20 RX ORDER — LATANOPROST 50 UG/ML
1 SOLUTION/ DROPS OPHTHALMIC NIGHTLY
Status: DISCONTINUED | OUTPATIENT
Start: 2025-01-20 | End: 2025-01-22 | Stop reason: HOSPADM

## 2025-01-20 RX ORDER — DIPHENHYDRAMINE HYDROCHLORIDE 50 MG/ML
12.5 INJECTION INTRAMUSCULAR; INTRAVENOUS ONCE
Status: COMPLETED | OUTPATIENT
Start: 2025-01-20 | End: 2025-01-20

## 2025-01-20 RX ORDER — SODIUM CHLORIDE 0.9 % (FLUSH) 0.9 %
5-40 SYRINGE (ML) INJECTION EVERY 12 HOURS SCHEDULED
Status: DISCONTINUED | OUTPATIENT
Start: 2025-01-20 | End: 2025-01-22 | Stop reason: HOSPADM

## 2025-01-20 RX ORDER — SODIUM CHLORIDE 0.9 % (FLUSH) 0.9 %
5-40 SYRINGE (ML) INJECTION PRN
Status: DISCONTINUED | OUTPATIENT
Start: 2025-01-20 | End: 2025-01-22 | Stop reason: HOSPADM

## 2025-01-20 RX ORDER — ACETAMINOPHEN 650 MG/1
650 SUPPOSITORY RECTAL EVERY 6 HOURS PRN
Status: DISCONTINUED | OUTPATIENT
Start: 2025-01-20 | End: 2025-01-22 | Stop reason: HOSPADM

## 2025-01-20 RX ORDER — POTASSIUM CHLORIDE 1500 MG/1
40 TABLET, EXTENDED RELEASE ORAL PRN
Status: DISCONTINUED | OUTPATIENT
Start: 2025-01-20 | End: 2025-01-22 | Stop reason: HOSPADM

## 2025-01-20 RX ORDER — VITAMIN B COMPLEX
1000 TABLET ORAL DAILY
Status: DISCONTINUED | OUTPATIENT
Start: 2025-01-21 | End: 2025-01-22 | Stop reason: HOSPADM

## 2025-01-20 RX ORDER — ONDANSETRON 2 MG/ML
4 INJECTION INTRAMUSCULAR; INTRAVENOUS EVERY 6 HOURS PRN
Status: DISCONTINUED | OUTPATIENT
Start: 2025-01-20 | End: 2025-01-22 | Stop reason: HOSPADM

## 2025-01-20 RX ORDER — TIMOLOL MALEATE 2.5 MG/ML
1 SOLUTION/ DROPS OPHTHALMIC DAILY
Status: DISCONTINUED | OUTPATIENT
Start: 2025-01-20 | End: 2025-01-22 | Stop reason: HOSPADM

## 2025-01-20 RX ORDER — LANOLIN ALCOHOL/MO/W.PET/CERES
500 CREAM (GRAM) TOPICAL DAILY
Status: DISCONTINUED | OUTPATIENT
Start: 2025-01-21 | End: 2025-01-22 | Stop reason: HOSPADM

## 2025-01-20 RX ORDER — POTASSIUM CHLORIDE 1500 MG/1
40 TABLET, EXTENDED RELEASE ORAL ONCE
Status: COMPLETED | OUTPATIENT
Start: 2025-01-20 | End: 2025-01-20

## 2025-01-20 RX ORDER — ACETAMINOPHEN 325 MG/1
650 TABLET ORAL EVERY 6 HOURS PRN
Status: DISCONTINUED | OUTPATIENT
Start: 2025-01-20 | End: 2025-01-22 | Stop reason: HOSPADM

## 2025-01-20 RX ORDER — ENOXAPARIN SODIUM 100 MG/ML
30 INJECTION SUBCUTANEOUS DAILY
Status: DISCONTINUED | OUTPATIENT
Start: 2025-01-21 | End: 2025-01-22 | Stop reason: HOSPADM

## 2025-01-20 RX ADMIN — SODIUM CHLORIDE, PRESERVATIVE FREE 10 ML: 5 INJECTION INTRAVENOUS at 21:59

## 2025-01-20 RX ADMIN — ROSUVASTATIN CALCIUM 40 MG: 20 TABLET, FILM COATED ORAL at 21:58

## 2025-01-20 RX ADMIN — METOCLOPRAMIDE 10 MG: 10 TABLET ORAL at 13:20

## 2025-01-20 RX ADMIN — IOPAMIDOL 75 ML: 755 INJECTION, SOLUTION INTRAVENOUS at 12:41

## 2025-01-20 RX ADMIN — POTASSIUM CHLORIDE 40 MEQ: 1500 TABLET, EXTENDED RELEASE ORAL at 13:38

## 2025-01-20 RX ADMIN — DIPHENHYDRAMINE HYDROCHLORIDE 12.5 MG: 50 INJECTION, SOLUTION INTRAMUSCULAR; INTRAVENOUS at 12:37

## 2025-01-20 ASSESSMENT — PAIN SCALES - GENERAL
PAINLEVEL_OUTOF10: 0
PAINLEVEL_OUTOF10: 10

## 2025-01-20 ASSESSMENT — PAIN DESCRIPTION - LOCATION: LOCATION: HEAD

## 2025-01-20 ASSESSMENT — PAIN - FUNCTIONAL ASSESSMENT: PAIN_FUNCTIONAL_ASSESSMENT: 0-10

## 2025-01-20 NOTE — ED NOTES
Medication History  [facility]    Patient Name: Doretha Melo 6/15/1927     Medication history has been completed by: Maddie Slaughter CPhT    Source(s) of information: Patient/family and insurance claims     Primary Care Physician: Kenna Ceron MD     [facility]acy: [facility]    Allergies as of 01/20/2025 - Fully Reviewed 01/20/2025   Allergen Reaction Noted    Bee venom Swelling 09/25/2023    Iodides Hives 09/26/2010    Shrimp flavor agent (non-screening) Hives 02/17/2018        Prior to Admission medications    Medication Sig Start Date End Date Taking? Authorizing Provider   ciprofloxacin (CIPRO) 500 MG tablet Take 1 tablet by mouth daily for 7 days 1/16/25 1/23/25 Yes Kenna Ceron MD   alendronate (FOSAMAX) 70 MG tablet TAKE 1 TABLET ONCE A WEEK 30 MINUTES BEFORE FOOD, DRINK OR MEDS. STAY UPRIGHT. 12/12/24  Yes Kenna Ceron MD   amLODIPine (NORVASC) 10 MG tablet TAKE 1 TABLET BY MOUTH DAILY 11/4/24  Yes Kenna Ceron MD   rosuvastatin (CRESTOR) 40 MG tablet TAKE 1 TABLET BY MOUTH NIGHTLY 8/5/24 2/1/25 Yes Juliana Munguia APRN - CNP   acetaminophen (TYLENOL) 500 MG tablet Take 1 tablet by mouth every 4 hours as needed for Pain or Fever 7/1/24  Yes Franklin Clemente MD   aspirin 81 MG chewable tablet Take 1 tablet by mouth daily 10/3/23  Yes GISEL Zayas MD   Multiple Vitamins-Minerals (PRESERVISION AREDS PO) Take 1 tablet by mouth in the morning and 1 tablet in the evening. Takes chewables.. 5/23/23  Yes Raquel Osborne MD   timolol (TIMOPTIC) 0.25 % ophthalmic solution Place 1 drop into both eyes daily   Yes Raquel Osborne MD   Calcium Carb-Cholecalciferol (CALCIUM-VITAMIN D3) 600-400 MG-UNIT TABS Take 1 tablet by mouth daily   Yes Raquel Osborne MD   vitamin D (CHOLECALCIFEROL) 1000 UNIT TABS tablet Take 1 tablet by mouth daily   Yes Raquel Osborne MD   vitamin B-12 (CYANOCOBALAMIN) 500 MCG tablet Take 1 tablet by mouth daily

## 2025-01-20 NOTE — ED PROVIDER NOTES
Emergency Department Encounter        Pt Name: Doretha Melo  MRN: [id number]  Birthdate 6/15/1927  Date of evaluation: 1/20/2025  ED Physician: Sina Guy MD    CHIEF COMPLAINT     Triage Chief Complaint:   Dizziness and Headache      HISTORY OF PRESENT ILLNESS & REVIEW OF SYSTEMS     History obtained from the patient and staff.    Doretha Melo is a 89 y.o. female who presents to the emergency department for evaluation of headache.  Says that around 9:00 she began having headache as well as some lightheadedness and sensation of the room spinning.  States a sensation of the room spinning is improved but still has been headache.  He says she did take some Tylenol.  Denies any blood thinners.  Patient is very hard of hearing and did not bring her hearing aids.  Does have previous CVA history and reports left upper extremity weakness.        Patient denies any new Fever, Chills, Cough, Chest pain, Shortness of breath, Abdominal pain, Nausea, Vomiting, Diarrhea, Constipation, and Leg swelling.    The patient has no other acute complaints at this time.  Review of systems as above.          PAST MED/SURG/SOCIAL/FAM HISTORY & ALLERGY & MEDICATIONS     Past Medical History:   Diagnosis Date    BCC (basal cell carcinoma), leg 09/2017    Family history of pancreatic cancer     Glaucoma of right eye 05/2016    Dr Jean Baptiste    Hyperlipidemia     Hypertension     Macular degeneration, dry     Dr. Jean Baptiste    Osteoarthritis of knee 2011    Dr Trujillo    Osteoporosis 2002    Fosamax [phone]    Pelvic fracture (HCC) 05/2018    Right rotator cuff tear 2014    Trujillo / injection; PT    T10 vertebral fracture (HCC) 02/2018    fall and fragility fx; also L1 ; had kyphoplasty 2/2018    Vitamin B12 deficiency 03/2017    B 12 266 by Ronnie at [facility]     Patient Active Problem List   Diagnosis Code    Essential hypertension I10    Osteoporosis M81.0    Hyperlipidemia E78.5    Osteoarthritis of knee M17.9    T10 vertebral fracture

## 2025-01-20 NOTE — H&P
years old Female INDICATION: Stroke Symptoms COMPARISON: No existing relevant imaging study corresponding to the same anatomical region is available. TECHNIQUE: Contiguous axial slices of the head were submitted without IV contrast. Additional coronal reformatted images were submitted for review. DOSE OPTIMIZATION: CT radiation dose optimization techniques (automated exposure  control, and use of iterative reconstruction techniques, or adjustment of the mA and/or kV according to patient size) were used to limit patient radiation dose. FINDINGS: Mild diffuse atrophy. There is moderate periventricular white matter hypoattenuation which most likely reflects chronic small vessel ischemic changes.  The gray/white matter differentiation is preserved. There is no CT evidence of a large acute or recent subacute territorial infarct.  The basal cisterns are not effaced. There is no mass effect or midline shift. There is no acute intracranial hemorrhage or extra-axial fluid collections. The visualized paranasal sinuses and mastoid air cells are clear. Regional soft tissues and bony calvarium are unremarkable. IMPRESSION: 1.  No acute intracranial findings.  There is no evidence for hemorrhage, mass effect, or acute large territory infarct. 2.  Moderate chronic small vessel ischemic changes. 3.  Mild diffuse atrophy. This dictation was created with voice recognition software.  While attempts have  been made to review the dictation as it is transcribed, on occasion the spoken word can be misinterpreted by the technology leading to omissions or inappropriate words, phrases or sentences.  Dictated and Electronically Signed By: Priyank Kwong DO 1/20/2025 12:47            Electronically signed by Juan Luis Batista MD on 1/20/2025 at 2:19 PM

## 2025-01-20 NOTE — ED NOTES
This RN called Dr Bradshaw about pt allergies to iodides. Per Dr bradshaw give contrast and have benadryl on standby for hive reactions.

## 2025-01-20 NOTE — PROGRESS NOTES
Per ordering provider & Radiologist Dr. De Paz it is alright to inject for CTA stroke and give benadryl after in CT @ 6039

## 2025-01-20 NOTE — ED TRIAGE NOTES
Pt from home with ems complaining of dizziness and a headache since 8am today. Reports hx of Afib.

## 2025-01-20 NOTE — ED NOTES
ED TO INPATIENT SBAR HANDOFF    Patient Name: Doretha Melo   :  6/15/1927  89 y.o.   Preferred Name    Family/Caregiver Present yes   Restraints no   C-SSRS: Risk of Suicide: No Risk  Sitter no   Sepsis Risk Score        Situation  Chief Complaint   Patient presents with    Dizziness    Headache     Brief Description of Patient's Condition:  Pt from home with C/O dizziness and a headache since 9am. Stated that she felt like the room was spinning and that she was light headed. Pt was called as a stroke alert and was ruled out for a stroke and NIHSS was negative. Pt wears hearing aides but does not have them.  Mental Status: oriented, alert, coherent, and logical  Arrived from: home    Imaging:   CTA HEAD NECK W CONTRAST   Final Result      CT HEAD WO CONTRAST   Final Result      XR CHEST PORTABLE    (Results Pending)     Abnormal labs:   Abnormal Labs Reviewed   CBC WITH AUTO DIFFERENTIAL - Abnormal; Notable for the following components:       Result Value    RBC 3.94 (*)     Hemoglobin 11.8 (*)     MCHC 31.6 (*)     RDW 16.1 (*)     Monocytes % 11 (*)     All other components within normal limits   COMPREHENSIVE METABOLIC PANEL W/ REFLEX TO MG FOR LOW K - Abnormal; Notable for the following components:    Potassium 3.2 (*)     Glucose 126 (*)     Total Protein 6.1 (*)     All other components within normal limits   TROPONIN - Abnormal; Notable for the following components:    Troponin, High Sensitivity 21 (*)     All other components within normal limits   TROPONIN - Abnormal; Notable for the following components:    Troponin, High Sensitivity 21 (*)     All other components within normal limits   POCT GLUCOSE - Abnormal; Notable for the following components:    POC Glucose 125 (*)     All other components within normal limits        Background  History:   Past Medical History:   Diagnosis Date    BCC (basal cell carcinoma), leg 2017    Family history of pancreatic cancer     Glaucoma of right eye 2016

## 2025-01-21 ENCOUNTER — APPOINTMENT (OUTPATIENT)
Dept: NON INVASIVE DIAGNOSTICS | Age: 89
End: 2025-01-21
Payer: MEDICARE

## 2025-01-21 ENCOUNTER — APPOINTMENT (OUTPATIENT)
Dept: MRI IMAGING | Age: 89
End: 2025-01-21
Payer: MEDICARE

## 2025-01-21 LAB
ANION GAP SERPL CALCULATED.3IONS-SCNC: 8 MMOL/L (ref 9–17)
BASOPHILS # BLD: 0.02 K/UL
BASOPHILS NFR BLD: 0 % (ref 0–1)
BUN SERPL-MCNC: 9 MG/DL (ref 7–20)
CALCIUM SERPL-MCNC: 9 MG/DL (ref 8.3–10.6)
CHLORIDE SERPL-SCNC: 107 MMOL/L (ref 99–110)
CO2 SERPL-SCNC: 22 MMOL/L (ref 21–32)
CREAT SERPL-MCNC: 0.5 MG/DL (ref 0.6–1.2)
ECHO AO ASC DIAM: 2.7 CM
ECHO AO ASCENDING AORTA INDEX: 2.21 CM/M2
ECHO AO ROOT DIAM: 2.9 CM
ECHO AO ROOT INDEX: 2.38 CM/M2
ECHO AV AREA PEAK VELOCITY: 1.8 CM2
ECHO AV AREA VTI: 2.1 CM2
ECHO AV AREA/BSA PEAK VELOCITY: 1.5 CM2/M2
ECHO AV AREA/BSA VTI: 1.7 CM2/M2
ECHO AV MEAN GRADIENT: 5 MMHG
ECHO AV MEAN VELOCITY: 1 M/S
ECHO AV PEAK GRADIENT: 6 MMHG
ECHO AV PEAK VELOCITY: 1.3 M/S
ECHO AV VELOCITY RATIO: 0.69
ECHO AV VTI: 23.5 CM
ECHO BSA: 1.22 M2
ECHO EST RA PRESSURE: 3 MMHG
ECHO IVC PROX: 1.2 CM
ECHO LA AREA 4C: 14.8 CM2
ECHO LA DIAMETER INDEX: 1.97 CM/M2
ECHO LA DIAMETER: 2.4 CM
ECHO LA MAJOR AXIS: 5 CM
ECHO LA TO AORTIC ROOT RATIO: 0.83
ECHO LA VOL MOD A4C: 34 ML (ref 22–52)
ECHO LA VOLUME INDEX MOD A4C: 28 ML/M2 (ref 16–34)
ECHO LV E' LATERAL VELOCITY: 8 CM/S
ECHO LV E' SEPTAL VELOCITY: 5 CM/S
ECHO LV EDV A4C: 23 ML
ECHO LV EDV INDEX A4C: 19 ML/M2
ECHO LV EF PHYSICIAN: 55 %
ECHO LV EJECTION FRACTION A4C: 58 %
ECHO LV ESV A4C: 10 ML
ECHO LV ESV INDEX A4C: 8 ML/M2
ECHO LV FRACTIONAL SHORTENING: 33 % (ref 28–44)
ECHO LV INTERNAL DIMENSION DIASTOLE INDEX: 3.2 CM/M2
ECHO LV INTERNAL DIMENSION DIASTOLIC: 3.9 CM (ref 3.9–5.3)
ECHO LV INTERNAL DIMENSION SYSTOLIC INDEX: 2.13 CM/M2
ECHO LV INTERNAL DIMENSION SYSTOLIC: 2.6 CM
ECHO LV IVSD: 1 CM (ref 0.6–0.9)
ECHO LV MASS 2D: 122.1 G (ref 67–162)
ECHO LV MASS INDEX 2D: 100.1 G/M2 (ref 43–95)
ECHO LV POSTERIOR WALL DIASTOLIC: 1 CM (ref 0.6–0.9)
ECHO LV RELATIVE WALL THICKNESS RATIO: 0.51
ECHO LVOT AREA: 2.5 CM2
ECHO LVOT AV VTI INDEX: 0.81
ECHO LVOT DIAM: 1.8 CM
ECHO LVOT MEAN GRADIENT: 2 MMHG
ECHO LVOT PEAK GRADIENT: 3 MMHG
ECHO LVOT PEAK VELOCITY: 0.9 M/S
ECHO LVOT STROKE VOLUME INDEX: 39.8 ML/M2
ECHO LVOT SV: 48.6 ML
ECHO LVOT VTI: 19.1 CM
ECHO MV A VELOCITY: 1.32 M/S
ECHO MV E DECELERATION TIME (DT): 236 MS
ECHO MV E VELOCITY: 0.64 M/S
ECHO MV E/A RATIO: 0.48
ECHO MV E/E' LATERAL: 8
ECHO MV E/E' RATIO (AVERAGED): 10.4
ECHO MV E/E' SEPTAL: 12.8
ECHO MV REGURGITANT ALIASING (NYQUIST) VELOCITY: 37 CM/S
ECHO MV REGURGITANT PEAK GRADIENT: 130 MMHG
ECHO MV REGURGITANT PEAK VELOCITY: 5.7 M/S
ECHO MV REGURGITANT RADIUS PISA: 0.6 CM
ECHO MV REGURGITANT VTIA: 180 CM
ECHO RIGHT VENTRICULAR SYSTOLIC PRESSURE (RVSP): 24 MMHG
ECHO RV MID DIMENSION: 2.7 CM
ECHO TV REGURGITANT MAX VELOCITY: 2.28 M/S
ECHO TV REGURGITANT PEAK GRADIENT: 21 MMHG
EKG ATRIAL RATE: 92 BPM
EKG DIAGNOSIS: NORMAL
EKG P AXIS: 74 DEGREES
EKG P-R INTERVAL: 150 MS
EKG Q-T INTERVAL: 372 MS
EKG QRS DURATION: 86 MS
EKG QTC CALCULATION (BAZETT): 460 MS
EKG R AXIS: -19 DEGREES
EKG T AXIS: 61 DEGREES
EKG VENTRICULAR RATE: 92 BPM
EOSINOPHIL # BLD: 0.1 K/UL
EOSINOPHILS RELATIVE PERCENT: 1 % (ref 0–3)
ERYTHROCYTE [DISTWIDTH] IN BLOOD BY AUTOMATED COUNT: 16.4 % (ref 11.7–14.9)
GFR, ESTIMATED: >90 ML/MIN/1.73M2
GLUCOSE SERPL-MCNC: 93 MG/DL (ref 74–99)
HCT VFR BLD AUTO: 34.7 % (ref 37–47)
HGB BLD-MCNC: 10.8 G/DL (ref 12.5–16)
IMM GRANULOCYTES # BLD AUTO: 0.03 K/UL
IMM GRANULOCYTES NFR BLD: 0 %
LYMPHOCYTES NFR BLD: 2.63 K/UL
LYMPHOCYTES RELATIVE PERCENT: 37 % (ref 24–44)
MCH RBC QN AUTO: 29.6 PG (ref 27–31)
MCHC RBC AUTO-ENTMCNC: 31.1 G/DL (ref 32–36)
MCV RBC AUTO: 95.1 FL (ref 78–100)
MONOCYTES NFR BLD: 0.82 K/UL
MONOCYTES NFR BLD: 11 % (ref 0–4)
NEUTROPHILS NFR BLD: 50 % (ref 36–66)
NEUTS SEG NFR BLD: 3.57 K/UL
PLATELET # BLD AUTO: 294 K/UL (ref 140–440)
PMV BLD AUTO: 9.2 FL (ref 7.5–11.1)
POTASSIUM SERPL-SCNC: 4.1 MMOL/L (ref 3.5–5.1)
RBC # BLD AUTO: 3.65 M/UL (ref 4.2–5.4)
SODIUM SERPL-SCNC: 137 MMOL/L (ref 136–145)
WBC OTHER # BLD: 7.2 K/UL (ref 4–10.5)

## 2025-01-21 PROCEDURE — 93010 ELECTROCARDIOGRAM REPORT: CPT | Performed by: INTERNAL MEDICINE

## 2025-01-21 PROCEDURE — G0378 HOSPITAL OBSERVATION PER HR: HCPCS

## 2025-01-21 PROCEDURE — 36415 COLL VENOUS BLD VENIPUNCTURE: CPT

## 2025-01-21 PROCEDURE — 80048 BASIC METABOLIC PNL TOTAL CA: CPT

## 2025-01-21 PROCEDURE — 93306 TTE W/DOPPLER COMPLETE: CPT | Performed by: INTERNAL MEDICINE

## 2025-01-21 PROCEDURE — 6360000002 HC RX W HCPCS: Performed by: STUDENT IN AN ORGANIZED HEALTH CARE EDUCATION/TRAINING PROGRAM

## 2025-01-21 PROCEDURE — 85025 COMPLETE CBC W/AUTO DIFF WBC: CPT

## 2025-01-21 PROCEDURE — 70551 MRI BRAIN STEM W/O DYE: CPT

## 2025-01-21 PROCEDURE — 96372 THER/PROPH/DIAG INJ SC/IM: CPT

## 2025-01-21 PROCEDURE — 93306 TTE W/DOPPLER COMPLETE: CPT

## 2025-01-21 PROCEDURE — 6370000000 HC RX 637 (ALT 250 FOR IP): Performed by: STUDENT IN AN ORGANIZED HEALTH CARE EDUCATION/TRAINING PROGRAM

## 2025-01-21 PROCEDURE — 2500000003 HC RX 250 WO HCPCS: Performed by: STUDENT IN AN ORGANIZED HEALTH CARE EDUCATION/TRAINING PROGRAM

## 2025-01-21 PROCEDURE — 94761 N-INVAS EAR/PLS OXIMETRY MLT: CPT

## 2025-01-21 RX ADMIN — SODIUM CHLORIDE, PRESERVATIVE FREE 10 ML: 5 INJECTION INTRAVENOUS at 20:33

## 2025-01-21 RX ADMIN — LATANOPROST 1 DROP: 50 SOLUTION OPHTHALMIC at 20:33

## 2025-01-21 RX ADMIN — Medication 1000 UNITS: at 07:49

## 2025-01-21 RX ADMIN — TIMOLOL MALEATE 1 DROP: 2.5 SOLUTION OPHTHALMIC at 07:49

## 2025-01-21 RX ADMIN — Medication 500 MCG: at 07:49

## 2025-01-21 RX ADMIN — ASPIRIN 81 MG CHEWABLE TABLET 81 MG: 81 TABLET CHEWABLE at 07:49

## 2025-01-21 RX ADMIN — ENOXAPARIN SODIUM 30 MG: 100 INJECTION SUBCUTANEOUS at 07:49

## 2025-01-21 RX ADMIN — SODIUM CHLORIDE, PRESERVATIVE FREE 10 ML: 5 INJECTION INTRAVENOUS at 07:49

## 2025-01-21 RX ADMIN — Medication 1 TABLET: at 07:49

## 2025-01-21 RX ADMIN — ROSUVASTATIN CALCIUM 40 MG: 20 TABLET, FILM COATED ORAL at 20:33

## 2025-01-21 ASSESSMENT — PAIN SCALES - GENERAL: PAINLEVEL_OUTOF10: 0

## 2025-01-21 NOTE — PROGRESS NOTES
4 Eyes Skin Assessment     NAME:  Doretha Melo  YOB: 1927  MEDICAL RECORD NUMBER:  [id number]    The patient is being assessed for  Admission    I agree that at least one RN has performed a thorough Head to Toe Skin Assessment on the patient. ALL assessment sites listed below have been assessed.      Areas assessed by both nurses:    Head, Face, Ears, Shoulders, Back, Chest, Arms, Elbows, Hands, Sacrum. Buttock, Coccyx, Ischium, Legs. Feet and Heels, and Under Medical Devices         Does the Patient have a Wound? No noted wound(s)       Donell Prevention initiated by RN: No  Wound Care Orders initiated by RN: No    Pressure Injury (Stage 3,4, Unstageable, DTI, NWPT, and Complex wounds) if present, place Wound referral order by RN under : No    New Ostomies, if present place, Ostomy referral order under : No     Nurse 1 eSignature: Electronically signed by Bonnie Ba RN on 1/20/25 at 7:33 PM EST    **SHARE this note so that the co-signing nurse can place an eSignature**    Nurse 2 eSignature: Electronically signed by Jessica Gutierrez LPN on 1/20/25 at 7:34 PM EST

## 2025-01-21 NOTE — PROGRESS NOTES
V2.[place]  [facility] [place]      Name:  Doretha Melo /Age/Sex: 6/15/1927  (89 y.o. female)   MRN & CSN:  [id number] & [id number] Encounter Date/Time: 2025 8:23 AM EST    Location:  [address] [place] [address] PCP: Kenna Ceron MD       Hospital Day: 2    Assessment and Plan:   Doretha Meol is a 89 y.o. female with pmh of hypertension, hyperlipidemia, prediabetes paroxysmal A-fib,  who presents with Vertigo      Plan:    # Dizziness  # History of CVA   -Reports dizziness with associated headache  -NIH scale on my eval 2  -Stroke alert was called in the ED.  No TNK per [facility]  -CT head unremarkable.  CTA head and neck unremarkable  -MRI Brain: pending  -ECHO: pending   -Orthostatic vitals: pending   -Telemetry monitoring  -Continue aspirin and statin     # Hypokalemia  -Replete and monitor with repeat labs     # Essential hypertension  -On amlodipine.  Resume as appropriate     # Hyperlipidemia  -Continue home statin     # History of paroxysmal A-fib  -Not on anticoagulation     # Vitamin D deficiency  -Continue supplementation    Diet ADULT DIET; Regular   DVT Prophylaxis [x] Lovenox, []  Heparin, [] SCDs, [] Ambulation,  [] Eliquis, [] Xarelto  [] Coumadin   Code Status DNR-CC   Disposition From: Home  Expected Disposition: Home  Estimated Date of Discharge: 1 day  Patient requires continued admission due to MRI Brain pending    Surrogate Decision Maker/ POA Daughter- Monae Paul      Personally reviewed Lab Studies and Imaging     Discussed management of the case with Dr. Sidney Estevez.    Drugs that require monitoring for toxicity include lovenox and the method of monitoring was CBC    Subjective:     Chief Complaint: Dizziness and Headache       Doretha Melo is a 89 y.o. female who presented to the ED with dizziness.  Patient states that her symptoms started about 8 to 9 AM today and reports a sudden onset of dizziness and lightheadedness that she tried to get up from a chair.  Reports that she

## 2025-01-21 NOTE — PROGRESS NOTES
Comprehensive Nutrition Assessment    Type and Reason for Visit:  Initial (Low BMI for age.)    Nutrition Recommendations/Plan:   Continue regular diet as ordered.   Please document all po intake in pt's chart for review.   Will trial standard high calorie/high protein oral nutrition supplement TID.   Please obtained a daily measured body wt for pt.    Will monitor pt's GI status, wt, po intake, lytes, labs, and POC.   Will follow pt as a high nutrition risk.      Malnutrition Assessment:  Malnutrition Status:  Insufficient data (01/21/25 1610)    Context:  Social/Environmental Circumstances     Findings of the 6 clinical characteristics of malnutrition:  Energy Intake:  Unable to assess  Weight Loss:  Unable to assess (~4.9 % wt loss in ~ 6 months however CBW is stated and may be in accurate.)     Body Fat Loss:  Unable to assess     Muscle Mass Loss:  Unable to assess    Fluid Accumulation:  Unable to assess     Strength:  Not Performed    Nutrition Assessment:    Pt admitted with vertigo with a pmh of hypertension, hyperlipidemia, prediabetes paroxysmal A-fib who presents with dizziness. Pt currently on a regular diet order. No noted po intakes documented since admission. Will follow pt as a high nutrition risk as pt has a hx of moderate malnutrition at previous visits. Will order standard high calorie/high protein oral nutrition supplements to support.    Nutrition Related Findings:    Ct 0.5, RBC 3.65, Hemoglobin  10.8, Troponin 21, xalatan, oyster sheell calcium tablet, crestor, NaCl, Vitamin B12, Vitamin D Wound Type: None       Current Nutrition Intake & Therapies:    Average Meal Intake: Unable to assess  Average Supplements Intake: None Ordered  ADULT DIET; Regular    Anthropometric Measures:  Height: 139.7 cm (4' 7\")  Ideal Body Weight (IBW): 75 lbs (34 kg)       Current Body Weight: 38.6 kg (85 lb 1.6 oz) (1/21/25 stated body wt), 113.5 % IBW. Weight Source: Stated  Current BMI (kg/m2): 19.8  Usual

## 2025-01-21 NOTE — PROGRESS NOTES
01/21/25 1221   Encounter Summary   Encounter Overview/Reason Volunteer Encounter   Encounter Code  Assessment by  services   Service Provided For Patient   Referral/Consult From Volunteer   Support System Family members   Last Encounter  01/21/25  (Visit by Eucharist sulaiman Lomeli, charted by  Doug)   Complexity of Encounter Low   Begin Time 1000   End Time  1010   Total Time Calculated 10 min   Spiritual/Emotional needs   Type Spiritual Support   Rituals, Rites and Sacraments   Type Yarsanism Communion;Blessings   Assessment/Intervention/Outcome   Assessment Calm   Intervention Active listening   Outcome Coping   Plan and Referrals   Plan/Referrals Continue Support (comment)

## 2025-01-21 NOTE — CARE COORDINATION
Chart reviewed and attempted to meet w/ patient t initiate discharge planning. Patient unavailable as currently having echo completed in room. CM spoke with pts juliano Mcclendon. Patient is from home w/ support from her family. Patient has PCP and insurance. Patient has all needed DME at home currently. [facility] and would like a new referral sent to them.     Referral sent to [facility] liaison.       01/21/25 1005   Service Assessment   History Provided By Child/Family  (juliano Mcclendon)   Support Systems Children;Family Members   Patient's Healthcare Decision Maker is: Legal Next of Kin   PCP Verified by CM Yes   Can patient return to prior living arrangement Yes   Ability to make needs known: Good   Family able to assist with home care needs: Yes   Financial Resources Medicare   Community Resources None   Social/Functional History   Lives With Alone   Type of Home House   Home Equipment Rollator;Walker - Rolling   Discharge Planning   Type of Residence House   Living Arrangements Alone   Current Services Prior To Admission None   Potential Assistance Needed Home Care   DME Ordered? No   Potential Assistance Purchasing Medications No   Type of Home Care Services Nursing Services;PT;OT   Patient expects to be discharged to: House   Services At/After Discharge   Transition of Care Consult (CM Consult) Home Health   Internal Home Health Yes   Services At/After Discharge Home Health   Condition of Participation: Discharge Planning   The Patient and/or Patient Representative was provided with a Choice of Provider? Patient   The Patient and/Or Patient Representative agree with the Discharge Plan? Yes

## 2025-01-22 ENCOUNTER — TELEPHONE (OUTPATIENT)
Dept: INTERNAL MEDICINE CLINIC | Age: 89
End: 2025-01-22

## 2025-01-22 VITALS
HEIGHT: 55 IN | BODY MASS INDEX: 18.78 KG/M2 | SYSTOLIC BLOOD PRESSURE: 136 MMHG | DIASTOLIC BLOOD PRESSURE: 79 MMHG | WEIGHT: 81.13 LBS | TEMPERATURE: 97.9 F | RESPIRATION RATE: 20 BRPM | HEART RATE: 95 BPM | OXYGEN SATURATION: 98 %

## 2025-01-22 PROCEDURE — 97530 THERAPEUTIC ACTIVITIES: CPT

## 2025-01-22 PROCEDURE — G0378 HOSPITAL OBSERVATION PER HR: HCPCS

## 2025-01-22 PROCEDURE — 6370000000 HC RX 637 (ALT 250 FOR IP): Performed by: NURSE PRACTITIONER

## 2025-01-22 PROCEDURE — 6370000000 HC RX 637 (ALT 250 FOR IP): Performed by: STUDENT IN AN ORGANIZED HEALTH CARE EDUCATION/TRAINING PROGRAM

## 2025-01-22 PROCEDURE — 2500000003 HC RX 250 WO HCPCS: Performed by: STUDENT IN AN ORGANIZED HEALTH CARE EDUCATION/TRAINING PROGRAM

## 2025-01-22 PROCEDURE — 97116 GAIT TRAINING THERAPY: CPT

## 2025-01-22 PROCEDURE — 97166 OT EVAL MOD COMPLEX 45 MIN: CPT

## 2025-01-22 PROCEDURE — 97162 PT EVAL MOD COMPLEX 30 MIN: CPT

## 2025-01-22 RX ORDER — CETIRIZINE HYDROCHLORIDE 10 MG/1
5 TABLET ORAL DAILY
Status: DISCONTINUED | OUTPATIENT
Start: 2025-01-22 | End: 2025-01-22 | Stop reason: HOSPADM

## 2025-01-22 RX ORDER — AMOXICILLIN AND CLAVULANATE POTASSIUM 500; 125 MG/1; MG/1
1 TABLET, FILM COATED ORAL EVERY 12 HOURS SCHEDULED
Qty: 13 TABLET | Refills: 0 | Status: SHIPPED | OUTPATIENT
Start: 2025-01-22 | End: 2025-01-29

## 2025-01-22 RX ORDER — FLUTICASONE PROPIONATE 50 MCG
1 SPRAY, SUSPENSION (ML) NASAL DAILY
Status: DISCONTINUED | OUTPATIENT
Start: 2025-01-22 | End: 2025-01-22 | Stop reason: HOSPADM

## 2025-01-22 RX ORDER — AMOXICILLIN AND CLAVULANATE POTASSIUM 500; 125 MG/1; MG/1
1 TABLET, FILM COATED ORAL EVERY 12 HOURS SCHEDULED
Status: DISCONTINUED | OUTPATIENT
Start: 2025-01-22 | End: 2025-01-22 | Stop reason: HOSPADM

## 2025-01-22 RX ORDER — CETIRIZINE HYDROCHLORIDE 5 MG/1
5 TABLET ORAL DAILY
Qty: 90 TABLET | Refills: 0 | Status: SHIPPED | OUTPATIENT
Start: 2025-01-23

## 2025-01-22 RX ORDER — FLUTICASONE PROPIONATE 50 MCG
1 SPRAY, SUSPENSION (ML) NASAL DAILY
Qty: 16 G | Refills: 2 | Status: SHIPPED | OUTPATIENT
Start: 2025-01-23

## 2025-01-22 RX ADMIN — AMOXICILLIN AND CLAVULANATE POTASSIUM 1 TABLET: 500; 125 TABLET, FILM COATED ORAL at 09:37

## 2025-01-22 RX ADMIN — SODIUM CHLORIDE, PRESERVATIVE FREE 10 ML: 5 INJECTION INTRAVENOUS at 09:36

## 2025-01-22 RX ADMIN — Medication 1000 UNITS: at 09:36

## 2025-01-22 RX ADMIN — Medication 1 TABLET: at 09:36

## 2025-01-22 RX ADMIN — Medication 500 MCG: at 09:35

## 2025-01-22 RX ADMIN — TIMOLOL MALEATE 1 DROP: 2.5 SOLUTION OPHTHALMIC at 09:35

## 2025-01-22 RX ADMIN — FLUTICASONE PROPIONATE 1 SPRAY: 50 SPRAY, METERED NASAL at 09:37

## 2025-01-22 RX ADMIN — CETIRIZINE HYDROCHLORIDE 5 MG: 10 TABLET, FILM COATED ORAL at 09:36

## 2025-01-22 RX ADMIN — ASPIRIN 81 MG CHEWABLE TABLET 81 MG: 81 TABLET CHEWABLE at 09:36

## 2025-01-22 ASSESSMENT — PAIN SCALES - GENERAL: PAINLEVEL_OUTOF10: 0

## 2025-01-22 NOTE — PROGRESS NOTES
Occupational Therapy  [facility] ACUTE CARE OCCUPATIONAL THERAPY EVALUATION  Doretha Melo, 6/15/1927, 4126/4126-A, 1/22/2025    Discharge Recommendation: Encourage home with assist/supervision as needed, with home health occupational therapy services to address minimal/mild post-acute rehabilitation needs.      History  [place]:  The primary encounter diagnosis was Stroke-like symptoms. Diagnoses of Acute nonintractable headache, unspecified headache type and Hypokalemia were also pertinent to this visit.  Patient  has a past medical history of BCC (basal cell carcinoma), leg, Family history of pancreatic cancer, Glaucoma of right eye, Hyperlipidemia, Hypertension, Macular degeneration, dry, Osteoarthritis of knee, Osteoporosis, Pelvic fracture (HCC), Right rotator cuff tear, T10 vertebral fracture (HCC), and Vitamin B12 deficiency.  Patient  has a past surgical history that includes Appendectomy (1953); Hysterectomy (1969); Cystocele repair (1993); Abdominal exploration surgery (1997); Wrist fracture surgery (Left, 03/2017); Skin cancer excision (Left, 09/2017); and Fixation Kyphoplasty (02/19/2018).    Subjective:  Patient comments: 'I like ARU but I think I want to go home\".    Pain: Did not rate reported B Knee pain (hx of OA) .    Communication with other providers: Nurse julieth max, co-eval with PT Marian, and OT Anamaria .  Restrictions: General Precautions, Fall Risk,     Home Setup/Prior level of function:  Social/Functional History  Lives With: Alone  Type of Home: House  Home Layout: One level  Home Access: Stairs to enter with rails  Entrance Stairs - Number of Steps: 4  Entrance Stairs - Rails: Both  Bathroom Shower/Tub: Tub only (Sponge bathes)  Bathroom Toilet: Standard (Risers on toilet)  Home Equipment: Rollator  Has the patient had two or more falls in the past year or any fall with injury in the past year?: Yes  Receives Help From: Family  ADL Assistance: Independent  Homemaking

## 2025-01-22 NOTE — PLAN OF CARE
Problem: Chronic Conditions and Co-morbidities  Goal: Patient's chronic conditions and co-morbidity symptoms are monitored and maintained or improved  Outcome: Progressing     Problem: Discharge Planning  Goal: Discharge to home or other facility with appropriate resources  Outcome: Progressing     Problem: Safety - Adult  Goal: Free from fall injury  Outcome: Progressing     Problem: ABCDS Injury Assessment  Goal: Absence of physical injury  Outcome: Progressing     Problem: Nutrition Deficit:  Goal: Optimize nutritional status  Outcome: Progressing  Flowsheets (Taken 1/21/2025 1606 by Ab Longo, DAWIT)  Nutrient intake appropriate for improving, restoring, or maintaining nutritional needs:   Assess nutritional status and recommend course of action   Monitor oral intake, labs, and treatment plans   Recommend appropriate diets, oral nutritional supplements, and vitamin/mineral supplements

## 2025-01-22 NOTE — CONSULTS
[facility] ACUTE CARE PHYSICAL THERAPY EVALUATION  Doretha Melo, 6/15/1927, 4126/4126-A, 1/22/2025    History  [place]:  The primary encounter diagnosis was Stroke-like symptoms. Diagnoses of Acute nonintractable headache, unspecified headache type and Hypokalemia were also pertinent to this visit.  Patient  has a past medical history of BCC (basal cell carcinoma), leg, Family history of pancreatic cancer, Glaucoma of right eye, Hyperlipidemia, Hypertension, Macular degeneration, dry, Osteoarthritis of knee, Osteoporosis, Pelvic fracture (HCC), Right rotator cuff tear, T10 vertebral fracture (HCC), and Vitamin B12 deficiency.  Patient  has a past surgical history that includes Appendectomy (1953); Hysterectomy (1969); Cystocele repair (1993); Abdominal exploration surgery (1997); Wrist fracture surgery (Left, 03/2017); Skin cancer excision (Left, 09/2017); and Fixation Kyphoplasty (02/19/2018).    Recommendation: Home with HH PT, walker use, and initial 24/7 supervision/assistance    Subjective:  Patient states: \"I have arthritis that's why I wear these braces\".    Pain: endorses chronic pain in bilateral knees, does not provide numerical rating.    Communication with other providers: RN approval for therapy session, co-eval with OT Anamaria and OT michelle Moser  Restrictions: general precautions, falls    Home Setup/Prior level of function  Social/Functional History  Lives With: Alone  Type of Home: House  Home Layout: One level  Home Access: Stairs to enter with rails  Entrance Stairs - Number of Steps: 4  Entrance Stairs - Rails: Both  Bathroom Shower/Tub: Tub only (Sponge bathes)  Bathroom Toilet: Standard (Risers on toilet)  Home Equipment: Rollator  Has the patient had two or more falls in the past year or any fall with injury in the past year?: Yes  Receives Help From: Family  ADL Assistance: Independent  Homemaking Assistance: Needs assistance  Ambulation Assistance: Independent (Ginger w/

## 2025-01-22 NOTE — TELEPHONE ENCOUNTER
Monae called trying to get patient in for Hospital F/U . No slots available at this time. Informed Monae that the office will call back.

## 2025-01-22 NOTE — DISCHARGE SUMMARY
V2.0  Discharge Summary    Name:  Doretha Melo /Age/Sex: 6/15/1927 (89 y.o. female)   Admit Date: 2025  Discharge Date: 25    MRN & CSN:  [id number] & [id number] Encounter Date and Time 25 7:48 AM EST    Attending:  Sidney Estevez MD Discharging Provider: CHULA ABURTO [facility] Course:     Brief HPI: Doretha Melo is a 89 y.o. female who presented with Dizziness and Headache     Brief Problem Based Course:     # Dizziness - improved  # History of CVA   -Reports dizziness with associated headache  -NIH scale on my eval 2  -Stroke alert was called in the ED.  No TNK per [facility]  -CT head unremarkable.  CTA head and neck unremarkable  -MRI Brain: no acute CVA, chronic sinus disease with left mastoid effusion  -ECHO: EF 55-60%, mild increase in LV wall thickness, mod MV regurgitation, sclerotic AV, neg bubble study   -+ Orthostatic vitals: lying 140/69, sitting 140/84, standing 118/85   -Telemetry monitoring  -Continue aspirin and statin    # Chronic Sinusitis with Left Mastoid Effusion  -starting Flonase and Zyrtec with Augmentin bid x 7 days   -FU with PCP     # Hypokalemia - resolved   -Replete and monitor with repeat labs     # Essential hypertension  -On amlodipine.  Resume as appropriate     # Hyperlipidemia  -Continue home statin     # History of paroxysmal A-fib  -Not on anticoagulation     # Vitamin D deficiency  -Continue supplementation    This patient was seen and examined and/or discussed in conjunction with Dr. Sidney Estevez. He/She was agreeable with the patient's plan at discharge as dictated above.     The patient expressed appropriate understanding of, and agreement with the discharge recommendations, medications, and plan.     Consults this admission:  None    Discharge Diagnosis:   Vertigo    Chronic Sinusitis with Left Mastoid Effusion     Orthostatic Hypotension     Discharge Instruction:   Follow up appointments:   Primary care physician: Osmany

## 2025-01-23 ENCOUNTER — CARE COORDINATION (OUTPATIENT)
Dept: CASE MANAGEMENT | Age: 89
End: 2025-01-23

## 2025-01-23 DIAGNOSIS — R42 DIZZINESS: Primary | ICD-10-CM

## 2025-01-23 PROCEDURE — 1111F DSCHRG MED/CURRENT MED MERGE: CPT | Performed by: FAMILY MEDICINE

## 2025-01-23 NOTE — CARE COORDINATION
Care Transitions Note    Initial Call - Call within 2 business days of discharge: Yes    Patient Current Location:  Home: [address] [place] [address]    Care Transition Nurse contacted the patient by telephone to perform post hospital discharge assessment, verified name and  as identifiers. Provided introduction to self, and explanation of the Care Transition Nurse role.     Patient: Doretha Melo    Patient : 6/15/1927   MRN: [id number]    Reason for Admission: DX: Dizziness  [facility]:-25  Discharge Date: 25  RURS: Readmission Risk Score: 15.4      Last Discharge Facility       Date Complaint Diagnosis Description Type Department Provider    25 Dizziness; Headache Stroke-like symptoms ... ED to Hosp-Admission (Discharged) (ADMITTED) [facility] Sidney Estevez MD; Malena...            Was this an external facility discharge? No    Additional needs identified to be addressed with provider   No needs identified             Method of communication with provider: none.    Patients top risk factors for readmission: medical condition-see above    Interventions to address risk factors:   Review of patient management of conditions/medications: see below    Care Summary Note: Pt polite and engaged during call, very [place]. Pt is A&O and lives alone in private residence, uses a Rw  to assist w/ ambulation and has a life alert for emergencies. Reports her daughter comes over every morning and stays until early afternoon. Son lives 2 blocks away and comes over daily as well. Pt reports dizziness and HA resolved while inpt. Pt educated on B.E.F.A.S.T. Pt currently denies facial droop, slurred speech, increased weakness/unsteady gait . Denies numbness /tingling and speaks in complete sentences.  Pt had copy of med list , meds reviewed w/ no issues noted. Pt prescribed Flonase and Augmentin for chronic sinusitis, Advised to not skip doses and complete entire course unless otherwise directed by MD.

## 2025-01-28 ENCOUNTER — CARE COORDINATION (OUTPATIENT)
Dept: CASE MANAGEMENT | Age: 89
End: 2025-01-28

## 2025-01-28 ENCOUNTER — OFFICE VISIT (OUTPATIENT)
Dept: INTERNAL MEDICINE CLINIC | Age: 89
End: 2025-01-28

## 2025-01-28 VITALS
DIASTOLIC BLOOD PRESSURE: 70 MMHG | RESPIRATION RATE: 16 BRPM | BODY MASS INDEX: 19.67 KG/M2 | HEART RATE: 87 BPM | OXYGEN SATURATION: 95 % | WEIGHT: 85 LBS | SYSTOLIC BLOOD PRESSURE: 132 MMHG | HEIGHT: 55 IN

## 2025-01-28 DIAGNOSIS — R42 DIZZINESS: Primary | ICD-10-CM

## 2025-01-28 DIAGNOSIS — Z09 HOSPITAL DISCHARGE FOLLOW-UP: ICD-10-CM

## 2025-01-28 ASSESSMENT — ENCOUNTER SYMPTOMS
DIARRHEA: 0
SORE THROAT: 0
VOMITING: 0
ABDOMINAL PAIN: 0
CONSTIPATION: 0
COLOR CHANGE: 0
CHEST TIGHTNESS: 0
SHORTNESS OF BREATH: 0

## 2025-01-28 NOTE — PROGRESS NOTES
Visit) with Kenna Ceron MD   Medication Sig Dispense Refill    cetirizine (ZYRTEC) 5 MG tablet Take 1 tablet by mouth daily 90 tablet 0    fluticasone (FLONASE) 50 MCG/ACT nasal spray 1 spray by Each Nostril route daily 16 g 2    alendronate (FOSAMAX) 70 MG tablet TAKE 1 TABLET ONCE A WEEK 30 MINUTES BEFORE FOOD, DRINK OR MEDS. STAY UPRIGHT. 4 tablet 11    amLODIPine (NORVASC) 10 MG tablet TAKE 1 TABLET BY MOUTH DAILY 90 tablet 1    rosuvastatin (CRESTOR) 40 MG tablet TAKE 1 TABLET BY MOUTH NIGHTLY 90 tablet 1    acetaminophen (TYLENOL) 500 MG tablet Take 1 tablet by mouth every 4 hours as needed for Pain or Fever 60 tablet 0    aspirin 81 MG chewable tablet Take 1 tablet by mouth daily 30 tablet 3    Multiple Vitamins-Minerals (PRESERVISION AREDS PO) Take 1 tablet by mouth in the morning and 1 tablet in the evening. Takes chewables..      timolol (TIMOPTIC) 0.25 % ophthalmic solution Place 1 drop into both eyes daily      Calcium Carb-Cholecalciferol (CALCIUM-VITAMIN D3) 600-400 MG-UNIT TABS Take 1 tablet by mouth daily      vitamin D (CHOLECALCIFEROL) 1000 UNIT TABS tablet Take 1 tablet by mouth daily      vitamin B-12 (CYANOCOBALAMIN) 500 MCG tablet Take 1 tablet by mouth daily      latanoprost (XALATAN) 0.005 % ophthalmic solution Place 1 drop into both eyes nightly          Medications patient taking as of now reconciled against medications ordered at time of hospital discharge: Yes    Review of Systems   Constitutional:  Negative for activity change, appetite change, chills, fever and unexpected weight change.   HENT:  Negative for congestion and sore throat.    Respiratory:  Negative for chest tightness and shortness of breath.    Cardiovascular:  Negative for chest pain and palpitations.   Gastrointestinal:  Negative for abdominal pain, constipation, diarrhea and vomiting.   Genitourinary:  Negative for dysuria.   Musculoskeletal:  Positive for gait problem.   Skin:  Negative for color change.

## 2025-01-28 NOTE — CARE COORDINATION
Care Transitions Note    Follow Up Call     Patient Current Location:  Home: [facility] [address]  [place] [address]    Care Transition Nurse contacted the family, Dtr Monae  by telephone. Verified name and  as identifiers.    Additional needs identified to be addressed with provider   No needs identified                 Method of communication with provider: none.    Care Summary Note: Spoke with Pt dtr Monae. Reports Pt dizziness has resolved. Appetite is back to normal and is well hydrated. No reports of CP, SOB, palpitations or increased weakness. Denies Facial droop, ataxia, slurred speech or numbness/tingling. Pt saw PCP this AM, NNO or med changes. Dtr agreeable to additional calls    Plan of care updates since last contact:  Review of patient management of conditions/medications: see above       Advance Care Planning:   Does patient have an Advance Directive: reviewed during previous call, see note. .    Medication Review:  No changes since last call.     Remote Patient Monitoring:  Offered patient enrollment in the Remote Patient Monitoring (RPM) program for in-home monitoring: Yes, but did not enroll at this time: limited patient ability to navigate RPM/equipment.    Assessments:  Care Transitions Subsequent and Final Call    Subsequent and Final Calls  Do you have any ongoing symptoms?: No  Have your medications changed?: No  Do you have any questions related to your medications?: No  Do you currently have any active services?: No  Are you currently active with any services?: Home Health  Do you have any needs or concerns that I can assist you with?: No  Identified Barriers: Other  Care Transitions Interventions     Transportation Support: Declined    Meals on Wheels: Declined  DME Assistance: Declined     Senior Services: Declined     Medication Assistance Program: Declined       Social Work: Declined    Other Interventions:                Future Appointments         Provider Specialty Dept Phone

## 2025-01-29 ENCOUNTER — HOSPITAL ENCOUNTER (OUTPATIENT)
Age: 89
Setting detail: SPECIMEN
Discharge: HOME | End: 2025-01-29
Payer: MEDICARE

## 2025-01-29 LAB
ANION GAP SERPL CALCULATED.3IONS-SCNC: 11 MMOL/L (ref 9–17)
BUN SERPL-MCNC: 13 MG/DL (ref 7–20)
CALCIUM SERPL-MCNC: 9.3 MG/DL (ref 8.3–10.6)
CHLORIDE SERPL-SCNC: 105 MMOL/L (ref 99–110)
CO2 SERPL-SCNC: 26 MMOL/L (ref 21–32)
CREAT SERPL-MCNC: 0.5 MG/DL (ref 0.6–1.2)
GFR, ESTIMATED: >90 ML/MIN/1.73M2
GLUCOSE SERPL-MCNC: 127 MG/DL (ref 74–99)
POTASSIUM SERPL-SCNC: 3.5 MMOL/L (ref 3.5–5.1)
SODIUM SERPL-SCNC: 142 MMOL/L (ref 136–145)

## 2025-01-29 PROCEDURE — 80048 BASIC METABOLIC PNL TOTAL CA: CPT

## 2025-02-04 ENCOUNTER — CARE COORDINATION (OUTPATIENT)
Dept: CASE MANAGEMENT | Age: 89
End: 2025-02-04

## 2025-02-04 DIAGNOSIS — E78.2 MIXED HYPERLIPIDEMIA: ICD-10-CM

## 2025-02-04 NOTE — CARE COORDINATION
Care Transitions Note    Follow Up Call     Patient Current Location:  Home: [address]  [place] [address]    Care Transition Nurse contacted the patient by telephone. Verified name and  as identifiers.    Additional needs identified to be addressed with provider   No needs identified                 Method of communication with provider: none.    Care Summary Note: Pt states she's had no more episodes of dizziness . Appetite is good stating she had a BBQ chicken sandwich for lunch . States she is well hydrated,. Worked with Physical therapy today and feels like she has \"perked up\" after medina. No reports of CP, SOB, palpitations or increased weakness. Denies Facial droop, ataxia, slurred speech or numbness/tingling.  No med changes since last call. Pt  agreeable to additional calls     Plan of care updates since last contact:  Review of patient management of conditions/medications: see above       Advance Care Planning:   Does patient have an Advance Directive: reviewed and current.    Medication Review:  No changes since last call.     Remote Patient Monitoring:  Offered patient enrollment in the Remote Patient Monitoring (RPM) program for in-home monitoring: Yes, but did not enroll at this time: limited patient ability to navigate RPM/equipment.    Assessments:  Care Transitions Subsequent and Final Call    Subsequent and Final Calls  Do you have any ongoing symptoms?: No  Have your medications changed?: No  Do you have any questions related to your medications?: No  Do you currently have any active services?: Yes  Are you currently active with any services?: Home Health  Do you have any needs or concerns that I can assist you with?: No  Identified Barriers: Other  Care Transitions Interventions     Transportation Support: Declined    Meals on Wheels: Declined  DME Assistance: Declined     Senior Services: Declined     Medication Assistance Program: Declined       Social Work: Declined    Other

## 2025-02-05 RX ORDER — ROSUVASTATIN CALCIUM 40 MG/1
40 TABLET, COATED ORAL NIGHTLY
Qty: 90 TABLET | Refills: 1 | Status: SHIPPED | OUTPATIENT
Start: 2025-02-05 | End: 2025-08-04

## 2025-02-11 ENCOUNTER — CARE COORDINATION (OUTPATIENT)
Dept: CASE MANAGEMENT | Age: 89
End: 2025-02-11

## 2025-02-11 NOTE — CARE COORDINATION
Care Transitions Note    Follow Up Call     Patient Current Location:  [place] [facility] [place] [address]    [facility] Care Coordinator contacted the family, daughter-Monae  by telephone. Verified name and  as identifiers.    Additional needs identified to be addressed with provider   No needs identified                 Method of communication with provider: none.    Care Summary Note: LPN CC spoke with patient's daughter, Monae, HIPAA verified. States patient is doing pretty well. Chronic problem with her knees. Some pain & difficulty with ambulation. (L) knee is worse. Ortho in March, getting cortisone injections every 3 months. Working on balance with [facility]. Appetite good. Denies problems urinating or having BM. BP/P good. 128-134/68-72. Denies medication changes. Denies problems with speech or swallow.  VV 3/10. Denies needs.     Thank you,   Malia Hsieh LPN Care Coordinator   [facility]  Remote Patient Monitoring, Care Transitions, Ambulatory Care Management  [phone]    Plan of care updates since last contact:  Review of patient management of conditions/medications:         Advance Care Planning:   Does patient have an Advance Directive: deferred at this time, will discuss on future follow up. .    Medication Review:  No changes since last call.     Remote Patient Monitoring:  Offered patient enrollment in the Remote Patient Monitoring (RPM) program for in-home monitoring: Patient being discharged from remote patient monitoring program today.    Assessments:  No changes since last call    Follow Up Appointment:   Reviewed upcoming appointment(s).  Future Appointments         Provider Specialty Dept Phone    3/10/2025 1:00 PM Lpn, [facility] [phone]    2025 10:00 AM Kenna Ceron MD Internal Medicine [phone]            LPN Care Coordinator provided contact information.  Plan for follow-up call in 6-10 days based on severity of

## 2025-02-18 ENCOUNTER — CARE COORDINATION (OUTPATIENT)
Dept: CASE MANAGEMENT | Age: 89
End: 2025-02-18

## 2025-02-18 NOTE — CARE COORDINATION
Care Transitions Note    Follow Up Call     Attempted to reach patient for transitions of care follow up.  Unable to reach patient.      Outreach Attempts:   Unable to leave message.  NA    Care Summary Note: UTR    Follow Up Appointment:   Future Appointments         Provider Specialty Dept Phone    3/10/2025 1:00 PM Lpn, [facility] [phone]    5/6/2025 10:00 AM Kenna Ceron MD Internal Medicine [phone]            Plan for follow-up on next business day.  based on severity of symptoms and risk factors.     Hollie Agustin RN

## 2025-02-18 NOTE — CARE COORDINATION
Care Transitions Note    Follow Up Call     Patient Current Location:  Home: [facility]  [facility] [id number]    Care Transition Nurse contacted the patient by telephone. Verified name and  as identifiers.    Additional needs identified to be addressed with provider   No needs identified                 Method of communication with provider: none.    Care Summary Note: Pt states she's had no episodes of dizziness since last call . Appetite is good and reports she is well hydrated,. Worked with Physical therapy today , reports 3 more weeks of PT at 1 time a week. No reports of CP, SOB, palpitations or increased weakness. Denies Facial droop, ataxia, slurred speech or numbness/tingling.  No med changes since last call. Pt  agreeable to additional calls        Plan of care updates since last contact:  Review of patient management of conditions/medications: see above       Advance Care Planning:   Does patient have an Advance Directive: reviewed during previous call, see note. .    Medication Review:  No changes since last call.     Remote Patient Monitoring:  Offered patient enrollment in the Remote Patient Monitoring (RPM) program for in-home monitoring: Yes, but did not enroll at this time: already monitoring with home equipment.    Assessments:  Care Transitions Subsequent and Final Call    Subsequent and Final Calls  Do you have any ongoing symptoms?: No  Have your medications changed?: No  Do you have any questions related to your medications?: No  Do you currently have any active services?: Yes  Are you currently active with any services?: Home Health  Do you have any needs or concerns that I can assist you with?: No  Identified Barriers: Other  Care Transitions Interventions     Transportation Support: Declined    Meals on Wheels: Declined  DME Assistance: Declined     Senior Services: Declined     Medication Assistance Program: Declined       Social Work: Declined    Other Interventions:

## 2025-02-25 ENCOUNTER — CARE COORDINATION (OUTPATIENT)
Dept: CASE MANAGEMENT | Age: 89
End: 2025-02-25

## 2025-02-28 ENCOUNTER — APPOINTMENT (OUTPATIENT)
Dept: GENERAL RADIOLOGY | Age: 89
DRG: 193 | End: 2025-02-28
Payer: MEDICARE

## 2025-02-28 ENCOUNTER — HOSPITAL ENCOUNTER (INPATIENT)
Age: 89
LOS: 3 days | Discharge: NURSING, REHAB OR LONG-TERM CARE | DRG: 193 | End: 2025-03-03
Attending: STUDENT IN AN ORGANIZED HEALTH CARE EDUCATION/TRAINING PROGRAM | Admitting: STUDENT IN AN ORGANIZED HEALTH CARE EDUCATION/TRAINING PROGRAM
Payer: MEDICARE

## 2025-02-28 DIAGNOSIS — J96.01 ACUTE HYPOXIC RESPIRATORY FAILURE (HCC): Primary | ICD-10-CM

## 2025-02-28 DIAGNOSIS — J18.9 PNEUMONIA OF LEFT UPPER LOBE DUE TO INFECTIOUS ORGANISM: ICD-10-CM

## 2025-02-28 DIAGNOSIS — E87.6 HYPOKALEMIA: ICD-10-CM

## 2025-02-28 DIAGNOSIS — J10.1 INFLUENZA B: ICD-10-CM

## 2025-02-28 LAB
ALBUMIN SERPL-MCNC: 3.7 G/DL (ref 3.4–5)
ALBUMIN/GLOB SERPL: 1.4 {RATIO} (ref 1.1–2.2)
ALP SERPL-CCNC: 61 U/L (ref 40–129)
ALT SERPL-CCNC: 16 U/L (ref 10–40)
ANION GAP SERPL CALCULATED.3IONS-SCNC: 13 MMOL/L (ref 9–17)
AST SERPL-CCNC: 26 U/L (ref 15–37)
BASOPHILS # BLD: 0.02 K/UL
BASOPHILS NFR BLD: 0 % (ref 0–1)
BILIRUB SERPL-MCNC: 0.6 MG/DL (ref 0–1)
BUN SERPL-MCNC: 12 MG/DL (ref 7–20)
CALCIUM SERPL-MCNC: 9.3 MG/DL (ref 8.3–10.6)
CHLORIDE SERPL-SCNC: 98 MMOL/L (ref 99–110)
CO2 SERPL-SCNC: 26 MMOL/L (ref 21–32)
CREAT SERPL-MCNC: 0.6 MG/DL (ref 0.6–1.2)
EKG ATRIAL RATE: 105 BPM
EKG DIAGNOSIS: NORMAL
EKG Q-T INTERVAL: 368 MS
EKG QRS DURATION: 86 MS
EKG QTC CALCULATION (BAZETT): 457 MS
EKG R AXIS: -11 DEGREES
EKG T AXIS: -24 DEGREES
EKG VENTRICULAR RATE: 93 BPM
EOSINOPHIL # BLD: 0.04 K/UL
EOSINOPHILS RELATIVE PERCENT: 1 % (ref 0–3)
ERYTHROCYTE [DISTWIDTH] IN BLOOD BY AUTOMATED COUNT: 17 % (ref 11.7–14.9)
GFR, ESTIMATED: >90 ML/MIN/1.73M2
GLUCOSE SERPL-MCNC: 114 MG/DL (ref 74–99)
HCT VFR BLD AUTO: 41.7 % (ref 37–47)
HGB BLD-MCNC: 13 G/DL (ref 12.5–16)
IMM GRANULOCYTES # BLD AUTO: 0.05 K/UL
IMM GRANULOCYTES NFR BLD: 1 %
INFLUENZA A BY PCR: NOT DETECTED
INFLUENZA B BY PCR: DETECTED
LYMPHOCYTES NFR BLD: 1.84 K/UL
LYMPHOCYTES RELATIVE PERCENT: 25 % (ref 24–44)
MCH RBC QN AUTO: 28.8 PG (ref 27–31)
MCHC RBC AUTO-ENTMCNC: 31.2 G/DL (ref 32–36)
MCV RBC AUTO: 92.3 FL (ref 78–100)
MONOCYTES NFR BLD: 0.84 K/UL
MONOCYTES NFR BLD: 11 % (ref 0–4)
NEUTROPHILS NFR BLD: 63 % (ref 36–66)
NEUTS SEG NFR BLD: 4.72 K/UL
PLATELET # BLD AUTO: 357 K/UL (ref 140–440)
PMV BLD AUTO: 9.3 FL (ref 7.5–11.1)
POTASSIUM SERPL-SCNC: 3 MMOL/L (ref 3.5–5.1)
PROT SERPL-MCNC: 6.4 G/DL (ref 6.4–8.2)
RBC # BLD AUTO: 4.52 M/UL (ref 4.2–5.4)
SARS-COV-2 RDRP RESP QL NAA+PROBE: NOT DETECTED
SODIUM SERPL-SCNC: 137 MMOL/L (ref 136–145)
SPECIMEN DESCRIPTION: NORMAL
TROPONIN I SERPL HS-MCNC: 20 NG/L (ref 0–14)
TROPONIN I SERPL HS-MCNC: 26 NG/L (ref 0–14)
WBC OTHER # BLD: 7.5 K/UL (ref 4–10.5)

## 2025-02-28 PROCEDURE — 6370000000 HC RX 637 (ALT 250 FOR IP): Performed by: STUDENT IN AN ORGANIZED HEALTH CARE EDUCATION/TRAINING PROGRAM

## 2025-02-28 PROCEDURE — 6360000002 HC RX W HCPCS: Performed by: STUDENT IN AN ORGANIZED HEALTH CARE EDUCATION/TRAINING PROGRAM

## 2025-02-28 PROCEDURE — 85025 COMPLETE CBC W/AUTO DIFF WBC: CPT

## 2025-02-28 PROCEDURE — 1200000000 HC SEMI PRIVATE

## 2025-02-28 PROCEDURE — 87502 INFLUENZA DNA AMP PROBE: CPT

## 2025-02-28 PROCEDURE — 93005 ELECTROCARDIOGRAM TRACING: CPT | Performed by: STUDENT IN AN ORGANIZED HEALTH CARE EDUCATION/TRAINING PROGRAM

## 2025-02-28 PROCEDURE — 2500000003 HC RX 250 WO HCPCS: Performed by: STUDENT IN AN ORGANIZED HEALTH CARE EDUCATION/TRAINING PROGRAM

## 2025-02-28 PROCEDURE — 99285 EMERGENCY DEPT VISIT HI MDM: CPT

## 2025-02-28 PROCEDURE — 80053 COMPREHEN METABOLIC PANEL: CPT

## 2025-02-28 PROCEDURE — 71045 X-RAY EXAM CHEST 1 VIEW: CPT

## 2025-02-28 PROCEDURE — 94761 N-INVAS EAR/PLS OXIMETRY MLT: CPT

## 2025-02-28 PROCEDURE — 2580000003 HC RX 258: Performed by: STUDENT IN AN ORGANIZED HEALTH CARE EDUCATION/TRAINING PROGRAM

## 2025-02-28 PROCEDURE — 87635 SARS-COV-2 COVID-19 AMP PRB: CPT

## 2025-02-28 PROCEDURE — 93010 ELECTROCARDIOGRAM REPORT: CPT | Performed by: INTERNAL MEDICINE

## 2025-02-28 PROCEDURE — 94640 AIRWAY INHALATION TREATMENT: CPT

## 2025-02-28 PROCEDURE — 84484 ASSAY OF TROPONIN QUANT: CPT

## 2025-02-28 RX ORDER — BENZONATATE 100 MG/1
100 CAPSULE ORAL 3 TIMES DAILY PRN
Status: DISCONTINUED | OUTPATIENT
Start: 2025-02-28 | End: 2025-03-03 | Stop reason: HOSPADM

## 2025-02-28 RX ORDER — SODIUM CHLORIDE 9 MG/ML
INJECTION, SOLUTION INTRAVENOUS CONTINUOUS
Status: DISPENSED | OUTPATIENT
Start: 2025-02-28 | End: 2025-03-01

## 2025-02-28 RX ORDER — ASPIRIN 81 MG/1
81 TABLET, CHEWABLE ORAL DAILY
Status: DISCONTINUED | OUTPATIENT
Start: 2025-02-28 | End: 2025-03-03 | Stop reason: HOSPADM

## 2025-02-28 RX ORDER — OSELTAMIVIR PHOSPHATE 30 MG/1
30 CAPSULE ORAL 2 TIMES DAILY
Status: DISCONTINUED | OUTPATIENT
Start: 2025-02-28 | End: 2025-03-03 | Stop reason: HOSPADM

## 2025-02-28 RX ORDER — MAGNESIUM SULFATE IN WATER 40 MG/ML
2000 INJECTION, SOLUTION INTRAVENOUS PRN
Status: DISCONTINUED | OUTPATIENT
Start: 2025-02-28 | End: 2025-03-03 | Stop reason: HOSPADM

## 2025-02-28 RX ORDER — TIMOLOL MALEATE 5 MG/ML
1 SOLUTION/ DROPS OPHTHALMIC DAILY
Status: DISCONTINUED | OUTPATIENT
Start: 2025-02-28 | End: 2025-03-03 | Stop reason: HOSPADM

## 2025-02-28 RX ORDER — POTASSIUM CHLORIDE 1500 MG/1
40 TABLET, EXTENDED RELEASE ORAL ONCE
Status: COMPLETED | OUTPATIENT
Start: 2025-02-28 | End: 2025-02-28

## 2025-02-28 RX ORDER — ONDANSETRON 4 MG/1
4 TABLET, ORALLY DISINTEGRATING ORAL EVERY 8 HOURS PRN
Status: DISCONTINUED | OUTPATIENT
Start: 2025-02-28 | End: 2025-03-03 | Stop reason: HOSPADM

## 2025-02-28 RX ORDER — POTASSIUM CHLORIDE 7.45 MG/ML
10 INJECTION INTRAVENOUS PRN
Status: DISCONTINUED | OUTPATIENT
Start: 2025-02-28 | End: 2025-03-03 | Stop reason: HOSPADM

## 2025-02-28 RX ORDER — AMLODIPINE BESYLATE 10 MG/1
10 TABLET ORAL DAILY
Status: DISCONTINUED | OUTPATIENT
Start: 2025-02-28 | End: 2025-03-03 | Stop reason: HOSPADM

## 2025-02-28 RX ORDER — ENOXAPARIN SODIUM 100 MG/ML
30 INJECTION SUBCUTANEOUS DAILY
Status: DISCONTINUED | OUTPATIENT
Start: 2025-02-28 | End: 2025-03-03 | Stop reason: HOSPADM

## 2025-02-28 RX ORDER — IPRATROPIUM BROMIDE AND ALBUTEROL SULFATE 2.5; .5 MG/3ML; MG/3ML
1 SOLUTION RESPIRATORY (INHALATION)
Status: DISCONTINUED | OUTPATIENT
Start: 2025-02-28 | End: 2025-03-03 | Stop reason: HOSPADM

## 2025-02-28 RX ORDER — VITAMIN B COMPLEX
1000 TABLET ORAL DAILY
Status: DISCONTINUED | OUTPATIENT
Start: 2025-02-28 | End: 2025-03-03 | Stop reason: HOSPADM

## 2025-02-28 RX ORDER — LANOLIN ALCOHOL/MO/W.PET/CERES
500 CREAM (GRAM) TOPICAL DAILY
Status: DISCONTINUED | OUTPATIENT
Start: 2025-02-28 | End: 2025-03-03 | Stop reason: HOSPADM

## 2025-02-28 RX ORDER — POTASSIUM CHLORIDE 1500 MG/1
40 TABLET, EXTENDED RELEASE ORAL PRN
Status: DISCONTINUED | OUTPATIENT
Start: 2025-02-28 | End: 2025-03-03 | Stop reason: HOSPADM

## 2025-02-28 RX ORDER — ROSUVASTATIN CALCIUM 20 MG/1
40 TABLET, COATED ORAL NIGHTLY
Status: DISCONTINUED | OUTPATIENT
Start: 2025-02-28 | End: 2025-03-03 | Stop reason: HOSPADM

## 2025-02-28 RX ORDER — POLYETHYLENE GLYCOL 3350 17 G/17G
17 POWDER, FOR SOLUTION ORAL DAILY PRN
Status: DISCONTINUED | OUTPATIENT
Start: 2025-02-28 | End: 2025-03-03 | Stop reason: HOSPADM

## 2025-02-28 RX ORDER — ONDANSETRON 2 MG/ML
4 INJECTION INTRAMUSCULAR; INTRAVENOUS EVERY 6 HOURS PRN
Status: DISCONTINUED | OUTPATIENT
Start: 2025-02-28 | End: 2025-03-03 | Stop reason: HOSPADM

## 2025-02-28 RX ORDER — CETIRIZINE HYDROCHLORIDE 10 MG/1
5 TABLET ORAL DAILY
Status: DISCONTINUED | OUTPATIENT
Start: 2025-02-28 | End: 2025-03-03 | Stop reason: HOSPADM

## 2025-02-28 RX ORDER — ACETAMINOPHEN 650 MG/1
650 SUPPOSITORY RECTAL EVERY 6 HOURS PRN
Status: DISCONTINUED | OUTPATIENT
Start: 2025-02-28 | End: 2025-03-03 | Stop reason: HOSPADM

## 2025-02-28 RX ORDER — LATANOPROST 50 UG/ML
1 SOLUTION/ DROPS OPHTHALMIC NIGHTLY
Status: DISCONTINUED | OUTPATIENT
Start: 2025-02-28 | End: 2025-03-03 | Stop reason: HOSPADM

## 2025-02-28 RX ORDER — SODIUM CHLORIDE 0.9 % (FLUSH) 0.9 %
5-40 SYRINGE (ML) INJECTION EVERY 12 HOURS SCHEDULED
Status: DISCONTINUED | OUTPATIENT
Start: 2025-02-28 | End: 2025-03-03 | Stop reason: HOSPADM

## 2025-02-28 RX ORDER — ACETAMINOPHEN 325 MG/1
650 TABLET ORAL EVERY 6 HOURS PRN
Status: DISCONTINUED | OUTPATIENT
Start: 2025-02-28 | End: 2025-03-03 | Stop reason: HOSPADM

## 2025-02-28 RX ORDER — PREDNISONE 20 MG/1
40 TABLET ORAL DAILY
Status: DISCONTINUED | OUTPATIENT
Start: 2025-02-28 | End: 2025-03-03 | Stop reason: HOSPADM

## 2025-02-28 RX ORDER — AZITHROMYCIN 250 MG/1
250 TABLET, FILM COATED ORAL DAILY
Status: DISCONTINUED | OUTPATIENT
Start: 2025-03-01 | End: 2025-03-03 | Stop reason: HOSPADM

## 2025-02-28 RX ORDER — SODIUM CHLORIDE 9 MG/ML
INJECTION, SOLUTION INTRAVENOUS PRN
Status: DISCONTINUED | OUTPATIENT
Start: 2025-02-28 | End: 2025-03-03 | Stop reason: HOSPADM

## 2025-02-28 RX ORDER — FLUTICASONE PROPIONATE 50 MCG
1 SPRAY, SUSPENSION (ML) NASAL DAILY PRN
Status: DISCONTINUED | OUTPATIENT
Start: 2025-02-28 | End: 2025-03-03 | Stop reason: HOSPADM

## 2025-02-28 RX ORDER — SODIUM CHLORIDE 0.9 % (FLUSH) 0.9 %
5-40 SYRINGE (ML) INJECTION PRN
Status: DISCONTINUED | OUTPATIENT
Start: 2025-02-28 | End: 2025-03-03 | Stop reason: HOSPADM

## 2025-02-28 RX ADMIN — Medication 500 MCG: at 16:37

## 2025-02-28 RX ADMIN — WATER 1000 MG: 1 INJECTION INTRAMUSCULAR; INTRAVENOUS; SUBCUTANEOUS at 10:19

## 2025-02-28 RX ADMIN — AZITHROMYCIN MONOHYDRATE 500 MG: 500 INJECTION, POWDER, LYOPHILIZED, FOR SOLUTION INTRAVENOUS at 10:22

## 2025-02-28 RX ADMIN — AMLODIPINE BESYLATE 10 MG: 10 TABLET ORAL at 16:37

## 2025-02-28 RX ADMIN — ASPIRIN 81 MG CHEWABLE TABLET 81 MG: 81 TABLET CHEWABLE at 16:36

## 2025-02-28 RX ADMIN — PREDNISONE 40 MG: 20 TABLET ORAL at 16:36

## 2025-02-28 RX ADMIN — ENOXAPARIN SODIUM 30 MG: 100 INJECTION SUBCUTANEOUS at 16:37

## 2025-02-28 RX ADMIN — ROSUVASTATIN CALCIUM 40 MG: 20 TABLET, FILM COATED ORAL at 21:52

## 2025-02-28 RX ADMIN — SODIUM CHLORIDE: 9 INJECTION, SOLUTION INTRAVENOUS at 16:35

## 2025-02-28 RX ADMIN — CETIRIZINE HYDROCHLORIDE 5 MG: 10 TABLET, FILM COATED ORAL at 16:36

## 2025-02-28 RX ADMIN — SODIUM CHLORIDE, PRESERVATIVE FREE 10 ML: 5 INJECTION INTRAVENOUS at 21:54

## 2025-02-28 RX ADMIN — OSELTAMIVIR PHOSPHATE 30 MG: 30 CAPSULE ORAL at 21:52

## 2025-02-28 RX ADMIN — IPRATROPIUM BROMIDE AND ALBUTEROL SULFATE 1 DOSE: 2.5; .5 SOLUTION RESPIRATORY (INHALATION) at 20:55

## 2025-02-28 RX ADMIN — Medication 1000 UNITS: at 16:36

## 2025-02-28 RX ADMIN — POTASSIUM CHLORIDE 40 MEQ: 1500 TABLET, EXTENDED RELEASE ORAL at 09:21

## 2025-02-28 RX ADMIN — IPRATROPIUM BROMIDE AND ALBUTEROL SULFATE 1 DOSE: 2.5; .5 SOLUTION RESPIRATORY (INHALATION) at 16:37

## 2025-02-28 RX ADMIN — LATANOPROST 1 DROP: 50 SOLUTION/ DROPS OPHTHALMIC at 21:54

## 2025-02-28 RX ADMIN — Medication 1 TABLET: at 16:36

## 2025-02-28 ASSESSMENT — PAIN - FUNCTIONAL ASSESSMENT: PAIN_FUNCTIONAL_ASSESSMENT: NONE - DENIES PAIN

## 2025-02-28 ASSESSMENT — LIFESTYLE VARIABLES
HOW OFTEN DO YOU HAVE A DRINK CONTAINING ALCOHOL: NEVER
HOW MANY STANDARD DRINKS CONTAINING ALCOHOL DO YOU HAVE ON A TYPICAL DAY: PATIENT DOES NOT DRINK
HOW MANY STANDARD DRINKS CONTAINING ALCOHOL DO YOU HAVE ON A TYPICAL DAY: PATIENT DOES NOT DRINK
HOW OFTEN DO YOU HAVE A DRINK CONTAINING ALCOHOL: NEVER

## 2025-02-28 NOTE — ED PROVIDER NOTES
patient were to treat and/or prevent clinically significant deterioration that could result in the failure of one or more body systems and/or organ systems.    Services included the following:  -reviewing nursing notes and old charts  -vital sign assessments  -direct patient care  -medication orders and management  -interpreting and reviewing diagnostic studies/labs  -re-evaluations  -documentation time    Aggregate critical care time was 25 minutes, which includes only time during which I was engaged in work directly related to the patient's care as described above, whether I was at bedside or elsewhere in the Emergency Department. It did not include time spent performing other reported procedures or the services of residents, students, nurses, or advance practice providers.       Calin Nelson D.O.        Diagnosis and Disposition     CLINICAL IMPRESSION:  1. Acute hypoxic respiratory failure (HCC)    2. Influenza B    3. Pneumonia of left upper lobe due to infectious organism    4. Hypokalemia        Disposition: Admission    Patient condition at time of disposition: Stable    Disposition medications (if applicable):  New Prescriptions    No medications on file         Voice Dictation Disclaimer     Comment: Please note this report has been produced using speech recognition software and may contain errors related to that system including errors in grammar, punctuation, and spelling, as well as words and phrases that may be inappropriate.  Efforts were made to edit the dictations.      Calin Nelson D.O.       Adrien Nelson,   02/28/25 0950       Adrien Nelson,   02/28/25 1009

## 2025-02-28 NOTE — H&P
BLOODU neg 01/16/2025 03:18 PM    BLOODU TRACE 07/06/2024 10:45 PM    GLUCOSEU neg 01/16/2025 03:18 PM    GLUCOSEU NEGATIVE 07/06/2024 10:45 PM    KETUA neg 01/16/2025 03:18 PM    KETUA NEGATIVE 07/06/2024 10:45 PM     Urine Cultures:   Lab Results   Component Value Date/Time    LABURIN No growth at 18 to 36 hours 01/16/2025 03:19 PM     Blood Cultures: No results found for: \"BC\"  No results found for: \"BLOODCULT2\"  Organism:   Lab Results   Component Value Date/Time    ORG Strep agalactiae (Beta Strep Group B) 08/29/2023 02:55 PM    ORG Aerococcus species 08/29/2023 02:55 PM       Imaging/Diagnostics Last 24 Hours   XR CHEST PORTABLE    Result Date: 2/28/2025  AP chest: INDICATION: Chest Pain DEMOGRAPHICS: 89 years old Female COMPARISON: None Findings: The cardiac silhouette is within normal limits in size. Mediastinal contours are  unremarkable. Right upper lobe consolidation similar to previous. No pneumothorax. Deformity of the proximal right humerus AP sequela of remote fracture or AVN similar prior. Right hemidiaphragm elevation similar to prior. IMPRESSION: 1. Persistent right upper lobe opacity indicating pneumonia. Follow-up to normal  recommended  Dictated and Electronically Signed By: Antonio Cevallos MD 2/28/2025 9:22            Electronically signed by Derrick Orta MD on 2/28/2025 at 10:18 AM

## 2025-02-28 NOTE — ED NOTES
Floor notified of Sbar.   
Medication History  [facility]    Patient Name: Doretha Melo 6/15/1927     Medication history has been completed by: Maddie Slaughter CPhT    Source(s) of information: patient's family and insurance claims     Primary Care Physician: Kenna Ceron MD     [facility]armacy: [facility]    Allergies as of 02/28/2025 - Fully Reviewed 02/28/2025   Allergen Reaction Noted    Bee venom Swelling 09/25/2023    Iodides Hives 09/26/2010    Shrimp flavor agent (non-screening) Hives 02/17/2018        Prior to Admission medications    Medication Sig Start Date End Date Taking? Authorizing Provider   rosuvastatin (CRESTOR) 40 MG tablet TAKE 1 TABLET BY MOUTH NIGHTLY 2/5/25 8/4/25 Yes Kenna Ceron MD   cetirizine (ZYRTEC) 5 MG tablet Take 1 tablet by mouth daily 1/23/25  Yes Karena Herrera APRN - CNP   fluticasone (FLONASE) 50 MCG/ACT nasal spray 1 spray by Each Nostril route daily 1/23/25  Yes Karena Herrera APRN - CNP   alendronate (FOSAMAX) 70 MG tablet TAKE 1 TABLET ONCE A WEEK 30 MINUTES BEFORE FOOD, DRINK OR MEDS. STAY UPRIGHT.  Patient taking differently: Take 1 tablet by mouth every 7 days TAKE 1 TABLET ONCE A WEEK 30 MINUTES BEFORE FOOD, DRINK OR MEDS. STAY UPRIGHT. 12/12/24  Yes Kenna Ceron MD   amLODIPine (NORVASC) 10 MG tablet TAKE 1 TABLET BY MOUTH DAILY 11/4/24  Yes Kenna Ceron MD   acetaminophen (TYLENOL) 500 MG tablet Take 1 tablet by mouth every 4 hours as needed for Pain or Fever  Patient taking differently: Take 1 tablet by mouth daily 7/1/24  Yes Franklin Clemente MD   aspirin 81 MG chewable tablet Take 1 tablet by mouth daily 10/3/23  Yes GISEL Zayas MD   Multiple Vitamins-Minerals (PRESERVISION AREDS PO) Take 1 tablet by mouth in the morning and 1 tablet in the evening. Takes chewables.. 5/23/23  Yes Raquel Osborne MD   timolol (TIMOPTIC) 0.5 % ophthalmic solution Place 1 drop into both eyes daily   Yes Raquel Osborne MD   Calcium 
Ronnie at [facility]       Assessment    Vitals: MEWS Score: 1  Level of Consciousness: Alert (0)   Vitals:    02/28/25 0813   BP: (!) 146/80   Pulse: 96   Resp: 16   Temp: 98.3 °F (36.8 °C)   SpO2: 97%   Weight: 38.6 kg (85 lb)   Height: 1.397 m (4' 7\")       PO Status: Regular    O2 Flow Rate: O2 Device: Nasal cannula O2 Flow Rate (L/min): 2 L/min    Cardiac Rhythm: Afib     Last documented pain medication administered:     NIH Score: NIH       Active LDA's:      Pertinent or High Risk Medications/Drips: no   If Yes, please provide details:       Blood Product Administration: no  If Yes, please provide details:     Recommendation    Incomplete orders   Additional Comments:    If any further questions, please call Sending RN at [phone]    Electronically signed by: Electronically signed by July Rodriguez RN on 2/28/2025 at 9:49 AM

## 2025-03-01 LAB
ALBUMIN SERPL-MCNC: 3.1 G/DL (ref 3.4–5)
ALBUMIN/GLOB SERPL: 1.2 {RATIO} (ref 1.1–2.2)
ALP SERPL-CCNC: 50 U/L (ref 40–129)
ALT SERPL-CCNC: 10 U/L (ref 10–40)
ANION GAP SERPL CALCULATED.3IONS-SCNC: 12 MMOL/L (ref 9–17)
AST SERPL-CCNC: 18 U/L (ref 15–37)
BASOPHILS # BLD: 0.01 K/UL
BASOPHILS NFR BLD: 0 % (ref 0–1)
BILIRUB SERPL-MCNC: 0.3 MG/DL (ref 0–1)
BUN SERPL-MCNC: 10 MG/DL (ref 7–20)
CALCIUM SERPL-MCNC: 8.5 MG/DL (ref 8.3–10.6)
CHLORIDE SERPL-SCNC: 103 MMOL/L (ref 99–110)
CO2 SERPL-SCNC: 24 MMOL/L (ref 21–32)
CREAT SERPL-MCNC: 0.5 MG/DL (ref 0.6–1.2)
EOSINOPHIL # BLD: 0 K/UL
EOSINOPHILS RELATIVE PERCENT: 0 % (ref 0–3)
ERYTHROCYTE [DISTWIDTH] IN BLOOD BY AUTOMATED COUNT: 17.1 % (ref 11.7–14.9)
GFR, ESTIMATED: >90 ML/MIN/1.73M2
GLUCOSE SERPL-MCNC: 140 MG/DL (ref 74–99)
HCT VFR BLD AUTO: 35.7 % (ref 37–47)
HGB BLD-MCNC: 11.2 G/DL (ref 12.5–16)
IMM GRANULOCYTES # BLD AUTO: 0.03 K/UL
IMM GRANULOCYTES NFR BLD: 1 %
LYMPHOCYTES NFR BLD: 0.64 K/UL
LYMPHOCYTES RELATIVE PERCENT: 16 % (ref 24–44)
MAGNESIUM SERPL-MCNC: 1.8 MG/DL (ref 1.8–2.4)
MCH RBC QN AUTO: 28.7 PG (ref 27–31)
MCHC RBC AUTO-ENTMCNC: 31.4 G/DL (ref 32–36)
MCV RBC AUTO: 91.5 FL (ref 78–100)
MONOCYTES NFR BLD: 0.14 K/UL
MONOCYTES NFR BLD: 3 % (ref 0–4)
NEUTROPHILS NFR BLD: 80 % (ref 36–66)
NEUTS SEG NFR BLD: 3.32 K/UL
PLATELET # BLD AUTO: 320 K/UL (ref 140–440)
PMV BLD AUTO: 9.2 FL (ref 7.5–11.1)
POTASSIUM SERPL-SCNC: 3.2 MMOL/L (ref 3.5–5.1)
PROCALCITONIN SERPL-MCNC: 0.05 NG/ML
PROT SERPL-MCNC: 5.7 G/DL (ref 6.4–8.2)
RBC # BLD AUTO: 3.9 M/UL (ref 4.2–5.4)
SODIUM SERPL-SCNC: 138 MMOL/L (ref 136–145)
WBC OTHER # BLD: 4.1 K/UL (ref 4–10.5)

## 2025-03-01 PROCEDURE — 2700000000 HC OXYGEN THERAPY PER DAY

## 2025-03-01 PROCEDURE — 36415 COLL VENOUS BLD VENIPUNCTURE: CPT

## 2025-03-01 PROCEDURE — 94761 N-INVAS EAR/PLS OXIMETRY MLT: CPT

## 2025-03-01 PROCEDURE — 2500000003 HC RX 250 WO HCPCS: Performed by: STUDENT IN AN ORGANIZED HEALTH CARE EDUCATION/TRAINING PROGRAM

## 2025-03-01 PROCEDURE — 85025 COMPLETE CBC W/AUTO DIFF WBC: CPT

## 2025-03-01 PROCEDURE — 2580000003 HC RX 258: Performed by: STUDENT IN AN ORGANIZED HEALTH CARE EDUCATION/TRAINING PROGRAM

## 2025-03-01 PROCEDURE — 83735 ASSAY OF MAGNESIUM: CPT

## 2025-03-01 PROCEDURE — 1200000000 HC SEMI PRIVATE

## 2025-03-01 PROCEDURE — 80053 COMPREHEN METABOLIC PANEL: CPT

## 2025-03-01 PROCEDURE — 6370000000 HC RX 637 (ALT 250 FOR IP): Performed by: STUDENT IN AN ORGANIZED HEALTH CARE EDUCATION/TRAINING PROGRAM

## 2025-03-01 PROCEDURE — 84145 PROCALCITONIN (PCT): CPT

## 2025-03-01 PROCEDURE — 94640 AIRWAY INHALATION TREATMENT: CPT

## 2025-03-01 PROCEDURE — 6360000002 HC RX W HCPCS: Performed by: STUDENT IN AN ORGANIZED HEALTH CARE EDUCATION/TRAINING PROGRAM

## 2025-03-01 RX ORDER — POTASSIUM CHLORIDE 1500 MG/1
40 TABLET, EXTENDED RELEASE ORAL ONCE
Status: COMPLETED | OUTPATIENT
Start: 2025-03-01 | End: 2025-03-01

## 2025-03-01 RX ADMIN — LATANOPROST 1 DROP: 50 SOLUTION/ DROPS OPHTHALMIC at 23:13

## 2025-03-01 RX ADMIN — ROSUVASTATIN CALCIUM 40 MG: 20 TABLET, FILM COATED ORAL at 23:11

## 2025-03-01 RX ADMIN — Medication 1000 UNITS: at 10:04

## 2025-03-01 RX ADMIN — Medication 500 MCG: at 10:03

## 2025-03-01 RX ADMIN — CETIRIZINE HYDROCHLORIDE 5 MG: 10 TABLET, FILM COATED ORAL at 10:03

## 2025-03-01 RX ADMIN — TIMOLOL MALEATE 1 DROP: 5 SOLUTION OPHTHALMIC at 10:10

## 2025-03-01 RX ADMIN — POTASSIUM CHLORIDE 40 MEQ: 1500 TABLET, EXTENDED RELEASE ORAL at 10:03

## 2025-03-01 RX ADMIN — AMLODIPINE BESYLATE 10 MG: 10 TABLET ORAL at 10:04

## 2025-03-01 RX ADMIN — SODIUM CHLORIDE, PRESERVATIVE FREE 10 ML: 5 INJECTION INTRAVENOUS at 09:59

## 2025-03-01 RX ADMIN — ENOXAPARIN SODIUM 30 MG: 100 INJECTION SUBCUTANEOUS at 10:04

## 2025-03-01 RX ADMIN — IPRATROPIUM BROMIDE AND ALBUTEROL SULFATE 1 DOSE: 2.5; .5 SOLUTION RESPIRATORY (INHALATION) at 08:09

## 2025-03-01 RX ADMIN — IPRATROPIUM BROMIDE AND ALBUTEROL SULFATE 1 DOSE: 2.5; .5 SOLUTION RESPIRATORY (INHALATION) at 20:20

## 2025-03-01 RX ADMIN — WATER 1000 MG: 1 INJECTION INTRAMUSCULAR; INTRAVENOUS; SUBCUTANEOUS at 09:59

## 2025-03-01 RX ADMIN — PREDNISONE 40 MG: 20 TABLET ORAL at 10:05

## 2025-03-01 RX ADMIN — OSELTAMIVIR PHOSPHATE 30 MG: 30 CAPSULE ORAL at 23:12

## 2025-03-01 RX ADMIN — Medication 1 TABLET: at 10:03

## 2025-03-01 RX ADMIN — POTASSIUM BICARBONATE 40 MEQ: 782 TABLET, EFFERVESCENT ORAL at 03:08

## 2025-03-01 RX ADMIN — SODIUM CHLORIDE: 9 INJECTION, SOLUTION INTRAVENOUS at 07:19

## 2025-03-01 RX ADMIN — ASPIRIN 81 MG CHEWABLE TABLET 81 MG: 81 TABLET CHEWABLE at 10:05

## 2025-03-01 RX ADMIN — AZITHROMYCIN DIHYDRATE 250 MG: 250 TABLET ORAL at 10:05

## 2025-03-01 RX ADMIN — IPRATROPIUM BROMIDE AND ALBUTEROL SULFATE 1 DOSE: 2.5; .5 SOLUTION RESPIRATORY (INHALATION) at 15:41

## 2025-03-01 RX ADMIN — OSELTAMIVIR PHOSPHATE 30 MG: 30 CAPSULE ORAL at 10:03

## 2025-03-02 LAB
ANION GAP SERPL CALCULATED.3IONS-SCNC: 7 MMOL/L (ref 9–17)
BILIRUB UR QL STRIP: NEGATIVE
BUN SERPL-MCNC: 23 MG/DL (ref 7–20)
CALCIUM SERPL-MCNC: 9 MG/DL (ref 8.3–10.6)
CHARACTER UR: ABNORMAL
CHLORIDE SERPL-SCNC: 106 MMOL/L (ref 99–110)
CLARITY UR: CLEAR
CO2 SERPL-SCNC: 26 MMOL/L (ref 21–32)
COLOR UR: YELLOW
CREAT SERPL-MCNC: 0.5 MG/DL (ref 0.6–1.2)
EPI CELLS #/AREA URNS HPF: 4 /HPF
GFR, ESTIMATED: >90 ML/MIN/1.73M2
GLUCOSE BLD-MCNC: 120 MG/DL (ref 74–99)
GLUCOSE SERPL-MCNC: 155 MG/DL (ref 74–99)
GLUCOSE UR STRIP-MCNC: NEGATIVE MG/DL
HGB UR QL STRIP.AUTO: NEGATIVE
KETONES UR STRIP-MCNC: ABNORMAL MG/DL
LEUKOCYTE ESTERASE UR QL STRIP: ABNORMAL
MUCOUS THREADS URNS QL MICRO: ABNORMAL
NITRITE UR QL STRIP: NEGATIVE
PH UR STRIP: 6.5 [PH] (ref 5–8)
POTASSIUM SERPL-SCNC: 4.5 MMOL/L (ref 3.5–5.1)
PROT UR STRIP-MCNC: 30 MG/DL
RBC #/AREA URNS HPF: 1 /HPF (ref 0–2)
SODIUM SERPL-SCNC: 139 MMOL/L (ref 136–145)
SP GR UR STRIP: 1.02 (ref 1–1.03)
UROBILINOGEN UR STRIP-ACNC: 0.2 EU/DL (ref 0–1)
WBC #/AREA URNS HPF: 7 /HPF (ref 0–5)

## 2025-03-02 PROCEDURE — 82962 GLUCOSE BLOOD TEST: CPT

## 2025-03-02 PROCEDURE — 6360000002 HC RX W HCPCS: Performed by: STUDENT IN AN ORGANIZED HEALTH CARE EDUCATION/TRAINING PROGRAM

## 2025-03-02 PROCEDURE — 2500000003 HC RX 250 WO HCPCS: Performed by: STUDENT IN AN ORGANIZED HEALTH CARE EDUCATION/TRAINING PROGRAM

## 2025-03-02 PROCEDURE — 51798 US URINE CAPACITY MEASURE: CPT

## 2025-03-02 PROCEDURE — 2700000000 HC OXYGEN THERAPY PER DAY

## 2025-03-02 PROCEDURE — 36415 COLL VENOUS BLD VENIPUNCTURE: CPT

## 2025-03-02 PROCEDURE — 6370000000 HC RX 637 (ALT 250 FOR IP): Performed by: STUDENT IN AN ORGANIZED HEALTH CARE EDUCATION/TRAINING PROGRAM

## 2025-03-02 PROCEDURE — 80048 BASIC METABOLIC PNL TOTAL CA: CPT

## 2025-03-02 PROCEDURE — 94761 N-INVAS EAR/PLS OXIMETRY MLT: CPT

## 2025-03-02 PROCEDURE — 81001 URINALYSIS AUTO W/SCOPE: CPT

## 2025-03-02 PROCEDURE — 97163 PT EVAL HIGH COMPLEX 45 MIN: CPT

## 2025-03-02 PROCEDURE — 97116 GAIT TRAINING THERAPY: CPT

## 2025-03-02 PROCEDURE — 94640 AIRWAY INHALATION TREATMENT: CPT

## 2025-03-02 PROCEDURE — 97530 THERAPEUTIC ACTIVITIES: CPT

## 2025-03-02 PROCEDURE — 1200000000 HC SEMI PRIVATE

## 2025-03-02 RX ADMIN — AMLODIPINE BESYLATE 10 MG: 10 TABLET ORAL at 07:50

## 2025-03-02 RX ADMIN — IPRATROPIUM BROMIDE AND ALBUTEROL SULFATE 1 DOSE: 2.5; .5 SOLUTION RESPIRATORY (INHALATION) at 08:56

## 2025-03-02 RX ADMIN — PREDNISONE 40 MG: 20 TABLET ORAL at 07:50

## 2025-03-02 RX ADMIN — SODIUM CHLORIDE, PRESERVATIVE FREE 10 ML: 5 INJECTION INTRAVENOUS at 07:51

## 2025-03-02 RX ADMIN — ASPIRIN 81 MG CHEWABLE TABLET 81 MG: 81 TABLET CHEWABLE at 07:49

## 2025-03-02 RX ADMIN — OSELTAMIVIR PHOSPHATE 30 MG: 30 CAPSULE ORAL at 22:43

## 2025-03-02 RX ADMIN — Medication 1 TABLET: at 07:51

## 2025-03-02 RX ADMIN — CETIRIZINE HYDROCHLORIDE 5 MG: 10 TABLET, FILM COATED ORAL at 07:50

## 2025-03-02 RX ADMIN — ROSUVASTATIN CALCIUM 40 MG: 20 TABLET, FILM COATED ORAL at 22:43

## 2025-03-02 RX ADMIN — SODIUM CHLORIDE, PRESERVATIVE FREE 10 ML: 5 INJECTION INTRAVENOUS at 22:43

## 2025-03-02 RX ADMIN — WATER 1000 MG: 1 INJECTION INTRAMUSCULAR; INTRAVENOUS; SUBCUTANEOUS at 07:48

## 2025-03-02 RX ADMIN — Medication 500 MCG: at 07:49

## 2025-03-02 RX ADMIN — OSELTAMIVIR PHOSPHATE 30 MG: 30 CAPSULE ORAL at 07:51

## 2025-03-02 RX ADMIN — IPRATROPIUM BROMIDE AND ALBUTEROL SULFATE 1 DOSE: 2.5; .5 SOLUTION RESPIRATORY (INHALATION) at 16:42

## 2025-03-02 RX ADMIN — IPRATROPIUM BROMIDE AND ALBUTEROL SULFATE 1 DOSE: 2.5; .5 SOLUTION RESPIRATORY (INHALATION) at 20:24

## 2025-03-02 RX ADMIN — Medication 1000 UNITS: at 07:49

## 2025-03-02 RX ADMIN — TIMOLOL MALEATE 1 DROP: 5 SOLUTION OPHTHALMIC at 07:51

## 2025-03-02 RX ADMIN — ENOXAPARIN SODIUM 30 MG: 100 INJECTION SUBCUTANEOUS at 07:48

## 2025-03-02 RX ADMIN — AZITHROMYCIN DIHYDRATE 250 MG: 250 TABLET ORAL at 07:50

## 2025-03-02 RX ADMIN — LATANOPROST 1 DROP: 50 SOLUTION/ DROPS OPHTHALMIC at 22:43

## 2025-03-02 RX ADMIN — IPRATROPIUM BROMIDE AND ALBUTEROL SULFATE 1 DOSE: 2.5; .5 SOLUTION RESPIRATORY (INHALATION) at 13:11

## 2025-03-02 NOTE — SIGNIFICANT EVENT
RRT called due to tachycardia on tele. 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 Per tele had episode of tachycardia up to 140s, patient without nay symptoms at this time and HR in the 80s. Will discontinue tele.       Bethany Schilling MD  Night Physician

## 2025-03-02 NOTE — PLAN OF CARE
Problem: Chronic Conditions and Co-morbidities  Goal: Patient's chronic conditions and co-morbidity symptoms are monitored and maintained or improved  3/2/2025 0402 by Beth Ac LPN  Outcome: Progressing  3/1/2025 1644 by Ruth Malone LPN  Outcome: Progressing     Problem: Discharge Planning  Goal: Discharge to home or other facility with appropriate resources  3/2/2025 0402 by Beth Ac LPN  Outcome: Progressing  3/1/2025 1644 by Ruth Malone LPN  Outcome: Progressing     Problem: Safety - Adult  Goal: Free from fall injury  3/2/2025 0402 by Beth Ac LPN  Outcome: Progressing  3/1/2025 1644 by Ruth Malone LPN  Outcome: Progressing     Problem: Skin/Tissue Integrity  Goal: Skin integrity remains intact  Description: 1.  Monitor for areas of redness and/or skin breakdown  2.  Assess vascular access sites hourly  3.  Every 4-6 hours minimum:  Change oxygen saturation probe site  4.  Every 4-6 hours:  If on nasal continuous positive airway pressure, respiratory therapy assess nares and determine need for appliance change or resting period  3/2/2025 0402 by Beth Ac LPN  Outcome: Progressing  3/1/2025 1644 by Ruth Malone LPN  Outcome: Progressing     Problem: ABCDS Injury Assessment  Goal: Absence of physical injury  3/2/2025 0402 by Beth Ac LPN  Outcome: Progressing  3/1/2025 1644 by Ruth Malone LPN  Outcome: Progressing

## 2025-03-03 ENCOUNTER — APPOINTMENT (OUTPATIENT)
Dept: GENERAL RADIOLOGY | Age: 89
DRG: 193 | End: 2025-03-03
Payer: MEDICARE

## 2025-03-03 ENCOUNTER — HOSPITAL ENCOUNTER (INPATIENT)
Age: 89
LOS: 8 days | Discharge: HOME HEALTH | DRG: 947 | End: 2025-03-11
Attending: PHYSICAL MEDICINE & REHABILITATION | Admitting: PHYSICAL MEDICINE & REHABILITATION
Payer: MEDICARE

## 2025-03-03 VITALS
BODY MASS INDEX: 19.9 KG/M2 | TEMPERATURE: 98.1 F | DIASTOLIC BLOOD PRESSURE: 82 MMHG | WEIGHT: 85.98 LBS | SYSTOLIC BLOOD PRESSURE: 127 MMHG | OXYGEN SATURATION: 93 % | HEIGHT: 55 IN | HEART RATE: 79 BPM | RESPIRATION RATE: 16 BRPM

## 2025-03-03 PROBLEM — J11.1 INFLUENZA: Status: ACTIVE | Noted: 2025-03-03

## 2025-03-03 PROCEDURE — 2700000000 HC OXYGEN THERAPY PER DAY

## 2025-03-03 PROCEDURE — 97535 SELF CARE MNGMENT TRAINING: CPT

## 2025-03-03 PROCEDURE — 97530 THERAPEUTIC ACTIVITIES: CPT

## 2025-03-03 PROCEDURE — 71045 X-RAY EXAM CHEST 1 VIEW: CPT

## 2025-03-03 PROCEDURE — 94761 N-INVAS EAR/PLS OXIMETRY MLT: CPT

## 2025-03-03 PROCEDURE — 6370000000 HC RX 637 (ALT 250 FOR IP): Performed by: STUDENT IN AN ORGANIZED HEALTH CARE EDUCATION/TRAINING PROGRAM

## 2025-03-03 PROCEDURE — 99223 1ST HOSP IP/OBS HIGH 75: CPT | Performed by: PHYSICAL MEDICINE & REHABILITATION

## 2025-03-03 PROCEDURE — 97166 OT EVAL MOD COMPLEX 45 MIN: CPT

## 2025-03-03 PROCEDURE — 94150 VITAL CAPACITY TEST: CPT

## 2025-03-03 PROCEDURE — 2500000003 HC RX 250 WO HCPCS: Performed by: STUDENT IN AN ORGANIZED HEALTH CARE EDUCATION/TRAINING PROGRAM

## 2025-03-03 PROCEDURE — 94640 AIRWAY INHALATION TREATMENT: CPT

## 2025-03-03 PROCEDURE — 97110 THERAPEUTIC EXERCISES: CPT

## 2025-03-03 PROCEDURE — 97116 GAIT TRAINING THERAPY: CPT

## 2025-03-03 PROCEDURE — 6360000002 HC RX W HCPCS: Performed by: STUDENT IN AN ORGANIZED HEALTH CARE EDUCATION/TRAINING PROGRAM

## 2025-03-03 PROCEDURE — 1280000000 HC REHAB R&B

## 2025-03-03 RX ORDER — PREDNISONE 20 MG/1
40 TABLET ORAL DAILY
Status: COMPLETED | OUTPATIENT
Start: 2025-03-04 | End: 2025-03-04

## 2025-03-03 RX ORDER — ROSUVASTATIN CALCIUM 20 MG/1
40 TABLET, COATED ORAL NIGHTLY
Status: DISCONTINUED | OUTPATIENT
Start: 2025-03-03 | End: 2025-03-11 | Stop reason: HOSPADM

## 2025-03-03 RX ORDER — ONDANSETRON 4 MG/1
4 TABLET, ORALLY DISINTEGRATING ORAL EVERY 8 HOURS PRN
Status: DISCONTINUED | OUTPATIENT
Start: 2025-03-03 | End: 2025-03-11 | Stop reason: HOSPADM

## 2025-03-03 RX ORDER — PREDNISONE 20 MG/1
40 TABLET ORAL DAILY
Status: CANCELLED | OUTPATIENT
Start: 2025-03-04 | End: 2025-03-05

## 2025-03-03 RX ORDER — POLYETHYLENE GLYCOL 3350 17 G/17G
17 POWDER, FOR SOLUTION ORAL DAILY PRN
Status: CANCELLED | OUTPATIENT
Start: 2025-03-03

## 2025-03-03 RX ORDER — OSELTAMIVIR PHOSPHATE 30 MG/1
30 CAPSULE ORAL 2 TIMES DAILY
Status: CANCELLED | OUTPATIENT
Start: 2025-03-03 | End: 2025-03-05

## 2025-03-03 RX ORDER — ASPIRIN 81 MG/1
81 TABLET, CHEWABLE ORAL DAILY
Status: CANCELLED | OUTPATIENT
Start: 2025-03-04

## 2025-03-03 RX ORDER — BISACODYL 10 MG
10 SUPPOSITORY, RECTAL RECTAL DAILY PRN
Status: DISCONTINUED | OUTPATIENT
Start: 2025-03-03 | End: 2025-03-11 | Stop reason: HOSPADM

## 2025-03-03 RX ORDER — VITAMIN B COMPLEX
1000 TABLET ORAL DAILY
Status: DISCONTINUED | OUTPATIENT
Start: 2025-03-04 | End: 2025-03-11 | Stop reason: HOSPADM

## 2025-03-03 RX ORDER — LANOLIN ALCOHOL/MO/W.PET/CERES
500 CREAM (GRAM) TOPICAL DAILY
Status: CANCELLED | OUTPATIENT
Start: 2025-03-04

## 2025-03-03 RX ORDER — IPRATROPIUM BROMIDE AND ALBUTEROL SULFATE 2.5; .5 MG/3ML; MG/3ML
1 SOLUTION RESPIRATORY (INHALATION)
Status: CANCELLED | OUTPATIENT
Start: 2025-03-03

## 2025-03-03 RX ORDER — BISACODYL 10 MG
10 SUPPOSITORY, RECTAL RECTAL DAILY PRN
Status: CANCELLED | OUTPATIENT
Start: 2025-03-03

## 2025-03-03 RX ORDER — ROSUVASTATIN CALCIUM 20 MG/1
40 TABLET, COATED ORAL NIGHTLY
Status: CANCELLED | OUTPATIENT
Start: 2025-03-03

## 2025-03-03 RX ORDER — AZITHROMYCIN 250 MG/1
250 TABLET, FILM COATED ORAL DAILY
Status: COMPLETED | OUTPATIENT
Start: 2025-03-04 | End: 2025-03-05

## 2025-03-03 RX ORDER — CETIRIZINE HYDROCHLORIDE 10 MG/1
5 TABLET ORAL DAILY
Status: CANCELLED | OUTPATIENT
Start: 2025-03-04

## 2025-03-03 RX ORDER — POLYETHYLENE GLYCOL 3350 17 G/17G
17 POWDER, FOR SOLUTION ORAL DAILY PRN
Status: DISCONTINUED | OUTPATIENT
Start: 2025-03-03 | End: 2025-03-11 | Stop reason: HOSPADM

## 2025-03-03 RX ORDER — AZITHROMYCIN 250 MG/1
250 TABLET, FILM COATED ORAL DAILY
Status: CANCELLED | OUTPATIENT
Start: 2025-03-04 | End: 2025-03-06

## 2025-03-03 RX ORDER — AMLODIPINE BESYLATE 10 MG/1
10 TABLET ORAL DAILY
Status: DISCONTINUED | OUTPATIENT
Start: 2025-03-04 | End: 2025-03-11 | Stop reason: HOSPADM

## 2025-03-03 RX ORDER — VITAMIN B COMPLEX
1000 TABLET ORAL DAILY
Status: CANCELLED | OUTPATIENT
Start: 2025-03-04

## 2025-03-03 RX ORDER — GUAIFENESIN 600 MG/1
600 TABLET, EXTENDED RELEASE ORAL 2 TIMES DAILY
Status: CANCELLED | OUTPATIENT
Start: 2025-03-03

## 2025-03-03 RX ORDER — FLUTICASONE PROPIONATE 50 MCG
1 SPRAY, SUSPENSION (ML) NASAL DAILY PRN
Status: DISCONTINUED | OUTPATIENT
Start: 2025-03-03 | End: 2025-03-11 | Stop reason: HOSPADM

## 2025-03-03 RX ORDER — SODIUM CHLORIDE 9 MG/ML
INJECTION, SOLUTION INTRAVENOUS PRN
Status: CANCELLED | OUTPATIENT
Start: 2025-03-03

## 2025-03-03 RX ORDER — TIMOLOL MALEATE 5 MG/ML
1 SOLUTION/ DROPS OPHTHALMIC DAILY
Status: DISCONTINUED | OUTPATIENT
Start: 2025-03-04 | End: 2025-03-11 | Stop reason: HOSPADM

## 2025-03-03 RX ORDER — BENZONATATE 100 MG/1
100 CAPSULE ORAL 3 TIMES DAILY PRN
Status: CANCELLED | OUTPATIENT
Start: 2025-03-03

## 2025-03-03 RX ORDER — SODIUM CHLORIDE 9 MG/ML
INJECTION, SOLUTION INTRAVENOUS PRN
Status: DISCONTINUED | OUTPATIENT
Start: 2025-03-03 | End: 2025-03-11

## 2025-03-03 RX ORDER — ASPIRIN 81 MG/1
81 TABLET, CHEWABLE ORAL DAILY
Status: DISCONTINUED | OUTPATIENT
Start: 2025-03-04 | End: 2025-03-11 | Stop reason: HOSPADM

## 2025-03-03 RX ORDER — LANOLIN ALCOHOL/MO/W.PET/CERES
500 CREAM (GRAM) TOPICAL DAILY
Status: DISCONTINUED | OUTPATIENT
Start: 2025-03-04 | End: 2025-03-11 | Stop reason: HOSPADM

## 2025-03-03 RX ORDER — AMLODIPINE BESYLATE 10 MG/1
10 TABLET ORAL DAILY
Status: CANCELLED | OUTPATIENT
Start: 2025-03-04

## 2025-03-03 RX ORDER — GUAIFENESIN 600 MG/1
600 TABLET, EXTENDED RELEASE ORAL 2 TIMES DAILY
Status: DISCONTINUED | OUTPATIENT
Start: 2025-03-03 | End: 2025-03-03 | Stop reason: HOSPADM

## 2025-03-03 RX ORDER — LATANOPROST 50 UG/ML
1 SOLUTION/ DROPS OPHTHALMIC NIGHTLY
Status: DISCONTINUED | OUTPATIENT
Start: 2025-03-03 | End: 2025-03-11 | Stop reason: HOSPADM

## 2025-03-03 RX ORDER — BENZONATATE 100 MG/1
100 CAPSULE ORAL 3 TIMES DAILY PRN
Status: DISCONTINUED | OUTPATIENT
Start: 2025-03-03 | End: 2025-03-11 | Stop reason: HOSPADM

## 2025-03-03 RX ORDER — IPRATROPIUM BROMIDE AND ALBUTEROL SULFATE 2.5; .5 MG/3ML; MG/3ML
1 SOLUTION RESPIRATORY (INHALATION) EVERY 4 HOURS PRN
Status: DISCONTINUED | OUTPATIENT
Start: 2025-03-03 | End: 2025-03-04

## 2025-03-03 RX ORDER — ONDANSETRON 4 MG/1
4 TABLET, ORALLY DISINTEGRATING ORAL EVERY 8 HOURS PRN
Status: CANCELLED | OUTPATIENT
Start: 2025-03-03

## 2025-03-03 RX ORDER — ACETAMINOPHEN 325 MG/1
650 TABLET ORAL EVERY 6 HOURS PRN
Status: DISCONTINUED | OUTPATIENT
Start: 2025-03-03 | End: 2025-03-04

## 2025-03-03 RX ORDER — ACETAMINOPHEN 650 MG/1
650 SUPPOSITORY RECTAL EVERY 6 HOURS PRN
Status: CANCELLED | OUTPATIENT
Start: 2025-03-03

## 2025-03-03 RX ORDER — LATANOPROST 50 UG/ML
1 SOLUTION/ DROPS OPHTHALMIC NIGHTLY
Status: CANCELLED | OUTPATIENT
Start: 2025-03-03

## 2025-03-03 RX ORDER — ACETAMINOPHEN 325 MG/1
650 TABLET ORAL EVERY 6 HOURS PRN
Status: CANCELLED | OUTPATIENT
Start: 2025-03-03

## 2025-03-03 RX ORDER — FLUTICASONE PROPIONATE 50 MCG
1 SPRAY, SUSPENSION (ML) NASAL DAILY PRN
Status: CANCELLED | OUTPATIENT
Start: 2025-03-03

## 2025-03-03 RX ORDER — OSELTAMIVIR PHOSPHATE 30 MG/1
30 CAPSULE ORAL 2 TIMES DAILY
Status: COMPLETED | OUTPATIENT
Start: 2025-03-03 | End: 2025-03-04

## 2025-03-03 RX ORDER — GUAIFENESIN 600 MG/1
600 TABLET, EXTENDED RELEASE ORAL 2 TIMES DAILY
Status: DISCONTINUED | OUTPATIENT
Start: 2025-03-03 | End: 2025-03-11 | Stop reason: HOSPADM

## 2025-03-03 RX ORDER — ONDANSETRON 2 MG/ML
4 INJECTION INTRAMUSCULAR; INTRAVENOUS EVERY 6 HOURS PRN
Status: DISCONTINUED | OUTPATIENT
Start: 2025-03-03 | End: 2025-03-11

## 2025-03-03 RX ORDER — ONDANSETRON 2 MG/ML
4 INJECTION INTRAMUSCULAR; INTRAVENOUS EVERY 6 HOURS PRN
Status: CANCELLED | OUTPATIENT
Start: 2025-03-03

## 2025-03-03 RX ORDER — TIMOLOL MALEATE 5 MG/ML
1 SOLUTION/ DROPS OPHTHALMIC DAILY
Status: CANCELLED | OUTPATIENT
Start: 2025-03-04

## 2025-03-03 RX ORDER — CETIRIZINE HYDROCHLORIDE 10 MG/1
5 TABLET ORAL DAILY
Status: DISCONTINUED | OUTPATIENT
Start: 2025-03-04 | End: 2025-03-11 | Stop reason: HOSPADM

## 2025-03-03 RX ORDER — ACETAMINOPHEN 650 MG/1
650 SUPPOSITORY RECTAL EVERY 6 HOURS PRN
Status: DISCONTINUED | OUTPATIENT
Start: 2025-03-03 | End: 2025-03-03

## 2025-03-03 RX ORDER — IPRATROPIUM BROMIDE AND ALBUTEROL SULFATE 2.5; .5 MG/3ML; MG/3ML
1 SOLUTION RESPIRATORY (INHALATION)
Status: DISCONTINUED | OUTPATIENT
Start: 2025-03-03 | End: 2025-03-03

## 2025-03-03 RX ADMIN — ASPIRIN 81 MG CHEWABLE TABLET 81 MG: 81 TABLET CHEWABLE at 08:05

## 2025-03-03 RX ADMIN — OSELTAMIVIR PHOSPHATE 30 MG: 30 CAPSULE ORAL at 08:05

## 2025-03-03 RX ADMIN — WATER 1000 MG: 1 INJECTION INTRAMUSCULAR; INTRAVENOUS; SUBCUTANEOUS at 08:06

## 2025-03-03 RX ADMIN — ROSUVASTATIN CALCIUM 40 MG: 20 TABLET, FILM COATED ORAL at 23:54

## 2025-03-03 RX ADMIN — PREDNISONE 40 MG: 20 TABLET ORAL at 08:05

## 2025-03-03 RX ADMIN — AZITHROMYCIN DIHYDRATE 250 MG: 250 TABLET ORAL at 08:07

## 2025-03-03 RX ADMIN — IPRATROPIUM BROMIDE AND ALBUTEROL SULFATE 1 DOSE: 2.5; .5 SOLUTION RESPIRATORY (INHALATION) at 16:19

## 2025-03-03 RX ADMIN — ENOXAPARIN SODIUM 30 MG: 100 INJECTION SUBCUTANEOUS at 08:06

## 2025-03-03 RX ADMIN — GUAIFENESIN 600 MG: 600 TABLET ORAL at 11:44

## 2025-03-03 RX ADMIN — Medication 1 TABLET: at 08:05

## 2025-03-03 RX ADMIN — IPRATROPIUM BROMIDE AND ALBUTEROL SULFATE 1 DOSE: 2.5; .5 SOLUTION RESPIRATORY (INHALATION) at 11:58

## 2025-03-03 RX ADMIN — Medication 1000 UNITS: at 08:05

## 2025-03-03 RX ADMIN — GUAIFENESIN 600 MG: 600 TABLET ORAL at 23:54

## 2025-03-03 RX ADMIN — CETIRIZINE HYDROCHLORIDE 5 MG: 10 TABLET, FILM COATED ORAL at 08:05

## 2025-03-03 RX ADMIN — Medication 500 MCG: at 08:05

## 2025-03-03 RX ADMIN — SODIUM CHLORIDE, PRESERVATIVE FREE 10 ML: 5 INJECTION INTRAVENOUS at 08:06

## 2025-03-03 RX ADMIN — AMLODIPINE BESYLATE 10 MG: 10 TABLET ORAL at 08:05

## 2025-03-03 RX ADMIN — OSELTAMIVIR PHOSPHATE 30 MG: 30 CAPSULE ORAL at 23:54

## 2025-03-03 RX ADMIN — IPRATROPIUM BROMIDE AND ALBUTEROL SULFATE 1 DOSE: 2.5; .5 SOLUTION RESPIRATORY (INHALATION) at 08:15

## 2025-03-03 RX ADMIN — LATANOPROST 1 DROP: 50 SOLUTION OPHTHALMIC at 23:55

## 2025-03-03 RX ADMIN — TIMOLOL MALEATE 1 DROP: 5 SOLUTION OPHTHALMIC at 08:06

## 2025-03-03 NOTE — DISCHARGE SUMMARY
Date/Time    TROPONINT <0.010 09/18/2023 04:25 PM    TROPONINT <0.010 09/26/2021 06:31 PM    TROPONINT <0.010 02/16/2018 09:58 PM     Lactic Acid: No results for input(s): \"LACTA\" in the last 72 hours.  BNP: No results for input(s): \"PROBNP\" in the last 72 hours.  UA:  Lab Results   Component Value Date/Time    NITRU NEGATIVE 03/02/2025 04:15 AM    COLORU Yellow 03/02/2025 04:15 AM    PHUR 6.5 03/02/2025 04:15 AM    PHUR 7.0 01/16/2025 03:18 PM    PHUR 6.0 01/22/2013 08:04 PM    WBCUA 7 03/02/2025 04:15 AM    RBCUA 1 03/02/2025 04:15 AM    RBCUA 4 07/06/2024 10:45 PM    MUCUS RARE 03/02/2025 04:15 AM    TRICHOMONAS NONE SEEN 07/06/2024 10:45 PM    BACTERIA NEGATIVE 07/06/2024 10:45 PM    CLARITYU cloudy 01/16/2025 03:18 PM    CLARITYU CLEAR 07/06/2024 10:45 PM    SPECGRAV 1.020 01/16/2025 03:18 PM    LEUKOCYTESUR TRACE 03/02/2025 04:15 AM    UROBILINOGEN 0.2 03/02/2025 04:15 AM    BILIRUBINUR NEGATIVE 03/02/2025 04:15 AM    BLOODU neg 01/16/2025 03:18 PM    BLOODU TRACE 07/06/2024 10:45 PM    GLUCOSEU NEGATIVE 03/02/2025 04:15 AM    KETUA TRACE 03/02/2025 04:15 AM     Urine Cultures:   Lab Results   Component Value Date/Time    LABURIN No growth at 18 to 36 hours 01/16/2025 03:19 PM     Blood Cultures: No results found for: \"BC\"  No results found for: \"BLOODCULT2\"  Organism:   Lab Results   Component Value Date/Time    ORG Strep agalactiae (Beta Strep Group B) 08/29/2023 02:55 PM    ORG Aerococcus species 08/29/2023 02:55 PM       Time Spent Discharging patient 35 minutes    Electronically signed by Derrick Orta MD on 3/3/2025 at 4:47 PM

## 2025-03-03 NOTE — CARE COORDINATION
Received referral for ARU.  Will review patients clinicals and await OT eval.  Thank you for the referral.

## 2025-03-03 NOTE — PROGRESS NOTES
ARU Admission Assessment - [facility]      A Complete drug regimen review was completed for this patient this date.   [x]  No clinically significant medication issue was identified   []  Yes, a clinically significant medication issue was identified     []  Adverse Drug Event:    []  Allergy:    []  Side Effect:    []  Ineffective Therapy:    []  Drug interaction:     []  Duplicate Therapy:    []  Untreated Indication:    []  Non-adherence:    []  Other:  Nursing contacted the physician:       Date: 3/3/2025               Time:6:15pm    Actions recommended by physician were completed:   Date:                 Time:  Action(s) Taken:             []  New Physician Order Received    []  Issue Noted by Physician; However No Action Required    []  Other:      *NEW QUESTION EFFECTIVE OCTOBER 1, 2024 as required by Medicare    COVID Vaccination  Is the patient up-to-date with their COVID vaccination?  *See CDC Guidelines Below      https://www.cdc.gov/covid/vaccines/stay-up-to-date.html    [] A.  No, patient is NOT up to date.  Includes if they have not received the vaccine for ANY reason, including medical or Nondenominational reason.   Encourage patient to receive this after discharge.     [] B.  Yes, patient is up to date.       People ages 12 years and older Guidelines for Up to Date  *You are up to date when you have received:  1 dose of the 2453-8341 Moderna COVID-19 vaccine OR  1 dose of the 2942-8528 Pfizer-BioNTech COVID-19 vaccine OR  1 dose of the 9208-0513 Novavax vaccine unless you are receiving a COVID-19 vaccine for the very first time. If you have never received any COVID-19 vaccine and you choose to get Novavax, you need 2 doses of 0857-6752 Novavax COVID-19 vaccine to be up to date.    Information may be determined from medical record, interviews with the patient and/or family members or other healthcare providers but must meet criteria above.      Ethnicity  \"Are you of , /a, or Ecuadorean

## 2025-03-03 NOTE — PROGRESS NOTES
Physical Therapy Treatment Note  Name: Doretha Melo MRN: [id number] :   6/15/1927   Date:  3/3/2025   Admission Date: 2025 Room:  [address] [place] [address]   Restrictions/Precautions:  Restrictions/Precautions  Restrictions/Precautions: General Precautions, Fall Risk       droplet flu  Communication with other providers:  Hand off with Nurse    Subjective:  Patient states:  Pt says that her left leg is an inch shorter than her left. Pt says that her left knee monica on her at times when she is walking. Pt had some soreness in knee after ambulating but did not rate.  Pain:   Location, Type, Intensity (0/10 to 10/10):  DNR  Objective:    Observation:  Pt sitting up in chair upon arrival waiting to go to ARU.  Objective Measures:  none  Treatment, including education/measures:  Therapeutic Activity Training:   Therapeutic activity training was instructed today.  Cues were given for safety, sequence, UE/LE placement, awareness, and balance.    Activities performed today included sit-stand.    Bed Mobility: OOB  Sit to stand transfer: CGA/ Min A    Standing balance:CGA with FWW     Therapeutic Exercise Training:  Sitting exercises performed: HR, TR, march, LAQ, hip add pillow squeeze, MR hip abd, MR HS curls 2 x10 ea B. Cues were given for posture and technique.    Gait Training:  Cues were given for safety, sequence, device management, balance, posture, awareness, path.    Amb 3 x 50 ft, CGA, FWW. decreased gait speed, decreased step length bilaterally, forward trunk lean, NBOS, unsteady gait increased with fatigue    Education:   Pt. Educated on importance of out of bed activity, safe functional mobility, and walker management.     Assessment / Impression:    Pt. Left chair at end of session, all needs met, phone and call light in reach, bed/personal alarm active, and nursing updated.     Patient's tolerance of treatment:  good   Adverse Reaction: none  Significant change in status and impact:  none  Barriers to 
  Physician Progress Note      PATIENT:               YOVANI MENDEZ  CSN #:                  [id number]  :                       6/15/1927  ADMIT DATE:       2025 8:08 AM  DISCH DATE:  RESPONDING  PROVIDER #:        Derrick Orta MD          QUERY TEXT:    Patient admitted with gen weakness. Influenza A positive with lower lobe PNA.   Trops 26, 20.  If possible, please document in the progress notes and   discharge summary if you are evaluating and/or treating any of the following:    The medical record reflects the following:  Risk Factors: Influenza A, PNA, HTN, PAF  Clinical Indicators: trops 26, 20, bp 146/80  EKG --\" Atrial fibrillation has replaced Sinus rhythm -ST now depressed in   Inferior leads -ST more depressed in Anterior leads  Nonspecific T wave abnormality now evident in Inferior leads.\"  Treatment: tamiflu, IV azith/rocephin, labs, fluids, cultures, EKG, steroids,   inhalers, cxray, essential home meds, supportive care    Thank you,  Torsten Guadarrama RN,BSB  Options provided:  -- Type 2 MI due to Influenza and PNA  -- Demand Ischemia with MI due to Influenza and PNA  -- Other - I will add my own diagnosis  -- Disagree - Not applicable / Not valid  -- Disagree - Clinically unable to determine / Unknown  -- Refer to Clinical Documentation Reviewer    PROVIDER RESPONSE TEXT:    This patient has a Type 2 MI due to Influenza and PNA    Query created by: Torsten Guadarrama on 2025 2:57 PM      Electronically signed by:  Derrick Orta MD 3/1/2025 7:21 PM          
4 Eyes Skin Assessment     NAME:  Doretha Melo  YOB: 1927  MEDICAL RECORD NUMBER:  [id number]    The patient is being assessed for  Admission    I agree that at least one RN has performed a thorough Head to Toe Skin Assessment on the patient. ALL assessment sites listed below have been assessed.      Areas assessed by both nurses:    Head, Face, Ears, Shoulders, Back, Chest, Arms, Elbows, Hands, Sacrum. Buttock, Coccyx, Ischium, Legs. Feet and Heels, and Under Medical Devices         Does the Patient have a Wound? No noted wound(s) - Pt has lesion (scabbed area) on left side of face near her ear.        Donell Prevention initiated by RN: Yes  Wound Care Orders initiated by RN: No    Pressure Injury (Stage 3,4, Unstageable, DTI, NWPT, and Complex wounds) if present, place Wound referral order by RN under : No    New Ostomies, if present place, Ostomy referral order under : No     Nurse 1 eSignature: Electronically signed by Imani Carmona RN on 2/28/25 at 1:59 PM EST    **SHARE this note so that the co-signing nurse can place an eSignature**    Nurse 2 eSignature: Electronically signed by Norah Reilly LPN on 2/28/25 at 3:51 PM EST   
Comprehensive Nutrition Assessment    Type and Reason for Visit:  Initial (Low BMI for age)    Nutrition Recommendations/Plan:   1.Recommend modifying diet to easy to chew to support po intake improvement/especially protein.   2.Will trial standard high calorie/high protein oral nutrition supplement (vanilla) TID.  3. Pt enjoys yogurt and cottage cheese between meals.   4. Please continue to measure body wt daily.   5. Please document all po intake of meals, snacks, and oral nutrition supplements in flowsheets for review.    6. Please offer assist with meals upon pt request.   7. Will follow pt as a high nutrition risk.      Malnutrition Assessment:  Malnutrition Status:  Moderate malnutrition (03/03/25 1058)    Context:  Acute Illness     Findings of the 6 clinical characteristics of malnutrition:  Energy Intake:  50% or less of estimated energy requirements for 5 or more days (per pt on and off vomiting for past week and not eating more than 1/2 of meals pta and since admission per pt.)  Weight Loss:  No weight loss     Body Fat Loss:  Mild body fat loss Buccal region, Fat Overlying Ribs, Triceps, Orbital   Muscle Mass Loss:  Moderate muscle mass loss Clavicles (pectoralis & deltoids), Temples (temporalis), Thigh (quadriceps), Calf (gastrocnemius), Scapula (trapezius)  Fluid Accumulation:  No fluid accumulation Extremities, Generalized   Strength:  Not Performed    Nutrition Assessment:    Pt admitted with influenza A. Pt has a pmh of HTN, HLD, preDM, paroxysmal AFib. Pt currently on a regular diet order. Pt had vomitting off currently and on pta. No po intake documented in flowsheets since admission. Pt reports she consumed smaller more frequent meals at home before getting the flu. Pt appetite improving today . Pt consumed ~1/2 her meal, potatoes and carrots, at lunch. Pt left the meat on the plate because it was too tough to chew. Pt okay to try easy to chew diet order to support improvement with po intake. 
Report from Sammie ANTUNEZ. All questions answered at this time. Advised we are ready for her in 1014 at this time.  
Tele called stating pts HR elevated to 140's, but then did go back down to low 100's/high 90's where she has been. Vitals WNL, Pt denies any chest pain or palpitations. PS sent to NP who states to get EKG if pts heart rate becomes elevated again. Pts lagbs also resulted with potassium 3.2, PRN potassium protocol already ordered, gave 2 effer-k tablets in water per orders. NP aware.     Veena Duenas LPN.   
04:15 AM    PHUR 7.0 01/16/2025 03:18 PM    PHUR 6.0 01/22/2013 08:04 PM    WBCUA 7 03/02/2025 04:15 AM    RBCUA 1 03/02/2025 04:15 AM    RBCUA 4 07/06/2024 10:45 PM    MUCUS RARE 03/02/2025 04:15 AM    TRICHOMONAS NONE SEEN 07/06/2024 10:45 PM    BACTERIA NEGATIVE 07/06/2024 10:45 PM    CLARITYU cloudy 01/16/2025 03:18 PM    CLARITYU CLEAR 07/06/2024 10:45 PM    SPECGRAV 1.020 01/16/2025 03:18 PM    LEUKOCYTESUR TRACE 03/02/2025 04:15 AM    UROBILINOGEN 0.2 03/02/2025 04:15 AM    BILIRUBINUR NEGATIVE 03/02/2025 04:15 AM    BLOODU neg 01/16/2025 03:18 PM    BLOODU TRACE 07/06/2024 10:45 PM    GLUCOSEU NEGATIVE 03/02/2025 04:15 AM    KETUA TRACE 03/02/2025 04:15 AM     Urine Cultures:   Lab Results   Component Value Date/Time    LABURIN No growth at 18 to 36 hours 01/16/2025 03:19 PM     Blood Cultures: No results found for: \"BC\"  No results found for: \"BLOODCULT2\"  Organism:   Lab Results   Component Value Date/Time    ORG Strep agalactiae (Beta Strep Group B) 08/29/2023 02:55 PM    ORG Aerococcus species 08/29/2023 02:55 PM         Electronically signed by Derrick Orta MD on 3/3/2025 at 11:49 AM   
<0.010 02/16/2018 09:58 PM     Lactic Acid: No results for input(s): \"LACTA\" in the last 72 hours.  BNP: No results for input(s): \"PROBNP\" in the last 72 hours.  UA:  Lab Results   Component Value Date/Time    NITRU NEGATIVE 03/02/2025 04:15 AM    COLORU Yellow 03/02/2025 04:15 AM    PHUR 6.5 03/02/2025 04:15 AM    PHUR 7.0 01/16/2025 03:18 PM    PHUR 6.0 01/22/2013 08:04 PM    WBCUA 7 03/02/2025 04:15 AM    RBCUA 1 03/02/2025 04:15 AM    RBCUA 4 07/06/2024 10:45 PM    MUCUS RARE 03/02/2025 04:15 AM    TRICHOMONAS NONE SEEN 07/06/2024 10:45 PM    BACTERIA NEGATIVE 07/06/2024 10:45 PM    CLARITYU cloudy 01/16/2025 03:18 PM    CLARITYU CLEAR 07/06/2024 10:45 PM    SPECGRAV 1.020 01/16/2025 03:18 PM    LEUKOCYTESUR TRACE 03/02/2025 04:15 AM    UROBILINOGEN 0.2 03/02/2025 04:15 AM    BILIRUBINUR NEGATIVE 03/02/2025 04:15 AM    BLOODU neg 01/16/2025 03:18 PM    BLOODU TRACE 07/06/2024 10:45 PM    GLUCOSEU NEGATIVE 03/02/2025 04:15 AM    KETUA TRACE 03/02/2025 04:15 AM     Urine Cultures:   Lab Results   Component Value Date/Time    LABURIN No growth at 18 to 36 hours 01/16/2025 03:19 PM     Blood Cultures: No results found for: \"BC\"  No results found for: \"BLOODCULT2\"  Organism:   Lab Results   Component Value Date/Time    ORG Strep agalactiae (Beta Strep Group B) 08/29/2023 02:55 PM    ORG Aerococcus species 08/29/2023 02:55 PM         Electronically signed by Derrick Orta MD on 3/2/2025 at 12:01 PM   
awareness, and balance.    Activities performed today included bed mobility training, sup-sit, sit-stand, SPT.    AM-PAC - Mobility    AM-PAC Basic Mobility - Inpatient   How much help is needed turning from your back to your side while in a flat bed without using bedrails?: A Little  How much help is needed moving from lying on your back to sitting on the side of a flat bed without using bedrails?: A Little  How much help is needed moving to and from a bed to a chair?: A Lot  How much help is needed standing up from a chair using your arms?: A Lot  How much help is needed walking in hospital room?: A Lot  How much help is needed climbing 3-5 steps with a railing?: Total  AM-PAC Inpatient Mobility Raw Score : 13  AM-PAC Inpatient T-Scale Score : 36.74  Mobility Inpatient CMS 0-100% Score: 64.91  Mobility Inpatient CMS G-Code Modifier : CL             Goals  Long Term Goals  Time Frame for Long Term Goals : In one week:  Long Term Goal 1: Pt will complete all bed mobility with SBAx1  Long Term Goal 2: Pt will complete sit <> stand transfers with CGAx1  Long Term Goal 3: Pt will ambulate 75 feet with CGAx1 with LRAD  Long Term Goal 4: Pt will independently complete 3 sets of 10 reps of BLE AROM exercises  Long Term Goal 5: Pt will ascend/descend 6 steps with a handrail with modAx2       Education  Patient Education  Education Given To: Patient  Education Provided: Energy Conservation;Role of Therapy;Plan of Care;IADL Safety;ADL Adaptive Strategies;Transfer Training;Mobility Training;Fall Prevention Strategies;Equipment  Education Method: Demonstration;Verbal  Education Outcome: Verbalized understanding;Demonstrated understanding;Continued education needed      Time In: 1252  Time Out: 1326  Total Treatment Time: 34  Timed Code Treatment Minutes: 24        Dakota Apple, PT, DPT  License #: [id number]      
for input(s): \"LACTA\" in the last 72 hours.  BNP: No results for input(s): \"PROBNP\" in the last 72 hours.  UA:  Lab Results   Component Value Date/Time    NITRU NEGATIVE 07/06/2024 10:45 PM    NITRU NEGATIVE 01/22/2013 08:04 PM    COLORU yellow 01/16/2025 03:18 PM    COLORU YELLOW 07/06/2024 10:45 PM    PHUR 7.0 01/16/2025 03:18 PM    PHUR 6.0 01/22/2013 08:04 PM    WBCUA 218 07/06/2024 10:45 PM    RBCUA 4 07/06/2024 10:45 PM    MUCUS RARE 07/06/2024 10:45 PM    TRICHOMONAS NONE SEEN 07/06/2024 10:45 PM    BACTERIA NEGATIVE 07/06/2024 10:45 PM    CLARITYU cloudy 01/16/2025 03:18 PM    CLARITYU CLEAR 07/06/2024 10:45 PM    SPECGRAV 1.020 01/16/2025 03:18 PM    LEUKOCYTESUR neg 01/16/2025 03:18 PM    LEUKOCYTESUR SMALL NUMBER OR AMOUNT OBSERVED 07/06/2024 10:45 PM    UROBILINOGEN 1.0 07/06/2024 10:45 PM    BILIRUBINUR neg 01/16/2025 03:18 PM    BILIRUBINUR NEGATIVE 07/06/2024 10:45 PM    BLOODU neg 01/16/2025 03:18 PM    BLOODU TRACE 07/06/2024 10:45 PM    GLUCOSEU neg 01/16/2025 03:18 PM    GLUCOSEU NEGATIVE 07/06/2024 10:45 PM    KETUA neg 01/16/2025 03:18 PM    KETUA NEGATIVE 07/06/2024 10:45 PM     Urine Cultures:   Lab Results   Component Value Date/Time    LABURIN No growth at 18 to 36 hours 01/16/2025 03:19 PM     Blood Cultures: No results found for: \"BC\"  No results found for: \"BLOODCULT2\"  Organism:   Lab Results   Component Value Date/Time    ORG Strep agalactiae (Beta Strep Group B) 08/29/2023 02:55 PM    ORG Aerococcus species 08/29/2023 02:55 PM         Electronically signed by Derrick Orta MD on 3/1/2025 at 11:35 AM

## 2025-03-03 NOTE — CARE COORDINATION
Chart reviewed and met w/ patient and juliano Mcclendon to initiate discharge planning. CM introduced self and explained role. Patient is form home alone w/ supportive family. [facility] Patient and family would like referral to ARU.     9:06 AM Referral sent to [facility].     3:22 PM Received update from [facility], that patient has been accepted to [facility] and she has sent perfect serve to Dr. Orta with information.       03/03/25 0839   Service Assessment   Patient Orientation Alert and Oriented   Cognition Alert   History Provided By Patient;Child/Family  (juliano Mcclendon)   Support Systems Children;Family Members   Patient's Healthcare Decision Maker is: Legal Next of Kin   PCP Verified by CM Yes   Can patient return to prior living arrangement Unknown at present   Ability to make needs known: Fair  (pt is very [place])   Family able to assist with home care needs: Yes   Financial Resources Medicare   Social/Functional History   Lives With Alone   Type of Home House   Home Layout One level   Home Access Stairs to enter with rails   Entrance Stairs - Number of Steps 4   Home Equipment Walker - Rolling;Rollator   Prior Level of Assist for ADLs Independent   Prior Level of Assist for Transfers Independent   Discharge Planning   Type of Residence House   Living Arrangements Alone   Current Services Prior To Admission Home Care  ([facility])   Potential Assistance Needed Other (Comment)  (ARU)   DME Ordered? No   Potential Assistance Purchasing Medications No   Patient expects to be discharged to: Acute rehab   Condition of Participation: Discharge Planning   The Patient and/or Patient Representative was provided with a Choice of Provider? Patient Representative   Name of the Patient Representative who was provided with the Choice of Provider and agrees with the Discharge Plan?  juliano Mcclendon   The Patient and/Or Patient Representative agree with the Discharge Plan? Yes   Freedom of Choice list was

## 2025-03-03 NOTE — CONSULTS
used.    Assessment:  Pt is a 89 y/o female admitted for The primary encounter diagnosis was Acute hypoxic respiratory failure (HCC). Diagnoses of Influenza B, Pneumonia of left upper lobe due to infectious organism, and Hypokalemia were also pertinent to this visit.. Pt at baseline is independent with ADLs and modified independent with functional transfers/mobility using rollator. Pt currently presents w/ deficits relating to ADLs, IADLs, UE strength/ROM, functional activity tolerance, safety awareness, and functional mobility. Pt reports that son lives close by and DTR is able to stay with pt 24/7 after d/c from acute care. Recommend assist with heavy IADLs and supervision bathing at d/c as well as C services. Pt would benefit from continued acute care OT services     Complexity: Moderate   Prognosis: Good, no significant barriers to participation at this time.   Occupational Therapy Plan  Times Per Week: 3-5x wk  Times Per Day: Once a day       Goals:  - Pt will perform UE ADLs with Dep A  using AE PRN by d/c  - Pt will perform LE ADLs with Dep A  using AE PRN by d/c  - Pt will perform toileting with Dep A using AE PRN by d/c  - Pt will perform HH distance functional mobility with Dep A  using DME PRN in preparation for return to home   - Pt will perform functional transfers to/from bed, chair, and toilet with Dep A using AE PRN by d/c  - Pt will perform therex/theract in order to increase functional activity tolerance    Treatment plan:  Pt will improve functional activity tolerance, strength, functional mobility, and ADL completion    Recommendations for NURSING activity: Up to chair for all 3 meals and up to toilet for all toileting needs    Time:   Time in: 1328  Time out: 1402  Timed treatment minutes: 24  Total time: 34    Electronically signed by:    REILLY Rubin/L [id number]  3/3/2025, 3:40 PM

## 2025-03-03 NOTE — PROGRESS NOTES
4 Eyes Skin Assessment     NAME:  Doretha Melo  YOB: 1927  MEDICAL RECORD NUMBER:  [id number]    The patient is being assessed for  Admission    I agree that at least one RN has performed a thorough Head to Toe Skin Assessment on the patient. ALL assessment sites listed below have been assessed.      Areas assessed by both nurses:    Head, Face, Ears, Shoulders, Back, Chest, Arms, Elbows, Hands, Sacrum. Buttock, Coccyx, Ischium, Legs. Feet and Heels, and Under Medical Devices         Does the Patient have a Wound? No noted wound(s) patient has bruising to bilateral arm,legs and chest.       Donell Prevention initiated by RN: No  Wound Care Orders initiated by RN: No    Pressure Injury (Stage 3,4, Unstageable, DTI, NWPT, and Complex wounds) if present, place Wound referral order by RN under : No    New Ostomies, if present place, Ostomy referral order under : No     Nurse 1 eSignature: Electronically signed by Elaine Alarcon RN on 3/3/25 at 6:37 PM EST    **SHARE this note so that the co-signing nurse can place an eSignature**    Nurse 2 eSignature: Electronically signed by Gabe Murry RN on 3/3/25 at 6:40 PMEST

## 2025-03-03 NOTE — CARE COORDINATION
Spoke with patient and son (Hakan) regarding  ARU.  Explained to patient the required 3 hours of therapy a day.  Also explained the average length of stay is 11 days, could be longer or shorter depending on recommendations of therapy and Dr. Zayas.  Patient expresses her understanding and states she's agreeable to admit to ARU.  Goal is for patient to return home alone with help from family as needed.     Patient meets criteria and is approved to come to ARU.   Patient able to admit once medically stable and after ARU Medical Director and  sign the pre-admission screen (PAS).    Notified MD and CM of approval.  Awaiting response from MD.

## 2025-03-03 NOTE — PLAN OF CARE
ARU Interdisciplinary Plan of Care (IPOC)  [address] [place]  [address]  [phone]  Fax: [phone]        Doretha CARVALHO Kameron    : 6/15/1927  [id number]  MRN: [id number]   PHYSICIAN:  GISEL Zayas MD  Primary Active Problems:   Active Hospital Problems    Diagnosis Date Noted    Acute respiratory failure with hypoxia (HCC) [J96.01] 2025    Influenza [J11.1] 2025    Influenza A with pneumonia [J09.X1] 2025    Hypokalemia [E87.6] 2018       Rehabilitation Diagnosis:     Influenza due to other identified influenza virus with other respiratory manifestations [J10.1]  Influenza [J11.1]          CARE PLAN     NURSING:  Doretha Melo while on this unit will:     Bowel and Bladder   [x] Be continent of bowel and bladder      [x] Have an adequate number of bowel movements   [] Urinate with no urinary retention >300ml in bladder   [] Bladder Scan: (details)   [] Complete bladder protocol with moyer removal   [] Initiate Bladder Program to toilet every ___ hours   [] Initiate Bowel Program to toilet every ___hours   [] Bladder training    [] Bowel training  Pulmonary   [x] Maintain O2 SATs at 92% or greater  Pain Management   [x] Have pain managed while on ARU        [] Be pain free by discharge    [x] Medication Management and Education  Maintenance of Skin Integrity/Wound Management   [x] Have no skin breakdown while on ARU   [] Have improved skin integrity via wound measurements   [] Have no signs/symptoms of infection via infection protection and monitoring at the          wound site  Fall Prevention   [x] Be free from injury during hospitalization via fall prevention measures     [x] Disease management and Education  Precautions   [] Weight Bearing Precautions   [x] Swallowing Precautions   [x] Monitoring of Risks of Complications   [x] DVT Prophylaxis    [x] Fluid/electrolyte/Nutrition Management     contents:    Title   Name    Date    Time    Physician:   GISEL Zayas MD 3/5/2025 10:01 AM    Case Mgmt:  Vicky Castro 3/4/2025 1045    OT: GABBY Christianson, OTR/L #[id number] 03/04/25 1418    PT: Serena Escamilla, FROY       03/04/2025       16:37    RN: Gabe Murry RN 3/3/25    ST:    Dietician:     ADMIT DATE:3/3/2025

## 2025-03-04 PROBLEM — J96.01 ACUTE RESPIRATORY FAILURE WITH HYPOXIA: Status: ACTIVE | Noted: 2025-03-04

## 2025-03-04 PROBLEM — J09.X1 INFLUENZA A WITH PNEUMONIA: Status: ACTIVE | Noted: 2025-02-28

## 2025-03-04 PROCEDURE — 94640 AIRWAY INHALATION TREATMENT: CPT

## 2025-03-04 PROCEDURE — 6370000000 HC RX 637 (ALT 250 FOR IP): Performed by: PHYSICAL MEDICINE & REHABILITATION

## 2025-03-04 PROCEDURE — 97166 OT EVAL MOD COMPLEX 45 MIN: CPT

## 2025-03-04 PROCEDURE — 99232 SBSQ HOSP IP/OBS MODERATE 35: CPT | Performed by: PHYSICAL MEDICINE & REHABILITATION

## 2025-03-04 PROCEDURE — 97116 GAIT TRAINING THERAPY: CPT

## 2025-03-04 PROCEDURE — 94150 VITAL CAPACITY TEST: CPT

## 2025-03-04 PROCEDURE — 97535 SELF CARE MNGMENT TRAINING: CPT

## 2025-03-04 PROCEDURE — 97162 PT EVAL MOD COMPLEX 30 MIN: CPT

## 2025-03-04 PROCEDURE — 6370000000 HC RX 637 (ALT 250 FOR IP): Performed by: STUDENT IN AN ORGANIZED HEALTH CARE EDUCATION/TRAINING PROGRAM

## 2025-03-04 PROCEDURE — 1280000000 HC REHAB R&B

## 2025-03-04 PROCEDURE — 97530 THERAPEUTIC ACTIVITIES: CPT

## 2025-03-04 RX ORDER — IPRATROPIUM BROMIDE AND ALBUTEROL SULFATE 2.5; .5 MG/3ML; MG/3ML
1 SOLUTION RESPIRATORY (INHALATION)
Status: DISCONTINUED | OUTPATIENT
Start: 2025-03-04 | End: 2025-03-11

## 2025-03-04 RX ORDER — ACETAMINOPHEN 325 MG/1
650 TABLET ORAL EVERY 4 HOURS PRN
Status: DISCONTINUED | OUTPATIENT
Start: 2025-03-04 | End: 2025-03-11 | Stop reason: HOSPADM

## 2025-03-04 RX ADMIN — Medication 1 TABLET: at 09:26

## 2025-03-04 RX ADMIN — ROSUVASTATIN CALCIUM 40 MG: 20 TABLET, FILM COATED ORAL at 21:06

## 2025-03-04 RX ADMIN — OSELTAMIVIR PHOSPHATE 30 MG: 30 CAPSULE ORAL at 09:26

## 2025-03-04 RX ADMIN — ASPIRIN 81 MG CHEWABLE TABLET 81 MG: 81 TABLET CHEWABLE at 09:25

## 2025-03-04 RX ADMIN — GUAIFENESIN 600 MG: 600 TABLET ORAL at 21:05

## 2025-03-04 RX ADMIN — AMLODIPINE BESYLATE 10 MG: 10 TABLET ORAL at 09:26

## 2025-03-04 RX ADMIN — IPRATROPIUM BROMIDE AND ALBUTEROL SULFATE 1 DOSE: .5; 2.5 SOLUTION RESPIRATORY (INHALATION) at 16:18

## 2025-03-04 RX ADMIN — ACETAMINOPHEN 650 MG: 325 TABLET ORAL at 21:05

## 2025-03-04 RX ADMIN — OSELTAMIVIR PHOSPHATE 30 MG: 30 CAPSULE ORAL at 21:05

## 2025-03-04 RX ADMIN — Medication 1000 UNITS: at 09:26

## 2025-03-04 RX ADMIN — AZITHROMYCIN 250 MG: 250 TABLET, FILM COATED ORAL at 09:26

## 2025-03-04 RX ADMIN — IPRATROPIUM BROMIDE AND ALBUTEROL SULFATE 1 DOSE: .5; 2.5 SOLUTION RESPIRATORY (INHALATION) at 20:17

## 2025-03-04 RX ADMIN — CETIRIZINE HYDROCHLORIDE 5 MG: 10 TABLET, FILM COATED ORAL at 09:25

## 2025-03-04 RX ADMIN — Medication 500 MCG: at 09:25

## 2025-03-04 RX ADMIN — GUAIFENESIN 600 MG: 600 TABLET ORAL at 09:26

## 2025-03-04 RX ADMIN — PREDNISONE 40 MG: 20 TABLET ORAL at 09:26

## 2025-03-04 RX ADMIN — LATANOPROST 1 DROP: 50 SOLUTION OPHTHALMIC at 21:06

## 2025-03-04 RX ADMIN — TIMOLOL MALEATE 1 DROP: 5 SOLUTION OPHTHALMIC at 09:27

## 2025-03-04 ASSESSMENT — PAIN SCALES - GENERAL: PAINLEVEL_OUTOF10: 0

## 2025-03-04 ASSESSMENT — PAIN DESCRIPTION - DESCRIPTORS: DESCRIPTORS: OTHER (COMMENT)

## 2025-03-04 ASSESSMENT — PAIN DESCRIPTION - LOCATION: LOCATION: OTHER (COMMENT)

## 2025-03-04 ASSESSMENT — PAIN DESCRIPTION - ORIENTATION: ORIENTATION: OTHER (COMMENT)

## 2025-03-04 NOTE — H&P
Doretha Melo    : 6/15/1927  [id number]  MRN: [id number]              History and physical    Date of face-to-face exam: 3/4/2025.  Time of face-to-face exam: .    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    History of present illness: The patient is a 89-year-old right-hand-dominant female with chronic clumsiness of gait following a stroke in .  She has felt under the weather for the last 10 days or so.  She thought she had a cold.  On 2025 she awoke too weak to get herself out of bed.  Her family got her to our emergency department where her respiratory panel was positive for influenza A.  She was hypoxic and required oxygen supplementation.  She had significant dizziness and generalized weakness but no localizing signs of new stroke.  Hypokalemia has required supplementation.  She has become too weak to do her own toileting, transfers and self-care.  Acute care therapist have identified reasonable rehabilitation goals.  She requires inpatient rehabilitation to address these issues.    Review of systems: Moist cough, general fatigue and positional dizziness.  No nausea or vomiting or chills.  No dysuria or diarrhea.  The remainder of their review of systems was negative except as mentioned in the history of present illness.    Social History: Lives With: Alone  Type of Home: House  Home Layout: One level  Home Access: Stairs to enter with rails (only leaves home with assist)  Entrance Stairs - Number of Steps: 4  Bathroom Shower/Tub: Tub/Shower unit (has not showered in 1y d/t difficulty stepping over tub, sponge bathes at sink, beauty shop does hair)  Bathroom Toilet: Bedside  her adaptive equipment training with strengthening exercises, balance recovery training and conditioning development.  We will complete her Zithromax and Tamiflu courses with medication continuing into 3/4/2025.  She will continue to receive her oral steroid burst for another day or 2.  Nebulizers will be provided 4 times daily to improve ventilation.  Continuing droplet isolation per protocol.  We must provide aggressive pulmonary hygiene measures while monitoring O2 saturations at rest and with activities.  Encouraging consistent oral hydration while monitoring bowel and bladder function providing retraining as indicated.  Progressive mobility for skin protection and bowel activation.  Caregiver training may be useful with family before she transitions home again.  Outpatient follow-up with her primary care physician after rehab.  DVT prophylaxis: Continuing Lovenox 30 mg subcu daily.  This raises her risk for spontaneous hemorrhage and GI bleeding.  Therefore, I must monitor her hemoglobin and platelet count regularly while considering GI prophylaxis with a PPI.  This is particularly true while she is on oral steroids.  Weightbearing will be a part of her daily therapy plan.  Acute respiratory failure with hypoxia: Supplementing oxygen keep saturations greater than 90%.  Providing oral steroids and nebulizers regularly.  Encouraging compliance with pulmonary hygiene measures.  Titrating oxygen to room air as she strengthens and recovers.  Continuing with Mucinex and an antihistamine (Zyrtec).  Cough suppressant as needed.  Essential hypertension: Continuing Norvasc for controlling systolic blood pressure.  Target systolic blood pressure is 120-140.  Vital signs will be checked at rest and with activities.  Encouraging consistent oral hydration.  Providing antiplatelet therapy with a baby aspirin and a statin (Crestor) as heart health measures.  Hypokalemia: Closely monitoring her chemistries and supplementing

## 2025-03-04 NOTE — PLAN OF CARE
Problem: Chronic Conditions and Co-morbidities  Goal: Patient's chronic conditions and co-morbidity symptoms are monitored and maintained or improved  3/4/2025 1511 by Savanah Poole, RN  Outcome: Progressing  3/4/2025 0503 by Porsha Youngblood, RN  Outcome: Progressing     Problem: Safety - Adult  Goal: Free from fall injury  3/4/2025 0503 by Porsha Youngblood, RN  Outcome: Progressing

## 2025-03-04 NOTE — PROGRESS NOTES
Continuing nebulizers 4 times daily to improve ventilation.  Continuing droplet isolation per protocol.  Providing pulmonary hygiene measures while monitoring O2 saturations at rest and with activities.  Encouraging consistent oral hydration while monitoring bowel and bladder function providing retraining as indicated.  Encouraging progressive mobility for skin protection and bowel activation.  Caregiver training may be useful with family before she transitions home again.  Outpatient follow-up with her primary care physician after rehab.  Verbal cues and moderate physical assistance for transfers.  DVT prophylaxis: Continuing Lovenox 30 mg subcu daily.  This raises her risk for spontaneous hemorrhage and GI bleeding.  Monitoring her hemoglobin and platelet count regularly while considering GI prophylaxis with a PPI.  Encouraging some weightbearing with stand pivot transfers and taking a few steps with a walker with assistance.  No signs of acute blood loss.   Acute respiratory failure with hypoxia: Supplementing oxygen keep saturations greater than 90%.  Providing oral steroids and nebulizers regularly.  Encouraging compliance with pulmonary hygiene measures.  Titrating oxygen to room air as she strengthens and recovers.  Continuing with Mucinex and an antihistamine (Zyrtec).  Cough suppressant as needed.  Essential hypertension: Continuing Norvasc with parameters for controlling systolic blood pressure.  Target systolic blood pressure is 120-140.  Vital signs will be checked at rest and with activities.  Encouraging consistent oral hydration.  Providing antiplatelet therapy with a baby aspirin and a statin (Crestor) as heart health measures.  Her blood pressure was within the target range on medication today.  Monitoring closely.  Hypokalemia: Closely monitoring her chemistries and supplementing potassium as needed (particular while on the corticosteroid).  This medicine upsets her stomach so we will not make it  daily until we have to to maintain reasonable levels.  Rechecking labs in AM.  Orthostatic hypotension: Cautious use of the Norvasc with parameters and encouraging oral hydration.  Using lower limb compressive stockings.  Encouraging slow and deliberate position changes and use of isometric lower body exercises before and after transitions.        Patient's history and exam documented today does not substantially vary from the history and physical completed yesterday before she was admitted to a rehab unit.

## 2025-03-04 NOTE — PROGRESS NOTES
Occupational Therapy                              [facility] ARU OCCUPATIONAL THERAPY EVALUATION    Chart Review:  Past Medical History:   Diagnosis Date    BCC (basal cell carcinoma), leg 09/2017    Family history of pancreatic cancer     Glaucoma of right eye 05/2016    Dr Jean Baptiste    Hyperlipidemia     Hypertension     Macular degeneration, dry     Dr. Jean Baptiste    Osteoarthritis of knee 2011    Dr Trujillo    Osteoporosis 2002    Fosamax [id number]    Pelvic fracture (HCC) 05/2018    Right rotator cuff tear 2014    Trujillo / injection; PT    T10 vertebral fracture (HCC) 02/2018    fall and fragility fx; also L1 ; had kyphoplasty 2/2018    Vitamin B12 deficiency 03/2017    B 12 266 by Ronnie at [facility]     Past Surgical History:   Procedure Laterality Date    ABDOMINAL EXPLORATION SURGERY  1997    With Lysis of Adhesions    APPENDECTOMY  1953    w/  rt.uso    CYSTOCELE REPAIR  1993    w/ rectocele    FIXATION KYPHOPLASTY  02/19/2018    T10 & L1    HYSTERECTOMY (CERVIX STATUS UNKNOWN)  1969     remaining ovary removed also    SKIN CANCER EXCISION Left 09/2017    BCC with skin graft    WRIST FRACTURE SURGERY Left 03/2017    ORIF     Social History:  Social/Functional History  Lives With: Alone (Has children in the area, daughter stops by daily.)  Type of Home: House  Home Layout: One level (Has a basement but does not go down there.)  Home Access: Stairs to enter with rails  Entrance Stairs - Number of Steps: 4  Entrance Stairs - Rails: Both  Bathroom Shower/Tub: Tub only (Pt has been sponge bathing for the last year.)  Bathroom Toilet: Standard (Equipped with toilet raiser)  Bathroom Equipment: Grab bars around toilet, Toilet raiser  Home Equipment: Rollator, Wheelchair - Manual, Reacher, Long-handled shoehorn, Lift chair, Cane  Has the patient had two or more falls in the past year or any fall with injury in the past year?: Yes (At least 4)  Prior Level of Assist for ADLs: Independent  Prior Level of Assist for Homemaking:

## 2025-03-04 NOTE — CARE COORDINATION
Received referral for possible nutritional supplements at discharge.  Patient is now in ARU.  Will continue to follow.

## 2025-03-04 NOTE — RT PROTOCOL NOTE
RT Inhaler-Nebulizer Bronchodilator Protocol Note    There is a bronchodilator order in the chart from a provider indicating to follow the RT Bronchodilator Protocol and there is an “Initiate RT Inhaler-Nebulizer Bronchodilator Protocol” order as well (see protocol at bottom of note).    CXR Findings:  No results found.    The findings from the last RT Protocol Assessment were as follows:   History Pulmonary Disease: None or smoker <15 pack years Non-smoker  hx of asthma or COPD  Respiratory Pattern: Regular pattern and RR 12-20 bpm  Breath Sounds: Slightly diminished and/or crackles  Cough: Strong, productive  Indication for Bronchodilator Therapy: None  Bronchodilator Assessment Score: 3 - PT w/ the flu, not indicated    Aerosolized bronchodilator medication orders have been revised according to the RT Inhaler-Nebulizer Bronchodilator Protocol below.    Respiratory Therapist to perform RT Therapy Protocol Assessment initially then follow the protocol.  Repeat RT Therapy Protocol Assessment PRN for score 0-3 or on second treatment, BID, and PRN for scores above 3.    No Indications - adjust the frequency to every 6 hours PRN wheezing or bronchospasm, if no treatments needed after 48 hours then discontinue using Per Protocol order mode.     If indication present, adjust the RT bronchodilator orders based on the Bronchodilator Assessment Score as indicated below.  Use Inhaler orders unless patient has one or more of the following: on home nebulizer, not able to hold breath for 10 seconds, is not alert and oriented, cannot activate and use MDI correctly, or respiratory rate 25 breaths per minute or more, then use the equivalent nebulizer order(s) with same Frequency and PRN reasons based on the score.  If a patient is on this medication at home then do not decrease Frequency below that used at home.    0-3 - enter or revise RT bronchodilator order(s) to equivalent RT Bronchodilator order with Frequency of every 4  hours PRN for wheezing or increased work of breathing using Per Protocol order mode.        4-6 - enter or revise RT Bronchodilator order(s) to two equivalent RT bronchodilator orders with one order with BID Frequency and one order with Frequency of every 4 hours PRN wheezing or increased work of breathing using Per Protocol order mode.        7-10 - enter or revise RT Bronchodilator order(s) to two equivalent RT bronchodilator orders with one order with TID Frequency and one order with Frequency of every 4 hours PRN wheezing or increased work of breathing using Per Protocol order mode.       11-13 - enter or revise RT Bronchodilator order(s) to one equivalent RT bronchodilator order with QID Frequency and an Albuterol order with Frequency of every 4 hours PRN wheezing or increased work of breathing using Per Protocol order mode.      Greater than 13 - enter or revise RT Bronchodilator order(s) to one equivalent RT bronchodilator order with every 4 hours Frequency and an Albuterol order with Frequency of every 2 hours PRN wheezing or increased work of breathing using Per Protocol order mode.     RT to enter RT Home Evaluation for COPD & MDI Assessment order using Per Protocol order mode.    Electronically signed by Destiney Benoit RCP on 3/3/2025 at 8:42 PM

## 2025-03-04 NOTE — CARE COORDINATION
Case Management Admission Note      Patient:Doretha Melo      :6/15/1927  MRN:[id number]  Rehab Dx/Hx: Influenza due to other identified influenza virus with other respiratory manifestations [J10.1]  Influenza [J11.1]    Chief Complaint:   Past Medical History:   Diagnosis Date    BCC (basal cell carcinoma), leg 2017    Family history of pancreatic cancer     Glaucoma of right eye 2016    Dr Jean Baptiste    Hyperlipidemia     Hypertension     Macular degeneration, dry     Dr. Jean Baptiste    Osteoarthritis of knee     Dr Trujillo    Osteoporosis 2002    Fosamax [id number]    Pelvic fracture (HCC) 2018    Right rotator cuff tear 2014    Trujillo / injection; PT    T10 vertebral fracture (HCC) 2018    fall and fragility fx; also L1 ; had kyphoplasty 2018    Vitamin B12 deficiency 2017    B 12 266 by Ron[facility] at [facility]     Past Surgical History:   Procedure Laterality Date    ABDOMINAL EXPLORATION SURGERY      With Lysis of Adhesions    APPENDECTOMY      w/  rt.uso    CYSTOCELE REPAIR      w/ rectocele    FIXATION KYPHOPLASTY  2018    T10 & L1    HYSTERECTOMY (CERVIX STATUS UNKNOWN)  1969     remaining ovary removed also    SKIN CANCER EXCISION Left 2017    BCC with skin graft    WRIST FRACTURE SURGERY Left 2017    ORIF     Allergies   Allergen Reactions    Bee Venom Swelling    Iodides Hives    Shrimp Flavor Agent (Non-Screening) Hives     Precautions: falls    Date of Admit: 3/3/2025  Room #: 1014/1014-A      Current functional status at time of admit:        Home Living/DME Available:      Type of Home: House  Home Access: Stairs to enter with rails  Bathroom Shower/Tub: Tub only (Pt has been sponge bathing for the last year.)  Bathroom Toilet: Standard (Equipped with toilet raiser)  Bathroom Equipment: Grab bars around toilet, Toilet raiser  Home Equipment: Rollator, Wheelchair - Manual, Reacher, Long-handled shoehorn, Lift chair, Cane       IADL Hx:   Homemaking Responsibilities:

## 2025-03-04 NOTE — PROGRESS NOTES
cold. On 2/28/2025 she awoke too weak to get herself out of bed. Her family got her to our emergency department where her respiratory panel was positive for influenza A. She was hypoxic and required oxygen supplementation. She had significant dizziness and generalized weakness but no localizing signs of new stroke. Hypokalemia has required supplementation.   Pt denied any pain until she performed stairs that aggravated her L knee pain (7/10). She used bilat elastic knee braces throughout the day at baseline and preferred use of it today on most mobility tasks. She was able to stand and ambulate ~15 ft of distance in the room without the said knee braces initially. She had an episode of L knee instability during a stand->sit transfer to a regular chair with armrests and when she tried to ascend the training stairs leading with it. Pt reports her bifocal glasses affect her depth perception on steps affecting her balance.She demonstrated pre-existing skills on rollator use and so continued use of this AD for transfers and progressive ambulation will be part of the PT POC. She was in room air throughout this tx session with one episode of desaturation and quickly recovered with deep inhalation. Pt had wet type of cough but was non-productive. Pt denied any dizziness with positional changes today. Pt was alert and oriented, followed 1-2 step commands consistently, however required repetition of instructions at times due to her hearing deficit and so this will need to be accommodated during tx sessions to ensure pt fully understands required tasks and education provided.  Pt is expected to make progress towards established PT goals based on her cognition, motivation, and provided her medical status continue to improve and remain stable.       Body Structures, Functions, Activity Limitations Requiring Skilled Therapeutic Intervention: Decreased functional mobility , Decreased ADL status, Decreased endurance, Decreased  17:02

## 2025-03-05 LAB
ANION GAP SERPL CALCULATED.3IONS-SCNC: 10 MMOL/L (ref 9–17)
BUN SERPL-MCNC: 16 MG/DL (ref 7–20)
CALCIUM SERPL-MCNC: 9 MG/DL (ref 8.3–10.6)
CHLORIDE SERPL-SCNC: 105 MMOL/L (ref 99–110)
CO2 SERPL-SCNC: 26 MMOL/L (ref 21–32)
CREAT SERPL-MCNC: 0.5 MG/DL (ref 0.6–1.2)
ERYTHROCYTE [DISTWIDTH] IN BLOOD BY AUTOMATED COUNT: 16.8 % (ref 11.7–14.9)
GFR, ESTIMATED: >90 ML/MIN/1.73M2
GLUCOSE SERPL-MCNC: 107 MG/DL (ref 74–99)
HCT VFR BLD AUTO: 43.6 % (ref 37–47)
HGB BLD-MCNC: 13.5 G/DL (ref 12.5–16)
MCH RBC QN AUTO: 28.4 PG (ref 27–31)
MCHC RBC AUTO-ENTMCNC: 31 G/DL (ref 32–36)
MCV RBC AUTO: 91.8 FL (ref 78–100)
PLATELET # BLD AUTO: 471 K/UL (ref 140–440)
PMV BLD AUTO: 8.8 FL (ref 7.5–11.1)
POTASSIUM SERPL-SCNC: 3.9 MMOL/L (ref 3.5–5.1)
RBC # BLD AUTO: 4.75 M/UL (ref 4.2–5.4)
SODIUM SERPL-SCNC: 141 MMOL/L (ref 136–145)
WBC OTHER # BLD: 8.9 K/UL (ref 4–10.5)

## 2025-03-05 PROCEDURE — 97110 THERAPEUTIC EXERCISES: CPT

## 2025-03-05 PROCEDURE — 97116 GAIT TRAINING THERAPY: CPT

## 2025-03-05 PROCEDURE — 85027 COMPLETE CBC AUTOMATED: CPT

## 2025-03-05 PROCEDURE — 6370000000 HC RX 637 (ALT 250 FOR IP): Performed by: PHYSICAL MEDICINE & REHABILITATION

## 2025-03-05 PROCEDURE — 94640 AIRWAY INHALATION TREATMENT: CPT

## 2025-03-05 PROCEDURE — 80048 BASIC METABOLIC PNL TOTAL CA: CPT

## 2025-03-05 PROCEDURE — 36415 COLL VENOUS BLD VENIPUNCTURE: CPT

## 2025-03-05 PROCEDURE — 97530 THERAPEUTIC ACTIVITIES: CPT

## 2025-03-05 PROCEDURE — 1280000000 HC REHAB R&B

## 2025-03-05 PROCEDURE — 2500000003 HC RX 250 WO HCPCS: Performed by: PHYSICAL MEDICINE & REHABILITATION

## 2025-03-05 PROCEDURE — 6370000000 HC RX 637 (ALT 250 FOR IP): Performed by: STUDENT IN AN ORGANIZED HEALTH CARE EDUCATION/TRAINING PROGRAM

## 2025-03-05 PROCEDURE — 94761 N-INVAS EAR/PLS OXIMETRY MLT: CPT

## 2025-03-05 PROCEDURE — 97535 SELF CARE MNGMENT TRAINING: CPT

## 2025-03-05 RX ORDER — SODIUM CHLORIDE 0.9 % (FLUSH) 0.9 %
5-40 SYRINGE (ML) INJECTION 2 TIMES DAILY
Status: DISCONTINUED | OUTPATIENT
Start: 2025-03-05 | End: 2025-03-11

## 2025-03-05 RX ADMIN — ASPIRIN 81 MG CHEWABLE TABLET 81 MG: 81 TABLET CHEWABLE at 08:39

## 2025-03-05 RX ADMIN — Medication 500 MCG: at 08:40

## 2025-03-05 RX ADMIN — Medication 1000 UNITS: at 08:40

## 2025-03-05 RX ADMIN — AMLODIPINE BESYLATE 10 MG: 10 TABLET ORAL at 08:41

## 2025-03-05 RX ADMIN — CETIRIZINE HYDROCHLORIDE 5 MG: 10 TABLET, FILM COATED ORAL at 08:41

## 2025-03-05 RX ADMIN — AZITHROMYCIN 250 MG: 250 TABLET, FILM COATED ORAL at 08:42

## 2025-03-05 RX ADMIN — TIMOLOL MALEATE 1 DROP: 5 SOLUTION OPHTHALMIC at 08:44

## 2025-03-05 RX ADMIN — GUAIFENESIN 600 MG: 600 TABLET ORAL at 08:39

## 2025-03-05 RX ADMIN — IPRATROPIUM BROMIDE AND ALBUTEROL SULFATE 1 DOSE: .5; 2.5 SOLUTION RESPIRATORY (INHALATION) at 16:25

## 2025-03-05 RX ADMIN — ACETAMINOPHEN 650 MG: 325 TABLET ORAL at 08:40

## 2025-03-05 RX ADMIN — IPRATROPIUM BROMIDE AND ALBUTEROL SULFATE 1 DOSE: .5; 2.5 SOLUTION RESPIRATORY (INHALATION) at 12:08

## 2025-03-05 RX ADMIN — GUAIFENESIN 600 MG: 600 TABLET ORAL at 21:56

## 2025-03-05 RX ADMIN — Medication 1 TABLET: at 08:41

## 2025-03-05 RX ADMIN — LATANOPROST 1 DROP: 50 SOLUTION OPHTHALMIC at 22:00

## 2025-03-05 RX ADMIN — SODIUM CHLORIDE, PRESERVATIVE FREE 5 ML: 5 INJECTION INTRAVENOUS at 21:56

## 2025-03-05 RX ADMIN — ROSUVASTATIN CALCIUM 40 MG: 20 TABLET, FILM COATED ORAL at 21:56

## 2025-03-05 ASSESSMENT — PAIN SCALES - GENERAL
PAINLEVEL_OUTOF10: 2
PAINLEVEL_OUTOF10: 4
PAINLEVEL_OUTOF10: 0

## 2025-03-05 ASSESSMENT — PAIN - FUNCTIONAL ASSESSMENT: PAIN_FUNCTIONAL_ASSESSMENT: ACTIVITIES ARE NOT PREVENTED

## 2025-03-05 ASSESSMENT — PAIN DESCRIPTION - ONSET: ONSET: GRADUAL

## 2025-03-05 ASSESSMENT — PAIN DESCRIPTION - DESCRIPTORS: DESCRIPTORS: ACHING

## 2025-03-05 ASSESSMENT — PAIN DESCRIPTION - LOCATION: LOCATION: HEAD

## 2025-03-05 ASSESSMENT — PAIN DESCRIPTION - ORIENTATION: ORIENTATION: INNER

## 2025-03-05 ASSESSMENT — PAIN DESCRIPTION - PAIN TYPE: TYPE: ACUTE PAIN

## 2025-03-05 ASSESSMENT — PAIN DESCRIPTION - FREQUENCY: FREQUENCY: INTERMITTENT

## 2025-03-05 NOTE — PROGRESS NOTES
Occupational Therapy    Physical Rehabilitation: OCCUPATIONAL THERAPY     [x] daily progress note       [] discharge       Patient Name:  Doretha Melo   :  6/15/1927 MRN: [id number]  Room:  [address] [place] [address] Date of Admission: 3/3/2025  Rehabilitation Diagnosis:   Influenza due to other identified influenza virus with other respiratory manifestations [J10.1]  Influenza [J11.1]       Date 3/5/2025       Day of ARU Week:  3   Time IN/OUT 0839/1009   Individual Tx Minutes 90   Group Tx Minutes    Co-Treat Minutes    Concurrent Tx Minutes    TOTAL Tx Time Mins 90   Variance Time    Variance Reason []   Refusal due to:     []   Medical hold/reason:    []   Illness   []   Off Unit for test/procedure  []   Extra time needed to complete task  []   Therapeutic need  []   Make up mins were attempted but pt unable to complete due to (specify)  []   Other (specify):   Restrictions Restrictions/Precautions: Fall Risk, Isolation         Communication with other providers: [x]   OK to see per nursing:     []   Spoke with team member regarding:      Subjective observations and cognitive status: Pt in bed finishing breakfast and taking morning medications upon arrival. Pt agreeable to wash up and get dressed for the day. Pt did enjoy games of Chinese checkers at the end of the session.     Pain level/location:    /10       Location: Pt denined   Discharge recommendations  Anticipated discharge date:  TBD  Destination: []home alone   [x]home alone w assist prn   [] home w/ family    [] Continuous supervision       []SNF    [] Assisted living     [] Other:   Continued therapy: [x]HHC OT  []OUTPATIENT  OT   [] No Further OT  Equipment needs: None       ADLs:    Oral Hygiene: Oral Hygiene  Assistance Needed: Supervision or touching assistance  Comment: Supervision in stance at the sink to brush dentures.  CARE Score: 4  Discharge Goal: Independent    UB/LB Bathing: Shower/Bathe Self  Assistance Needed: Supervision or touching

## 2025-03-05 NOTE — PROGRESS NOTES
Comprehensive Nutrition Assessment    Type and Reason for Visit:  Initial, Consult (oral nutrition supplement)    Nutrition Recommendations/Plan:   Continue easy to chew diet as needed   Will offer frozen oral nutrition supplement instead of standard supplement   Will continue to follow up during stay      Malnutrition Assessment:  Malnutrition Status:  At risk for malnutrition (03/05/25 1345)    Context:  Acute Illness       Nutrition Assessment:    Admit to acute rehab unit with weakness, influenza. Tolerating easy to chew diet with meal intake at recent meals 50-75%. Current order for standard oral nutrition supplement but patient dislikes, does not want anymore sent. Will trial frozen supplement instead. Improving intake in past days as patient feeling much better. Will follow at moderate nutrition risk at this time with hx reduced intake.    Nutrition Related Findings:    up in chair eating lunch, living independently prior to hospital admit     hard of hearing Wound Type: None       Current Nutrition Intake & Therapies:    Average Meal Intake: 51-75%  Average Supplements Intake: Refusing to take (dislikes Ensure)  ADULT DIET; Easy to Chew; yogurt and cottage cheese  ADULT ORAL NUTRITION SUPPLEMENT; Breakfast, Lunch, Dinner; Standard High Calorie/High Protein Oral Supplement    Anthropometric Measures:  Height: 139.7 cm (4' 7\")  Ideal Body Weight (IBW): 75 lbs (34 kg)    Admission Body Weight: 38.6 kg (85 lb 1.6 oz) (3/3/25)  Current Body Weight: 41 kg (90 lb 6.2 oz), 120.5 % IBW. Weight Source: Bed scale  Current BMI (kg/m2): 21  Usual Body Weight: 38.6 kg (85 lb 1.6 oz) (1/14/25)     % Weight Change (Calculated): 6.2  Weight Adjustment For: No Adjustment                 BMI Categories: Underweight (BMI less than 22) age over 65    Estimated Daily Nutrient Needs:  Energy Requirements Based On: Kcal/kg  Weight Used for Energy Requirements: Current  Energy (kcal/day): 7450-8710 (30-35 ben/kg)  Weight Used for

## 2025-03-05 NOTE — FLOWSHEET NOTE
[x] daily progress note       [] discharge       Patient Name:  Doretha Melo   :  6/15/1927 MRN: [id number]  Room:  [address] [place] [address] Date of Admission: 3/3/2025  Rehabilitation Diagnosis:   Influenza due to other identified influenza virus with other respiratory manifestations [J10.1]  Influenza [J11.1]       Date 3/5/2025       Day of ARU Week:  3   Time IN/OUT 2273-9228   Individual Tx Minutes 90   TOTAL Tx Time Mins 90   Restrictions Restrictions/Precautions  Restrictions/Precautions: Fall Risk, Isolation  Position Activity Restriction  Other Position/Activity Restrictions: droplet isolation; chronic L knee weakness with Hx of instability (wears bilat elastic knee braces during the day per pt's preference)   Communication with other providers: [x]   OK to see per nursing:     []   Spoke with team member regarding:      Subjective observations and cognitive status: Pt seen sitting up in recliner at beginning of treatment. Agreeable to therapy.    Pain level/location: 0/10          Discharge recommendations  Anticipated discharge date:  TBD  Destination: []home alone   []home alone with assist PRN     [] home w/ family      [] Continuous supervision  []SNF    [] Assisted living     [] Other:   Continued therapy: []HHC PT  []OUTPATIENT  PT   [] No Further PT  Equipment needs:  has rollator and reacher      Bed Mobility:           [x]   Pt received out of bed   Rolling R/L:  Independent   Scooting:  Independent   Lying --> Sit:  Independent   Sit --> lying:  Independent  Pt practiced with bed flat and no rails     Transfers:    Sit--> Stand:  SBA  Stand --> Sit:   SBA  Chair-->Bed/Bed --> Chair:   SBA  Car Transfers:  SBA (pt stepped into vehicle)   Assistive device required for transfer:   rollator and bilateral knee braces     Gait:    Distance:  246'+180'    Assistance:  SBA  Device:  rollator and bilateral knee braces   Gait Quality:  reciprocal pattern     Stairs   # Completed:  5  Assistance: CGA  Supportive  primarily)  Laundry Responsibility: No (Daughter completes)  Cleaning Responsibility: No (Daughter completes)  Bill Paying/Finance Responsibility: No (Daughter completes)  Shopping Responsibility: No (Daughter completes)  Dependent Care Responsibility: No  Health Care Management: Primary (Uses pillbox at baseline)  Prior Level of Assist for Ambulation: Independent household ambulator, with or without device  Prior Level of Assist for Transfers: Independent  Active : No  Patient's  Info: Daughter, Monae drives  Mode of Transportation: SUV  Occupation: Retired  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    Objective                                                                                    Goals:  (Update in navigator)  Short Term Goals  Time Frame for Short Term Goals: 10 tx days:  Short Term Goal 1: Pt will complete bed mobility (scooting, rolling R/L, and sup<->sit) Ind.  Short Term Goal 2: Pt will complete OOB transfers using rollator Mod Ind.  Short Term Goal 3: Pt will ambulate 10 ft and 50 ft with turns over level surface using rollator Mod Ind.  Short Term Goal 4: Pt will ambulate 10 ft over uneven surface and 150 ft over level surface using rollator with SBA.  Short Term Goal 5: Pt will ascend/descend curb step using rollator and 4-5 steps using railings with CGA.  Additional Goals?: Yes  Short Term Goal 6: Pt will complete object retrieval from the flloor with rollator and reacher Mod Ind.:   :        Plan of Care                                                                              Times per week: 5 days per week for a minimum of 60 minutes/day plus group as appropriate for 60 minutes.  Treatment to include      Electronically signed by   Mendoza Gutiérrez, [id number]  3/5/2025, 4:54 PM

## 2025-03-05 NOTE — PROGRESS NOTES
Latest Reference Range & Units 03/05/25 08:04   WBC 4.0 - 10.5 k/uL 8.9   RBC 4.20 - 5.40 m/uL 4.75   Hemoglobin Quant 12.5 - 16.0 g/dL 13.5   Hematocrit 37.0 - 47.0 % 43.6   MCV 78.0 - 100.0 fL 91.8   MCH 27.0 - 31.0 pg 28.4   MCHC 32.0 - 36.0 g/dL 31.0 (L)   MPV 7.5 - 11.1 fL 8.8   RDW 11.7 - 14.9 % 16.8 (H)   Platelet Count 140 - 440 k/uL 471 (H)   (L): Data is abnormally low  (H): Data is abnormally high

## 2025-03-05 NOTE — PROGRESS NOTES
Latest Reference Range & Units 03/05/25 08:04   Sodium 136 - 145 mmol/L 141   Potassium 3.5 - 5.1 mmol/L 3.9   Chloride 99 - 110 mmol/L 105   CARBON DIOXIDE 21 - 32 mmol/L 26   BUN,BUNPL 7 - 20 mg/dL 16   Creatinine 0.6 - 1.2 mg/dL 0.5 (L)   Anion Gap 9 - 17 mmol/L 10   Est, Glom Filt Rate >60 mL/min/1.73m2 >90   Glucose 74 - 99 mg/dL 107 (H)   Calcium 8.3 - 10.6 mg/dL 9.0   (L): Data is abnormally low  (H): Data is abnormally high

## 2025-03-05 NOTE — PLAN OF CARE
Problem: Chronic Conditions and Co-morbidities  Goal: Patient's chronic conditions and co-morbidity symptoms are monitored and maintained or improved  3/5/2025 1036 by Shaina Subramanian LPN  Outcome: Progressing  3/4/2025 2200 by Veena Barrera LPN  Outcome: Progressing     Problem: Discharge Planning  Goal: Discharge to home or other facility with appropriate resources  3/5/2025 1036 by Shaina Subramanian LPN  Outcome: Progressing  3/4/2025 2200 by Veena Barrera LPN  Outcome: Progressing     Problem: Safety - Adult  Goal: Free from fall injury  3/5/2025 1036 by Shaina Subramanian LPN  Outcome: Progressing  3/4/2025 2200 by Veena Barrera LPN  Outcome: Progressing     Problem: Skin/Tissue Integrity  Goal: Skin integrity remains intact  Description: 1.  Monitor for areas of redness and/or skin breakdown  2.  Assess vascular access sites hourly  3.  Every 4-6 hours minimum:  Change oxygen saturation probe site  4.  Every 4-6 hours:  If on nasal continuous positive airway pressure, respiratory therapy assess nares and determine need for appliance change or resting period  3/5/2025 1036 by Shaina Subramanian LPN  Outcome: Progressing  3/4/2025 2200 by Veena Barrera LPN  Outcome: Progressing  Flowsheets (Taken 3/4/2025 2105)  Skin Integrity Remains Intact: Monitor for areas of redness and/or skin breakdown     Problem: ABCDS Injury Assessment  Goal: Absence of physical injury  3/5/2025 1036 by Shaina Subramanian LPN  Outcome: Progressing  3/4/2025 2200 by Veena Barrera LPN  Outcome: Progressing

## 2025-03-05 NOTE — PLAN OF CARE
Problem: Chronic Conditions and Co-morbidities  Goal: Patient's chronic conditions and co-morbidity symptoms are monitored and maintained or improved  3/4/2025 2200 by Veena Barrera LPN  Outcome: Progressing  3/4/2025 1511 by Savanah Poole RN  Outcome: Progressing     Problem: Discharge Planning  Goal: Discharge to home or other facility with appropriate resources  3/4/2025 2200 by Veena Barrera LPN  Outcome: Progressing  3/4/2025 1511 by Savanah Poole RN  Outcome: Progressing     Problem: Safety - Adult  Goal: Free from fall injury  Outcome: Progressing     Problem: Skin/Tissue Integrity  Goal: Skin integrity remains intact  Description: 1.  Monitor for areas of redness and/or skin breakdown  2.  Assess vascular access sites hourly  3.  Every 4-6 hours minimum:  Change oxygen saturation probe site  4.  Every 4-6 hours:  If on nasal continuous positive airway pressure, respiratory therapy assess nares and determine need for appliance change or resting period  Outcome: Progressing     Problem: ABCDS Injury Assessment  Goal: Absence of physical injury  Outcome: Progressing

## 2025-03-05 NOTE — PATIENT CARE CONFERENCE
ACUTE REHAB TEAM CONFERENCE SUMMARY   [facility]    NAME: Doretha Melo  : 6/15/1927 ADMIT DATE: 3/3/2025    Rehab Admitting Dx:Influenza due to other identified influenza virus with other respiratory manifestations [J10.1]  Influenza [J11.1]  Patient Comorbid Conditions:   Active Hospital Problems    Diagnosis Date Noted    Acute respiratory failure with hypoxia (HCC) [J96.01] 2025    Influenza [J11.1] 2025    Influenza A with pneumonia [J09.X1] 2025    Hypokalemia [E87.6] 2018     Date: 3/6/2025    CASE MANAGEMENT  Current issues/needs regarding patient and family discharge status: Patient lives alone  Patient and family goal:  discharge home alone with PRN family support and [facility].    PHYSICAL THERAPY (Updated in QI)  Short Term Goals  Time Frame for Short Term Goals: 10 tx days:  Short Term Goal 1: Pt will complete bed mobility (scooting, rolling R/L, and sup<->sit) Ind.  Short Term Goal 2: Pt will complete OOB transfers using rollator Mod Ind.  Short Term Goal 3: Pt will ambulate 10 ft and 50 ft with turns over level surface using rollator Mod Ind.  Short Term Goal 4: Pt will ambulate 10 ft over uneven surface and 150 ft over level surface using rollator with SBA.  Short Term Goal 5: Pt will ascend/descend curb step using rollator and 4-5 steps using railings with CGA.  Additional Goals?: Yes  Short Term Goal 6: Pt will complete object retrieval from the flloor with rollator and reacher Mod Ind.          Impairments/deficits, barriers:    Body Structures, Functions, Activity Limitations Requiring Skilled Therapeutic Intervention: Decreased functional mobility , Decreased ADL status, Decreased endurance, Decreased balance, Decreased high-level IADLs, Decreased strength, Increased pain     Therapy Prognosis: Good  Decision Making: Medium Complexity  Clinical Presentation: evolving with changing characteristics  Equipment needed at discharge: has rollator       PT  conference due to:         Ongoing tx following discharge: [x][facility] OT, PT     []OUTPATIENT     [] No Further Treatment     [] Family/Caregiver Training  []  Transitional Living Arrangement   [] Home Assessment (date  )     [] Family Conference   []  Therapeutic Pass       []  Other: (specify)    Estimated Discharge Date: 3/11/25    Estimated Discharge Destination: []home alone   [x]home alone with assist prn  []Continuous supervision []Return home with s/o/spouse/family   [] Assisted living    []SNF     Team members participating in today's conference.    [x] GISEL Zayas, Medical Director    []  Dr Ishaan Churchill, Covering Medical Director  []  Dr Nikolay Yancey, Covering Medical Director    [x] Bora Paul, DPT,         [] [place], RN Nurse Manager   [] Reyes Flores RN Nurse Manager     [x]  Serena Escamilla, PT  []  Ester Lindsey, PT       [] Brianna Velasquez, OT   [x] Mohamud Salesr, OT  [] Mona Frank, OT     [x]  Destiney Munguia, SLP    []  Janae Campos, SLP   [] Agata Arevalo, SLP     [x]Vicky Erazo,   []Ester Lion MSW, LSW,      [] Santi Murry, HARMONY   [x] Savanah Poole, HARMONY    [] Diony Hager, HARMONY    [] Daja Harrison, HARMONY []       I have led this Team Conference and agree with the plan,  GISEL Zayas MD, 3/6/2025, 12:44 PM  []   I, the Medical Director, was required to lead today's conference via telephone due to an unanticipated scheduling conflict.  I agree with the decisions made by the inter-disciplinary team at today's meeting.      Goals have been updated to reflect recent status.    Team conference note transcribed this date by: Destiney Munguia MA, CCC-SLP, Therapy Coordinator

## 2025-03-06 PROCEDURE — 94664 DEMO&/EVAL PT USE INHALER: CPT

## 2025-03-06 PROCEDURE — 94761 N-INVAS EAR/PLS OXIMETRY MLT: CPT

## 2025-03-06 PROCEDURE — 6370000000 HC RX 637 (ALT 250 FOR IP): Performed by: PHYSICAL MEDICINE & REHABILITATION

## 2025-03-06 PROCEDURE — 97116 GAIT TRAINING THERAPY: CPT

## 2025-03-06 PROCEDURE — 97530 THERAPEUTIC ACTIVITIES: CPT

## 2025-03-06 PROCEDURE — 6370000000 HC RX 637 (ALT 250 FOR IP): Performed by: STUDENT IN AN ORGANIZED HEALTH CARE EDUCATION/TRAINING PROGRAM

## 2025-03-06 PROCEDURE — 97110 THERAPEUTIC EXERCISES: CPT

## 2025-03-06 PROCEDURE — 94150 VITAL CAPACITY TEST: CPT

## 2025-03-06 PROCEDURE — 2500000003 HC RX 250 WO HCPCS: Performed by: PHYSICAL MEDICINE & REHABILITATION

## 2025-03-06 PROCEDURE — 97112 NEUROMUSCULAR REEDUCATION: CPT

## 2025-03-06 PROCEDURE — 1280000000 HC REHAB R&B

## 2025-03-06 PROCEDURE — 6360000002 HC RX W HCPCS: Performed by: PHYSICAL MEDICINE & REHABILITATION

## 2025-03-06 PROCEDURE — 94640 AIRWAY INHALATION TREATMENT: CPT

## 2025-03-06 RX ORDER — ENOXAPARIN SODIUM 100 MG/ML
30 INJECTION SUBCUTANEOUS DAILY
Status: COMPLETED | OUTPATIENT
Start: 2025-03-06 | End: 2025-03-10

## 2025-03-06 RX ADMIN — ENOXAPARIN SODIUM 30 MG: 100 INJECTION SUBCUTANEOUS at 12:17

## 2025-03-06 RX ADMIN — Medication 500 MCG: at 08:22

## 2025-03-06 RX ADMIN — Medication 1000 UNITS: at 08:21

## 2025-03-06 RX ADMIN — SODIUM CHLORIDE, PRESERVATIVE FREE 10 ML: 5 INJECTION INTRAVENOUS at 10:37

## 2025-03-06 RX ADMIN — IPRATROPIUM BROMIDE AND ALBUTEROL SULFATE 1 DOSE: .5; 2.5 SOLUTION RESPIRATORY (INHALATION) at 07:42

## 2025-03-06 RX ADMIN — SODIUM CHLORIDE, PRESERVATIVE FREE 10 ML: 5 INJECTION INTRAVENOUS at 20:10

## 2025-03-06 RX ADMIN — TIMOLOL MALEATE 1 DROP: 5 SOLUTION OPHTHALMIC at 08:23

## 2025-03-06 RX ADMIN — IPRATROPIUM BROMIDE AND ALBUTEROL SULFATE 1 DOSE: .5; 2.5 SOLUTION RESPIRATORY (INHALATION) at 15:22

## 2025-03-06 RX ADMIN — Medication 1 TABLET: at 08:22

## 2025-03-06 RX ADMIN — ROSUVASTATIN CALCIUM 40 MG: 20 TABLET, FILM COATED ORAL at 20:09

## 2025-03-06 RX ADMIN — ASPIRIN 81 MG CHEWABLE TABLET 81 MG: 81 TABLET CHEWABLE at 08:22

## 2025-03-06 RX ADMIN — GUAIFENESIN 600 MG: 600 TABLET ORAL at 08:22

## 2025-03-06 RX ADMIN — AMLODIPINE BESYLATE 10 MG: 10 TABLET ORAL at 08:22

## 2025-03-06 RX ADMIN — LATANOPROST 1 DROP: 50 SOLUTION OPHTHALMIC at 20:10

## 2025-03-06 RX ADMIN — CETIRIZINE HYDROCHLORIDE 5 MG: 10 TABLET, FILM COATED ORAL at 08:21

## 2025-03-06 RX ADMIN — IPRATROPIUM BROMIDE AND ALBUTEROL SULFATE 1 DOSE: .5; 2.5 SOLUTION RESPIRATORY (INHALATION) at 20:09

## 2025-03-06 RX ADMIN — GUAIFENESIN 600 MG: 600 TABLET ORAL at 20:09

## 2025-03-06 NOTE — PROGRESS NOTES
Doretha Melo    : 6/15/1927  Acct #: [id number]  MRN: [id number]              PM&R Progress Note      Admitting diagnosis: ***    Comorbid diagnoses impacting rehabilitation: ***    Chief complaint: ***    Prior (baseline) level of function: Independent.    Current level of function:         Current  IRF-ANGELY and Goals:   Occupational Therapy:    Short Term Goals  Time Frame for Short Term Goals: STGs=LTGs :   Long Term Goals  Time Frame for Long Term Goals : 10 days or until d/c.  Long Term Goal 1: Pt will complete oral hygiene and grooming tasks with IND.  Long Term Goal 2: Pt will complete total body bathing with AE PRN and Mod I.  Long Term Goal 3: Pt will complete UB dressing with IND.  Long Term Goal 4: Pt will complete LB dressing with AE PRN and Mod I.  Long Term Goal 5: Pt will doff/don footwear with AE PRN and Mod I.  Additional Goals?: Yes  Long Term Goal 6: Pt will complete toileting with Mod I.  Long Term Goal 7: Pt will complete functional transfers (bed, chair, shower, toilet) with DME PRN and Mod I.  Long Term Goal 8: Pt will perform therex/therax to facilitate an increase in strength/endurance with emphasis on dynamic standing balance/tolerance > 8 mins with Mod I.  Long Term Goal 9: Pt will complete a meal prep ax with Mod I. :                                       Eating: Eating  Assistance Needed: Independent  Comment: Per Pt report, able to open packages/containers to eat meal IND.  CARE Score: 6  Discharge Goal: Independent       Oral Hygiene: Oral Hygiene  Assistance Needed: Supervision or touching assistance  Comment: Supervision in stance at the sink to brush dentures.  CARE Score: 4  Discharge Goal: Independent    UB/LB Bathing: Shower/Bathe Self  Assistance Needed: Supervision or touching assistance  Comment: Pt stood for ~ 50% of bathing at the sink wtih Supervision. Pt does sit to wash distal BLEs using forward flexion positioning.  CARE Score: 4  Discharge Goal: Independent    UB  Mobility:   Sit to Lying  Assistance Needed: Independent  Comment: Pt practiced with bed flat and no rails.  CARE Score: 6  Discharge Goal: Independent  Roll Left and Right  Assistance Needed: Independent  Comment: Pt practiced with bed flat and no rails.  CARE Score: 6  Discharge Goal: Independent  Lying to Sitting on Side of Bed  Assistance Needed: Independent  Comment: Pt practiced with bed flat and no rails.  CARE Score: 6  Discharge Goal: Independent    Transfers:    Sit to Stand  Assistance Needed: Supervision or touching assistance  Comment: SBA with rollator and bilateral knee braces  CARE Score: 4  Discharge Goal: Independent  Chair/Bed-to-Chair Transfer  Assistance Needed: Supervision or touching assistance  Comment: SBA with rollator and bilateral knee braces  CARE Score: 4  Discharge Goal: Independent  Toilet Transfer  Assistance needed: Supervision or touching assistance  Comment: CGA with rollator and L grab bar  CARE Score: 4  Car Transfer  Assistance Needed: Supervision or touching assistance  Comment: SBA with rollator and bilateral knee braces  CARE Score: 4  Discharge Goal: Independent    Ambulation:    Walking Ability  Does the Patient Walk?: Yes     Walk 10 Feet  Assistance Needed: Supervision or touching assistance  Comment: SBA with rollator and bilateral knee braces  CARE Score: 4  Discharge Goal: Independent     Walk 50 Feet with Two Turns  Assistance Needed: Supervision or touching assistance  Comment: SBA with rollator and bilateral knee braces  CARE Score: 4  Discharge Goal: Independent     Walk 150 Feet  Assistance Needed: Supervision or touching assistance  Comment: SBA with rollator and bilateral knee braces  Reason if not Attempted: Not attempted due to medical condition or safety concerns  CARE Score: 4  Discharge Goal: Supervision or touching assistance     Walking 10 Feet on Uneven Surfaces  Assistance Needed: Supervision or touching assistance  Comment: CGA with rollator and

## 2025-03-06 NOTE — PLAN OF CARE
Problem: Chronic Conditions and Co-morbidities  Goal: Patient's chronic conditions and co-morbidity symptoms are monitored and maintained or improved  Outcome: Progressing     Problem: Discharge Planning  Goal: Discharge to home or other facility with appropriate resources  Outcome: Progressing     Problem: Safety - Adult  Goal: Free from fall injury  Outcome: Progressing     Problem: Skin/Tissue Integrity  Goal: Skin integrity remains intact  Description: 1.  Monitor for areas of redness and/or skin breakdown  2.  Assess vascular access sites hourly  3.  Every 4-6 hours minimum:  Change oxygen saturation probe site  4.  Every 4-6 hours:  If on nasal continuous positive airway pressure, respiratory therapy assess nares and determine need for appliance change or resting period  Outcome: Progressing     Problem: ABCDS Injury Assessment  Goal: Absence of physical injury  Outcome: Progressing     Problem: Nutrition Deficit:  Goal: Optimize nutritional status  Outcome: Progressing

## 2025-03-06 NOTE — FLOWSHEET NOTE
[x] daily progress note       [] discharge       Patient Name:  Doretha Melo   :  6/15/1927 MRN: [id number]  Room:  [address] [place] [address] Date of Admission: 3/3/2025  Rehabilitation Diagnosis:   Influenza due to other identified influenza virus with other respiratory manifestations [J10.1]  Influenza [J11.1]       Date 3/6/2025       Day of ARU Week:  4   Time IN/OUT 4658-8428   Individual Tx Minutes 60   TOTAL Tx Time Mins 60   Restrictions Restrictions/Precautions  Restrictions/Precautions: Fall Risk, Isolation  Position Activity Restriction  Other Position/Activity Restrictions: droplet isolation; chronic L knee weakness with Hx of instability (wears bilat elastic knee braces during the day per pt's preference)   Communication with other providers: [x]   OK to see per nursing:     []   Spoke with team member regarding:      Subjective observations and cognitive status: Pt seen sitting up in recliner at beginning of treatment. Agreeable to therapy.    Pain level/location: 0/10       Discharge recommendations  Anticipated discharge date: 3-  Destination: []home alone   [x]home alone with assist PRN     [] home w/ family      [] Continuous supervision  []SNF    [] Assisted living     [] Other:   Continued therapy: [x][facility] PT  []OUTPATIENT  PT   [] No Further PT  Equipment needs:  has rollator and reacher       [x]   Pt received out of bed     Transfers:    Sit--> Stand:  SBA  Stand --> Sit:   SBA  Stand pivot transfers:   SBA  Car Transfers: SBA (pt stepped into car) (pt reported that she transfers in/out of her daughter's SUV.)  Assistive device required for transfer:   rollator and bilateral knee braces     Gait:    Distance:  262'   Assistance:  SBA  Device:  rollator and bilateral knee braces  Gait Quality:  reciprocal pattern    Stairs   # Completed:  5  Assistance:  SBA  Supportive Device:  2 rails and bilateral knee braces   Vcs for proper sequencing     Uneven Surfaces:       Assistance:    SBA  Device:     flar bed.    Objective                                                                                    Goals:  (Update in navigator)  Short Term Goals  Time Frame for Short Term Goals: 10 tx days:  Short Term Goal 1: Pt will complete bed mobility (scooting, rolling R/L, and sup<->sit) Ind.  Short Term Goal 2: Pt will complete OOB transfers using rollator Mod Ind.  Short Term Goal 3: Pt will ambulate 10 ft and 50 ft with turns over level surface using rollator Mod Ind.  Short Term Goal 4: Pt will ambulate 10 ft over uneven surface and 150 ft over level surface using rollator with SBA.  Short Term Goal 5: Pt will ascend/descend curb step using rollator and 4-5 steps using railings with CGA.  Additional Goals?: Yes  Short Term Goal 6: Pt will complete object retrieval from the flloor with rollator and reacher Mod Ind.:   :        Plan of Care                                                                              Times per week: 5 days per week for a minimum of 60 minutes/day plus group as appropriate for 60 minutes.  Treatment to include      Electronically signed by   Mendoza Gutiérrez, [id number]  3/6/2025, 5:19 PM

## 2025-03-06 NOTE — PROGRESS NOTES
Occupational Therapy    Physical Rehabilitation: OCCUPATIONAL THERAPY     [x] daily progress note       [] discharge       Patient Name:  Doretha Melo   :  6/15/1927 MRN: [id number]  Room:  [address] [place] [address] Date of Admission: 3/3/2025  Rehabilitation Diagnosis:   Influenza due to other identified influenza virus with other respiratory manifestations [J10.1]  Influenza [J11.1]       Date 3/6/2025       Day of ARU Week:  4   Time IN/OUT 1100/1200   Individual Tx Minutes 60   Group Tx Minutes    Co-Treat Minutes    Concurrent Tx Minutes    TOTAL Tx Time Mins 60   Variance Time    Variance Reason []   Refusal due to:     []   Medical hold/reason:    []   Illness   []   Off Unit for test/procedure  []   Extra time needed to complete task  []   Therapeutic need  []   Make up mins were attempted but pt unable to complete due to (specify)  []   Other (specify):   Restrictions Restrictions/Precautions: Fall Risk, Isolation         Communication with other providers: [x]   OK to see per nursing:     []   Spoke with team member regarding:      Subjective observations and cognitive status: Pt received from physical therapy in the hallway. Pt pleasant and agreeable to go to therapy gym for OT session.     Pain level/location:    /10       Location: Pt denied   Discharge recommendations  Anticipated discharge date:  25  Destination: []home alone   [x]home alone w assist prn   [] home w/ family    [] Continuous supervision       []SNF    [] Assisted living     [] Other:   Continued therapy: [x][facility] OT  []OUTPATIENT  OT   [] No Further OT  Equipment needs: None       Bed Mobility:           [x]   Pt received out of bed       Transfers:    Sit--> Stand:  SBA/Supervision  Stand --> Sit:   SBA/Supervision  Stand-Pivot:   SBA/Supervision  Other:    Assistive device required for transfer:   Rollator      Additional Therapeutic activities/exercises completed this date:     []   ADL Training   [x]   Balance/Postural training  Pt

## 2025-03-06 NOTE — PROGRESS NOTES
Physical Therapy  [x] daily progress note       [] discharge       Patient Name:  Doretha Melo   :  6/15/1927 MRN: [id number]  Room:  [address] [place] [address] Date of Admission: 3/3/2025  Rehabilitation Diagnosis:   Influenza due to other identified influenza virus with other respiratory manifestations [J10.1]  Influenza [J11.1]       Date 3/6/2025       Day of ARU Week:  4   Time IN/OUT 13:00-14:00   Individual Tx Minutes 60   TOTAL Tx Time Mins 60   Restrictions Restrictions/Precautions  Restrictions/Precautions: Fall Risk, Isolation  Position Activity Restriction  Other Position/Activity Restrictions: droplet isolation; 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 Pt son and daughter in law present during first part of PT. Pt agreeable to PT services this afternoon.   Pain level/location:    0/10       Location: none   Discharge recommendations  Anticipated discharge date:  TBD  Destination: []home alone   [x]home alone with assist PRN     [] home w/ family      [] Continuous supervision  []SNF    [] Assisted living     [] Other:   Continued therapy: [x]HHC PT  []OUTPATIENT  PT   [] No Further PT  Equipment needs: TBD         Bed Mobility:      Pt received out of bed      Transfers:    Sit--> Stand:  sup  Stand --> Sit:   sup  Chair-->Bed/Bed --> Chair:   sup  Car Transfers:  SBA  Toilet Transfer (if applicable): sup  Assistive device required for transfer:   4WW    Gait:    Distance:  364' + 301' + 264'   Assistance:  sup  Device:  4WW  Gait Quality:  normal bhupinder and reciprocal gait pattern      Uneven Surfaces:       Assistance:    SBA  Device:    4WW  Surfaces Completed:   [x]  Green mat with bean bags beneath      []  Throw rugs       []   Ramp       []  Outdoor pavements        []  Grass             []  Loose gravel        []  Other:         Additional Therapeutic activities/exercises completed this date:     []   Nu-step:  Time:        Level:         #Steps:       []   Rebounder:    []  Seated     []  Standing        [x]   Balance training  PT assistant instructed pt on stability trainers, pt was able to tolerate narrow JORGE LUIS, semi-tandem, and SLS all for 1 min each. Pt had no LOB this day       []   Postural training    []   Supine ther ex (reps/sets):     [x]   Seated ther ex (reps/sets): LAQs, heel slides, GS, QS, marches, PF/DF, hip abduction, hip adduction, x10 reps using 2# for resistance    []   Standing ther ex (reps/sets):     []   Picking up object from floor (standing):                   []   Reacher used   []   Other:   []   Other:    Comments:      Patient/Caregiver Education and Training:   [x]   Bed Mobility/Transfer technique/safety  [x]   Gait technique/sequencing  [x]   Proper use of assistive device  [x]   Advanced mobility safety and technique  [x]   Reinforced patient's precautions/mobility while maintaining precautions  [x]   Postural awareness  []   Family training      Treatment/Activity Tolerance:   [x] Tolerated treatment with no adverse effects    [] Patient limited by fatigue  [] Patient limited by pain   [] Patient limited by medical complications:    [] Adverse reaction to Tx:   [] Significant change in status    Safety:       [x]  bed alarm set    []  chair alarm set    []  Pt refused alarms                []  Telesitter activated      [x]  Gait belt used during tx session      [x]other:  call light left within pt reach at end of PT session.       Number of Minutes/Billable Intervention  Gait Training 15   Therapeutic Exercise 15   Neuro Re-Ed 15   Therapeutic Activity 15   Wheelchair Propulsion    Group    Other:    TOTAL 60         Social History  Social/Functional History  Lives With: Alone (Has children in the area,

## 2025-03-06 NOTE — CARE COORDINATION
Patient reviewed at today's care conference.  She'll dc home alone 3/11 with PRN family support.  [facility] pt/ot recommended.  She has all needed DME.  Follow up with PCP.    CM attempted to update patient in room. Patient napping.  Will re-attempt.

## 2025-03-07 PROCEDURE — 6370000000 HC RX 637 (ALT 250 FOR IP): Performed by: PHYSICAL MEDICINE & REHABILITATION

## 2025-03-07 PROCEDURE — 1280000000 HC REHAB R&B

## 2025-03-07 PROCEDURE — 97535 SELF CARE MNGMENT TRAINING: CPT

## 2025-03-07 PROCEDURE — 97116 GAIT TRAINING THERAPY: CPT

## 2025-03-07 PROCEDURE — 94761 N-INVAS EAR/PLS OXIMETRY MLT: CPT

## 2025-03-07 PROCEDURE — 6370000000 HC RX 637 (ALT 250 FOR IP): Performed by: STUDENT IN AN ORGANIZED HEALTH CARE EDUCATION/TRAINING PROGRAM

## 2025-03-07 PROCEDURE — 97110 THERAPEUTIC EXERCISES: CPT

## 2025-03-07 PROCEDURE — 97112 NEUROMUSCULAR REEDUCATION: CPT

## 2025-03-07 PROCEDURE — 6360000002 HC RX W HCPCS: Performed by: PHYSICAL MEDICINE & REHABILITATION

## 2025-03-07 PROCEDURE — 94640 AIRWAY INHALATION TREATMENT: CPT

## 2025-03-07 PROCEDURE — 97530 THERAPEUTIC ACTIVITIES: CPT

## 2025-03-07 RX ADMIN — LATANOPROST 1 DROP: 50 SOLUTION OPHTHALMIC at 20:40

## 2025-03-07 RX ADMIN — ENOXAPARIN SODIUM 30 MG: 100 INJECTION SUBCUTANEOUS at 09:46

## 2025-03-07 RX ADMIN — Medication 500 MCG: at 09:47

## 2025-03-07 RX ADMIN — IPRATROPIUM BROMIDE AND ALBUTEROL SULFATE 1 DOSE: .5; 2.5 SOLUTION RESPIRATORY (INHALATION) at 11:44

## 2025-03-07 RX ADMIN — ASPIRIN 81 MG CHEWABLE TABLET 81 MG: 81 TABLET CHEWABLE at 09:46

## 2025-03-07 RX ADMIN — GUAIFENESIN 600 MG: 600 TABLET ORAL at 09:47

## 2025-03-07 RX ADMIN — CETIRIZINE HYDROCHLORIDE 5 MG: 10 TABLET, FILM COATED ORAL at 09:46

## 2025-03-07 RX ADMIN — TIMOLOL MALEATE 1 DROP: 5 SOLUTION OPHTHALMIC at 09:57

## 2025-03-07 RX ADMIN — GUAIFENESIN 600 MG: 600 TABLET ORAL at 20:40

## 2025-03-07 RX ADMIN — Medication 1 TABLET: at 09:46

## 2025-03-07 RX ADMIN — AMLODIPINE BESYLATE 10 MG: 10 TABLET ORAL at 09:46

## 2025-03-07 RX ADMIN — IPRATROPIUM BROMIDE AND ALBUTEROL SULFATE 1 DOSE: .5; 2.5 SOLUTION RESPIRATORY (INHALATION) at 15:21

## 2025-03-07 RX ADMIN — Medication 1000 UNITS: at 09:46

## 2025-03-07 RX ADMIN — ROSUVASTATIN CALCIUM 40 MG: 20 TABLET, FILM COATED ORAL at 20:40

## 2025-03-07 ASSESSMENT — PAIN SCALES - GENERAL
PAINLEVEL_OUTOF10: 0
PAINLEVEL_OUTOF10: 0

## 2025-03-07 NOTE — CARE COORDINATION
CM met with patient and family in room to discuss dc planning.  Patient is agreeable to 3/11 discharge home with [facility].  She has no new DME needs.  Whiteboard up to date.    Patient referred to Sandhya @ [facility] via [employer] serve

## 2025-03-07 NOTE — PROGRESS NOTES
Physical Therapy  [x] daily progress note       [] discharge       Patient Name:  Doretha Melo   :  6/15/1927 MRN: [id number]  Room:  [address] [place] [address] Date of Admission: 3/3/2025  Rehabilitation Diagnosis:   Influenza due to other identified influenza virus with other respiratory manifestations [J10.1]  Influenza [J11.1]       Date 3/7/2025       Day of ARU Week:  5   Time IN/OUT 8:30-9:30  13:00-14:00   Individual Tx Minutes 60+60   TOTAL Tx Time Mins 120   Restrictions Restrictions/Precautions  Restrictions/Precautions: Fall Risk, Isolation  Position Activity Restriction  Other Position/Activity Restrictions: droplet isolation; 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  PM: Pt seen resting in recliner after lunch. Pt agreeable to finishing her PT for the day.   Pain level/location:    0/10       Location: none   Discharge recommendations  Anticipated discharge date:  3/11/25  Destination: []home alone   [x]home alone with assist PRN     [] home w/ family      [] Continuous supervision  []SNF    [] Assisted living     [] Other:   Continued therapy: [x]HHC PT  []OUTPATIENT  PT   [] No Further PT  Equipment needs: none         Bed Mobility:           [x]   Pt received out of bed       Transfers:    Sit--> Stand:  sup  Stand --> Sit:   sup  Chair-->Bed/Bed --> Chair:   sup  Car Transfers:  SBA  Toilet Transfer (if applicable): sup  Assistive device required for transfer:   4WW    Gait:    Distance:  284' + 364' + 364' + 108' + 210' + 217' + 364'  Assistance:  sup  Device:  4WW  Gait Quality:  normal reciprocal gait pattern    Wheelchair Propulsion:  Distance:  150' for  bed.    Objective                                                                                    Goals:  (Update in navigator)  Short Term Goals  Time Frame for Short Term Goals: 10 tx days:  Short Term Goal 1: Pt will complete bed mobility (scooting, rolling R/L, and sup<->sit) Ind.  Short Term Goal 2: Pt will complete OOB transfers using rollator Mod Ind.  Short Term Goal 3: Pt will ambulate 10 ft and 50 ft with turns over level surface using rollator Mod Ind.  Short Term Goal 4: Pt will ambulate 10 ft over uneven surface and 150 ft over level surface using rollator with SBA.  Short Term Goal 5: Pt will ascend/descend curb step using rollator and 4-5 steps using railings with CGA.  Additional Goals?: Yes  Short Term Goal 6: Pt will complete object retrieval from the flloor with rollator and reacher Mod Ind.:   :        Plan of Care                                                                              Times per week: 5 days per week for a minimum of 60 minutes/day plus group as appropriate for 60 minutes.  Treatment to include      Electronically signed by   Elizabeth Lynch PTA,  3/7/2025, 7:42 AM

## 2025-03-07 NOTE — PROGRESS NOTES
Doretha Melo    : 6/15/1927  Acct #: [id number]  MRN: [id number]              PM&R Progress Note      Admitting diagnosis: ***    Comorbid diagnoses impacting rehabilitation: ***    Chief complaint: ***    Prior (baseline) level of function: Independent.    Current level of function:         Current  IRF-ANGELY and Goals:   Occupational Therapy:    Short Term Goals  Time Frame for Short Term Goals: STGs=LTGs :   Long Term Goals  Time Frame for Long Term Goals : 10 days or until d/c.  Long Term Goal 1: Pt will complete oral hygiene and grooming tasks with IND.  Long Term Goal 2: Pt will complete total body bathing with AE PRN and Mod I.  Long Term Goal 3: Pt will complete UB dressing with IND.  Long Term Goal 4: Pt will complete LB dressing with AE PRN and Mod I.  Long Term Goal 5: Pt will doff/don footwear with AE PRN and Mod I.  Additional Goals?: Yes  Long Term Goal 6: Pt will complete toileting with Mod I.  Long Term Goal 7: Pt will complete functional transfers (bed, chair, shower, toilet) with DME PRN and Mod I.  Long Term Goal 8: Pt will perform therex/therax to facilitate an increase in strength/endurance with emphasis on dynamic standing balance/tolerance > 8 mins with Mod I.  Long Term Goal 9: Pt will complete a meal prep ax with Mod I. :                                       Eating: Eating  Assistance Needed: Independent  Comment: Per Pt report, able to open packages/containers to eat meal IND.  CARE Score: 6  Discharge Goal: Independent       Oral Hygiene: Oral Hygiene  Assistance Needed: Supervision or touching assistance  Comment: Supervision in stance at the sink to brush dentures.  CARE Score: 4  Discharge Goal: Independent    UB/LB Bathing: Shower/Bathe Self  Assistance Needed: Supervision or touching assistance  Comment: Pt stood for ~ 50% of bathing at the sink wtih Supervision. Pt does sit to wash distal BLEs using forward flexion positioning.  CARE Score: 4  Discharge Goal: Independent    UB

## 2025-03-07 NOTE — PROGRESS NOTES
Occupational Therapy    Physical Rehabilitation: OCCUPATIONAL THERAPY     [x] daily progress note       [] discharge       Patient Name:  Doretha Melo   :  6/15/1927 MRN: [id number]  Room:  [address] [place] [address] Date of Admission: 3/3/2025  Rehabilitation Diagnosis:   Influenza due to other identified influenza virus with other respiratory manifestations [J10.1]  Influenza [J11.1]       Date 3/7/2025       Day of ARU Week:  5   Time IN/OUT 0730/0830   Individual Tx Minutes 60   Group Tx Minutes    Co-Treat Minutes    Concurrent Tx Minutes    TOTAL Tx Time Mins 60   Variance Time    Variance Reason []   Refusal due to:     []   Medical hold/reason:    []   Illness   []   Off Unit for test/procedure  []   Extra time needed to complete task  []   Therapeutic need  []   Make up mins were attempted but pt unable to complete due to (specify)  []   Other (specify):   Restrictions Restrictions/Precautions: Fall Risk, Isolation         Communication with other providers: [x]   OK to see per nursing:     []   Spoke with team member regarding:      Subjective observations and cognitive status: Pt resting in bed upon entrance, easily awakened and agreeable to therapy.     Pain level/location:    /10       Location: Pt denied   Discharge recommendations  Anticipated discharge date:  25  Destination: []home alone   [x]home alone w assist prn   [] home w/ family    [] Continuous supervision       []SNF    [] Assisted living     [] Other:   Continued therapy: [x]HHC OT  []OUTPATIENT  OT   [] No Further OT  Equipment needs: None       ADLs:    Eating: Eating  Assistance Needed: Independent  Comment: OT observed Pt open packages/containers to eat breakfast IND.  CARE Score: 6  Discharge Goal: Independent       Oral Hygiene: Oral Hygiene  Assistance Needed: Independent  Comment: IND to brush dentures in stance.  CARE Score: 6  Discharge Goal: Independent    UB/LB Bathing: Shower/Bathe Self  Assistance Needed: Supervision or touching  Mod I.  Long Term Goal 8: Pt will perform therex/therax to facilitate an increase in strength/endurance with emphasis on dynamic standing balance/tolerance > 8 mins with Mod I.  Long Term Goal 9: Pt will complete a meal prep ax with Mod I.:        Plan of Care                                                                              Times per week: 5 days per week for a minimum of 60 minutes/day plus group as appropriate for 60 minutes.  Treatment to include Occupational Therapy Plan  Current Treatment Recommendations: Strengthening, ROM, Balance training, Functional mobility training, Endurance training, Pain management, Safety education & training, Patient/Caregiver education & training, Equipment evaluation, education, & procurement, Positioning, Self-Care / ADL, Home management training    Electronically signed by   GABBY Christinason, OTR/L #[id number]  3/7/2025, 1:16 PM

## 2025-03-08 PROCEDURE — 6370000000 HC RX 637 (ALT 250 FOR IP): Performed by: STUDENT IN AN ORGANIZED HEALTH CARE EDUCATION/TRAINING PROGRAM

## 2025-03-08 PROCEDURE — 94761 N-INVAS EAR/PLS OXIMETRY MLT: CPT

## 2025-03-08 PROCEDURE — 97530 THERAPEUTIC ACTIVITIES: CPT

## 2025-03-08 PROCEDURE — 94640 AIRWAY INHALATION TREATMENT: CPT

## 2025-03-08 PROCEDURE — 97110 THERAPEUTIC EXERCISES: CPT

## 2025-03-08 PROCEDURE — 6360000002 HC RX W HCPCS: Performed by: PHYSICAL MEDICINE & REHABILITATION

## 2025-03-08 PROCEDURE — 6370000000 HC RX 637 (ALT 250 FOR IP): Performed by: PHYSICAL MEDICINE & REHABILITATION

## 2025-03-08 PROCEDURE — 97116 GAIT TRAINING THERAPY: CPT

## 2025-03-08 PROCEDURE — 1280000000 HC REHAB R&B

## 2025-03-08 RX ADMIN — AMLODIPINE BESYLATE 10 MG: 10 TABLET ORAL at 08:24

## 2025-03-08 RX ADMIN — CETIRIZINE HYDROCHLORIDE 5 MG: 10 TABLET, FILM COATED ORAL at 08:24

## 2025-03-08 RX ADMIN — Medication 1000 UNITS: at 08:24

## 2025-03-08 RX ADMIN — ROSUVASTATIN CALCIUM 40 MG: 20 TABLET, FILM COATED ORAL at 20:33

## 2025-03-08 RX ADMIN — LATANOPROST 1 DROP: 50 SOLUTION OPHTHALMIC at 20:33

## 2025-03-08 RX ADMIN — GUAIFENESIN 600 MG: 600 TABLET ORAL at 08:24

## 2025-03-08 RX ADMIN — TIMOLOL MALEATE 1 DROP: 5 SOLUTION OPHTHALMIC at 08:27

## 2025-03-08 RX ADMIN — Medication 1 TABLET: at 08:24

## 2025-03-08 RX ADMIN — GUAIFENESIN 600 MG: 600 TABLET ORAL at 20:33

## 2025-03-08 RX ADMIN — IPRATROPIUM BROMIDE AND ALBUTEROL SULFATE 1 DOSE: .5; 2.5 SOLUTION RESPIRATORY (INHALATION) at 16:28

## 2025-03-08 RX ADMIN — ENOXAPARIN SODIUM 30 MG: 100 INJECTION SUBCUTANEOUS at 08:24

## 2025-03-08 RX ADMIN — IPRATROPIUM BROMIDE AND ALBUTEROL SULFATE 1 DOSE: .5; 2.5 SOLUTION RESPIRATORY (INHALATION) at 13:04

## 2025-03-08 RX ADMIN — ASPIRIN 81 MG CHEWABLE TABLET 81 MG: 81 TABLET CHEWABLE at 08:24

## 2025-03-08 RX ADMIN — Medication 500 MCG: at 08:25

## 2025-03-08 ASSESSMENT — PAIN SCALES - GENERAL
PAINLEVEL_OUTOF10: 0
PAINLEVEL_OUTOF10: 0

## 2025-03-08 NOTE — PROGRESS NOTES
Physical Rehabilitation: OCCUPATIONAL THERAPY     [x] daily progress note       [] discharge       Patient Name:  Doretha Melo   :  6/15/1927 MRN: [id number]  Room:  [address] [place] [address] Date of Admission: 3/3/2025  Rehabilitation Diagnosis:   Influenza due to other identified influenza virus with other respiratory manifestations [J10.1]  Influenza [J11.1]       Date 3/8/2025       Day of ARU Week:  6   Time IN/OUT 6923-6056, 6367-4690   Individual Tx Minutes 60+60   Group Tx Minutes    Co-Treat Minutes    Concurrent Tx Minutes    TOTAL Tx Time Mins 120   Variance Time    Variance Reason []   Refusal due to:     []   Medical hold/reason:    []   Illness   []   Off Unit for test/procedure  []   Extra time needed to complete task  []   Therapeutic need  []   Make up mins were attempted but pt unable to complete due to (specify)  []   Other (specify):   Restrictions Restrictions/Precautions: Fall Risk, Isolation         Communication with other providers: [x]   OK to see per nursing:     []   Spoke with team member regarding:      Subjective observations and cognitive status: Pt sitting in recliner chair upon arrival and agreeable to therapy.     Pain level/location:    /10       Location:    Discharge recommendations  Anticipated discharge date:  25  Destination: []home alone   [x]home alone w assist prn   [] home w/ family    [] Continuous supervision       []SNF    [] Assisted living     [] Other:   Continued therapy: [x]HHC OT  []OUTPATIENT  OT   [] No Further OT  Equipment needs: None       ADLs:    Bed Mobility:           [x]   Pt received out of bed       Transfers:    Sit--> Stand:  Sup  Stand --> Sit:   Sup  Stand-Pivot:   Sup  Other:    Assistive device required for transfer:   RW      Functional Mobility:    Assistance:  Sup  Device:   [x]   Rolling Walker     []   Standard Walker []   Wheelchair        []   Cane       []   4-Wheeled Walker         []   Cardiac Walker       []   Other:        Additional

## 2025-03-08 NOTE — FLOWSHEET NOTE
[x] daily progress note       [] discharge       Patient Name:  Doretha Melo   :  6/15/1927 MRN: [id number]  Room:  [address] [place] [address] Date of Admission: 3/3/2025  Rehabilitation Diagnosis:   Influenza due to other identified influenza virus with other respiratory manifestations [J10.1]  Influenza [J11.1]       Date 3/8/2025       Day of ARU Week:  6   Time IN/OUT 5789-4397   Individual Tx Minutes 60   Group Tx Minutes    Co-Treat Minutes    Concurrent Tx Minutes    TOTAL Tx Time Mins 60   Variance Time    Variance Reason []   Refusal due to:     []   Medical hold/reason:    []   Illness   []   Off Unit for test/procedure  []   Extra time needed to complete task  []   Therapeutic need  []   Make up mins were attempted but pt unable to complete due to (specify)  []   Other (specify):   Restrictions Restrictions/Precautions  Restrictions/Precautions: Fall Risk, Isolation  Position Activity Restriction  Other Position/Activity Restrictions: droplet isolation; 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 Pt was alert and agreeable to treatment session.     Pain level/location:    /10       Location:    Discharge recommendations  Anticipated discharge date:  3/11/25  Destination: []home alone   [x]home alone with assist PRN     [] home w/ family      [] Continuous supervision  []SNF    [] Assisted living     [] Other:   Continued therapy: [x]HHC PT  []OUTPATIENT  PT   [] No Further PT  Equipment needs: none       Transfers:    Sit--> Stand:  Sup  Stand --> Sit:   Sup  Chair-->Bed/Bed --> Chair:   Sup  Car Transfers:  Sup  Toilet Transfer (if applicable):   Other:    Assistive device required for transfer:   4WW    Gait:    Distance:   Frame for Short Term Goals: 10 tx days:  Short Term Goal 1: Pt will complete bed mobility (scooting, rolling R/L, and sup<->sit) Ind.  Short Term Goal 2: Pt will complete OOB transfers using rollator Mod Ind.  Short Term Goal 3: Pt will ambulate 10 ft and 50 ft with turns over level surface using rollator Mod Ind.  Short Term Goal 4: Pt will ambulate 10 ft over uneven surface and 150 ft over level surface using rollator with SBA.  Short Term Goal 5: Pt will ascend/descend curb step using rollator and 4-5 steps using railings with CGA.  Additional Goals?: Yes  Short Term Goal 6: Pt will complete object retrieval from the flloor with rollator and reacher Mod Ind.:   :        Plan of Care                                                                              Times per week: 5 days per week for a minimum of 60 minutes/day plus group as appropriate for 60 minutes.  Treatment to include      Electronically signed by   Candelaria Orozco PTA, [id number]   3/8/2025, 8:22 AM

## 2025-03-08 NOTE — PROGRESS NOTES
Independent  Comment: Pt practiced with bed flat and no rails.  CARE Score: 6  Discharge Goal: Independent  Lying to Sitting on Side of Bed  Assistance Needed: Independent  Comment: Pt practiced with bed flat and no rails.  CARE Score: 6  Discharge Goal: Independent    Transfers:    Sit to Stand  Assistance Needed: Supervision or touching assistance  Comment: SBA with rollator and bilateral knee braces  CARE Score: 4  Discharge Goal: Independent  Chair/Bed-to-Chair Transfer  Assistance Needed: Supervision or touching assistance  Comment: SBA with rollator and bilateral knee braces  CARE Score: 4  Discharge Goal: Independent  Toilet Transfer  Assistance needed: Supervision or touching assistance  Comment: CGA with rollator and L grab bar  CARE Score: 4  Car Transfer  Assistance Needed: Supervision or touching assistance  Comment: SBA with rollator and bilateral knee braces  CARE Score: 4  Discharge Goal: Independent    Ambulation:    Walking Ability  Does the Patient Walk?: Yes     Walk 10 Feet  Assistance Needed: Supervision or touching assistance  Comment: SBA with rollator and bilateral knee braces  CARE Score: 4  Discharge Goal: Independent     Walk 50 Feet with Two Turns  Assistance Needed: Supervision or touching assistance  Comment: SBA with rollator and bilateral knee braces  CARE Score: 4  Discharge Goal: Independent     Walk 150 Feet  Assistance Needed: Supervision or touching assistance  Comment: 88 (deemed usual performance per team huddle)  Reason if not Attempted: Not attempted due to medical condition or safety concerns  CARE Score: 4  Discharge Goal: Supervision or touching assistance     Walking 10 Feet on Uneven Surfaces  Assistance Needed: Supervision or touching assistance  Comment: 3 (deemed usual performance per team huddle)  CARE Score: 4  Discharge Goal: Supervision or touching assistance     1 Step (Curb)  Assistance Needed: Supervision or touching assistance  Comment: 3 (deemed usual  03/01/2025    AST 18 03/01/2025    ALKPHOS 50 03/01/2025    BILITOT 0.3 03/01/2025       Expected length of stay  prior to a supervised level of function for discharge home with a walker and HHC OT/PT is ***    Recommendations:    ***

## 2025-03-08 NOTE — PLAN OF CARE
Problem: Chronic Conditions and Co-morbidities  Goal: Patient's chronic conditions and co-morbidity symptoms are monitored and maintained or improved  3/7/2025 2049 by Gayla Cool RN  Outcome: Progressing  Flowsheets (Taken 3/7/2025 2045)  Care Plan - Patient's Chronic Conditions and Co-Morbidity Symptoms are Monitored and Maintained or Improved: Monitor and assess patient's chronic conditions and comorbid symptoms for stability, deterioration, or improvement  3/7/2025 0940 by Stephanie Gordon RN  Outcome: Progressing  3/7/2025 0816 by Stephanie Gordon RN  Outcome: Progressing     Problem: Discharge Planning  Goal: Discharge to home or other facility with appropriate resources  3/7/2025 2049 by Gayla Cool RN  Outcome: Progressing  Flowsheets (Taken 3/7/2025 2045)  Discharge to home or other facility with appropriate resources: Identify barriers to discharge with patient and caregiver  3/7/2025 0940 by Stephanie Gordon RN  Outcome: Progressing  3/7/2025 0816 by Stephanie Gordon RN  Outcome: Progressing     Problem: Safety - Adult  Goal: Free from fall injury  3/7/2025 2049 by Gayla Cool RN  Outcome: Progressing  3/7/2025 0940 by Stephanie Gordon RN  Outcome: Progressing  3/7/2025 0816 by Stephanie Gordon RN  Outcome: Progressing     Problem: Skin/Tissue Integrity  Goal: Skin integrity remains intact  Description: 1.  Monitor for areas of redness and/or skin breakdown  2.  Assess vascular access sites hourly  3.  Every 4-6 hours minimum:  Change oxygen saturation probe site  4.  Every 4-6 hours:  If on nasal continuous positive airway pressure, respiratory therapy assess nares and determine need for appliance change or resting period  3/7/2025 2049 by Gayla Cool RN  Outcome: Progressing  Flowsheets (Taken 3/7/2025 2045)  Skin Integrity Remains Intact: Monitor for areas of redness and/or skin breakdown  3/7/2025 0940 by Stephanie Gordon  RN  Outcome: Progressing  3/7/2025 0816 by Stephanie Gordon RN  Outcome: Progressing     Problem: ABCDS Injury Assessment  Goal: Absence of physical injury  3/7/2025 2049 by Gayla Cool, RN  Outcome: Progressing  3/7/2025 0940 by Stephanie Gordon RN  Outcome: Progressing  3/7/2025 0816 by Stephanie Gordon, RN  Outcome: Progressing     Problem: Nutrition Deficit:  Goal: Optimize nutritional status  3/7/2025 2049 by Gayla Cool, RN  Outcome: Progressing  3/7/2025 0940 by Stephanie Gordon RN  Outcome: Progressing  3/7/2025 0816 by Stephanie Gordon RN  Outcome: Progressing

## 2025-03-09 VITALS
HEART RATE: 86 BPM | SYSTOLIC BLOOD PRESSURE: 131 MMHG | BODY MASS INDEX: 19.44 KG/M2 | OXYGEN SATURATION: 93 % | HEIGHT: 55 IN | TEMPERATURE: 98.4 F | WEIGHT: 84 LBS | DIASTOLIC BLOOD PRESSURE: 70 MMHG | RESPIRATION RATE: 16 BRPM

## 2025-03-09 PROCEDURE — 6370000000 HC RX 637 (ALT 250 FOR IP): Performed by: STUDENT IN AN ORGANIZED HEALTH CARE EDUCATION/TRAINING PROGRAM

## 2025-03-09 PROCEDURE — 6360000002 HC RX W HCPCS: Performed by: PHYSICAL MEDICINE & REHABILITATION

## 2025-03-09 PROCEDURE — 94150 VITAL CAPACITY TEST: CPT

## 2025-03-09 PROCEDURE — 1280000000 HC REHAB R&B

## 2025-03-09 PROCEDURE — 94761 N-INVAS EAR/PLS OXIMETRY MLT: CPT

## 2025-03-09 PROCEDURE — 94640 AIRWAY INHALATION TREATMENT: CPT

## 2025-03-09 PROCEDURE — 6370000000 HC RX 637 (ALT 250 FOR IP): Performed by: PHYSICAL MEDICINE & REHABILITATION

## 2025-03-09 RX ADMIN — Medication 500 MCG: at 08:15

## 2025-03-09 RX ADMIN — TIMOLOL MALEATE 1 DROP: 5 SOLUTION OPHTHALMIC at 08:20

## 2025-03-09 RX ADMIN — ASPIRIN 81 MG CHEWABLE TABLET 81 MG: 81 TABLET CHEWABLE at 08:16

## 2025-03-09 RX ADMIN — LATANOPROST 1 DROP: 50 SOLUTION OPHTHALMIC at 20:43

## 2025-03-09 RX ADMIN — Medication 1000 UNITS: at 08:16

## 2025-03-09 RX ADMIN — ROSUVASTATIN CALCIUM 40 MG: 20 TABLET, FILM COATED ORAL at 20:43

## 2025-03-09 RX ADMIN — ENOXAPARIN SODIUM 30 MG: 100 INJECTION SUBCUTANEOUS at 08:18

## 2025-03-09 RX ADMIN — Medication 1 TABLET: at 08:16

## 2025-03-09 RX ADMIN — AMLODIPINE BESYLATE 10 MG: 10 TABLET ORAL at 08:16

## 2025-03-09 RX ADMIN — IPRATROPIUM BROMIDE AND ALBUTEROL SULFATE 1 DOSE: .5; 2.5 SOLUTION RESPIRATORY (INHALATION) at 07:55

## 2025-03-09 RX ADMIN — IPRATROPIUM BROMIDE AND ALBUTEROL SULFATE 1 DOSE: .5; 2.5 SOLUTION RESPIRATORY (INHALATION) at 11:31

## 2025-03-09 RX ADMIN — CETIRIZINE HYDROCHLORIDE 5 MG: 10 TABLET, FILM COATED ORAL at 08:16

## 2025-03-09 RX ADMIN — GUAIFENESIN 600 MG: 600 TABLET ORAL at 08:15

## 2025-03-09 RX ADMIN — IPRATROPIUM BROMIDE AND ALBUTEROL SULFATE 1 DOSE: .5; 2.5 SOLUTION RESPIRATORY (INHALATION) at 20:12

## 2025-03-09 RX ADMIN — IPRATROPIUM BROMIDE AND ALBUTEROL SULFATE 1 DOSE: .5; 2.5 SOLUTION RESPIRATORY (INHALATION) at 16:04

## 2025-03-09 RX ADMIN — GUAIFENESIN 600 MG: 600 TABLET ORAL at 20:43

## 2025-03-09 ASSESSMENT — PAIN SCALES - GENERAL: PAINLEVEL_OUTOF10: 0

## 2025-03-09 NOTE — PLAN OF CARE
Problem: Chronic Conditions and Co-morbidities  Goal: Patient's chronic conditions and co-morbidity symptoms are monitored and maintained or improved  3/8/2025 2037 by Gayla Cool RN  Outcome: Progressing  Flowsheets (Taken 3/8/2025 2035)  Care Plan - Patient's Chronic Conditions and Co-Morbidity Symptoms are Monitored and Maintained or Improved: Monitor and assess patient's chronic conditions and comorbid symptoms for stability, deterioration, or improvement  3/8/2025 1245 by Meghan Cruz LPN  Outcome: Progressing     Problem: Discharge Planning  Goal: Discharge to home or other facility with appropriate resources  3/8/2025 2037 by Gayla Cool RN  Outcome: Progressing  Flowsheets (Taken 3/8/2025 2035)  Discharge to home or other facility with appropriate resources: Identify barriers to discharge with patient and caregiver  3/8/2025 1245 by Meghan Cruz LPN  Outcome: Progressing     Problem: Safety - Adult  Goal: Free from fall injury  3/8/2025 2037 by Gayla Cool RN  Outcome: Progressing  3/8/2025 1245 by Meghan Cruz LPN  Outcome: Progressing     Problem: Skin/Tissue Integrity  Goal: Skin integrity remains intact  Description: 1.  Monitor for areas of redness and/or skin breakdown  2.  Assess vascular access sites hourly  3.  Every 4-6 hours minimum:  Change oxygen saturation probe site  4.  Every 4-6 hours:  If on nasal continuous positive airway pressure, respiratory therapy assess nares and determine need for appliance change or resting period  3/8/2025 2037 by Gayla Cool RN  Outcome: Progressing  Flowsheets (Taken 3/8/2025 2035)  Skin Integrity Remains Intact: Monitor for areas of redness and/or skin breakdown  3/8/2025 1245 by Meghan Cruz LPN  Outcome: Progressing     Problem: ABCDS Injury Assessment  Goal: Absence of physical injury  3/8/2025 2037 by Gayla Cool RN  Outcome: Progressing  3/8/2025 1245 by Meghan Cruz

## 2025-03-09 NOTE — PLAN OF CARE
Problem: Chronic Conditions and Co-morbidities  Goal: Patient's chronic conditions and co-morbidity symptoms are monitored and maintained or improved  3/9/2025 1122 by Ruth Ta LPN  Outcome: Progressing         Problem: Safety - Adult  Goal: Free from fall injury  3/9/2025 1122 by Ruth Ta LPN  Outcome: Progressing

## 2025-03-10 PROCEDURE — 94640 AIRWAY INHALATION TREATMENT: CPT

## 2025-03-10 PROCEDURE — 94150 VITAL CAPACITY TEST: CPT

## 2025-03-10 PROCEDURE — 6370000000 HC RX 637 (ALT 250 FOR IP): Performed by: STUDENT IN AN ORGANIZED HEALTH CARE EDUCATION/TRAINING PROGRAM

## 2025-03-10 PROCEDURE — 6370000000 HC RX 637 (ALT 250 FOR IP): Performed by: PHYSICAL MEDICINE & REHABILITATION

## 2025-03-10 PROCEDURE — 97116 GAIT TRAINING THERAPY: CPT

## 2025-03-10 PROCEDURE — 97530 THERAPEUTIC ACTIVITIES: CPT

## 2025-03-10 PROCEDURE — 1280000000 HC REHAB R&B

## 2025-03-10 PROCEDURE — 97535 SELF CARE MNGMENT TRAINING: CPT

## 2025-03-10 PROCEDURE — 94761 N-INVAS EAR/PLS OXIMETRY MLT: CPT

## 2025-03-10 PROCEDURE — 6360000002 HC RX W HCPCS: Performed by: PHYSICAL MEDICINE & REHABILITATION

## 2025-03-10 PROCEDURE — 97110 THERAPEUTIC EXERCISES: CPT

## 2025-03-10 RX ADMIN — CETIRIZINE HYDROCHLORIDE 5 MG: 10 TABLET, FILM COATED ORAL at 09:00

## 2025-03-10 RX ADMIN — TIMOLOL MALEATE 1 DROP: 5 SOLUTION OPHTHALMIC at 09:00

## 2025-03-10 RX ADMIN — Medication 500 MCG: at 09:00

## 2025-03-10 RX ADMIN — GUAIFENESIN 600 MG: 600 TABLET ORAL at 09:00

## 2025-03-10 RX ADMIN — AMLODIPINE BESYLATE 10 MG: 10 TABLET ORAL at 09:00

## 2025-03-10 RX ADMIN — Medication 1000 UNITS: at 09:00

## 2025-03-10 RX ADMIN — ENOXAPARIN SODIUM 30 MG: 100 INJECTION SUBCUTANEOUS at 08:59

## 2025-03-10 RX ADMIN — LATANOPROST 1 DROP: 50 SOLUTION OPHTHALMIC at 22:29

## 2025-03-10 RX ADMIN — Medication 1 TABLET: at 09:00

## 2025-03-10 RX ADMIN — IPRATROPIUM BROMIDE AND ALBUTEROL SULFATE 1 DOSE: .5; 2.5 SOLUTION RESPIRATORY (INHALATION) at 07:39

## 2025-03-10 RX ADMIN — ASPIRIN 81 MG CHEWABLE TABLET 81 MG: 81 TABLET CHEWABLE at 09:00

## 2025-03-10 RX ADMIN — IPRATROPIUM BROMIDE AND ALBUTEROL SULFATE 1 DOSE: .5; 2.5 SOLUTION RESPIRATORY (INHALATION) at 12:24

## 2025-03-10 RX ADMIN — ROSUVASTATIN CALCIUM 40 MG: 20 TABLET, FILM COATED ORAL at 22:25

## 2025-03-10 RX ADMIN — GUAIFENESIN 600 MG: 600 TABLET ORAL at 22:25

## 2025-03-10 ASSESSMENT — PAIN SCALES - GENERAL: PAINLEVEL_OUTOF10: 0

## 2025-03-10 NOTE — PROGRESS NOTES
Comprehensive Nutrition Assessment    Type and Reason for Visit:  Reassess    Nutrition Recommendations/Plan:   Continue easy to chew diet as needed  Will continue to offer frozen oral nutrition supplement as patient will accept     Malnutrition Assessment:  Malnutrition Status:  At risk for malnutrition (03/05/25 1345)    Context:  Acute Illness     Nutrition Assessment:    Remains on easy to chew diet with intake at most meals %. Meal orders reasonable with protein included. Offered frozen supplement instead of standard Ensure due to patient dislike of Ensure. BMI low for age over 65. Will continue to follow at moderate nutrition risk at this time.    Nutrition Related Findings:    fluctuating weights during stay  with trend for loss if accurate    asleep in chair  discharge plan for tomorrow Wound Type: None       Current Nutrition Intake & Therapies:    Average Meal Intake: 51-75%, %  Average Supplements Intake: Unable to assess  ADULT DIET; Easy to Chew; yogurt and cottage cheese  ADULT ORAL NUTRITION SUPPLEMENT; Lunch, Dinner; Frozen Oral Supplement    Anthropometric Measures:  Height: 139.7 cm (4' 7\")  Ideal Body Weight (IBW): 75 lbs (34 kg)    Admission Body Weight: 38.6 kg (85 lb 1.6 oz) (3/3/25)  Current Body Weight: 38.1 kg (83 lb 15.9 oz), 120.5 % IBW. Weight Source: Bed scale  Current BMI (kg/m2): 19.5  Usual Body Weight: 38.6 kg (85 lb 1.6 oz) (1/14/25)     % Weight Change (Calculated): 6.2  Weight Adjustment For: No Adjustment                 BMI Categories: Underweight (BMI less than 22) age over 65    Estimated Daily Nutrient Needs:  Energy Requirements Based On: Kcal/kg  Weight Used for Energy Requirements: Current  Energy (kcal/day): 9885-4954 (30-35 ben/kg)  Weight Used for Protein Requirements: Current  Protein (g/day): 49-61 (1.2-1.5 g/kg)  Method Used for Fluid Requirements: 1 ml/kcal  Fluid (ml/day): 1500    Nutrition Diagnosis:   Inadequate oral intake related to decreased

## 2025-03-10 NOTE — FLOWSHEET NOTE
[x] daily progress note       [x] discharge       Patient Name:  Doretha Melo   :  6/15/1927 MRN: [id number]  Room:  [address] [place] [address] Date of Admission: 3/3/2025  Rehabilitation Diagnosis:   Influenza due to other identified influenza virus with other respiratory manifestations [J10.1]  Influenza [J11.1]       Date 3/10/2025       Day of ARU Week:  1   Time IN/-1000  1163-3660   Individual Tx Minutes 120   TOTAL Tx Time Mins 120   Restrictions Restrictions/Precautions  Restrictions/Precautions: Fall Risk, Isolation  Position Activity Restriction  Other Position/Activity Restrictions: droplet isolation; 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 Agreeable to therapy. Pt reported her knees felt weak today.   PM session: pt seen sitting up in recliner at beginning of treatment. Agreeable to therapy.    Pain level/location: 0/10          Discharge recommendations  Anticipated discharge date:  3/11/25  Destination: []home alone   [x]home alone with assist PRN     [] home w/ family      [] Continuous supervision  []SNF    [] Assisted living     [] Other:   Continued therapy: [x]HHC PT  []OUTPATIENT  PT   [] No Further PT  Equipment needs: none     Bed Mobility:           [x]   Pt received out of bed   Rolling R/L:  Independent   Scooting:  Independent   Lying --> Sit:  Independent   Sit --> lying:  Independent  Pt practiced with bed flat and no rails     Transfers:    Sit--> Stand:  mod I   Stand --> Sit:   mod I   Chair-->Bed/Bed --> Chair:   mod I   Car Transfers:  mod I   Assistive device required for transfer:   rollator and bilateral knee braces     Gait:    Walk 10 feet:  mod I   Walk 50 feet with 2 turns:  mod I

## 2025-03-10 NOTE — PLAN OF CARE
Problem: Chronic Conditions and Co-morbidities  Goal: Patient's chronic conditions and co-morbidity symptoms are monitored and maintained or improved  Outcome: Progressing     Problem: Discharge Planning  Goal: Discharge to home or other facility with appropriate resources  Outcome: Progressing     Problem: Safety - Adult  Goal: Free from fall injury  Outcome: Progressing     Problem: Skin/Tissue Integrity  Goal: Skin integrity remains intact  Description: 1.  Monitor for areas of redness and/or skin breakdown  2.  Assess vascular access sites hourly  3.  Every 4-6 hours minimum:  Change oxygen saturation probe site  4.  Every 4-6 hours:  If on nasal continuous positive airway pressure, respiratory therapy assess nares and determine need for appliance change or resting period  Outcome: Progressing     Problem: ABCDS Injury Assessment  Goal: Absence of physical injury  Outcome: Progressing     Problem: Nutrition Deficit:  Goal: Optimize nutritional status  Outcome: Progressing     Problem: Pain  Goal: Verbalizes/displays adequate comfort level or baseline comfort level  Outcome: Progressing

## 2025-03-10 NOTE — PROGRESS NOTES
Physical Rehabilitation: OCCUPATIONAL THERAPY     [x] daily progress note       [x] discharge       Patient Name:  Doretha Melo   :  6/15/1927 MRN: [id number]  Room:  [address] [place] [address] Date of Admission: 3/3/2025  Rehabilitation Diagnosis:   Influenza due to other identified influenza virus with other respiratory manifestations [J10.1]  Influenza [J11.1]       Date 3/10/2025       Day of ARU Week:  1   Time IN//710   Individual Tx Minutes 60   Group Tx Minutes    Co-Treat Minutes    Concurrent Tx Minutes    TOTAL Tx Time Mins 60   Variance Time    Variance Reason []   Refusal due to:     []   Medical hold/reason:    []   Illness   []   Off Unit for test/procedure  []   Extra time needed to complete task  []   Therapeutic need  []   Make up mins were attempted but pt unable to complete due to (specify)  []   Other (specify):   Restrictions Restrictions/Precautions: Fall Risk, Isolation         Communication with other providers: [x]   OK to see per nursing:     []   Spoke with team member regarding:      Subjective observations and cognitive status: Patient lying in side lying sleeping, easily awakened stating she just went to the bathroom and pleasant and agreeable to ADL/QI this morning      Pain level/location:    /10       Location:    Discharge recommendations  Anticipated discharge date:  3/11  Destination: []home alone   [x]home alone w assist prn   [] home w/ family    [] Continuous supervision       []SNF    [] Assisted living     [] Other:   Continued therapy: [x]HHC OT  []OUTPATIENT  OT   [] No Further OT  Equipment needs: None        ADLs:    Eating: Eating  Assistance Needed: Independent  Comment: X  CARE Score: 6  Discharge Goal: Independent       Oral Hygiene: Oral Hygiene  Assistance Needed: Independent  Comment: Ind denture care in stance  CARE Score: 6  Discharge Goal: Independent    UB/LB Bathing: Shower/Bathe Self  Assistance Needed: Independent  Comment: X  CARE Score: 6  Discharge Goal:

## 2025-03-10 NOTE — PLAN OF CARE
Problem: Chronic Conditions and Co-morbidities  Goal: Patient's chronic conditions and co-morbidity symptoms are monitored and maintained or improved  3/10/2025 1431 by Neeta Mercer RN  Outcome: Progressing  3/10/2025 0416 by Janene Medina RN  Outcome: Progressing     Problem: Safety - Adult  Goal: Free from fall injury  3/10/2025 1431 by Neeta Mercer RN  Outcome: Progressing  3/10/2025 0416 by Janene Medina RN  Outcome: Progressing     Problem: Skin/Tissue Integrity  Goal: Skin integrity remains intact  Description: 1.  Monitor for areas of redness and/or skin breakdown  2.  Assess vascular access sites hourly  3.  Every 4-6 hours minimum:  Change oxygen saturation probe site  4.  Every 4-6 hours:  If on nasal continuous positive airway pressure, respiratory therapy assess nares and determine need for appliance change or resting period  3/10/2025 1431 by Neeta Mercer RN  Outcome: Progressing  3/10/2025 0416 by Janene Medina RN  Outcome: Progressing     Problem: ABCDS Injury Assessment  Goal: Absence of physical injury  3/10/2025 1431 by Neeta Mercer RN  Outcome: Progressing  3/10/2025 0416 by Janene Medina RN  Outcome: Progressing     Problem: Nutrition Deficit:  Goal: Optimize nutritional status  3/10/2025 1431 by Neeta Mercer RN  Outcome: Progressing  Flowsheets (Taken 3/10/2025 1236 by Linsey Evans, RD, LD)  Nutrient intake appropriate for improving, restoring, or maintaining nutritional needs:   Assess nutritional status and recommend course of action   Monitor oral intake, labs, and treatment plans   Recommend appropriate diets, oral nutritional supplements, and vitamin/mineral supplements  3/10/2025 0416 by Janene Medina RN  Outcome: Progressing     Problem: Pain  Goal: Verbalizes/displays adequate comfort level or baseline comfort level  3/10/2025 1431 by Neeta Mercer RN  Outcome: Progressing  3/10/2025 0416 by Adam

## 2025-03-11 ENCOUNTER — TELEPHONE (OUTPATIENT)
Dept: INTERNAL MEDICINE CLINIC | Age: 89
End: 2025-03-11

## 2025-03-11 VITALS
OXYGEN SATURATION: 93 % | HEART RATE: 81 BPM | RESPIRATION RATE: 23 BRPM | BODY MASS INDEX: 19.39 KG/M2 | TEMPERATURE: 98.2 F | HEIGHT: 55 IN | WEIGHT: 83.78 LBS | DIASTOLIC BLOOD PRESSURE: 77 MMHG | SYSTOLIC BLOOD PRESSURE: 133 MMHG

## 2025-03-11 PROCEDURE — 94761 N-INVAS EAR/PLS OXIMETRY MLT: CPT

## 2025-03-11 PROCEDURE — 94150 VITAL CAPACITY TEST: CPT

## 2025-03-11 PROCEDURE — 6370000000 HC RX 637 (ALT 250 FOR IP): Performed by: PHYSICAL MEDICINE & REHABILITATION

## 2025-03-11 PROCEDURE — 94640 AIRWAY INHALATION TREATMENT: CPT

## 2025-03-11 PROCEDURE — 94669 MECHANICAL CHEST WALL OSCILL: CPT

## 2025-03-11 PROCEDURE — 6370000000 HC RX 637 (ALT 250 FOR IP): Performed by: STUDENT IN AN ORGANIZED HEALTH CARE EDUCATION/TRAINING PROGRAM

## 2025-03-11 RX ORDER — GUAIFENESIN 600 MG/1
600 TABLET, EXTENDED RELEASE ORAL 2 TIMES DAILY
Qty: 20 TABLET | Refills: 0 | Status: SHIPPED
Start: 2025-03-11 | End: 2025-03-13 | Stop reason: ALTCHOICE

## 2025-03-11 RX ADMIN — Medication 1000 UNITS: at 09:22

## 2025-03-11 RX ADMIN — Medication 1 TABLET: at 09:22

## 2025-03-11 RX ADMIN — ASPIRIN 81 MG CHEWABLE TABLET 81 MG: 81 TABLET CHEWABLE at 09:22

## 2025-03-11 RX ADMIN — ACETAMINOPHEN 650 MG: 325 TABLET ORAL at 06:51

## 2025-03-11 RX ADMIN — Medication 500 MCG: at 09:21

## 2025-03-11 RX ADMIN — TIMOLOL MALEATE 1 DROP: 5 SOLUTION OPHTHALMIC at 09:21

## 2025-03-11 RX ADMIN — AMLODIPINE BESYLATE 10 MG: 10 TABLET ORAL at 09:22

## 2025-03-11 RX ADMIN — GUAIFENESIN 600 MG: 600 TABLET ORAL at 09:22

## 2025-03-11 RX ADMIN — CETIRIZINE HYDROCHLORIDE 5 MG: 10 TABLET, FILM COATED ORAL at 09:22

## 2025-03-11 RX ADMIN — IPRATROPIUM BROMIDE AND ALBUTEROL SULFATE 1 DOSE: .5; 2.5 SOLUTION RESPIRATORY (INHALATION) at 08:27

## 2025-03-11 ASSESSMENT — PAIN - FUNCTIONAL ASSESSMENT: PAIN_FUNCTIONAL_ASSESSMENT: PREVENTS OR INTERFERES SOME ACTIVE ACTIVITIES AND ADLS

## 2025-03-11 ASSESSMENT — PAIN DESCRIPTION - DESCRIPTORS: DESCRIPTORS: ACHING

## 2025-03-11 ASSESSMENT — PAIN DESCRIPTION - LOCATION: LOCATION: KNEE

## 2025-03-11 ASSESSMENT — PAIN SCALES - GENERAL: PAINLEVEL_OUTOF10: 3

## 2025-03-11 ASSESSMENT — PAIN DESCRIPTION - ORIENTATION: ORIENTATION: LEFT

## 2025-03-11 NOTE — PROGRESS NOTES
Pt requested to use bedside commode because her left knee is bothering her. Able to stand pivot with contact assist.

## 2025-03-11 NOTE — PROGRESS NOTES
patient  [x] No, these were not provided.        Reason:   Pt's prescriptions were called into her pharmacy

## 2025-03-11 NOTE — TELEPHONE ENCOUNTER
[facility] called for a hospital f/u and there are no appts within two weeks available.

## 2025-03-11 NOTE — PROGRESS NOTES
Discharge instructions explained and given to pt's daughter. STNA took pt down by wheelchair and pt was able to get into the car with minimal assistance.

## 2025-03-11 NOTE — PLAN OF CARE
injury  3/11/2025 1028 by Neeta Mercer RN  Outcome: HH/HSPC Not Progressing  3/11/2025 0500 by Porsha Youngblood RN  Outcome: Progressing     Problem: Skin/Tissue Integrity  Goal: Skin integrity remains intact  Description: 1.  Monitor for areas of redness and/or skin breakdown  2.  Assess vascular access sites hourly  3.  Every 4-6 hours minimum:  Change oxygen saturation probe site  4.  Every 4-6 hours:  If on nasal continuous positive airway pressure, respiratory therapy assess nares and determine need for appliance change or resting period  3/11/2025 1028 by Neeta Mercer RN  Outcome: HH/HSPC Not Progressing  3/11/2025 0500 by Porsha Youngblood RN  Outcome: Progressing     Problem: ABCDS Injury Assessment  Goal: Absence of physical injury  3/11/2025 1028 by Neeta Mercer RN  Outcome: HH/HSPC Not Progressing  3/11/2025 0500 by Porsha Youngblood RN  Outcome: Progressing     Problem: Nutrition Deficit:  Goal: Optimize nutritional status  3/11/2025 1028 by Neeta Mercer RN  Outcome: HH/HSPC Not Progressing  3/11/2025 0500 by Porsha Youngblood RN  Outcome: Progressing     Problem: Pain  Goal: Verbalizes/displays adequate comfort level or baseline comfort level  3/11/2025 1028 by Neeta Mercer RN  Outcome: HH/HSPC Not Progressing  3/11/2025 0500 by Porsha Youngblood RN  Outcome: Progressing

## 2025-03-11 NOTE — CARE COORDINATION
Spoke with PCP office.  They'll speak with the provider about scheduling the patient and call the patient directly.    Prescriptions to [facility]    1020: Neeta RN, reported to  that patient's daughter doesn't think the patient is ready to dc today.  She had trouble walking to the bathroom after CM left the room.     discussed with Dr Zayas and team will ambulate the patient to see if she loosens up.  Team updated.  Jovanni therapy tech, ambulated patient, which was successful.  Patient's daughter now feels more confident and patient will dc today as planned.  Team updated.        ARU  Discharge Summary    D/C Date: 3/11/25    Patient discharged to: home alone    Transported by: family    Referrals made to: [facility]    Additional information:     Caregiver training: none requested    Discharge BIMS completed:  [x]      IMM:   [x]      Discharge Assessment:    At the time of discharge,  (check all that apply)     [x]   Patient fully participated in the program and made good progress towards established goals.  []   Patient's medical status limited participation and/or progress towards established goals.  []   Patient demonstrated self-limiting behaviors that limited progress/participation towards established goals.  []   Patient did not buy into the program or instruction provided by the rehab staff.  []   Patient demonstrated inappropriate behaviors during interactions with staff.  []   Patient left against medical advice (AMA).

## 2025-03-11 NOTE — PROGRESS NOTES
Doretha Melo    : 6/15/1927  Acct #: [id number]  MRN: [id number]              PM&R Progress Note      Admitting diagnosis: ***    Comorbid diagnoses impacting rehabilitation: ***    Chief complaint: ***    Prior (baseline) level of function: Independent.    Current level of function:         Current  IRF-ANGELY and Goals:   Occupational Therapy:    Short Term Goals  Time Frame for Short Term Goals: STGs=LTGs :   Long Term Goals  Time Frame for Long Term Goals : 10 days or until d/c.  Long Term Goal 1: Pt will complete oral hygiene and grooming tasks with IND.  Long Term Goal 2: Pt will complete total body bathing with AE PRN and Mod I.  Long Term Goal 3: Pt will complete UB dressing with IND.  Long Term Goal 4: Pt will complete LB dressing with AE PRN and Mod I.  Long Term Goal 5: Pt will doff/don footwear with AE PRN and Mod I.  Additional Goals?: Yes  Long Term Goal 6: Pt will complete toileting with Mod I.  Long Term Goal 7: Pt will complete functional transfers (bed, chair, shower, toilet) with DME PRN and Mod I.  Long Term Goal 8: Pt will perform therex/therax to facilitate an increase in strength/endurance with emphasis on dynamic standing balance/tolerance > 8 mins with Mod I.  Long Term Goal 9: Pt will complete a meal prep ax with Mod I. :                                       Eating: Eating  Assistance Needed: Independent  Comment: X  CARE Score: 6  Discharge Goal: Independent       Oral Hygiene: Oral Hygiene  Assistance Needed: Independent  Comment: Ind denture care in stance  CARE Score: 6  Discharge Goal: Independent    UB/LB Bathing: Shower/Bathe Self  Assistance Needed: Independent  Comment: X  CARE Score: 6  Discharge Goal: Independent    UB Dressing: Upper Body Dressing  Assistance Needed: Independent  Comment: X  CARE Score: 6  Discharge Goal: Independent         LB Dressing: Lower Body Dressing  Assistance Needed: Independent  Comment: X  CARE Score: 6  Discharge Goal: Independent    Donning and

## 2025-03-12 ENCOUNTER — CARE COORDINATION (OUTPATIENT)
Dept: CASE MANAGEMENT | Age: 89
End: 2025-03-12

## 2025-03-12 ENCOUNTER — HOSPITAL ENCOUNTER (OUTPATIENT)
Age: 89
Setting detail: SPECIMEN
Discharge: HOME | End: 2025-03-12
Payer: MEDICARE

## 2025-03-12 DIAGNOSIS — E87.6 LOW SERUM POTASSIUM: Primary | ICD-10-CM

## 2025-03-12 DIAGNOSIS — J10.1 INFLUENZA A: Primary | ICD-10-CM

## 2025-03-12 LAB — POTASSIUM SERPL-SCNC: 3.9 MMOL/L (ref 3.5–5.1)

## 2025-03-12 PROCEDURE — 1111F DSCHRG MED/CURRENT MED MERGE: CPT | Performed by: FAMILY MEDICINE

## 2025-03-12 PROCEDURE — 84132 ASSAY OF SERUM POTASSIUM: CPT

## 2025-03-12 NOTE — CARE COORDINATION
Care Transitions Note    Initial Call - Call within 2 business days of discharge: Yes    Patient Current Location:  Home: [address] [place] [address]    Care Transition Nurse contacted the patient by telephone to perform post hospital discharge assessment, verified name and  as identifiers. Provided introduction to self, and explanation of the Care Transition Nurse role.     Patient: Doretha Melo    Patient : 6/15/1927   MRN: [id number]    Reason for Admission: DX:Weakness 2/2 flu A  [facility]: -25  Discharge Date: 3/11/25  RURS: Readmission Risk Score: 15.2      Last Discharge Facility       Date Complaint Diagnosis Description Type Department Provider    3/3/25   Admission (Discharged) GISEL Lewis MD            Was this an external facility discharge? No    Additional needs identified to be addressed with provider   No needs identified             Method of communication with provider: none.    Patients top risk factors for readmission: medical condition-see above    Interventions to address risk factors:   Review of patient management of conditions/medications: see below  Home Health: [facility] [phone]     Care Summary Note:  Pt is A&O x4 lives alone in private residence, uses a Rw  to assist w/ ambulation and has a life alert for emergencies. Reports her daughter comes over every morning and stays until early afternoon. Son lives 2 blocks away and comes over daily as well. Pt reports   \"I have not coughed all day \" and is afebrile.Pt up to date on what meds she takes and why. All meds reviewed w/ no issues noted. . Pt report B/B are at baseline last BM was  reports it was formed.States appetite is normal having a turkey and cheese sandwich for lunch. Pt reports good fluid intake, S/S of dehydration reviewed , Pt VU. Fall education provided : clear well lit paths, keeping life alert on person all times, always using AD calling out when in need and frequent bathroom

## 2025-03-13 ENCOUNTER — OFFICE VISIT (OUTPATIENT)
Dept: INTERNAL MEDICINE CLINIC | Age: 89
End: 2025-03-13

## 2025-03-13 VITALS
HEIGHT: 55 IN | DIASTOLIC BLOOD PRESSURE: 70 MMHG | RESPIRATION RATE: 16 BRPM | WEIGHT: 85 LBS | BODY MASS INDEX: 19.67 KG/M2 | SYSTOLIC BLOOD PRESSURE: 126 MMHG | OXYGEN SATURATION: 96 % | HEART RATE: 80 BPM

## 2025-03-13 DIAGNOSIS — Z09 HOSPITAL DISCHARGE FOLLOW-UP: ICD-10-CM

## 2025-03-13 DIAGNOSIS — J09.X1 INFLUENZA A WITH PNEUMONIA: Primary | ICD-10-CM

## 2025-03-13 DIAGNOSIS — E87.6 HYPOKALEMIA: ICD-10-CM

## 2025-03-13 NOTE — PROGRESS NOTES
Post-Discharge Transitional Care  Follow Up      Doretha Melo   YOB: 1927    Date of Office Visit:  3/13/2025  Date of Hospital Admission: 3/3/25  Date of Hospital Discharge: 3/11/25  Risk of hospital readmission (high >=14%. Medium >=10%) :Readmission Risk Score: 15.2      Care management risk score Rising risk (score 2-5) and Complex Care (Scores >=6): No Risk Score On File     Non face to face  following discharge, date last encounter closed (first attempt may have been earlier): 03/12/2025    Call initiated 2 business days of discharge: Yes    ASSESSMENT/PLAN:   Influenza A with pneumonia  Now improved, continue supportive care as needed.    Hospital discharge follow-up  -     ME DISCHARGE MEDS RECONCILED W/ CURRENT OUTPATIENT MED LIST  Hypokalemia  Last few labs wnl, only had 2 replacement doses (about 2 weeks ago) during hospitalization.  Will recheck before her next follow up in 5-6 weeks, can start supplementation if indicated.    Medical Decision Making: moderate complexity  Return if symptoms worsen or fail to improve.    On this date 3/13/2025 I have spent 25 minutes reviewing previous notes, test results and face to face with the patient discussing the diagnosis and importance of compliance with the treatment plan as well as documenting on the day of the visit.       Subjective:   HPI:  Follow up of Hospital problems/diagnosis(es):     Inpatient course: Discharge summary reviewed- see chart.    Interval history/Current status:     Patient was hospitalized for influenza PNA, was treated with tamiflu and antibiotics.  States she is feeling significantly improved.     Daughter concerned for hypokalemia as K was initially low when she presented to the ER.  Received replacement in ER and the next day but states she has not received further K since then (almost 2 weeks ago).  Most recent lab (yesterday) showed normal K.      No other concerns today.      Patient Active Problem List   Diagnosis

## 2025-03-19 ENCOUNTER — CARE COORDINATION (OUTPATIENT)
Dept: CASE MANAGEMENT | Age: 89
End: 2025-03-19

## 2025-03-19 NOTE — CARE COORDINATION
Care Transitions Note    Follow Up Call     Attempted to reach patient, family, abimael  for transitions of care follow up.  Unable to reach patient, family, abimael .      Outreach Attempts:   Multiple attempts to contact patient, family, abimael at phone numbers on file.   HIPAA compliant voicemail left for family, abimael. Pt answered her phone but could not hear CTN     Care Summary Note: UTR    Follow Up Appointment:   Future Appointments         Provider Specialty Dept Phone    5/6/2025 10:00 AM Kenna Ceron MD Internal Medicine [phone]            Plan for follow-up on next business day.  based on severity of symptoms and risk factors. Plan for next call:  N/A    Hollie Agustin RN

## 2025-03-20 ENCOUNTER — CARE COORDINATION (OUTPATIENT)
Dept: CASE MANAGEMENT | Age: 89
End: 2025-03-20

## 2025-03-20 NOTE — CARE COORDINATION
Reviewed upcoming appointment(s).  Future Appointments         Provider Specialty Dept Phone    5/6/2025 10:00 AM Kenna Ceron MD Internal Medicine [phone]            Care Transition Nurse provided contact information.  Plan for follow-up call in 6-10 days based on severity of symptoms and risk factors.  Plan for next call:  weakness, PT , cough?       Hollie Agustin RN

## 2025-03-24 PROBLEM — I69.393: Status: ACTIVE | Noted: 2025-03-24

## 2025-03-27 ENCOUNTER — CARE COORDINATION (OUTPATIENT)
Dept: CASE MANAGEMENT | Age: 89
End: 2025-03-27

## 2025-03-27 NOTE — CARE COORDINATION
Care Transitions Note    Follow Up Call     Patient Current Location:  Home: [facility] [facility] [address]    Care Transition Nurse contacted the patient by telephone. Verified name and  as identifiers.    Additional needs identified to be addressed with provider   No needs identified                 Method of communication with provider: none.    Care Summary Note: Pt reports feeling good , worked with [facility]. Appetite is good, fluid intake is good. Denies falls/lob since last call . Reports no med changes . Denies N/V/D, B/B are at baseline. No reports of CP, palpitations ,dizziness or increase SOB. Pt agreeable to additional outreach. Plan for CTN to discuss ACM program .     Plan of care updates since last contact:  Review of patient management of conditions/medications: see above       Advance Care Planning:   Does patient have an Advance Directive: reviewed and current.    Medication Review:  No changes since last call.     Remote Patient Monitoring:  Offered patient enrollment in the Remote Patient Monitoring (RPM) program for in-home monitoring: Yes, but did not enroll at this time: limited patient ability to navigate RPM/equipment.    Assessments:  Care Transitions Subsequent and Final Call    Subsequent and Final Calls  Do you have any ongoing symptoms?: No  Have your medications changed?: No  Do you have any questions related to your medications?: No  Do you currently have any active services?: Yes  Are you currently active with any services?: Home Health  Do you have any needs or concerns that I can assist you with?: No  Identified Barriers: Medication Side Effects, Other  Care Transitions Interventions     Transportation Support: Declined    Meals on Wheels: Declined  DME Assistance: Declined     Senior Services: Declined     Medication Assistance Program: Declined       Social Work: Declined    Other Interventions:              Follow Up Appointment:   Reviewed upcoming

## 2025-04-03 ENCOUNTER — CARE COORDINATION (OUTPATIENT)
Dept: CASE MANAGEMENT | Age: 89
End: 2025-04-03

## 2025-04-03 NOTE — CARE COORDINATION
Care Transitions Note    Follow Up Call     Patient Current Location:  [facility] Care Coordinator contacted the family, daughter  by telephone. Verified name and  as identifiers.    Additional needs identified to be addressed with provider   No needs identified                 Method of communication with provider: none.    Care Summary Note:   Patients daughter reports, patient is doing good. She has PT coming today. They come weekly. She is using a rollator for mobility. Good appetite and fluid intake. Denies sob, cough,fever, cp, lh, ha, dizziness, nvd or falls. Has arthritis pain. It has been pretty good. No urinary or bowel issues.     Plan of care updates since last contact:  Review of patient management of conditions/medications:         Advance Care Planning:   Does patient have an Advance Directive: not on file.    Medication Review:  No changes since last call.     Remote Patient Monitoring:  Offered patient enrollment in the Remote Patient Monitoring (RPM) program for in-home monitoring: Yes, but did not enroll at this time: limited patient ability to navigate RPM/equipment.    Assessments:  Care Transitions Subsequent and Final Call    Subsequent and Final Calls  Do you have any ongoing symptoms?: No  Have your medications changed?: No  Do you have any questions related to your medications?: No  Do you currently have any active services?: Yes  Are you currently active with any services?: Home Health  Do you have any needs or concerns that I can assist you with?: No  Identified Barriers: None  Care Transitions Interventions     Transportation Support: Declined    Meals on Wheels: Declined  DME Assistance: Declined     Senior Services: Declined     Medication Assistance Program: Declined       Social Work: Declined    Other Interventions:              Follow Up Appointment:   TENZIN appointment attended as scheduled   Future Appointments         Provider Specialty Dept Phone    2025 10:00 AM

## 2025-04-10 ENCOUNTER — CARE COORDINATION (OUTPATIENT)
Dept: CASE MANAGEMENT | Age: 89
End: 2025-04-10

## 2025-04-10 NOTE — CARE COORDINATION
Care Transitions Note    Final Call     Patient Current Location:  Home: [address] [place] [id number]    Care Transition Nurse contacted the patient by telephone. Verified name and  as identifiers.    Patient graduated from the Care Transitions program on 104/10/25.  Patient/family verbalizes confidence in the ability to self-manage at this time..      Advance Care Planning:   Does patient have an Advance Directive: reviewed and current.    Handoff:   Patient was not referred to the [facility] team due to patient declined services.      Care Summary Note: Pt reports she is doing well, still meets with [facility]., next appointment is next week.Denies falls or LOB since last call. No reports CP. Palpitations, dizziness, increase SOB, cough, fever or NVD. Pt reports appetite is goods and she is well hydrated. Pt reports B/B are at baseline. No med changes since last call. Pt declines offers for [facility] referral, states she has family support and no needs. CTN program clsoed    Assessments:  Care Transitions Subsequent and Final Call    Subsequent and Final Calls  Do you have any ongoing symptoms?: No  Have your medications changed?: No  Do you have any questions related to your medications?: No  Do you currently have any active services?: Yes  Are you currently active with any services?: Home Health  Do you have any needs or concerns that I can assist you with?: No  Identified Barriers: Other  Care Transitions Interventions     Transportation Support: Declined    Meals on Wheels: Declined  DME Assistance: Declined     Senior Services: Declined     Medication Assistance Program: Declined       Social Work: Declined    Other Interventions:              Upcoming Appointments:    Future Appointments         Provider Specialty Dept Phone    2025 10:00 AM Kenna Ceron MD Internal Medicine [phone]            Patient has agreed to contact primary care provider and/or specialist for any further

## 2025-04-17 ENCOUNTER — HOSPITAL ENCOUNTER (OUTPATIENT)
Age: 89
Setting detail: SPECIMEN
Discharge: HOME | End: 2025-04-17
Payer: MEDICARE

## 2025-04-17 LAB
25(OH)D3 SERPL-MCNC: 43.6 NG/ML (ref 30–150)
ALBUMIN SERPL-MCNC: 4 G/DL (ref 3.4–5)
ALBUMIN/GLOB SERPL: 1.8 {RATIO} (ref 1.1–2.2)
ALP SERPL-CCNC: 50 U/L (ref 40–129)
ALT SERPL-CCNC: 21 U/L (ref 10–40)
ANION GAP SERPL CALCULATED.3IONS-SCNC: 11 MMOL/L (ref 9–17)
AST SERPL-CCNC: 23 U/L (ref 15–37)
BASOPHILS # BLD: 0.02 K/UL
BASOPHILS NFR BLD: 0 % (ref 0–1)
BILIRUB SERPL-MCNC: 1.1 MG/DL (ref 0–1)
BUN SERPL-MCNC: 8 MG/DL (ref 7–20)
CALCIUM SERPL-MCNC: 9.1 MG/DL (ref 8.3–10.6)
CHLORIDE SERPL-SCNC: 103 MMOL/L (ref 99–110)
CHOLEST SERPL-MCNC: 149 MG/DL (ref 125–199)
CO2 SERPL-SCNC: 25 MMOL/L (ref 21–32)
CREAT SERPL-MCNC: 0.5 MG/DL (ref 0.6–1.2)
EOSINOPHIL # BLD: 0.09 K/UL
EOSINOPHILS RELATIVE PERCENT: 1 % (ref 0–3)
ERYTHROCYTE [DISTWIDTH] IN BLOOD BY AUTOMATED COUNT: 17.2 % (ref 11.7–14.9)
EST. AVERAGE GLUCOSE BLD GHB EST-MCNC: 126 MG/DL
GFR, ESTIMATED: >90 ML/MIN/1.73M2
GLUCOSE SERPL-MCNC: 100 MG/DL (ref 74–99)
HBA1C MFR BLD: 6 % (ref 4.2–6.3)
HCT VFR BLD AUTO: 39.8 % (ref 37–47)
HDLC SERPL-MCNC: 79 MG/DL
HGB BLD-MCNC: 12.7 G/DL (ref 12.5–16)
IMM GRANULOCYTES # BLD AUTO: 0.03 K/UL
IMM GRANULOCYTES NFR BLD: 0 %
LDLC SERPL CALC-MCNC: 55 MG/DL
LYMPHOCYTES NFR BLD: 1.99 K/UL
LYMPHOCYTES RELATIVE PERCENT: 25 % (ref 24–44)
MCH RBC QN AUTO: 30 PG (ref 27–31)
MCHC RBC AUTO-ENTMCNC: 31.9 G/DL (ref 32–36)
MCV RBC AUTO: 94.1 FL (ref 78–100)
MONOCYTES NFR BLD: 0.93 K/UL
MONOCYTES NFR BLD: 12 % (ref 0–5)
NEUTROPHILS NFR BLD: 62 % (ref 36–66)
NEUTS SEG NFR BLD: 5.04 K/UL
PLATELET # BLD AUTO: 300 K/UL (ref 140–440)
PMV BLD AUTO: 9.2 FL (ref 7.5–11.1)
POTASSIUM SERPL-SCNC: 3.6 MMOL/L (ref 3.5–5.1)
PROT SERPL-MCNC: 6.3 G/DL (ref 6.4–8.2)
RBC # BLD AUTO: 4.23 M/UL (ref 4.2–5.4)
SODIUM SERPL-SCNC: 140 MMOL/L (ref 136–145)
TRIGL SERPL-MCNC: 77 MG/DL
WBC OTHER # BLD: 8.1 K/UL (ref 4–10.5)

## 2025-04-17 PROCEDURE — 82306 VITAMIN D 25 HYDROXY: CPT

## 2025-04-17 PROCEDURE — 80053 COMPREHEN METABOLIC PANEL: CPT

## 2025-04-17 PROCEDURE — 85025 COMPLETE CBC W/AUTO DIFF WBC: CPT

## 2025-04-17 PROCEDURE — 80061 LIPID PANEL: CPT

## 2025-04-17 PROCEDURE — 83036 HEMOGLOBIN GLYCOSYLATED A1C: CPT

## 2025-05-06 ENCOUNTER — OFFICE VISIT (OUTPATIENT)
Dept: INTERNAL MEDICINE CLINIC | Age: 89
End: 2025-05-06
Payer: MEDICARE

## 2025-05-06 VITALS
SYSTOLIC BLOOD PRESSURE: 122 MMHG | OXYGEN SATURATION: 93 % | WEIGHT: 85 LBS | DIASTOLIC BLOOD PRESSURE: 62 MMHG | HEART RATE: 72 BPM | BODY MASS INDEX: 19.76 KG/M2

## 2025-05-06 DIAGNOSIS — E87.6 HYPOKALEMIA: Primary | ICD-10-CM

## 2025-05-06 DIAGNOSIS — R73.03 PREDIABETES: ICD-10-CM

## 2025-05-06 DIAGNOSIS — E78.2 MIXED HYPERLIPIDEMIA: ICD-10-CM

## 2025-05-06 DIAGNOSIS — I10 ESSENTIAL HYPERTENSION: ICD-10-CM

## 2025-05-06 DIAGNOSIS — E55.9 VITAMIN D DEFICIENCY: ICD-10-CM

## 2025-05-06 PROCEDURE — G2211 COMPLEX E/M VISIT ADD ON: HCPCS | Performed by: FAMILY MEDICINE

## 2025-05-06 PROCEDURE — G8420 CALC BMI NORM PARAMETERS: HCPCS | Performed by: FAMILY MEDICINE

## 2025-05-06 PROCEDURE — 1123F ACP DISCUSS/DSCN MKR DOCD: CPT | Performed by: FAMILY MEDICINE

## 2025-05-06 PROCEDURE — G8427 DOCREV CUR MEDS BY ELIG CLIN: HCPCS | Performed by: FAMILY MEDICINE

## 2025-05-06 PROCEDURE — 1036F TOBACCO NON-USER: CPT | Performed by: FAMILY MEDICINE

## 2025-05-06 PROCEDURE — 1159F MED LIST DOCD IN RCRD: CPT | Performed by: FAMILY MEDICINE

## 2025-05-06 PROCEDURE — 1090F PRES/ABSN URINE INCON ASSESS: CPT | Performed by: FAMILY MEDICINE

## 2025-05-06 PROCEDURE — 99213 OFFICE O/P EST LOW 20 MIN: CPT | Performed by: FAMILY MEDICINE

## 2025-05-06 ASSESSMENT — ENCOUNTER SYMPTOMS
SHORTNESS OF BREATH: 0
COLOR CHANGE: 0
SORE THROAT: 0
DIARRHEA: 0
ABDOMINAL PAIN: 0
CHEST TIGHTNESS: 0
CONSTIPATION: 0
VOMITING: 0

## 2025-05-06 NOTE — PROGRESS NOTES
Subjective:      Chief Complaint   Patient presents with    Follow-up     4 mth f/u    Hypertension       HPI:  Doretha Melo is a 89 y.o. female who presents today for follow up of chronic conditions as listed below.    Patient has been doing well since her last appointment, no acute concerns today.     BP's have been normal.  Denies any symptoms.     Labs reviewed.      Past Medical History:   Diagnosis Date    BCC (basal cell carcinoma), leg 09/2017    Family history of pancreatic cancer     Glaucoma of right eye 05/2016    Dr Jean Baptiste    Hyperlipidemia     Hypertension     Macular degeneration, dry     Dr. Jean Baptiste    Osteoarthritis of knee 2011    Dr Trujillo    Osteoporosis 2002    Fosamax [phone]    Pelvic fracture (HCC) 05/2018    Right rotator cuff tear 2014    Trujillo / injection; PT    T10 vertebral fracture (HCC) 02/2018    fall and fragility fx; also L1 ; had kyphoplasty 2/2018    Vitamin B12 deficiency 03/2017    B 12 266 by Ronnie at [facility]        Past Surgical History:   Procedure Laterality Date    ABDOMINAL EXPLORATION SURGERY  1997    With Lysis of Adhesions    APPENDECTOMY  1953    w/  rt.uso    CYSTOCELE REPAIR  1993    w/ rectocele    FIXATION KYPHOPLASTY  02/19/2018    T10 & L1    HYSTERECTOMY (CERVIX STATUS UNKNOWN)  1969     remaining ovary removed also    SKIN CANCER EXCISION Left 09/2017    BCC with skin graft    WRIST FRACTURE SURGERY Left 03/2017    ORIF       Social History     Tobacco Use    Smoking status: Never    Smokeless tobacco: Never   Substance Use Topics    Alcohol use: Yes     Comment: wine rarely        Review of Systems   Constitutional:  Negative for activity change, appetite change, chills, fever and unexpected weight change.   HENT:  Negative for congestion and sore throat.    Respiratory:  Negative for chest tightness and shortness of breath.    Cardiovascular:  Negative for chest pain and palpitations.   Gastrointestinal:  Negative for abdominal pain, constipation,

## 2025-05-09 ENCOUNTER — APPOINTMENT (OUTPATIENT)
Dept: CT IMAGING | Age: 89
End: 2025-05-09
Attending: STUDENT IN AN ORGANIZED HEALTH CARE EDUCATION/TRAINING PROGRAM
Payer: MEDICARE

## 2025-05-09 ENCOUNTER — HOSPITAL ENCOUNTER (EMERGENCY)
Age: 89
Discharge: HOME | End: 2025-05-09
Attending: STUDENT IN AN ORGANIZED HEALTH CARE EDUCATION/TRAINING PROGRAM
Payer: MEDICARE

## 2025-05-09 ENCOUNTER — APPOINTMENT (OUTPATIENT)
Dept: GENERAL RADIOLOGY | Age: 89
End: 2025-05-09
Payer: MEDICARE

## 2025-05-09 VITALS
DIASTOLIC BLOOD PRESSURE: 78 MMHG | TEMPERATURE: 100 F | SYSTOLIC BLOOD PRESSURE: 151 MMHG | RESPIRATION RATE: 24 BRPM | HEART RATE: 105 BPM | OXYGEN SATURATION: 95 %

## 2025-05-09 DIAGNOSIS — R42 ORTHOSTATIC LIGHTHEADEDNESS: Primary | ICD-10-CM

## 2025-05-09 DIAGNOSIS — R51.9 ACUTE NONINTRACTABLE HEADACHE, UNSPECIFIED HEADACHE TYPE: ICD-10-CM

## 2025-05-09 DIAGNOSIS — E87.6 HYPOKALEMIA: ICD-10-CM

## 2025-05-09 LAB
ANION GAP SERPL CALCULATED.3IONS-SCNC: 15 MMOL/L (ref 9–17)
BASOPHILS # BLD: 0.01 K/UL
BASOPHILS NFR BLD: 0 % (ref 0–1)
BILIRUB UR QL STRIP: NEGATIVE
BUN SERPL-MCNC: 11 MG/DL (ref 7–20)
CALCIUM SERPL-MCNC: 9.2 MG/DL (ref 8.3–10.6)
CHARACTER UR: ABNORMAL
CHLORIDE SERPL-SCNC: 100 MMOL/L (ref 99–110)
CLARITY UR: CLEAR
CO2 SERPL-SCNC: 24 MMOL/L (ref 21–32)
COLOR UR: YELLOW
CREAT SERPL-MCNC: 0.4 MG/DL (ref 0.6–1.2)
EKG ATRIAL RATE: 93 BPM
EKG DIAGNOSIS: NORMAL
EKG P AXIS: -6 DEGREES
EKG P-R INTERVAL: 164 MS
EKG Q-T INTERVAL: 384 MS
EKG QRS DURATION: 86 MS
EKG QTC CALCULATION (BAZETT): 477 MS
EKG R AXIS: 59 DEGREES
EKG T AXIS: 12 DEGREES
EKG VENTRICULAR RATE: 93 BPM
EOSINOPHIL # BLD: 0 K/UL
EOSINOPHILS RELATIVE PERCENT: 0 % (ref 0–3)
EPI CELLS #/AREA URNS HPF: <1 /HPF
ERYTHROCYTE [DISTWIDTH] IN BLOOD BY AUTOMATED COUNT: 16.7 % (ref 11.7–14.9)
GFR, ESTIMATED: >90 ML/MIN/1.73M2
GLUCOSE SERPL-MCNC: 88 MG/DL (ref 74–99)
GLUCOSE UR STRIP-MCNC: NEGATIVE MG/DL
HCT VFR BLD AUTO: 41.7 % (ref 37–47)
HGB BLD-MCNC: 13.1 G/DL (ref 12.5–16)
HGB UR QL STRIP.AUTO: ABNORMAL
IMM GRANULOCYTES # BLD AUTO: 0.03 K/UL
IMM GRANULOCYTES NFR BLD: 0 %
KETONES UR STRIP-MCNC: 15 MG/DL
LACTATE BLDV-SCNC: 1.2 MMOL/L (ref 0.4–2)
LEUKOCYTE ESTERASE UR QL STRIP: NEGATIVE
LYMPHOCYTES NFR BLD: 1.4 K/UL
LYMPHOCYTES RELATIVE PERCENT: 20 % (ref 24–44)
MAGNESIUM SERPL-MCNC: 1.9 MG/DL (ref 1.8–2.4)
MCH RBC QN AUTO: 29.4 PG (ref 27–31)
MCHC RBC AUTO-ENTMCNC: 31.4 G/DL (ref 32–36)
MCV RBC AUTO: 93.7 FL (ref 78–100)
MONOCYTES NFR BLD: 1.27 K/UL
MONOCYTES NFR BLD: 18 % (ref 0–5)
NEUTROPHILS NFR BLD: 61 % (ref 36–66)
NEUTS SEG NFR BLD: 4.21 K/UL
NITRITE UR QL STRIP: NEGATIVE
PH UR STRIP: 6.5 [PH] (ref 5–8)
PLATELET # BLD AUTO: 270 K/UL (ref 140–440)
PMV BLD AUTO: 9.3 FL (ref 7.5–11.1)
POTASSIUM SERPL-SCNC: 3 MMOL/L (ref 3.5–5.1)
PROCALCITONIN SERPL-MCNC: 0.07 NG/ML
PROT UR STRIP-MCNC: ABNORMAL MG/DL
RBC # BLD AUTO: 4.45 M/UL (ref 4.2–5.4)
RBC #/AREA URNS HPF: 2 /HPF (ref 0–2)
SODIUM SERPL-SCNC: 140 MMOL/L (ref 136–145)
SP GR UR STRIP: 1.01 (ref 1–1.03)
UROBILINOGEN UR STRIP-ACNC: 0.2 EU/DL (ref 0–1)
WBC #/AREA URNS HPF: 1 /HPF (ref 0–5)
WBC OTHER # BLD: 6.9 K/UL (ref 4–10.5)

## 2025-05-09 PROCEDURE — 84145 PROCALCITONIN (PCT): CPT

## 2025-05-09 PROCEDURE — 96365 THER/PROPH/DIAG IV INF INIT: CPT

## 2025-05-09 PROCEDURE — 93010 ELECTROCARDIOGRAM REPORT: CPT | Performed by: INTERNAL MEDICINE

## 2025-05-09 PROCEDURE — 85025 COMPLETE CBC W/AUTO DIFF WBC: CPT

## 2025-05-09 PROCEDURE — 83605 ASSAY OF LACTIC ACID: CPT

## 2025-05-09 PROCEDURE — 2580000003 HC RX 258: Performed by: STUDENT IN AN ORGANIZED HEALTH CARE EDUCATION/TRAINING PROGRAM

## 2025-05-09 PROCEDURE — 6370000000 HC RX 637 (ALT 250 FOR IP): Performed by: STUDENT IN AN ORGANIZED HEALTH CARE EDUCATION/TRAINING PROGRAM

## 2025-05-09 PROCEDURE — 70450 CT HEAD/BRAIN W/O DYE: CPT

## 2025-05-09 PROCEDURE — 6360000002 HC RX W HCPCS: Performed by: STUDENT IN AN ORGANIZED HEALTH CARE EDUCATION/TRAINING PROGRAM

## 2025-05-09 PROCEDURE — 71045 X-RAY EXAM CHEST 1 VIEW: CPT

## 2025-05-09 PROCEDURE — 81001 URINALYSIS AUTO W/SCOPE: CPT

## 2025-05-09 PROCEDURE — 99285 EMERGENCY DEPT VISIT HI MDM: CPT

## 2025-05-09 PROCEDURE — 83735 ASSAY OF MAGNESIUM: CPT

## 2025-05-09 PROCEDURE — 93005 ELECTROCARDIOGRAM TRACING: CPT | Performed by: STUDENT IN AN ORGANIZED HEALTH CARE EDUCATION/TRAINING PROGRAM

## 2025-05-09 PROCEDURE — 80048 BASIC METABOLIC PNL TOTAL CA: CPT

## 2025-05-09 RX ORDER — 0.9 % SODIUM CHLORIDE 0.9 %
1000 INTRAVENOUS SOLUTION INTRAVENOUS ONCE
Status: COMPLETED | OUTPATIENT
Start: 2025-05-09 | End: 2025-05-09

## 2025-05-09 RX ORDER — METOCLOPRAMIDE 10 MG/1
10 TABLET ORAL 4 TIMES DAILY PRN
Qty: 20 TABLET | Refills: 0 | Status: SHIPPED | OUTPATIENT
Start: 2025-05-09 | End: 2025-05-14

## 2025-05-09 RX ORDER — ACETAMINOPHEN 500 MG
1000 TABLET ORAL ONCE
Status: COMPLETED | OUTPATIENT
Start: 2025-05-09 | End: 2025-05-09

## 2025-05-09 RX ORDER — POTASSIUM CHLORIDE 1500 MG/1
40 TABLET, EXTENDED RELEASE ORAL ONCE
Status: COMPLETED | OUTPATIENT
Start: 2025-05-09 | End: 2025-05-09

## 2025-05-09 RX ORDER — POTASSIUM CHLORIDE 7.45 MG/ML
10 INJECTION INTRAVENOUS ONCE
Status: COMPLETED | OUTPATIENT
Start: 2025-05-09 | End: 2025-05-09

## 2025-05-09 RX ORDER — METOCLOPRAMIDE 10 MG/1
10 TABLET ORAL ONCE
Status: COMPLETED | OUTPATIENT
Start: 2025-05-09 | End: 2025-05-09

## 2025-05-09 RX ADMIN — SODIUM CHLORIDE 1000 ML: 0.9 INJECTION, SOLUTION INTRAVENOUS at 09:50

## 2025-05-09 RX ADMIN — POTASSIUM CHLORIDE 40 MEQ: 1500 TABLET, EXTENDED RELEASE ORAL at 10:23

## 2025-05-09 RX ADMIN — METOCLOPRAMIDE 10 MG: 10 TABLET ORAL at 09:58

## 2025-05-09 RX ADMIN — ACETAMINOPHEN 1000 MG: 500 TABLET ORAL at 09:56

## 2025-05-09 RX ADMIN — POTASSIUM CHLORIDE 10 MEQ: 7.46 INJECTION, SOLUTION INTRAVENOUS at 10:30

## 2025-05-09 ASSESSMENT — PAIN DESCRIPTION - DESCRIPTORS: DESCRIPTORS: ACHING

## 2025-05-09 ASSESSMENT — PAIN DESCRIPTION - PAIN TYPE: TYPE: ACUTE PAIN

## 2025-05-09 ASSESSMENT — PAIN SCALES - GENERAL
PAINLEVEL_OUTOF10: 10
PAINLEVEL_OUTOF10: 0

## 2025-05-09 ASSESSMENT — PAIN DESCRIPTION - LOCATION: LOCATION: HEAD

## 2025-05-09 ASSESSMENT — PAIN DESCRIPTION - ORIENTATION: ORIENTATION: MID

## 2025-05-09 ASSESSMENT — PAIN DESCRIPTION - FREQUENCY: FREQUENCY: CONTINUOUS

## 2025-05-09 ASSESSMENT — PAIN DESCRIPTION - ONSET: ONSET: ON-GOING

## 2025-05-09 ASSESSMENT — PAIN - FUNCTIONAL ASSESSMENT: PAIN_FUNCTIONAL_ASSESSMENT: PREVENTS OR INTERFERES SOME ACTIVE ACTIVITIES AND ADLS

## 2025-05-09 NOTE — DISCHARGE INSTRUCTIONS
You can use acetaminophen as needed for pain  Make sure to eat and drink plenty of fluids to stay well-hydrated.  You can use Reglan as needed for your symptoms  Call and follow-up with your family doctor in the next 1-3 days  Return to the ED if your symptoms worsen or you feel you need to be reevaluated

## 2025-05-12 RX ORDER — AMLODIPINE BESYLATE 10 MG/1
10 TABLET ORAL DAILY
Qty: 90 TABLET | Refills: 1 | Status: SHIPPED | OUTPATIENT
Start: 2025-05-12

## 2025-05-15 ENCOUNTER — OFFICE VISIT (OUTPATIENT)
Dept: INTERNAL MEDICINE CLINIC | Age: 89
End: 2025-05-15

## 2025-05-15 VITALS
SYSTOLIC BLOOD PRESSURE: 132 MMHG | DIASTOLIC BLOOD PRESSURE: 68 MMHG | OXYGEN SATURATION: 92 % | BODY MASS INDEX: 19.52 KG/M2 | HEART RATE: 78 BPM | WEIGHT: 84 LBS

## 2025-05-15 DIAGNOSIS — E87.6 HYPOKALEMIA: Primary | ICD-10-CM

## 2025-05-15 RX ORDER — POTASSIUM CHLORIDE 750 MG/1
10 TABLET, EXTENDED RELEASE ORAL DAILY
Qty: 30 TABLET | Refills: 5 | Status: SHIPPED | OUTPATIENT
Start: 2025-05-15

## 2025-05-15 ASSESSMENT — ENCOUNTER SYMPTOMS
SORE THROAT: 0
CHEST TIGHTNESS: 0
SHORTNESS OF BREATH: 0
CONSTIPATION: 0
ABDOMINAL PAIN: 0
VOMITING: 0
COLOR CHANGE: 0
DIARRHEA: 0

## 2025-05-15 NOTE — PROGRESS NOTES
Subjective:      Chief Complaint   Patient presents with    Follow-up     Post er f/u-low potassium-she was having lightheadedness and issues walking       HPI:  Doretha Melo is a 89 y.o. female who presents today for ER follow up.     Daughter states patient called her because she couldn't get out of her chair, felt weak and lightheaded.  Daughter does states she noticed her voice was a little hoarse and was complaining of some decreased hearing as well.  Was evaluated in ER, head CT, labs were negative except for low K. Had low grade fever (100).  Was given IV K supplementation (as well as oral) and discharged home.     Patient reports feeling fine now- states all of her symptoms have resolved.    Was not able to get K drawn before her appointment.         Past Medical History:   Diagnosis Date    BCC (basal cell carcinoma), leg 09/2017    Family history of pancreatic cancer     Glaucoma of right eye 05/2016    Dr Jean Baptiste    Hyperlipidemia     Hypertension     Macular degeneration, dry     Dr. Jean Baptiste    Osteoarthritis of knee 2011    Dr Trujillo    Osteoporosis 2002    Fosamax [phone]    Pelvic fracture (HCC) 05/2018    Right rotator cuff tear 2014    Ronnie / injection; PT    T10 vertebral fracture (HCC) 02/2018    fall and fragility fx; also L1 ; had kyphoplasty 2/2018    Vitamin B12 deficiency 03/2017    B 12 266 by Ronnie at [facility]        Past Surgical History:   Procedure Laterality Date    ABDOMINAL EXPLORATION SURGERY  1997    With Lysis of Adhesions    APPENDECTOMY  1953    w/  rt.uso    CYSTOCELE REPAIR  1993    w/ rectocele    FIXATION KYPHOPLASTY  02/19/2018    T10 & L1    HYSTERECTOMY (CERVIX STATUS UNKNOWN)  1969     remaining ovary removed also    SKIN CANCER EXCISION Left 09/2017    BCC with skin graft    WRIST FRACTURE SURGERY Left 03/2017    ORIF       Social History     Tobacco Use    Smoking status: Never    Smokeless tobacco: Never   Substance Use Topics    Alcohol use: Yes     Comment: wine

## 2025-05-16 LAB — POTASSIUM SERPL-SCNC: 3.3 MMOL/L (ref 3.5–5.1)

## 2025-06-21 ENCOUNTER — HOSPITAL ENCOUNTER (OUTPATIENT)
Age: 89
Setting detail: SPECIMEN
Discharge: HOME | End: 2025-06-21
Payer: MEDICARE

## 2025-06-21 PROCEDURE — 84132 ASSAY OF SERUM POTASSIUM: CPT

## 2025-06-23 ENCOUNTER — RESULTS FOLLOW-UP (OUTPATIENT)
Dept: INTERNAL MEDICINE CLINIC | Age: 89
End: 2025-06-23

## 2025-06-23 LAB — POTASSIUM SERPL-SCNC: 4.1 MMOL/L (ref 3.5–5.1)

## 2025-06-26 NOTE — PATIENT INSTRUCTIONS
We are committed to providing you the best care possible.    If you receive a survey after visiting one of our offices, please take time to share your experience concerning your physician office visit.  These surveys are confidential and no health information about you is shared.    We are eager to improve for you and we are counting on your feedback to help make that happen.       
61

## 2025-06-30 ENCOUNTER — APPOINTMENT (OUTPATIENT)
Dept: CT IMAGING | Age: 89
DRG: 552 | End: 2025-06-30
Payer: MEDICARE

## 2025-06-30 ENCOUNTER — HOSPITAL ENCOUNTER (EMERGENCY)
Age: 89
Discharge: HOME | DRG: 552 | End: 2025-06-30
Attending: STUDENT IN AN ORGANIZED HEALTH CARE EDUCATION/TRAINING PROGRAM
Payer: MEDICARE

## 2025-06-30 VITALS
BODY MASS INDEX: 19.06 KG/M2 | DIASTOLIC BLOOD PRESSURE: 74 MMHG | RESPIRATION RATE: 18 BRPM | OXYGEN SATURATION: 94 % | WEIGHT: 82 LBS | TEMPERATURE: 98.2 F | SYSTOLIC BLOOD PRESSURE: 129 MMHG | HEART RATE: 80 BPM
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
## 2025-06-30 DIAGNOSIS — S39.012A STRAIN OF LUMBAR REGION, INITIAL ENCOUNTER: ICD-10-CM

## 2025-06-30 PROCEDURE — 99284 EMERGENCY DEPT VISIT MOD MDM: CPT

## 2025-06-30 PROCEDURE — 72131 CT LUMBAR SPINE W/O DYE: CPT

## 2025-06-30 PROCEDURE — 72125 CT NECK SPINE W/O DYE: CPT

## 2025-06-30 PROCEDURE — 6370000000 HC RX 637 (ALT 250 FOR IP): Performed by: STUDENT IN AN ORGANIZED HEALTH CARE EDUCATION/TRAINING PROGRAM

## 2025-06-30 PROCEDURE — 70450 CT HEAD/BRAIN W/O DYE: CPT

## 2025-06-30 RX ORDER — ACETAMINOPHEN 500 MG
500 TABLET ORAL
Status: COMPLETED | OUTPATIENT
Start: 2025-06-30 | End: 2025-06-30

## 2025-06-30 RX ORDER — LIDOCAINE 4 G/G
1 PATCH TOPICAL DAILY
Qty: 30 PATCH | Refills: 0 | Status: ON HOLD | OUTPATIENT
Start: 2025-06-30 | End: 2025-07-30

## 2025-06-30 RX ADMIN — ACETAMINOPHEN 500 MG: 500 TABLET ORAL at 16:05

## 2025-06-30 ASSESSMENT — PAIN DESCRIPTION - ORIENTATION
ORIENTATION: LOWER
ORIENTATION: LOWER

## 2025-06-30 ASSESSMENT — PAIN DESCRIPTION - LOCATION
LOCATION: BACK
LOCATION: BACK

## 2025-06-30 ASSESSMENT — PAIN SCALES - GENERAL
PAINLEVEL_OUTOF10: 9
PAINLEVEL_OUTOF10: 9

## 2025-06-30 ASSESSMENT — PAIN - FUNCTIONAL ASSESSMENT: PAIN_FUNCTIONAL_ASSESSMENT: 0-10

## 2025-06-30 NOTE — ED PROVIDER NOTES
Emergency Department Encounter    Patient: Doretha Melo  MRN: [id number]  : 6/15/1927  Date of Evaluation: 2025  ED Provider:  Jarret Rodrigues DO    Triage Chief Complaint:   Back Pain and Extremity Weakness (Fell over the weekend.  Having low back pain and bilateral leg weakness. )    [place]:  Doretha Melo is a 89 y.o. female that presents with lower back pain after a fall that occurred about 1 week ago.  She states that 1 week ago she was walking and stubbed her toe this caused her to fall.  She did not hit her head, landed on her butt.  She does have a history of unsteady gait after a stroke.  She usually gets around with a walker.  She has not had any other recent illness.  Denies any chest pain shortness of breath abdominal pain nausea vomiting headache fevers.  No pain in her hips arms or legs.  She has been walking with her walker since the fall but is having increasing lower back pain.  No saddle anesthesia no urinary retention no fecal incontinence.  No anticoagulant use.    MDM:    History from : Patient    On arrival patient overall appears very well GCS 15.  No obvious signs of head trauma.  She does have midline lumbar tenderness.  No other signs concerning for spinal cord compression.  I did review her previous history she has a history of osteoporosis has had falls with vertebral fractures previously.  She does not have any concerning neurologic deficits which is fortunate.  Given her symptoms and fall I did obtain CT scans of her head C-spine and lumbar spine.  No acute injuries.  Old L1 and L3 fractures.  Given that she is still walking and does not have any other concerning neurologic features I think this time she is okay for home-going and can further be treated symptomatic and supportively.  Family is with her and is comfortable with that plan as well.        ED Course as of 25 1724   Mon 2025   1530 Reviewed discharge summary from 2025 at which time patient was  adjustment of the mA and/or kV according to patient size) were used to limit patient radiation dose. FINDINGS: BONES: Bone demineralization. Five lumbar type vertebrae. Severe remote compression fracture at L1 with changes of vertebroplasty. A remote appearing mild superior endplate compression fracture L3. The remaining lumbar vertebral body heights are maintained. Multiple remote bilateral rib fractures. No acute fracture. ALIGNMENT: A dextroconvex curvature of the lumbar spine. Grade 1 anterolisthesis  of L4-5 appears to be related to facet arthropathy. DEGENERATIVE CHANGES: Moderate multilevel facet arthropathy. Mild multilevel endplate spurring. SOFT TISSUE: The paraspinal soft tissue is unremarkable. OTHER:  Atherosclerotic calcifications within the aortoiliac arteries. Diverticulosis within the colon. IMPRESSION: 1. No acute fracture. 2. Remote-appearing compression fractures at L1 and L3. 3. Moderate multilevel degenerative changes.  Dictated and Electronically Signed By: Tawanda Guerra MD [facility] 6/30/2025 17:04        CT HEAD WO CONTRAST  Result Date: 6/30/2025  EXAM:  CT HEAD WO CONTRAST DATE OF EXAM:  6/30/2025 16:44 DEMOGRAPHICS: 89 years old Female CLINICAL STATEMENT: Trauma COMPARISON: 5/9/2025 DOSE OPTIMIZATION: CT radiation dose optimization techniques (automated exposure  control, and use of iterative reconstruction techniques, or adjustment of the mA and/or kV according to patient size) were used to limit patient radiation dose. FINDINGS: Moderate motion artifact is present on some of the obtained images, degrading image quality. The study remains diagnostic. VENTRICLES/SULCI: Moderate ventricular and mild sulcal prominence, consistent with atrophy. BRAIN: Diminished attenuation in the cerebral white matter, compatible with moderate chronic small vessel ischemic disease. No extra-axial fluid collection,  hemorrhage, mass effect, midline shift, or acute infarct. Preserved  gray-white matter differentiation. PARANASAL SINUSES: Very minimal mucosal thickening within the paranasal sinuses. IMPRESSION: 1. No acute intracranial process. 2. Moderate atrophy and chronic small vessel ischemic disease.  Dictated and Electronically Signed By: Tawanda Guerra MD [facility] 6/30/2025 17:00        CT CERVICAL SPINE WO CONTRAST  Result Date: 6/30/2025  EXAM:  CT CERVICAL SPINE WO CONTRAST DATE OF EXAM:  6/30/2025 16:44 DEMOGRAPHICS: 89 years old Female CLINICAL STATEMENT: Trauma COMPARISON: 10/29/2021 DOSE OPTIMIZATION: CT radiation dose optimization techniques (automated exposure  control, and use of iterative reconstruction techniques, or adjustment of the mA and/or kV according to patient size) were used to limit patient radiation dose. FINDINGS: BONES: Normal vertebral body heights. No acute fracture or subluxation. A remote  nonunited odontoid fracture. ALIGNMENT: Mild anterolisthesis of C4-5, C5-6 and C6-7, and C7-T1 is similar to prior head appears be related to facet arthropathy. DEGENERATIVE CHANGES: - Arthritic changes at the atlantoaxial joint. - Moderate multilevel disc space narrowing, endplate spurring, and facet and uncovertebral arthropathy. SOFT TISSUE: The prevertebral soft tissue is unremarkable. IMPRESSION: 1. No acute fracture or subluxation. 2. A remote nonunited odontoid fracture. 3. Moderate multilevel degenerative changes.  Dictated and Electronically Signed By: Tawanda Guerra MD [facility] 6/30/2025 17:00            Clinical Impression:  1. Fall, initial encounter    2. Strain of lumbar region, initial encounter      Disposition referral (if applicable):  Kenna Ceron MD  [address] [place] [address]   [facility] [address]  [phone]    Call today  To make an appointment for reevaluation within 1 week    [facility] Emergency Department  [address] [place] [address]  [phone]  Go today  If symptoms

## 2025-06-30 NOTE — DISCHARGE INSTRUCTIONS
Doretha: Here are some specific instructions about taking Tylenol       acetaminophen (Tylenol) is used for fever and pain.  It comes in regular strength (325 mg) and extra strength (500 mg).  I typically recommend two of the ES (500 mg) tablets every 4-6 hours.  However, do not take more than 3 total doses (3,000 mg) in a 24-hour period. Also, do not take if you have any liver problems.

## 2025-07-01 ENCOUNTER — CARE COORDINATION (OUTPATIENT)
Dept: CARE COORDINATION | Age: 89
End: 2025-07-01

## 2025-07-01 NOTE — CARE COORDINATION
Symptoms: Pain, Other (Comment: Issues walking)          Medications Reviewed:   Completed during this call    Advance Care Planning:   Not reviewed during this call     Care Planning:   Not completed during this call    PCP/Specialist follow up:   Future Appointments         Provider Specialty Dept Phone    7/7/2025 3:00 PM Richard Lincoln MD Internal Medicine [phone]    7/8/2025 10:30 AM Del Montgomery APRN - CNP Family Medicine [phone]    12/2/2025 11:00 AM Kenna Ceron MD Internal Medicine [phone]            Follow Up:   Plan for next AC outreach in approximately 1 week to complete:  - CC Protocol assessments  - disease specific assessments  - SDOH assessments  - advance care planning   - goal progression  - education .   Patient family Monae is agreeable to this plan.

## 2025-07-02 ENCOUNTER — CARE COORDINATION (OUTPATIENT)
Dept: CARE COORDINATION | Age: 89
End: 2025-07-02

## 2025-07-02 NOTE — CARE COORDINATION
KELBY received secure message from family requesting order for [facility] PT/OT patient  uses [facility].  KELBY sent epic message to Dr. Ceron with the request at this time.

## 2025-07-02 NOTE — CARE COORDINATION
SW received referral from Mimi LAMA on 7/1 requesting assistance with HHC, OT/PT, rehab.    Attempted phone call to Pt's daughter, Monae, emergency contact to complete initial SW assessment. No answer, vm message left requesting return call, contact number provided.     SW plan of care: SW will make next outreach attempt on 7/8 to complete initial SW assessment.

## 2025-07-02 NOTE — CARE COORDINATION
Received return call from Pt's daughter, Monae, but SW was on the other line.     Phone call to Pt's daughter, emergency contact, Monae. Monae reported Pt had a recent change in condition following a fall and is requiring additional support. Would like Pt to receive PT. Pt has strong family support from children and spouses. They are assisting her with bathing, getting dressed and with meals.   PT - Pt has a Dr appointment on 7/7 and will discuss PT referral at that time.     SW plan of care: SW will resolve from SW services.

## 2025-07-03 ENCOUNTER — HOSPITAL ENCOUNTER (INPATIENT)
Age: 89
LOS: 5 days | Discharge: NURSING, REHAB OR LONG-TERM CARE | DRG: 552 | End: 2025-07-08
Attending: EMERGENCY MEDICINE | Admitting: INTERNAL MEDICINE
Payer: MEDICARE

## 2025-07-03 ENCOUNTER — APPOINTMENT (OUTPATIENT)
Dept: GENERAL RADIOLOGY | Age: 89
DRG: 552 | End: 2025-07-03
Payer: MEDICARE

## 2025-07-03 DIAGNOSIS — M54.9 BACK PAIN, UNSPECIFIED BACK LOCATION, UNSPECIFIED BACK PAIN LATERALITY, UNSPECIFIED CHRONICITY: Primary | ICD-10-CM

## 2025-07-03 DIAGNOSIS — M25.551 RIGHT HIP PAIN: ICD-10-CM

## 2025-07-03 DIAGNOSIS — R26.2 UNABLE TO AMBULATE: ICD-10-CM

## 2025-07-03 LAB
ALBUMIN SERPL-MCNC: 3.3 G/DL (ref 3.4–5)
ALBUMIN/GLOB SERPL: 1.2 {RATIO} (ref 1.1–2.2)
ALP SERPL-CCNC: 109 U/L (ref 40–129)
ALT SERPL-CCNC: 18 U/L (ref 10–40)
ANION GAP SERPL CALCULATED.3IONS-SCNC: 12 MMOL/L (ref 9–17)
AST SERPL-CCNC: 19 U/L (ref 15–37)
BASOPHILS # BLD: 0.04 K/UL
BASOPHILS NFR BLD: 0 % (ref 0–1)
BILIRUB SERPL-MCNC: 0.5 MG/DL (ref 0–1)
BUN SERPL-MCNC: 22 MG/DL (ref 7–20)
CALCIUM SERPL-MCNC: 9 MG/DL (ref 8.3–10.6)
CHLORIDE SERPL-SCNC: 101 MMOL/L (ref 99–110)
CK SERPL-CCNC: 53 U/L (ref 26–192)
CO2 SERPL-SCNC: 23 MMOL/L (ref 21–32)
CREAT SERPL-MCNC: 0.7 MG/DL (ref 0.6–1.2)
EOSINOPHIL # BLD: 0.04 K/UL
EOSINOPHILS RELATIVE PERCENT: 0 % (ref 0–3)
ERYTHROCYTE [DISTWIDTH] IN BLOOD BY AUTOMATED COUNT: 16.7 % (ref 11.7–14.9)
GFR, ESTIMATED: 73 ML/MIN/1.73M2
GLUCOSE SERPL-MCNC: 137 MG/DL (ref 74–99)
HCT VFR BLD AUTO: 35.6 % (ref 37–47)
HGB BLD-MCNC: 11.4 G/DL (ref 12.5–16)
IMM GRANULOCYTES # BLD AUTO: 0.12 K/UL
IMM GRANULOCYTES NFR BLD: 1 %
LACTATE BLDV-SCNC: 1.4 MMOL/L (ref 0.4–2)
LYMPHOCYTES NFR BLD: 1.21 K/UL
LYMPHOCYTES RELATIVE PERCENT: 9 % (ref 24–44)
MCH RBC QN AUTO: 30.2 PG (ref 27–31)
MCHC RBC AUTO-ENTMCNC: 32 G/DL (ref 32–36)
MCV RBC AUTO: 94.4 FL (ref 78–100)
MONOCYTES NFR BLD: 1.39 K/UL
MONOCYTES NFR BLD: 11 % (ref 0–5)
NEUTROPHILS NFR BLD: 78 % (ref 36–66)
NEUTS SEG NFR BLD: 10.12 K/UL
PLATELET # BLD AUTO: 354 K/UL (ref 140–440)
PMV BLD AUTO: 8.6 FL (ref 7.5–11.1)
POTASSIUM SERPL-SCNC: 3.8 MMOL/L (ref 3.5–5.1)
PROT SERPL-MCNC: 6 G/DL (ref 6.4–8.2)
RBC # BLD AUTO: 3.77 M/UL (ref 4.2–5.4)
SODIUM SERPL-SCNC: 135 MMOL/L (ref 136–145)
WBC OTHER # BLD: 12.9 K/UL (ref 4–10.5)

## 2025-07-03 PROCEDURE — 83605 ASSAY OF LACTIC ACID: CPT

## 2025-07-03 PROCEDURE — 72100 X-RAY EXAM L-S SPINE 2/3 VWS: CPT

## 2025-07-03 PROCEDURE — 6360000002 HC RX W HCPCS: Performed by: INTERNAL MEDICINE

## 2025-07-03 PROCEDURE — 82550 ASSAY OF CK (CPK): CPT

## 2025-07-03 PROCEDURE — 1200000000 HC SEMI PRIVATE

## 2025-07-03 PROCEDURE — 85025 COMPLETE CBC W/AUTO DIFF WBC: CPT

## 2025-07-03 PROCEDURE — 6370000000 HC RX 637 (ALT 250 FOR IP): Performed by: EMERGENCY MEDICINE

## 2025-07-03 PROCEDURE — 2500000003 HC RX 250 WO HCPCS: Performed by: INTERNAL MEDICINE

## 2025-07-03 PROCEDURE — 80053 COMPREHEN METABOLIC PANEL: CPT

## 2025-07-03 PROCEDURE — 73502 X-RAY EXAM HIP UNI 2-3 VIEWS: CPT

## 2025-07-03 PROCEDURE — 99285 EMERGENCY DEPT VISIT HI MDM: CPT

## 2025-07-03 PROCEDURE — 6370000000 HC RX 637 (ALT 250 FOR IP): Performed by: INTERNAL MEDICINE

## 2025-07-03 RX ORDER — LIDOCAINE 4 G/G
1 PATCH TOPICAL DAILY
Status: DISCONTINUED | OUTPATIENT
Start: 2025-07-04 | End: 2025-07-08 | Stop reason: HOSPADM

## 2025-07-03 RX ORDER — VITAMIN B COMPLEX
1000 TABLET ORAL DAILY
Status: DISCONTINUED | OUTPATIENT
Start: 2025-07-04 | End: 2025-07-08 | Stop reason: HOSPADM

## 2025-07-03 RX ORDER — LATANOPROST 50 UG/ML
1 SOLUTION/ DROPS OPHTHALMIC NIGHTLY
Status: DISCONTINUED | OUTPATIENT
Start: 2025-07-03 | End: 2025-07-08 | Stop reason: HOSPADM

## 2025-07-03 RX ORDER — ASPIRIN 81 MG/1
81 TABLET, CHEWABLE ORAL DAILY
Status: DISCONTINUED | OUTPATIENT
Start: 2025-07-04 | End: 2025-07-08 | Stop reason: HOSPADM

## 2025-07-03 RX ORDER — HYDROCODONE BITARTRATE AND ACETAMINOPHEN 5; 325 MG/1; MG/1
0.5 TABLET ORAL ONCE
Status: COMPLETED | OUTPATIENT
Start: 2025-07-03 | End: 2025-07-03

## 2025-07-03 RX ORDER — ONDANSETRON 2 MG/ML
4 INJECTION INTRAMUSCULAR; INTRAVENOUS EVERY 6 HOURS PRN
Status: DISCONTINUED | OUTPATIENT
Start: 2025-07-03 | End: 2025-07-08 | Stop reason: HOSPADM

## 2025-07-03 RX ORDER — ONDANSETRON 4 MG/1
4 TABLET, ORALLY DISINTEGRATING ORAL EVERY 8 HOURS PRN
Status: DISCONTINUED | OUTPATIENT
Start: 2025-07-03 | End: 2025-07-08 | Stop reason: HOSPADM

## 2025-07-03 RX ORDER — ROSUVASTATIN CALCIUM 5 MG/1
10 TABLET, COATED ORAL NIGHTLY
Status: DISCONTINUED | OUTPATIENT
Start: 2025-07-03 | End: 2025-07-08 | Stop reason: HOSPADM

## 2025-07-03 RX ORDER — POLYETHYLENE GLYCOL 3350 17 G/17G
17 POWDER, FOR SOLUTION ORAL DAILY PRN
Status: DISCONTINUED | OUTPATIENT
Start: 2025-07-03 | End: 2025-07-03

## 2025-07-03 RX ORDER — LANOLIN ALCOHOL/MO/W.PET/CERES
500 CREAM (GRAM) TOPICAL DAILY
Status: DISCONTINUED | OUTPATIENT
Start: 2025-07-04 | End: 2025-07-08 | Stop reason: HOSPADM

## 2025-07-03 RX ORDER — HEPARIN SODIUM 5000 [USP'U]/ML
5000 INJECTION, SOLUTION INTRAVENOUS; SUBCUTANEOUS 2 TIMES DAILY
Status: DISCONTINUED | OUTPATIENT
Start: 2025-07-03 | End: 2025-07-08 | Stop reason: HOSPADM

## 2025-07-03 RX ORDER — ACETAMINOPHEN 325 MG/1
325 TABLET ORAL ONCE
Status: DISCONTINUED | OUTPATIENT
Start: 2025-07-03 | End: 2025-07-08 | Stop reason: HOSPADM

## 2025-07-03 RX ORDER — SENNA AND DOCUSATE SODIUM 50; 8.6 MG/1; MG/1
2 TABLET, FILM COATED ORAL NIGHTLY
Status: DISCONTINUED | OUTPATIENT
Start: 2025-07-03 | End: 2025-07-08 | Stop reason: HOSPADM

## 2025-07-03 RX ORDER — LIDOCAINE 4 G/G
1 PATCH TOPICAL DAILY
Status: DISCONTINUED | OUTPATIENT
Start: 2025-07-03 | End: 2025-07-03

## 2025-07-03 RX ORDER — TIMOLOL MALEATE 5 MG/ML
1 SOLUTION/ DROPS OPHTHALMIC DAILY
Status: DISCONTINUED | OUTPATIENT
Start: 2025-07-04 | End: 2025-07-08 | Stop reason: HOSPADM

## 2025-07-03 RX ORDER — SODIUM CHLORIDE 9 MG/ML
INJECTION, SOLUTION INTRAVENOUS PRN
Status: DISCONTINUED | OUTPATIENT
Start: 2025-07-03 | End: 2025-07-08 | Stop reason: HOSPADM

## 2025-07-03 RX ORDER — ONDANSETRON 4 MG/1
4 TABLET, ORALLY DISINTEGRATING ORAL ONCE
Status: COMPLETED | OUTPATIENT
Start: 2025-07-03 | End: 2025-07-03

## 2025-07-03 RX ORDER — ACETAMINOPHEN 650 MG/1
650 SUPPOSITORY RECTAL EVERY 6 HOURS PRN
Status: DISCONTINUED | OUTPATIENT
Start: 2025-07-03 | End: 2025-07-08 | Stop reason: HOSPADM

## 2025-07-03 RX ORDER — VIT A/VIT C/VIT E/ZINC/COPPER 2148-113
1 TABLET ORAL 2 TIMES DAILY
Status: DISCONTINUED | OUTPATIENT
Start: 2025-07-04 | End: 2025-07-08 | Stop reason: HOSPADM

## 2025-07-03 RX ORDER — AMLODIPINE BESYLATE 10 MG/1
10 TABLET ORAL DAILY
Status: DISCONTINUED | OUTPATIENT
Start: 2025-07-04 | End: 2025-07-08 | Stop reason: HOSPADM

## 2025-07-03 RX ORDER — HYDROCODONE BITARTRATE AND ACETAMINOPHEN 5; 325 MG/1; MG/1
1 TABLET ORAL 3 TIMES DAILY PRN
Status: DISCONTINUED | OUTPATIENT
Start: 2025-07-03 | End: 2025-07-08 | Stop reason: HOSPADM

## 2025-07-03 RX ORDER — ACETAMINOPHEN 325 MG/1
650 TABLET ORAL EVERY 6 HOURS PRN
Status: DISCONTINUED | OUTPATIENT
Start: 2025-07-03 | End: 2025-07-08 | Stop reason: HOSPADM

## 2025-07-03 RX ORDER — POLYETHYLENE GLYCOL 3350 17 G/17G
17 POWDER, FOR SOLUTION ORAL DAILY
Status: DISCONTINUED | OUTPATIENT
Start: 2025-07-04 | End: 2025-07-05 | Stop reason: SDUPTHER

## 2025-07-03 RX ORDER — SODIUM CHLORIDE 0.9 % (FLUSH) 0.9 %
5-40 SYRINGE (ML) INJECTION EVERY 12 HOURS SCHEDULED
Status: DISCONTINUED | OUTPATIENT
Start: 2025-07-03 | End: 2025-07-08 | Stop reason: HOSPADM

## 2025-07-03 RX ORDER — POTASSIUM CHLORIDE 1500 MG/1
10 TABLET, EXTENDED RELEASE ORAL DAILY
Status: DISCONTINUED | OUTPATIENT
Start: 2025-07-04 | End: 2025-07-08 | Stop reason: HOSPADM

## 2025-07-03 RX ORDER — SODIUM CHLORIDE 0.9 % (FLUSH) 0.9 %
5-40 SYRINGE (ML) INJECTION PRN
Status: DISCONTINUED | OUTPATIENT
Start: 2025-07-03 | End: 2025-07-08 | Stop reason: HOSPADM

## 2025-07-03 RX ADMIN — HYDROCODONE BITARTRATE AND ACETAMINOPHEN 0.5 TABLET: 5; 325 TABLET ORAL at 16:58

## 2025-07-03 RX ADMIN — DOCUSATE SODIUM 50MG AND SENNOSIDES 8.6MG 2 TABLET: 8.6; 5 TABLET, FILM COATED ORAL at 23:29

## 2025-07-03 RX ADMIN — ACETAMINOPHEN 650 MG: 325 TABLET ORAL at 20:43

## 2025-07-03 RX ADMIN — ONDANSETRON 4 MG: 4 TABLET, ORALLY DISINTEGRATING ORAL at 16:58

## 2025-07-03 RX ADMIN — LATANOPROST 1 DROP: 50 SOLUTION OPHTHALMIC at 21:44

## 2025-07-03 RX ADMIN — HEPARIN SODIUM 5000 UNITS: 5000 INJECTION INTRAVENOUS; SUBCUTANEOUS at 20:43

## 2025-07-03 RX ADMIN — ROSUVASTATIN CALCIUM 10 MG: 5 TABLET, FILM COATED ORAL at 20:43

## 2025-07-03 RX ADMIN — SODIUM CHLORIDE, PRESERVATIVE FREE 10 ML: 5 INJECTION INTRAVENOUS at 20:44

## 2025-07-03 ASSESSMENT — PAIN DESCRIPTION - LOCATION
LOCATION: BACK
LOCATION: BACK

## 2025-07-03 ASSESSMENT — PAIN DESCRIPTION - DESCRIPTORS
DESCRIPTORS: ACHING
DESCRIPTORS: ACHING

## 2025-07-03 ASSESSMENT — PAIN SCALES - GENERAL
PAINLEVEL_OUTOF10: 10
PAINLEVEL_OUTOF10: 0
PAINLEVEL_OUTOF10: 10

## 2025-07-03 ASSESSMENT — PAIN DESCRIPTION - ORIENTATION
ORIENTATION: LOWER
ORIENTATION: LOWER

## 2025-07-03 NOTE — ED NOTES
ED TO INPATIENT SBAR HANDOFF    Patient Name: Doretha Melo   :  6/15/1927  89 y.o.   Preferred Name    Family/Caregiver Present yes   Restraints no   C-SSRS: Risk of Suicide: No Risk  Sitter no   Sepsis Risk Score Sepsis V2 Risk Score: 29.8    PLEASE NOTE--Encounter / Re-Admission Within 30 Days  This patient has had another encounter or admission within the last 30 days.      Readmission Risk Score: 15.2      Situation  Chief Complaint   Patient presents with    Back Pain     Fall last Thursday, was seen for same on Monday      Brief Description of Patient's Condition: Pt came to the ED with back pain after having a fall last week. Pt was evaluated on Monday, but has since become increasingly weak and the pain is causing her to be unable to ambulate. Pt is normally able to ambulate with a walker.   Mental Status: oriented, alert, coherent, and logical  Arrived from: home    Imaging:   XR HIP RIGHT (2-3 VIEWS)   Final Result      XR LUMBAR SPINE (2-3 VIEWS)   Final Result        Abnormal labs:   Abnormal Labs Reviewed   CBC WITH AUTO DIFFERENTIAL - Abnormal; Notable for the following components:       Result Value    WBC 12.9 (*)     RBC 3.77 (*)     Hemoglobin 11.4 (*)     Hematocrit 35.6 (*)     RDW 16.7 (*)     Neutrophils % 78 (*)     Lymphocytes % 9 (*)     Monocytes % 11 (*)     Immature Granulocytes % 1 (*)     All other components within normal limits   COMPREHENSIVE METABOLIC PANEL - Abnormal; Notable for the following components:    Sodium 135 (*)     Glucose 137 (*)     BUN 22 (*)     Total Protein 6.0 (*)     Albumin 3.3 (*)     All other components within normal limits        Background  History:   Past Medical History:   Diagnosis Date    BCC (basal cell carcinoma), leg 2017    Family history of pancreatic cancer     Glaucoma of right eye 2016    Dr Jean Baptiste    Hyperlipidemia     Hypertension     Macular degeneration, dry     Dr. Jean Baptiste    Osteoarthritis of knee     Dr Trujillo    Osteoporosis

## 2025-07-03 NOTE — ED TRIAGE NOTES
Pt to the ED with c/o back pain after having a fall last Thursday. Pt was seen in the ED on Monday for the back pain, but states it has become unbearable and she is having a hard time ambulating.

## 2025-07-03 NOTE — ED PROVIDER NOTES
[facility]      TRIAGE CHIEF COMPLAINT:   Back Pain (Fall last Thursday, was seen for same on Monday )      [place]:  Doretha Melo is a 89 y.o. female that presents with complaint of continued pain after fall.  Patient was here earlier today after a fall, had low back pain.  Patient had a back strain apparently discharged home with medication which she is taking she lives alone but she is not able to ambulate secondary to pain pain is not controlled.  Daughter talk to patient's PCP was told to come in for reevaluation and possible admission.  Patient has no other complaints no fevers nausea vomiting chest pain shortness of breath, chest pain decreased range of motion trouble taking care of self at home secondary to pain no new trauma.  No other questions or concerns.    REVIEW OF SYSTEMS:  At least 10 systems reviewed and otherwise acutely negative except as in the [place].    Review of Systems   Constitutional:  Positive for fatigue.   HENT: Negative.     Eyes: Negative.    Respiratory: Negative.     Cardiovascular: Negative.    Gastrointestinal: Negative.    Endocrine: Negative.    Genitourinary: Negative.    Musculoskeletal:  Positive for arthralgias, back pain, gait problem and myalgias.   Skin: Negative.    Allergic/Immunologic: Negative.    Hematological: Negative.    All other systems reviewed and are negative.      Past Medical History:   Diagnosis Date    BCC (basal cell carcinoma), leg 09/2017    Family history of pancreatic cancer     Glaucoma of right eye 05/2016    Dr Jean Baptiste    Hyperlipidemia     Hypertension     Macular degeneration, dry     Dr. Jean Baptiste    Osteoarthritis of knee 2011    Dr Trujillo    Osteoporosis 2002    Fosamax [phone]    Pelvic fracture (HCC) 05/2018    Right rotator cuff tear 2014    Ronnie / injection; PT    T10 vertebral fracture (HCC) 02/2018    fall and fragility fx; also L1 ; had kyphoplasty 2/2018    Vitamin B12 deficiency 03/2017    B 12 266 by Ronnie  COMPARISON: 10/29/2021 DOSE OPTIMIZATION: CT radiation dose optimization techniques (automated exposure  control, and use of iterative reconstruction techniques, or adjustment of the mA and/or kV according to patient size) were used to limit patient radiation dose. FINDINGS: BONES: Normal vertebral body heights. No acute fracture or subluxation. A remote  nonunited odontoid fracture. ALIGNMENT: Mild anterolisthesis of C4-5, C5-6 and C6-7, and C7-T1 is similar to prior head appears be related to facet arthropathy. DEGENERATIVE CHANGES: - Arthritic changes at the atlantoaxial joint. - Moderate multilevel disc space narrowing, endplate spurring, and facet and uncovertebral arthropathy. SOFT TISSUE: The prevertebral soft tissue is unremarkable. IMPRESSION: 1. No acute fracture or subluxation. 2. A remote nonunited odontoid fracture. 3. Moderate multilevel degenerative changes.  Dictated and Electronically Signed By: Tawanda Guerra MD [facility] 6/30/2025 17:00          EKG (if obtained): (All EKG's are interpreted by myself in the absence of a cardiologist)    MDM:    Patient here complaint of continued back pain after fall.  Patient apparently had a fall today was seen here diagnosed with a back strain discharge with medication but not doing better or getting worse.  Trouble ambulating secondary to pain pain medicine helping.  No new injury or trauma.  No fevers nausea vomiting, chest pain shortness of breath no weakness or numbness.  Just low back pain right hip pain.,  Daughter called patient's PCP was told to come to the ER for reevaluation, possible admission.  Patient lives alone at 89 years old.  On arrival she appears well vital signs are stable she has generalized weakness, nothing focal does have right hip pain low back pain palpation, positive straight leg test on the right.  Due to her symptoms I will check basic lab work, consult case management, will get x-rays of right hip and L-spine

## 2025-07-03 NOTE — CARE COORDINATION
CM received consult from Dr Sequeira for patient in room #19 to assist with discharge planning. Patient currently in the radiology department. This CM and [facility] RN met with patient's family. Family reports patient is from home alone and is typically independent with ADLs. \[facility]\"" patient had a fall last week and has been having increased pain since. Family checks on patient frequently and assists with activities of daily living as needed. Patient is active with [facility]. \[facility]\"" patient has a doctor's appointment scheduled on 7/7 and family was going to discuss PT/OT referral. Patient has the following DME: walker, wheelchair, BSC. Family is hopeful patient can admit to ARU for rehab. \[facility]\"" patient has been admitted to ARU in the past and has done well.       CM placed telephone call to Genie with ARU. No answer. Voicemail message left requesting return call.     CM received return call from Genie. Referral information provided. Patient will need PT/OT evaluation.

## 2025-07-03 NOTE — H&P
History and Physical      Name:  Doretha Melo /Age/Sex: 6/15/1927  (89 y.o. female)   MRN & CSN:  [id number] & [id number] Encounter Date/Time: 7/3/2025 7:14 PM EDT   Location:  ED19/ED-19 PCP: Kenan Ceron MD       Hospital Day: 1    Assessment and Plan:     #.  Difficulty walking  #.  Recent fall  - Patient was evaluated in ER 2025  - CT head, CT C-spine, CT L-spine-no acute process  - CT L-spine showed remote appearing compression fractures of L1 and L3  - X-ray of the right hip, x-ray of the lumbar spine done 7/3/2025--age indeterminate fractures of T9 and superior endplate L3.  - MRI T-spine, L-spine ordered  - PT/OT evaluation.  Analgesics as needed.    #.  History of CVA-2023  - Admission 2025 for dizziness-workup for acute CVA negative    #.  Hypertension-on amlodipine    #.  Hyperlipidemia-on Crestor    #.  Osteoporosis-on alendronate    #.  History of kyphoplasty to T10 and L1-2018    #.  History of cervical spine fracture after a fall  - Type II odontoid fracture-2021      Disposition:   Current Living situation: Home    Diet Regular   DVT Prophylaxis [x] Lovenox   Code Status Full-discussed with patient.  Stated that she has 2 more years to go   Surrogate Decision Maker/ POA Daughter     History from:   EMR, patient.    History of Present Illness:     Chief Complaint: Difficulty in walking  Doretha Melo is a 89 y.o. female was referred to ER from PCPs office for difficulty ambulating.  Patient had a fall 2 weeks ago and was evaluated in ER 2025.  Since last 1 week patient has been having worsening back pain and difficulty walking.  Patient denying any radiating pain, denying any weakness in bilateral lower extremities, denying any urine or bowel incontinence.  Denying any chest pain, shortness of breath, nausea, vomiting, abdominal pain.  Vitals on arrival-/82, HR 89, RR 16, temp 98.1, saturating 100% on room air.  CBC, CMP within normal range, except BUN 22,  true   Transportation Needs: Unknown (3/3/2025)    PRAPARE - Transportation     Lack of Transportation (Medical): Patient declined     Lack of Transportation (Non-Medical): No   Physical Activity: Sufficiently Active (3/4/2024)    Exercise Vital Sign     Days of Exercise per Week: 7 days     Minutes of Exercise per Session: 30 min   Housing Stability: Low Risk  (3/3/2025)    Housing Stability Vital Sign     Unable to Pay for Housing in the Last Year: No     Number of Times Moved in the Last Year: 0     Homeless in the Last Year: No       Medications:   Medications:    lidocaine  1 patch TransDERmal Daily    acetaminophen  325 mg Oral Once      Infusions:   PRN Meds:      Labs      CBC:   Recent Labs     07/03/25 1705   WBC 12.9*   HGB 11.4*        BMP:    Recent Labs     07/03/25 1705   *   K 3.8      CO2 23   BUN 22*   CREATININE 0.7   GLUCOSE 137*     Hepatic:   Recent Labs     07/03/25 1705   AST 19   ALT 18   BILITOT 0.5   ALKPHOS 109     Lipids:   Lab Results   Component Value Date/Time    CHOL 149 04/17/2025 01:00 PM    HDL 79 04/17/2025 01:00 PM    TRIG 77 04/17/2025 01:00 PM     Hemoglobin A1C:   Lab Results   Component Value Date/Time    LABA1C 6.0 04/17/2025 01:00 PM     TSH:   Lab Results   Component Value Date/Time    TSH 1.14 12/14/2015 08:29 PM     Troponin:   Lab Results   Component Value Date/Time    TROPONINT <0.010 09/18/2023 04:25 PM    TROPONINT <0.010 09/26/2021 06:31 PM    TROPONINT <0.010 02/16/2018 09:58 PM     Lactic Acid:   Recent Labs     07/03/25  1705   LACTA 1.4     BNP: No results for input(s): \"PROBNP\" in the last 72 hours.  UA:  Lab Results   Component Value Date/Time    NITRU NEGATIVE 05/09/2025 09:45 AM    COLORU Yellow 05/09/2025 09:45 AM    PHUR 6.5 05/09/2025 09:45 AM    PHUR 7.0 01/16/2025 03:18 PM    PHUR 6.0 01/22/2013 08:04 PM    WBCUA 1 05/09/2025 09:45 AM    RBCUA 2 05/09/2025 09:45 AM    RBCUA 4 07/06/2024 10:45 PM    MUCUS RARE 03/02/2025 04:15 AM

## 2025-07-04 ENCOUNTER — APPOINTMENT (OUTPATIENT)
Dept: MRI IMAGING | Age: 89
DRG: 552 | End: 2025-07-04
Payer: MEDICARE

## 2025-07-04 LAB
ANION GAP SERPL CALCULATED.3IONS-SCNC: 10 MMOL/L (ref 9–17)
BASOPHILS # BLD: 0.04 K/UL
BASOPHILS NFR BLD: 0 % (ref 0–1)
BUN SERPL-MCNC: 15 MG/DL (ref 7–20)
CALCIUM SERPL-MCNC: 8.5 MG/DL (ref 8.3–10.6)
CHLORIDE SERPL-SCNC: 104 MMOL/L (ref 99–110)
CO2 SERPL-SCNC: 21 MMOL/L (ref 21–32)
CREAT SERPL-MCNC: 0.5 MG/DL (ref 0.6–1.2)
EOSINOPHIL # BLD: 0.06 K/UL
EOSINOPHILS RELATIVE PERCENT: 1 % (ref 0–3)
ERYTHROCYTE [DISTWIDTH] IN BLOOD BY AUTOMATED COUNT: 16.5 % (ref 11.7–14.9)
GFR, ESTIMATED: >90 ML/MIN/1.73M2
GLUCOSE SERPL-MCNC: 97 MG/DL (ref 74–99)
HCT VFR BLD AUTO: 31.7 % (ref 37–47)
HGB BLD-MCNC: 10.1 G/DL (ref 12.5–16)
IMM GRANULOCYTES # BLD AUTO: 0.09 K/UL
IMM GRANULOCYTES NFR BLD: 1 %
LYMPHOCYTES NFR BLD: 1.5 K/UL
LYMPHOCYTES RELATIVE PERCENT: 15 % (ref 24–44)
MCH RBC QN AUTO: 30.5 PG (ref 27–31)
MCHC RBC AUTO-ENTMCNC: 31.9 G/DL (ref 32–36)
MCV RBC AUTO: 95.8 FL (ref 78–100)
MONOCYTES NFR BLD: 0.98 K/UL
MONOCYTES NFR BLD: 10 % (ref 0–5)
NEUTROPHILS NFR BLD: 74 % (ref 36–66)
NEUTS SEG NFR BLD: 7.56 K/UL
PLATELET # BLD AUTO: 323 K/UL (ref 140–440)
PMV BLD AUTO: 8.8 FL (ref 7.5–11.1)
POTASSIUM SERPL-SCNC: 3.7 MMOL/L (ref 3.5–5.1)
RBC # BLD AUTO: 3.31 M/UL (ref 4.2–5.4)
SODIUM SERPL-SCNC: 135 MMOL/L (ref 136–145)
WBC OTHER # BLD: 10.2 K/UL (ref 4–10.5)

## 2025-07-04 PROCEDURE — 36415 COLL VENOUS BLD VENIPUNCTURE: CPT

## 2025-07-04 PROCEDURE — 6360000002 HC RX W HCPCS: Performed by: INTERNAL MEDICINE

## 2025-07-04 PROCEDURE — 6370000000 HC RX 637 (ALT 250 FOR IP): Performed by: INTERNAL MEDICINE

## 2025-07-04 PROCEDURE — 85025 COMPLETE CBC W/AUTO DIFF WBC: CPT

## 2025-07-04 PROCEDURE — 72148 MRI LUMBAR SPINE W/O DYE: CPT

## 2025-07-04 PROCEDURE — 1200000000 HC SEMI PRIVATE

## 2025-07-04 PROCEDURE — 72146 MRI CHEST SPINE W/O DYE: CPT

## 2025-07-04 PROCEDURE — 94761 N-INVAS EAR/PLS OXIMETRY MLT: CPT

## 2025-07-04 PROCEDURE — 2500000003 HC RX 250 WO HCPCS: Performed by: INTERNAL MEDICINE

## 2025-07-04 PROCEDURE — 80048 BASIC METABOLIC PNL TOTAL CA: CPT

## 2025-07-04 RX ADMIN — ACETAMINOPHEN 650 MG: 325 TABLET ORAL at 20:42

## 2025-07-04 RX ADMIN — LATANOPROST 1 DROP: 50 SOLUTION OPHTHALMIC at 20:49

## 2025-07-04 RX ADMIN — AMLODIPINE BESYLATE 10 MG: 10 TABLET ORAL at 08:49

## 2025-07-04 RX ADMIN — ROSUVASTATIN CALCIUM 10 MG: 5 TABLET, FILM COATED ORAL at 20:35

## 2025-07-04 RX ADMIN — POTASSIUM CHLORIDE 10 MEQ: 1500 TABLET, EXTENDED RELEASE ORAL at 08:49

## 2025-07-04 RX ADMIN — POLYETHYLENE GLYCOL (3350) 17 G: 17 POWDER, FOR SOLUTION ORAL at 08:48

## 2025-07-04 RX ADMIN — TIMOLOL MALEATE 1 DROP: 5 SOLUTION OPHTHALMIC at 08:49

## 2025-07-04 RX ADMIN — Medication 1 TABLET: at 08:48

## 2025-07-04 RX ADMIN — Medication 500 MCG: at 08:49

## 2025-07-04 RX ADMIN — HEPARIN SODIUM 5000 UNITS: 5000 INJECTION INTRAVENOUS; SUBCUTANEOUS at 20:37

## 2025-07-04 RX ADMIN — HEPARIN SODIUM 5000 UNITS: 5000 INJECTION INTRAVENOUS; SUBCUTANEOUS at 08:49

## 2025-07-04 RX ADMIN — SODIUM CHLORIDE, PRESERVATIVE FREE 10 ML: 5 INJECTION INTRAVENOUS at 09:30

## 2025-07-04 RX ADMIN — Medication 1000 UNITS: at 08:49

## 2025-07-04 RX ADMIN — ASPIRIN 81 MG 81 MG: 81 TABLET ORAL at 08:49

## 2025-07-04 RX ADMIN — DOCUSATE SODIUM 50MG AND SENNOSIDES 8.6MG 2 TABLET: 8.6; 5 TABLET, FILM COATED ORAL at 20:35

## 2025-07-04 RX ADMIN — SODIUM CHLORIDE, PRESERVATIVE FREE 10 ML: 5 INJECTION INTRAVENOUS at 20:35

## 2025-07-04 ASSESSMENT — PAIN SCALES - GENERAL: PAINLEVEL_OUTOF10: 7

## 2025-07-04 ASSESSMENT — PAIN DESCRIPTION - LOCATION: LOCATION: ARM;GENERALIZED

## 2025-07-04 NOTE — PLAN OF CARE
Problem: Chronic Conditions and Co-morbidities  Goal: Patient's chronic conditions and co-morbidity symptoms are monitored and maintained or improved  7/4/2025 1853 by Adelaide Deras LPN  Outcome: Progressing  7/4/2025 1853 by Adelaide Deras LPN  Outcome: Progressing     Problem: Discharge Planning  Goal: Discharge to home or other facility with appropriate resources  7/4/2025 1853 by Adelaide Deras LPN  Outcome: Progressing  7/4/2025 1853 by Adelaide Deras LPN  Outcome: Progressing     Problem: Safety - Adult  Goal: Free from fall injury  7/4/2025 1853 by Adelaide Deras LPN  Outcome: Progressing  7/4/2025 1853 by Adelaide Deras LPN  Outcome: Progressing     Problem: ABCDS Injury Assessment  Goal: Absence of physical injury  7/4/2025 1853 by Adelaide Deras LPN  Outcome: Progressing  7/4/2025 1853 by Adelaide Deras LPN  Outcome: Progressing

## 2025-07-04 NOTE — PROGRESS NOTES
4 Eyes Skin Assessment     NAME:  Doretha Melo  YOB: 1927  MEDICAL RECORD NUMBER:  [id number]    The patient is being assessed for  Admission    I agree that at least one RN has performed a thorough Head to Toe Skin Assessment on the patient. ALL assessment sites listed below have been assessed.      Areas assessed by both nurses:    Head, Face, Ears, Shoulders, Back, Chest, Arms, Elbows, Hands, Sacrum. Buttock, Coccyx, Ischium, and Legs. Feet and Heels        Does the Patient have a Wound? No noted wound(s)       Donell Prevention initiated by RN: No  Wound Care Orders initiated by RN: No    Pressure Injury (Stage 1,2,3,4, Unstageable, DTI, NWPT, and Complex wounds) if present, place Wound referral order by RN under : No    New Ostomies, if present place, Ostomy referral order under : No     Nurse 1 eSignature: Electronically signed by Mena Valencia RN on 7/4/25 at 12:12 AM EDT    **SHARE this note so that the co-signing nurse can place an eSignature**    Nurse 2 eSignature: Electronically signed by PHYLLIS CENTENO RN on 7/4/25 at 12:42 AM EDT

## 2025-07-04 NOTE — PROGRESS NOTES
Progress note      Name:  Doretha Melo /Age/Sex: 6/15/1927  (89 y.o. female)   MRN & CSN:  [id number] & [id number] Encounter Date/Time: 2025 7:14 PM EDT   Location:  [address] [place] [address]- PCP: Kenna Ceron MD       Hospital Day: 2    Assessment and Plan:     #.  Difficulty walking  #.  Recent fall  - Patient was evaluated in ER 2025  - CT head, CT C-spine, CT L-spine-no acute process  - CT L-spine showed remote appearing compression fractures of L1 and L3  - X-ray of the right hip, x-ray of the lumbar spine done 7/3/2025--age indeterminate fractures of T9 and superior endplate L3.  - MRI T-spine, L-spine pending  - PT/OT evaluation.  Analgesics as needed.    #.  History of CVA-2023  - Admission 2025 for dizziness-workup for acute CVA negative    #.  Hypertension-on amlodipine    #.  Hyperlipidemia-on Crestor    #.  Osteoporosis-on alendronate    #.  History of kyphoplasty to T10 and L1-2018    #.  History of cervical spine fracture after a fall  - Type II odontoid fracture-2021      Disposition:   Current Living situation: Home    Diet Regular   DVT Prophylaxis [x] Lovenox   Code Status Full-discussed with patient.  Stated that she has 2 more years to go   Surrogate Decision Maker/ POA Daughter     History from:   EMR, patient.    History of Present Illness:     Chief Complaint: Difficulty in walking  Doretha Melo is a 89 y.o. female was referred to ER from PCPs office for difficulty ambulating.  Patient had a fall 2 weeks ago and was evaluated in ER 2025.  Since last 1 week patient has been having worsening back pain and difficulty walking.  Patient denying any radiating pain, denying any weakness in bilateral lower extremities, denying any urine or bowel incontinence.  Denying any chest pain, shortness of breath, nausea, vomiting, abdominal pain.  Vitals on arrival-/82, HR 89, RR 16, temp 98.1, saturating 100% on room air.  CBC, CMP within normal range, except BUN 22, random

## 2025-07-05 LAB
ANION GAP SERPL CALCULATED.3IONS-SCNC: 10 MMOL/L (ref 9–17)
BASOPHILS # BLD: 0.04 K/UL
BASOPHILS NFR BLD: 0 % (ref 0–1)
BUN SERPL-MCNC: 20 MG/DL (ref 7–20)
CALCIUM SERPL-MCNC: 8.9 MG/DL (ref 8.3–10.6)
CHLORIDE SERPL-SCNC: 102 MMOL/L (ref 99–110)
CO2 SERPL-SCNC: 23 MMOL/L (ref 21–32)
CREAT SERPL-MCNC: 0.6 MG/DL (ref 0.6–1.2)
EKG ATRIAL RATE: 394 BPM
EKG DIAGNOSIS: NORMAL
EKG Q-T INTERVAL: 370 MS
EKG QRS DURATION: 80 MS
EKG QTC CALCULATION (BAZETT): 424 MS
EKG R AXIS: -3 DEGREES
EKG T AXIS: 35 DEGREES
EKG VENTRICULAR RATE: 79 BPM
EOSINOPHIL # BLD: 0.1 K/UL
EOSINOPHILS RELATIVE PERCENT: 1 % (ref 0–3)
ERYTHROCYTE [DISTWIDTH] IN BLOOD BY AUTOMATED COUNT: 16.5 % (ref 11.7–14.9)
GFR, ESTIMATED: >90 ML/MIN/1.73M2
GLUCOSE SERPL-MCNC: 104 MG/DL (ref 74–99)
HCT VFR BLD AUTO: 35.3 % (ref 37–47)
HGB BLD-MCNC: 11.3 G/DL (ref 12.5–16)
IMM GRANULOCYTES # BLD AUTO: 0.09 K/UL
IMM GRANULOCYTES NFR BLD: 1 %
LYMPHOCYTES NFR BLD: 1.74 K/UL
LYMPHOCYTES RELATIVE PERCENT: 17 % (ref 24–44)
MCH RBC QN AUTO: 30 PG (ref 27–31)
MCHC RBC AUTO-ENTMCNC: 32 G/DL (ref 32–36)
MCV RBC AUTO: 93.6 FL (ref 78–100)
MONOCYTES NFR BLD: 1.05 K/UL
MONOCYTES NFR BLD: 10 % (ref 0–5)
NEUTROPHILS NFR BLD: 70 % (ref 36–66)
NEUTS SEG NFR BLD: 7.12 K/UL
PLATELET # BLD AUTO: 359 K/UL (ref 140–440)
PMV BLD AUTO: 8.6 FL (ref 7.5–11.1)
POTASSIUM SERPL-SCNC: 4.1 MMOL/L (ref 3.5–5.1)
RBC # BLD AUTO: 3.77 M/UL (ref 4.2–5.4)
SODIUM SERPL-SCNC: 135 MMOL/L (ref 136–145)
WBC OTHER # BLD: 10.1 K/UL (ref 4–10.5)

## 2025-07-05 PROCEDURE — 2500000003 HC RX 250 WO HCPCS: Performed by: INTERNAL MEDICINE

## 2025-07-05 PROCEDURE — 93010 ELECTROCARDIOGRAM REPORT: CPT | Performed by: INTERNAL MEDICINE

## 2025-07-05 PROCEDURE — 94761 N-INVAS EAR/PLS OXIMETRY MLT: CPT

## 2025-07-05 PROCEDURE — 6370000000 HC RX 637 (ALT 250 FOR IP): Performed by: STUDENT IN AN ORGANIZED HEALTH CARE EDUCATION/TRAINING PROGRAM

## 2025-07-05 PROCEDURE — 97167 OT EVAL HIGH COMPLEX 60 MIN: CPT

## 2025-07-05 PROCEDURE — 97535 SELF CARE MNGMENT TRAINING: CPT

## 2025-07-05 PROCEDURE — 1200000000 HC SEMI PRIVATE

## 2025-07-05 PROCEDURE — 6360000002 HC RX W HCPCS: Performed by: INTERNAL MEDICINE

## 2025-07-05 PROCEDURE — 97530 THERAPEUTIC ACTIVITIES: CPT

## 2025-07-05 PROCEDURE — 80048 BASIC METABOLIC PNL TOTAL CA: CPT

## 2025-07-05 PROCEDURE — 6370000000 HC RX 637 (ALT 250 FOR IP): Performed by: INTERNAL MEDICINE

## 2025-07-05 PROCEDURE — 36415 COLL VENOUS BLD VENIPUNCTURE: CPT

## 2025-07-05 PROCEDURE — 85025 COMPLETE CBC W/AUTO DIFF WBC: CPT

## 2025-07-05 PROCEDURE — 93005 ELECTROCARDIOGRAM TRACING: CPT | Performed by: STUDENT IN AN ORGANIZED HEALTH CARE EDUCATION/TRAINING PROGRAM

## 2025-07-05 RX ORDER — POLYETHYLENE GLYCOL 3350 17 G/17G
17 POWDER, FOR SOLUTION ORAL 2 TIMES DAILY
Status: DISCONTINUED | OUTPATIENT
Start: 2025-07-05 | End: 2025-07-05

## 2025-07-05 RX ORDER — POLYETHYLENE GLYCOL 3350 17 G/17G
17 POWDER, FOR SOLUTION ORAL 2 TIMES DAILY
Status: DISCONTINUED | OUTPATIENT
Start: 2025-07-05 | End: 2025-07-08 | Stop reason: HOSPADM

## 2025-07-05 RX ADMIN — Medication 1 TABLET: at 09:49

## 2025-07-05 RX ADMIN — AMLODIPINE BESYLATE 10 MG: 10 TABLET ORAL at 09:49

## 2025-07-05 RX ADMIN — DOCUSATE SODIUM 50MG AND SENNOSIDES 8.6MG 2 TABLET: 8.6; 5 TABLET, FILM COATED ORAL at 22:21

## 2025-07-05 RX ADMIN — ACETAMINOPHEN 650 MG: 325 TABLET ORAL at 18:54

## 2025-07-05 RX ADMIN — Medication 500 MCG: at 09:49

## 2025-07-05 RX ADMIN — ASPIRIN 81 MG 81 MG: 81 TABLET ORAL at 09:49

## 2025-07-05 RX ADMIN — SODIUM CHLORIDE, PRESERVATIVE FREE 10 ML: 5 INJECTION INTRAVENOUS at 22:21

## 2025-07-05 RX ADMIN — POLYETHYLENE GLYCOL (3350) 17 G: 17 POWDER, FOR SOLUTION ORAL at 22:21

## 2025-07-05 RX ADMIN — HEPARIN SODIUM 5000 UNITS: 5000 INJECTION INTRAVENOUS; SUBCUTANEOUS at 22:21

## 2025-07-05 RX ADMIN — POTASSIUM CHLORIDE 10 MEQ: 1500 TABLET, EXTENDED RELEASE ORAL at 09:49

## 2025-07-05 RX ADMIN — LATANOPROST 1 DROP: 50 SOLUTION OPHTHALMIC at 22:28

## 2025-07-05 RX ADMIN — ROSUVASTATIN CALCIUM 10 MG: 5 TABLET, FILM COATED ORAL at 22:21

## 2025-07-05 RX ADMIN — TIMOLOL MALEATE 1 DROP: 5 SOLUTION OPHTHALMIC at 09:47

## 2025-07-05 RX ADMIN — HEPARIN SODIUM 5000 UNITS: 5000 INJECTION INTRAVENOUS; SUBCUTANEOUS at 09:47

## 2025-07-05 RX ADMIN — Medication 1000 UNITS: at 09:49

## 2025-07-05 RX ADMIN — SODIUM CHLORIDE, PRESERVATIVE FREE 10 ML: 5 INJECTION INTRAVENOUS at 09:47

## 2025-07-05 RX ADMIN — POLYETHYLENE GLYCOL (3350) 17 G: 17 POWDER, FOR SOLUTION ORAL at 09:47

## 2025-07-05 ASSESSMENT — PAIN SCALES - GENERAL
PAINLEVEL_OUTOF10: 0
PAINLEVEL_OUTOF10: 6
PAINLEVEL_OUTOF10: 8

## 2025-07-05 ASSESSMENT — PAIN DESCRIPTION - DESCRIPTORS
DESCRIPTORS: ACHING
DESCRIPTORS: ACHING

## 2025-07-05 ASSESSMENT — PAIN DESCRIPTION - ORIENTATION
ORIENTATION: MID
ORIENTATION: MID

## 2025-07-05 ASSESSMENT — PAIN DESCRIPTION - LOCATION
LOCATION: BACK
LOCATION: BACK

## 2025-07-05 NOTE — PLAN OF CARE
Problem: Chronic Conditions and Co-morbidities  Goal: Patient's chronic conditions and co-morbidity symptoms are monitored and maintained or improved  7/4/2025 2352 by Estela Salas RN  Outcome: Progressing     Problem: Discharge Planning  Goal: Discharge to home or other facility with appropriate resources  7/4/2025 2352 by Estela Salas RN  Outcome: Progressing     Problem: Safety - Adult  Goal: Free from fall injury  7/4/2025 2352 by Estela Salas RN  Outcome: Progressing     Problem: ABCDS Injury Assessment  Goal: Absence of physical injury  7/4/2025 2352 by Estela Salas RN  Outcome: Progressing

## 2025-07-05 NOTE — PROGRESS NOTES
Occupational Therapy    [facility] ACUTE CARE OCCUPATIONAL THERAPY EVALUATION  Doretha Melo, 6/15/1927, 4122/4122-A, 7/5/2025    History  [place]:  The primary encounter diagnosis was Back pain, unspecified back location, unspecified back pain laterality, unspecified chronicity. Diagnoses of Right hip pain and Unable to ambulate were also pertinent to this visit.  Patient  has a past medical history of BCC (basal cell carcinoma), leg, Family history of pancreatic cancer, Glaucoma of right eye, Hyperlipidemia, Hypertension, Macular degeneration, dry, Osteoarthritis of knee, Osteoporosis, Pelvic fracture (HCC), Right rotator cuff tear, T10 vertebral fracture (HCC), and Vitamin B12 deficiency.  Patient  has a past surgical history that includes Appendectomy (1953); Hysterectomy (1969); Cystocele repair (1993); Abdominal exploration surgery (1997); Wrist fracture surgery (Left, 03/2017); Skin cancer excision (Left, 09/2017); and Fixation Kyphoplasty (02/19/2018).    Subjective:  Patient states:  \"It's so hard for me to walk\".    Pain:  No.    Communication with other providers:  Handoff to RN  Restrictions: General Precautions, Fall Risk    Home Setup/Prior level of function  Social/Functional History  Lives With: Alone  Type of Home: House  Home Layout: One level  Home Access: Level entry  Bathroom Shower/Tub: Tub only (sponge baths only)  Bathroom Toilet: Standard  Home Equipment: Walker - 4-Wheeled  Has the patient had two or more falls in the past year or any fall with injury in the past year?: Yes (multiple in last few months)  Prior Level of Assist for ADLs: Independent  Prior Level of Assist for Homemaking: Independent  Homemaking Responsibilities: Yes  Prior Level of Assist for Ambulation: Independent household ambulator, with or without device  Prior Level of Assist for Transfers: Independent  Active : Yes  Mode of Transportation: Car  Occupation: Retired    Examination of body systems

## 2025-07-05 NOTE — PROGRESS NOTES
Progress note      Name:  Doretha Melo /Age/Sex: 6/15/1927  (89 y.o. female)   MRN & CSN:  [id number] & [id number] Encounter Date/Time: 2025 7:14 PM EDT   Location:  [address] [place] [address]- PCP: Kenna Ceron MD       Hospital Day: 3    Assessment and Plan:     #.  Difficulty walking  #.  Recent fall  - Patient was evaluated in ER 2025  - CT head, CT C-spine, CT L-spine-no acute process  - CT L-spine showed remote appearing compression fractures of L1 and L3  - X-ray of the right hip, x-ray of the lumbar spine done 7/3/2025--age indeterminate fractures of T9 and superior endplate L3.  - MRI T-spine, L-spine  showed Chronic fractures of spine and acute Sacral fracture   neurosurgery -no surgical intervention are presently at this time.  Will continue with physical therapy and pain management  - PT/OT evaluation.  Analgesics as needed.    #.  History of CVA-2023  - Admission 2025 for dizziness-workup for acute CVA negative    #.  Hypertension-on amlodipine    #.  Hyperlipidemia-on Crestor    #.  Osteoporosis-on alendronate    #.  History of kyphoplasty to T10 and L1-2018    #.  History of cervical spine fracture after a fall  - Type II odontoid fracture-2021      Disposition:   Current Living situation: Home    Diet Regular   DVT Prophylaxis [x] Lovenox   Code Status Full-discussed with patient.  Stated that she has 2 more years to go   Surrogate Decision Maker/ POA Daughter     History from:   EMR, patient.    History of Present Illness:     Chief Complaint: Difficulty in walking  Doretha Melo is a 89 y.o. female was referred to ER from PCPs office for difficulty ambulating.  Patient had a fall 2 weeks ago and was evaluated in ER 2025.  Since last 1 week patient has been having worsening back pain and difficulty walking.  Patient denying any radiating pain, denying any weakness in bilateral lower extremities, denying any urine or bowel incontinence.  Denying any chest pain, shortness of  DEMOGRAPHICS: 89 years old Female CLINICAL STATEMENT: Trauma COMPARISON: None available. DOSE OPTIMIZATION: CT radiation dose optimization techniques (automated exposure  control, and use of iterative reconstruction techniques, or adjustment of the mA and/or kV according to patient size) were used to limit patient radiation dose. FINDINGS: BONES: Bone demineralization. Five lumbar type vertebrae. Severe remote compression fracture at L1 with changes of vertebroplasty. A remote appearing mild superior endplate compression fracture L3. The remaining lumbar vertebral body heights are maintained. Multiple remote bilateral rib fractures. No acute fracture. ALIGNMENT: A dextroconvex curvature of the lumbar spine. Grade 1 anterolisthesis  of L4-5 appears to be related to facet arthropathy. DEGENERATIVE CHANGES: Moderate multilevel facet arthropathy. Mild multilevel endplate spurring. SOFT TISSUE: The paraspinal soft tissue is unremarkable. OTHER:  Atherosclerotic calcifications within the aortoiliac arteries. Diverticulosis within the colon. IMPRESSION: 1. No acute fracture. 2. Remote-appearing compression fractures at L1 and L3. 3. Moderate multilevel degenerative changes.  Dictated and Electronically Signed By: Tawanda Guerra MD [facility] 6/30/2025 17:04        CT HEAD WO CONTRAST  Result Date: 6/30/2025  EXAM:  CT HEAD WO CONTRAST DATE OF EXAM:  6/30/2025 16:44 DEMOGRAPHICS: 89 years old Female CLINICAL STATEMENT: Trauma COMPARISON: 5/9/2025 DOSE OPTIMIZATION: CT radiation dose optimization techniques (automated exposure  control, and use of iterative reconstruction techniques, or adjustment of the mA and/or kV according to patient size) were used to limit patient radiation dose. FINDINGS: Moderate motion artifact is present on some of the obtained images, degrading image quality. The study remains diagnostic. VENTRICLES/SULCI: Moderate ventricular and mild sulcal prominence, consistent with atrophy.

## 2025-07-05 NOTE — PROGRESS NOTES
Neurosurgery Brief    89 male presenting after fall with back pain    Imaging reviewed  Chronic T5, T7, T10, L1, L3 fractures, Acute sacral insufficiency fractures  No significant spinal stenosis    -No acute neurosurgical intervention  -Pain control for sacral insufficiency fractures  -No need for activity restriction or bracing  -No need for outpatient neurosurgical follow-up at this time, can follow-up with primary care    Paulino Toledo MD  Neurosurgery

## 2025-07-06 LAB
ANION GAP SERPL CALCULATED.3IONS-SCNC: 11 MMOL/L (ref 9–17)
BASOPHILS # BLD: 0.03 K/UL
BASOPHILS NFR BLD: 0 % (ref 0–1)
BILIRUB UR QL STRIP: NEGATIVE
BUN SERPL-MCNC: 19 MG/DL (ref 7–20)
CALCIUM SERPL-MCNC: 8.8 MG/DL (ref 8.3–10.6)
CASTS #/AREA URNS LPF: ABNORMAL /LPF
CHARACTER UR: ABNORMAL
CHLORIDE SERPL-SCNC: 102 MMOL/L (ref 99–110)
CLARITY UR: CLEAR
CO2 SERPL-SCNC: 21 MMOL/L (ref 21–32)
COLOR UR: YELLOW
CREAT SERPL-MCNC: 0.5 MG/DL (ref 0.6–1.2)
EOSINOPHIL # BLD: 0.12 K/UL
EOSINOPHILS RELATIVE PERCENT: 1 % (ref 0–3)
EPI CELLS #/AREA URNS HPF: 2 /HPF
ERYTHROCYTE [DISTWIDTH] IN BLOOD BY AUTOMATED COUNT: 16.6 % (ref 11.7–14.9)
GFR, ESTIMATED: >90 ML/MIN/1.73M2
GLUCOSE SERPL-MCNC: 112 MG/DL (ref 74–99)
GLUCOSE UR STRIP-MCNC: NEGATIVE MG/DL
HCT VFR BLD AUTO: 36.4 % (ref 37–47)
HGB BLD-MCNC: 10.9 G/DL (ref 12.5–16)
HGB UR QL STRIP.AUTO: NEGATIVE
IMM GRANULOCYTES # BLD AUTO: 0.1 K/UL
IMM GRANULOCYTES NFR BLD: 1 %
KETONES UR STRIP-MCNC: NEGATIVE MG/DL
LEUKOCYTE ESTERASE UR QL STRIP: NEGATIVE
LYMPHOCYTES NFR BLD: 1.8 K/UL
LYMPHOCYTES RELATIVE PERCENT: 18 % (ref 24–44)
MCH RBC QN AUTO: 30.4 PG (ref 27–31)
MCHC RBC AUTO-ENTMCNC: 29.9 G/DL (ref 32–36)
MCV RBC AUTO: 101.4 FL (ref 78–100)
MONOCYTES NFR BLD: 1.13 K/UL
MONOCYTES NFR BLD: 11 % (ref 0–5)
MUCOUS THREADS URNS QL MICRO: ABNORMAL
NEUTROPHILS NFR BLD: 68 % (ref 36–66)
NEUTS SEG NFR BLD: 6.89 K/UL
NITRITE UR QL STRIP: NEGATIVE
PH UR STRIP: 6 [PH] (ref 5–8)
PLATELET # BLD AUTO: 343 K/UL (ref 140–440)
PMV BLD AUTO: 8.8 FL (ref 7.5–11.1)
POTASSIUM SERPL-SCNC: 4 MMOL/L (ref 3.5–5.1)
PROT UR STRIP-MCNC: 30 MG/DL
RBC # BLD AUTO: 3.59 M/UL (ref 4.2–5.4)
RBC #/AREA URNS HPF: 2 /HPF (ref 0–2)
SODIUM SERPL-SCNC: 135 MMOL/L (ref 136–145)
SP GR UR STRIP: 1.02 (ref 1–1.03)
UROBILINOGEN UR STRIP-ACNC: 1 EU/DL (ref 0–1)
WBC #/AREA URNS HPF: 11 /HPF (ref 0–5)
WBC OTHER # BLD: 10.1 K/UL (ref 4–10.5)

## 2025-07-06 PROCEDURE — 97530 THERAPEUTIC ACTIVITIES: CPT

## 2025-07-06 PROCEDURE — 87186 SC STD MICRODIL/AGAR DIL: CPT

## 2025-07-06 PROCEDURE — 6370000000 HC RX 637 (ALT 250 FOR IP): Performed by: STUDENT IN AN ORGANIZED HEALTH CARE EDUCATION/TRAINING PROGRAM

## 2025-07-06 PROCEDURE — 87086 URINE CULTURE/COLONY COUNT: CPT

## 2025-07-06 PROCEDURE — 6360000002 HC RX W HCPCS: Performed by: INTERNAL MEDICINE

## 2025-07-06 PROCEDURE — 81001 URINALYSIS AUTO W/SCOPE: CPT

## 2025-07-06 PROCEDURE — 97535 SELF CARE MNGMENT TRAINING: CPT

## 2025-07-06 PROCEDURE — 85025 COMPLETE CBC W/AUTO DIFF WBC: CPT

## 2025-07-06 PROCEDURE — 94761 N-INVAS EAR/PLS OXIMETRY MLT: CPT

## 2025-07-06 PROCEDURE — 87077 CULTURE AEROBIC IDENTIFY: CPT

## 2025-07-06 PROCEDURE — 6370000000 HC RX 637 (ALT 250 FOR IP): Performed by: INTERNAL MEDICINE

## 2025-07-06 PROCEDURE — 36415 COLL VENOUS BLD VENIPUNCTURE: CPT

## 2025-07-06 PROCEDURE — 1200000000 HC SEMI PRIVATE

## 2025-07-06 PROCEDURE — 2500000003 HC RX 250 WO HCPCS: Performed by: INTERNAL MEDICINE

## 2025-07-06 PROCEDURE — 80048 BASIC METABOLIC PNL TOTAL CA: CPT

## 2025-07-06 RX ORDER — SENNA AND DOCUSATE SODIUM 50; 8.6 MG/1; MG/1
2 TABLET, FILM COATED ORAL NIGHTLY
Qty: 60 TABLET | Refills: 0 | Status: SHIPPED | OUTPATIENT
Start: 2025-07-06 | End: 2025-08-05

## 2025-07-06 RX ORDER — HYDROCODONE BITARTRATE AND ACETAMINOPHEN 5; 325 MG/1; MG/1
1 TABLET ORAL EVERY 6 HOURS PRN
Qty: 12 TABLET | Refills: 0 | Status: SHIPPED | OUTPATIENT
Start: 2025-07-06 | End: 2025-07-08

## 2025-07-06 RX ADMIN — LATANOPROST 1 DROP: 50 SOLUTION OPHTHALMIC at 20:31

## 2025-07-06 RX ADMIN — DOCUSATE SODIUM 50MG AND SENNOSIDES 8.6MG 2 TABLET: 8.6; 5 TABLET, FILM COATED ORAL at 20:31

## 2025-07-06 RX ADMIN — SODIUM CHLORIDE, PRESERVATIVE FREE 10 ML: 5 INJECTION INTRAVENOUS at 20:31

## 2025-07-06 RX ADMIN — HEPARIN SODIUM 5000 UNITS: 5000 INJECTION INTRAVENOUS; SUBCUTANEOUS at 20:31

## 2025-07-06 RX ADMIN — POLYETHYLENE GLYCOL (3350) 17 G: 17 POWDER, FOR SOLUTION ORAL at 20:30

## 2025-07-06 RX ADMIN — Medication 500 MCG: at 10:04

## 2025-07-06 RX ADMIN — Medication 1000 UNITS: at 10:03

## 2025-07-06 RX ADMIN — POLYETHYLENE GLYCOL (3350) 17 G: 17 POWDER, FOR SOLUTION ORAL at 10:03

## 2025-07-06 RX ADMIN — AMLODIPINE BESYLATE 10 MG: 10 TABLET ORAL at 10:05

## 2025-07-06 RX ADMIN — Medication 1 TABLET: at 10:05

## 2025-07-06 RX ADMIN — POTASSIUM CHLORIDE 10 MEQ: 1500 TABLET, EXTENDED RELEASE ORAL at 10:04

## 2025-07-06 RX ADMIN — TIMOLOL MALEATE 1 DROP: 5 SOLUTION OPHTHALMIC at 10:03

## 2025-07-06 RX ADMIN — ASPIRIN 81 MG 81 MG: 81 TABLET ORAL at 10:04

## 2025-07-06 RX ADMIN — HEPARIN SODIUM 5000 UNITS: 5000 INJECTION INTRAVENOUS; SUBCUTANEOUS at 10:03

## 2025-07-06 RX ADMIN — ROSUVASTATIN CALCIUM 10 MG: 5 TABLET, FILM COATED ORAL at 20:30

## 2025-07-06 ASSESSMENT — PAIN SCALES - GENERAL
PAINLEVEL_OUTOF10: 4
PAINLEVEL_OUTOF10: 4

## 2025-07-06 ASSESSMENT — PAIN DESCRIPTION - ORIENTATION: ORIENTATION: MID

## 2025-07-06 ASSESSMENT — PAIN DESCRIPTION - DESCRIPTORS: DESCRIPTORS: ACHING

## 2025-07-06 ASSESSMENT — PAIN DESCRIPTION - PAIN TYPE: TYPE: CHRONIC PAIN

## 2025-07-06 ASSESSMENT — PAIN - FUNCTIONAL ASSESSMENT: PAIN_FUNCTIONAL_ASSESSMENT: PREVENTS OR INTERFERES SOME ACTIVE ACTIVITIES AND ADLS

## 2025-07-06 ASSESSMENT — PAIN DESCRIPTION - LOCATION: LOCATION: BACK

## 2025-07-06 NOTE — PLAN OF CARE
Problem: Chronic Conditions and Co-morbidities  Goal: Patient's chronic conditions and co-morbidity symptoms are monitored and maintained or improved  7/5/2025 2242 by Matilde Renee RN  Outcome: Progressing  7/5/2025 1429 by Fabian Gerardo LPN  Outcome: Progressing     Problem: Discharge Planning  Goal: Discharge to home or other facility with appropriate resources  7/5/2025 2242 by Matilde Rneee RN  Outcome: Progressing  7/5/2025 1429 by Fabian Gerardo LPN  Outcome: Progressing     Problem: Safety - Adult  Goal: Free from fall injury  7/5/2025 2242 by Matilde Renee RN  Outcome: Progressing  7/5/2025 1429 by Fabian Gerardo LPN  Outcome: Progressing     Problem: ABCDS Injury Assessment  Goal: Absence of physical injury  7/5/2025 2242 by Matilde Renee RN  Outcome: Progressing  7/5/2025 1429 by Fabian Gerardo LPN  Outcome: Progressing     Problem: Pain  Goal: Verbalizes/displays adequate comfort level or baseline comfort level  Outcome: Progressing     Problem: Skin/Tissue Integrity  Goal: Skin integrity remains intact  Description: 1.  Monitor for areas of redness and/or skin breakdown  2.  Assess vascular access sites hourly  3.  Every 4-6 hours minimum:  Change oxygen saturation probe site  4.  Every 4-6 hours:  If on nasal continuous positive airway pressure, respiratory therapy assess nares and determine need for appliance change or resting period  Outcome: Progressing

## 2025-07-06 NOTE — DISCHARGE SUMMARY
V2.0  Discharge Summary  Please consider this as progress note if patient not discharged     Name:  Doretha Melo /Age/Sex: 6/15/1927 (89 y.o. female)   Admit Date: 7/3/2025  Discharge Date: 25    MRN & CSN:  [id number] & [id number] Encounter Date and Time 25 2:58 PM EDT    Attending:  Sanna Bolden,* Discharging Provider: Sanna Zapata MD       Hospital Course:     Brief HPI: Doretha Melo is a 89 y.o. female who presented with difficulty ambulating.  Patient had a fall 2 weeks ago and was evaluated in ER 2025.  Since last 1 week patient has been having worsening back pain and difficulty walking.  Patient denying any radiating pain, denying any weakness in bilateral lower extremities, denying any urine or bowel incontinence.  Denying any chest pain, shortness of breath, nausea, vomiting, abdominal pain.  Vitals on arrival-/82, HR 89, RR 16, temp 98.1, saturating 100% on room air.  CBC, CMP within normal range, except BUN 22, random glucose 137, total CK is 53, albumin 3.3, WBC 12.9, hemoglobin 11.4.  X-ray of the lumbar spine, right hip done in ED.  Patient received Norco, Zofran in ED.    Brief Problem Based Course:     Patient presented with difficulty ambulating.  MRI of the spine showed chronic fractures and acute sacral fracture.  Plan to manage conservatively.  Patient reports pain is bearable and was able to work with therapy.  Plan is to go home with home health care.  Will recommend to follow-up with Ortho in 1 week.  Otherwise labs and vitals are stable    #.  Difficulty walking  #.  Recent fall  - Patient was evaluated in ER 2025  - CT head, CT C-spine, CT L-spine-no acute process  - CT L-spine showed remote appearing compression fractures of L1 and L3  - X-ray of the right hip, x-ray of the lumbar spine done 7/3/2025--age indeterminate fractures of T9 and superior endplate L3.  - MRI T-spine, L-spine  showed Chronic fractures of spine and acute Sacral  Date: 6/30/2025  EXAM:  CT CERVICAL SPINE WO CONTRAST DATE OF EXAM:  6/30/2025 16:44 DEMOGRAPHICS: 89 years old Female CLINICAL STATEMENT: Trauma COMPARISON: 10/29/2021 DOSE OPTIMIZATION: CT radiation dose optimization techniques (automated exposure  control, and use of iterative reconstruction techniques, or adjustment of the mA and/or kV according to patient size) were used to limit patient radiation dose. FINDINGS: BONES: Normal vertebral body heights. No acute fracture or subluxation. A remote  nonunited odontoid fracture. ALIGNMENT: Mild anterolisthesis of C4-5, C5-6 and C6-7, and C7-T1 is similar to prior head appears be related to facet arthropathy. DEGENERATIVE CHANGES: - Arthritic changes at the atlantoaxial joint. - Moderate multilevel disc space narrowing, endplate spurring, and facet and uncovertebral arthropathy. SOFT TISSUE: The prevertebral soft tissue is unremarkable. IMPRESSION: 1. No acute fracture or subluxation. 2. A remote nonunited odontoid fracture. 3. Moderate multilevel degenerative changes.  Dictated and Electronically Signed By: Tawanda Guerra MD [facility] 6/30/2025 17:00          CBC:   Recent Labs     07/04/25  0209 07/05/25  0151 07/06/25  0145   WBC 10.2 10.1 10.1   HGB 10.1* 11.3* 10.9*    359 343     BMP:    Recent Labs     07/04/25  0209 07/05/25  0151 07/06/25  0145   * 135* 135*   K 3.7 4.1 4.0    102 102   CO2 21 23 21   BUN 15 20 19   CREATININE 0.5* 0.6 0.5*   GLUCOSE 97 104* 112*     Hepatic:   Recent Labs     07/03/25  1705   AST 19   ALT 18   BILITOT 0.5   ALKPHOS 109     Lipids:   Lab Results   Component Value Date/Time    CHOL 149 04/17/2025 01:00 PM    HDL 79 04/17/2025 01:00 PM    TRIG 77 04/17/2025 01:00 PM     Hemoglobin A1C:   Lab Results   Component Value Date/Time    LABA1C 6.0 04/17/2025 01:00 PM     TSH:   Lab Results   Component Value Date/Time    TSH 1.14 12/14/2015 08:29 PM     Troponin:   Lab Results   Component Value

## 2025-07-06 NOTE — PROGRESS NOTES
Occupational Therapy      Occupational Therapy Treatment Note    Name: Doretha Melo MRN: [id number] :   6/15/1927   Date:  2025   Admission Date: 7/3/2025 Room:  [address] [place] [address]-A     Primary Problem:  Difficulty in walking    Restrictions/Precautions:          General Precautions, Fall Risk    Communication with other providers: Nursing handoff    Subjective:  Patient states:  \"I'd like a bath\"  Pain:   No    Objective:    Observation: Pt received supine in bed, agreeable to therapy   Objective Measures:  Vitals stable throughout session    Treatment, including education:  Therapeutic Activity Training:   Therapeutic activity training was instructed today.  Cues were given for safety, sequence, UE/LE placement, awareness, and balance.    Activities performed today included bed mobility training, sup-sit, sit-stand, functional mobility, stand to sit    Self Care Training:   Cues were given for safety, sequence, UE/LE placement, visual cues, and balance.    Activities performed today included grooming, UB/LB bathing/dressing, toileting, toilet transfer    Supine to sit modA, sitting EOB SBA, STS from EOB to BSC modA stand step, stand to sit modA, toilet transfer modA, LB dressing doffing/donning depends maxA. Toileting maxA. LB bathing washing katherine area/buttocks in stand maxA. Stand to step from BSC to reclining chair modA.     Grooming washing face/hands w/ warm washcloth and oral care SBA. UB dressing doffing/donning gown SBA. UB bathing washing abdomen, trunk, BUE arms SBA. LB bathing washing upper/lower legs in sit modA. LB dressing doffing/donning socks maxA.    Pt sitting upright in chair, chair alarm on, call light at side, nursing notified       Assessment / Impression:    Patient's tolerance of treatment: Well  Adverse Reaction: None  Significant change in status and impact: Improved from initial evaluation  Barriers to improvement: None noted      Plan for Next Session:    Continue OT POC    Time in:

## 2025-07-06 NOTE — CARE COORDINATION
07/06/25 1445   Service Assessment   Patient Orientation Alert and Oriented   Cognition Alert   History Provided By Patient   Primary Caregiver Self   Support Systems Children   Patient's Healthcare Decision Maker is: Legal Next of Kin   PCP Verified by CM Yes   Prior Functional Level Assistance with the following:;Mobility   Current Functional Level Assistance with the following:;Mobility   Can patient return to prior living arrangement Yes   Ability to make needs known: Good   Family able to assist with home care needs: Yes   Would you like for me to discuss the discharge plan with any other family members/significant others, and if so, who? No   Financial Resources Medicare   Community Resources ECF/Home Care     CM in to see Pt to initiate discharge planning.  Pt is from home with daily assistance from family and [facility].  Pt wishes to resume home care at discharge.     PS to Dr. Michel to update, home care order requested.  CM following     3:55 PM   CM contacted by Pt daughter in law [facility] referral made to ARU at family request.  PS to Dr. Michel to update, requested discharge be canceled.

## 2025-07-07 PROBLEM — E44.0 MODERATE MALNUTRITION: Status: ACTIVE | Noted: 2025-07-07

## 2025-07-07 LAB
ANION GAP SERPL CALCULATED.3IONS-SCNC: 11 MMOL/L (ref 9–17)
BASOPHILS # BLD: 0.03 K/UL
BASOPHILS NFR BLD: 0 % (ref 0–1)
BUN SERPL-MCNC: 15 MG/DL (ref 7–20)
CALCIUM SERPL-MCNC: 8.6 MG/DL (ref 8.3–10.6)
CHLORIDE SERPL-SCNC: 101 MMOL/L (ref 99–110)
CO2 SERPL-SCNC: 23 MMOL/L (ref 21–32)
CREAT SERPL-MCNC: 0.5 MG/DL (ref 0.6–1.2)
EOSINOPHIL # BLD: 0.12 K/UL
EOSINOPHILS RELATIVE PERCENT: 1 % (ref 0–3)
ERYTHROCYTE [DISTWIDTH] IN BLOOD BY AUTOMATED COUNT: 16.4 % (ref 11.7–14.9)
GFR, ESTIMATED: >90 ML/MIN/1.73M2
GLUCOSE SERPL-MCNC: 119 MG/DL (ref 74–99)
HCT VFR BLD AUTO: 34.2 % (ref 37–47)
HGB BLD-MCNC: 10.9 G/DL (ref 12.5–16)
IMM GRANULOCYTES # BLD AUTO: 0.14 K/UL
IMM GRANULOCYTES NFR BLD: 1 %
LYMPHOCYTES NFR BLD: 2.06 K/UL
LYMPHOCYTES RELATIVE PERCENT: 19 % (ref 24–44)
MCH RBC QN AUTO: 29.9 PG (ref 27–31)
MCHC RBC AUTO-ENTMCNC: 31.9 G/DL (ref 32–36)
MCV RBC AUTO: 93.7 FL (ref 78–100)
MONOCYTES NFR BLD: 1.41 K/UL
MONOCYTES NFR BLD: 13 % (ref 0–5)
NEUTROPHILS NFR BLD: 66 % (ref 36–66)
NEUTS SEG NFR BLD: 7.31 K/UL
PLATELET # BLD AUTO: 415 K/UL (ref 140–440)
PMV BLD AUTO: 9.2 FL (ref 7.5–11.1)
POTASSIUM SERPL-SCNC: 3.8 MMOL/L (ref 3.5–5.1)
RBC # BLD AUTO: 3.65 M/UL (ref 4.2–5.4)
SODIUM SERPL-SCNC: 135 MMOL/L (ref 136–145)
WBC OTHER # BLD: 11.1 K/UL (ref 4–10.5)

## 2025-07-07 PROCEDURE — 85025 COMPLETE CBC W/AUTO DIFF WBC: CPT

## 2025-07-07 PROCEDURE — 6370000000 HC RX 637 (ALT 250 FOR IP): Performed by: STUDENT IN AN ORGANIZED HEALTH CARE EDUCATION/TRAINING PROGRAM

## 2025-07-07 PROCEDURE — 6360000002 HC RX W HCPCS: Performed by: INTERNAL MEDICINE

## 2025-07-07 PROCEDURE — 97530 THERAPEUTIC ACTIVITIES: CPT

## 2025-07-07 PROCEDURE — 94761 N-INVAS EAR/PLS OXIMETRY MLT: CPT

## 2025-07-07 PROCEDURE — 97162 PT EVAL MOD COMPLEX 30 MIN: CPT

## 2025-07-07 PROCEDURE — 1200000000 HC SEMI PRIVATE

## 2025-07-07 PROCEDURE — 97535 SELF CARE MNGMENT TRAINING: CPT

## 2025-07-07 PROCEDURE — 6370000000 HC RX 637 (ALT 250 FOR IP): Performed by: INTERNAL MEDICINE

## 2025-07-07 PROCEDURE — 80048 BASIC METABOLIC PNL TOTAL CA: CPT

## 2025-07-07 PROCEDURE — 36415 COLL VENOUS BLD VENIPUNCTURE: CPT

## 2025-07-07 PROCEDURE — 2500000003 HC RX 250 WO HCPCS: Performed by: INTERNAL MEDICINE

## 2025-07-07 RX ADMIN — POTASSIUM CHLORIDE 10 MEQ: 1500 TABLET, EXTENDED RELEASE ORAL at 09:43

## 2025-07-07 RX ADMIN — SODIUM CHLORIDE, PRESERVATIVE FREE 10 ML: 5 INJECTION INTRAVENOUS at 09:44

## 2025-07-07 RX ADMIN — Medication 500 MCG: at 09:43

## 2025-07-07 RX ADMIN — LATANOPROST 1 DROP: 50 SOLUTION OPHTHALMIC at 22:19

## 2025-07-07 RX ADMIN — AMLODIPINE BESYLATE 10 MG: 10 TABLET ORAL at 09:44

## 2025-07-07 RX ADMIN — Medication 1000 UNITS: at 09:43

## 2025-07-07 RX ADMIN — Medication 1 TABLET: at 09:44

## 2025-07-07 RX ADMIN — HYDROCODONE BITARTRATE AND ACETAMINOPHEN 1 TABLET: 5; 325 TABLET ORAL at 06:07

## 2025-07-07 RX ADMIN — ASPIRIN 81 MG 81 MG: 81 TABLET ORAL at 09:43

## 2025-07-07 RX ADMIN — HEPARIN SODIUM 5000 UNITS: 5000 INJECTION INTRAVENOUS; SUBCUTANEOUS at 09:42

## 2025-07-07 RX ADMIN — TIMOLOL MALEATE 1 DROP: 5 SOLUTION OPHTHALMIC at 09:42

## 2025-07-07 RX ADMIN — SODIUM CHLORIDE, PRESERVATIVE FREE 10 ML: 5 INJECTION INTRAVENOUS at 22:19

## 2025-07-07 RX ADMIN — DOCUSATE SODIUM 50MG AND SENNOSIDES 8.6MG 2 TABLET: 8.6; 5 TABLET, FILM COATED ORAL at 22:19

## 2025-07-07 RX ADMIN — HEPARIN SODIUM 5000 UNITS: 5000 INJECTION INTRAVENOUS; SUBCUTANEOUS at 22:18

## 2025-07-07 RX ADMIN — POLYETHYLENE GLYCOL (3350) 17 G: 17 POWDER, FOR SOLUTION ORAL at 09:43

## 2025-07-07 RX ADMIN — POLYETHYLENE GLYCOL (3350) 17 G: 17 POWDER, FOR SOLUTION ORAL at 22:19

## 2025-07-07 RX ADMIN — ROSUVASTATIN CALCIUM 10 MG: 5 TABLET, FILM COATED ORAL at 22:19

## 2025-07-07 ASSESSMENT — PAIN DESCRIPTION - PAIN TYPE: TYPE: CHRONIC PAIN

## 2025-07-07 ASSESSMENT — PAIN DESCRIPTION - LOCATION: LOCATION: BACK;HIP

## 2025-07-07 ASSESSMENT — PAIN DESCRIPTION - DESCRIPTORS: DESCRIPTORS: ACHING

## 2025-07-07 ASSESSMENT — PAIN DESCRIPTION - ORIENTATION: ORIENTATION: POSTERIOR;MID

## 2025-07-07 ASSESSMENT — PAIN SCALES - WONG BAKER: WONGBAKER_NUMERICALRESPONSE: HURTS A LITTLE BIT

## 2025-07-07 ASSESSMENT — PAIN - FUNCTIONAL ASSESSMENT: PAIN_FUNCTIONAL_ASSESSMENT: PREVENTS OR INTERFERES SOME ACTIVE ACTIVITIES AND ADLS

## 2025-07-07 ASSESSMENT — PAIN SCALES - GENERAL
PAINLEVEL_OUTOF10: 6
PAINLEVEL_OUTOF10: 8
PAINLEVEL_OUTOF10: 4

## 2025-07-07 NOTE — PROGRESS NOTES
[facility] ACUTE CARE PHYSICAL THERAPY EVALUATION  Doretha Melo, 6/15/1927, 4122/4122-A, 7/7/2025    History  [place]:  The primary encounter diagnosis was Back pain, unspecified back location, unspecified back pain laterality, unspecified chronicity. Diagnoses of Right hip pain and Unable to ambulate were also pertinent to this visit.  Patient  has a past medical history of BCC (basal cell carcinoma), leg, Family history of pancreatic cancer, Glaucoma of right eye, Hyperlipidemia, Hypertension, Macular degeneration, dry, Osteoarthritis of knee, Osteoporosis, Pelvic fracture (HCC), Right rotator cuff tear, T10 vertebral fracture (HCC), and Vitamin B12 deficiency.  Patient  has a past surgical history that includes Appendectomy (1953); Hysterectomy (1969); Cystocele repair (1993); Abdominal exploration surgery (1997); Wrist fracture surgery (Left, 03/2017); Skin cancer excision (Left, 09/2017); and Fixation Kyphoplasty (02/19/2018).    Discharge Recommendation: Facility for moderate post-acute rehabilitation, anticipate 1-2 hours per day and 5 days per week (swing).    Equipment: TBD at next level of care    Subjective:    Patient states:  \"I just need a sip of coffee and we can try again.\"      Pain:  pain in back, does not numerically rate.      Communication with other providers:  Handoff to RN    Restrictions: fall risk    Home Setup/Prior level of function  Social/Functional History  Lives With: Alone  Type of Home: House  Home Layout: One level  Home Access: Level entry  Bathroom Shower/Tub: Tub only (sponge baths only)  Bathroom Toilet: Standard  Home Equipment: Walker - 4-Wheeled  Has the patient had two or more falls in the past year or any fall with injury in the past year?: Yes (multiple in last few months)  Prior Level of Assist for ADLs: Independent  Prior Level of Assist for Homemaking: Independent  Homemaking Responsibilities: Yes  Prior Level of Assist for Ambulation: Independent household

## 2025-07-07 NOTE — PROGRESS NOTES
Comprehensive Nutrition Assessment    Type and Reason for Visit:  Initial, Positive nutrition screen (wt loss (2 to 13 pounds))    Nutrition Recommendations/Plan:   Continue Regular Diet     Malnutrition Assessment:  Malnutrition Status:  Moderate malnutrition (07/07/25 1025)    Context:  Acute Illness     Findings of the 6 clinical characteristics of malnutrition:  Energy Intake:  Mild decrease in energy intake  Weight Loss:  Mild weight loss (7.4% over 4 mos)     Body Fat Loss:  Mild body fat loss Buccal region   Muscle Mass Loss:  Mild muscle mass loss Temples (temporalis), Clavicles (pectoralis & deltoids), Hand (interosseous)  Fluid Accumulation:  No fluid accumulation Extremities   Strength:  Not Performed    Nutrition Assessment:    Pt admitted with difficulty ambulating, MRI of the spine showed chronic fractures and acute sacral fracture. H/O CVA, HLD, HTN, osteoporosis, kyphoplasty, malnutrition. She reports a good appetite PTA as well as here, consuming generally greater than half of Regular Diet. Declines oral supplements, uses [place] [employer] at home. Denies any recent wt loss. Per Epic records, has lost 7.4% over the past 4 mos. Pt continues to meet criteria for moderate malnutrition. Assisted with menu preferences, such as meatloaf. Will continue to follow as moderate nutrition risk.    Nutrition Related Findings:    Na 135, Cr 0.5, Glu 119   + bowel regimen   Wound Type:  (brachial)       Current Nutrition Intake & Therapies:       Average Supplements Intake: 51-75%, 26-50%  ADULT DIET; Regular    Anthropometric Measures:  Height: 139.7 cm (4' 7\")  Ideal Body Weight (IBW): 75 lbs (34 kg)    Admission Body Weight: 36 kg (79 lb 5.9 oz)  Current Body Weight: 36 kg (79 lb 5.9 oz),   IBW.    Current BMI (kg/m2): 18.4  Usual Body Weight: 38.9 kg (85 lb 12 oz) (3/7/25)     % Weight Change (Calculated): -7.4                    BMI Categories: Underweight (BMI less than 18.5)    Estimated Daily

## 2025-07-07 NOTE — PROGRESS NOTES
Progress note      Name:  Doretha Melo /Age/Sex: 6/15/1927  (89 y.o. female)   MRN & CSN:  [id number] & [id number] Encounter Date/Time: 2025 7:14 PM EDT   Location:  [address] [place] [address]- PCP: Kenna Ceron MD       Hospital Day: 5    Assessment and Plan:     #.  Difficulty walking  #.  Recent fall  - Patient was evaluated in ER 2025  - CT head, CT C-spine, CT L-spine-no acute process  - CT L-spine showed remote appearing compression fractures of L1 and L3  - X-ray of the right hip, x-ray of the lumbar spine done 7/3/2025--age indeterminate fractures of T9 and superior endplate L3.  - MRI T-spine, L-spine  showed Chronic fractures of spine and acute Sacral fracture   neurosurgery -no surgical intervention are presently at this time.  Will continue with physical therapy and pain management  - PT/OT evaluation.  Analgesics as needed.  Patient medically stable for DC. Pending placement     #.  History of CVA-2023  - Admission 2025 for dizziness-workup for acute CVA negative    #.  Hypertension-on amlodipine    #.  Hyperlipidemia-on Crestor    #.  Osteoporosis-on alendronate    #.  History of kyphoplasty to T10 and L1-2018    #.  History of cervical spine fracture after a fall  - Type II odontoid fracture-2021      Disposition:   Current Living situation: Home    Diet Regular   DVT Prophylaxis [x] Lovenox   Code Status Full-discussed with patient.  Stated that she has 2 more years to go   Surrogate Decision Maker/ POA Daughter     History from:   EMR, patient.    History of Present Illness:     Chief Complaint: Difficulty in walking  Doretha Melo is a 89 y.o. female was referred to ER from PCPs office for difficulty ambulating.  Patient had a fall 2 weeks ago and was evaluated in ER 2025.  Since last 1 week patient has been having worsening back pain and difficulty walking.  Patient denying any radiating pain, denying any weakness in bilateral lower extremities, denying any urine or bowel  TAKE 1 TABLET ONCE A WEEK 30 MINUTES BEFORE FOOD, DRINK OR MEDS. STAY UPRIGHT. 12/12/24  Yes Kenna Ceron MD   acetaminophen (TYLENOL) 500 MG tablet Take 1 tablet by mouth every 4 hours as needed for Pain or Fever 7/1/24  Yes Franklin Clemente MD   aspirin 81 MG chewable tablet Take 1 tablet by mouth daily 10/3/23  Yes GISEL Zayas MD   Multiple Vitamins-Minerals (PRESERVISION AREDS PO) Take 1 tablet by mouth in the morning and 1 tablet in the evening. Takes chewables.. 5/23/23  Yes Raquel Osborne MD   timolol (TIMOPTIC) 0.5 % ophthalmic solution Place 1 drop into both eyes daily   Yes Raquel Osborne MD   Calcium Carb-Cholecalciferol (CALCIUM-VITAMIN D3) 600-400 MG-UNIT TABS Take 1 tablet by mouth daily   Yes Raquel Osborne MD   vitamin D (CHOLECALCIFEROL) 1000 UNIT TABS tablet Take 1 tablet by mouth daily   Yes Raquel Osborne MD   vitamin B-12 (CYANOCOBALAMIN) 500 MCG tablet Take 1 tablet by mouth daily   Yes Raquel Osborne MD   latanoprost (XALATAN) 0.005 % ophthalmic solution Place 1 drop into both eyes nightly   Yes Raquel Osborne MD       Physical Exam: Need 8 Elements   Physical Exam     GEN  -Awake, alert, NAD, frail-appearing.   EYES   -PERRL.   HENT  -MM are moist.   RESP  -LS CTA equal bilat, no wheezes, rales or rhonchi. Symmetric chest movement. No respiratory distress noted.  C/V  -S1/S2 auscultated. RRR without appreciable M/R/G.  Venous stasis changes noted in bilateral lower extremities   GI  -Abdomen is soft, non-distended, no significant tenderness. No masses or guarding. + BS in all quadrants. Rectal exam deferred.     -No CVA tenderness. Wright catheter is not present.  MS  -B/L extremities - No gross joint deformities. No swelling, intact sensation symmetrical.   SKIN  -Normal coloration, warm, dry.   NEURO  -awake, alert, oriented x 3, no focal deficits noted.  Patient had difficulty lifting her right lower extremity against gravity,

## 2025-07-07 NOTE — CONSULTS
[facility] Wound Ostomy Continence Nurse  Consult Note       Doretha Melo  AGE: 89 y.o.   GENDER: female  : 6/15/1927  TODAY'S DATE:  2025    Subjective:     Reason for Evaluation and Assessment: wound assessment       Doretha Melo is a 89 y.o. female referred by:   [x] Physician  [] Nursing  [] Other:     Wound Identification:  Wound Type: traumatic, skin tear, and malignant wound  Contributing Factors: shear force        PAST MEDICAL HISTORY        Diagnosis Date    BCC (basal cell carcinoma), leg 2017    Family history of pancreatic cancer     Glaucoma of right eye 2016    Dr Jean Baptiste    Hyperlipidemia     Hypertension     Macular degeneration, dry     Dr. Jean Baptiste    Osteoarthritis of knee     Dr Trujillo    Osteoporosis 2002    Fosamax [id number]    Pelvic fracture (HCC) 2018    Right rotator cuff tear     Trujillo / injection; PT    T10 vertebral fracture (HCC) 2018    fall and fragility fx; also L1 ; had kyphoplasty 2018    Vitamin B12 deficiency 2017    B 12 266 by Ronnie at [facility]       PAST SURGICAL HISTORY    Past Surgical History:   Procedure Laterality Date    ABDOMINAL EXPLORATION SURGERY      With Lysis of Adhesions    APPENDECTOMY      w/  rt.uso    CYSTOCELE REPAIR      w/ rectocele    FIXATION KYPHOPLASTY  2018    T10 & L1    HYSTERECTOMY (CERVIX STATUS UNKNOWN)  1969     remaining ovary removed also    SKIN CANCER EXCISION Left 2017    BCC with skin graft    WRIST FRACTURE SURGERY Left 2017    ORIF       FAMILY HISTORY    Family History   Problem Relation Age of Onset    Heart Disease Mother     Pancreatic Cancer Father     Pancreatic Cancer Sister     Heart Disease Sister     Diabetes Sister     Diabetes Sister     Pancreatic Cancer Brother     Cancer Other         pancreas       SOCIAL HISTORY    Social History     Tobacco Use    Smoking status: Never    Smokeless tobacco: Never   Vaping Use    Vaping status: Never Used   Substance Use Topics       Undermining Starts ___ O'Clock 0 07/07/25 0830   Undermining Ends___ O'Clock 0 07/07/25 0830   Undermining Maxium Distance (cm) 0 07/07/25 0830   Wound Assessment Eschar dry;Pink/red;Slough 07/07/25 0830   Drainage Amount None (dry) 07/07/25 0830   Odor None 07/07/25 0830   Betsy-wound Assessment Fragile 07/07/25 0830   Margins Attached edges 07/07/25 0830   Wound Thickness Description not for Pressure Injury Full thickness 07/07/25 0830   Number of days: 0       Wound 07/07/25 Back Left (Active)   Wound Image   07/07/25 0830   Wound Etiology Skin Tear 07/07/25 0830   Dressing Status New dressing applied 07/07/25 0830   Wound Cleansed Cleansed with saline 07/07/25 0830   Dressing/Treatment Silicone border 07/07/25 0830   Wound Length (cm) 1.2 cm 07/07/25 0830   Wound Width (cm) 1 cm 07/07/25 0830   Wound Depth (cm) 0.1 cm 07/07/25 0830   Wound Surface Area (cm^2) 1.2 cm^2 07/07/25 0830   Wound Volume (cm^3) 0.12 cm^3 07/07/25 0830   Distance Tunneling (cm) 0 cm 07/07/25 0830   Tunneling Position ___ O'Clock 0 07/07/25 0830   Undermining Starts ___ O'Clock 0 07/07/25 0830   Undermining Ends___ O'Clock 0 07/07/25 0830   Undermining Maxium Distance (cm) 0 07/07/25 0830   Wound Assessment Pink/red 07/07/25 0830   Drainage Amount Scant (moist but unmeasurable) 07/07/25 0830   Drainage Description Thin;Serosanguinous 07/07/25 0830   Odor None 07/07/25 0830   Betsy-wound Assessment Fragile;Intact 07/07/25 0830   Margins Defined edges 07/07/25 0830   Wound Thickness Description not for Pressure Injury Partial thickness 07/07/25 0830   Number of days: 0       Response to treatment:  Well tolerated by patient.     Pain Assessment:  Severity:  none  Quality of pain: none  Wound Pain Timing/Severity: none  Premedicated: none    Plan:     Plan of Care: Wound 07/06/25 Brachial Posterior;Right-Dressing/Treatment: Non adherent, ABD, Roll gauze  Wound 07/07/25 Pretibial Left-Dressing/Treatment: Betadine swabs/povidone iodine  Wound  07/07/25 Back Left-Dressing/Treatment: Silicone border    Alert and agreeable to wound assessment. Sitting up in recliner. Has had increased pain since fall on 6/30/2025. Left lower leg with traumatic wound, dry eschar noted. Pt stated that she dropped a book on it. Left lateral back with small skin tear, covered with silicone border. Has very dry skin and multiple keratosis skin lesions. Right lateral upper arm with raised suspicious lesion noted, blooding easily. Family member at bedside stated that she has refused to go to dermatologist. Sacrum intact. No other areas of concern.     Specialty Bed Required : no  [] Low Air Loss   [] Pressure Redistribution  [] Fluid Immersion  [] Bariatric  [] Total Pressure Relief  [] Other:     Discharge Plan:  Placement for patient upon discharge: to be determined   Hospice Care: no  Patient appropriate for Outpatient Wound Care Center: no    Patient/Caregiver Teaching:  Level of patient/caregiver understanding able to:   Voiced understanding        Electronically signed by Shirley Ojeda RN, on 7/7/2025 at 12:04 PM

## 2025-07-07 NOTE — PROGRESS NOTES
4 Eyes Skin Assessment     NAME:  Doretha Melo  YOB: 1927  MEDICAL RECORD NUMBER:  [id number]    The patient is being assessed for  Transfer to New Unit    I agree that at least one RN has performed a thorough Head to Toe Skin Assessment on the patient. ALL assessment sites listed below have been assessed.      Areas assessed by both nurses:    Head, Face, Ears, Shoulders, Back, Chest, Arms, Elbows, Hands, Sacrum. Buttock, Coccyx, Ischium, and Legs. Feet and Heels        Does the Patient have a Wound? No noted wound(s)       Donell Prevention initiated by RN: No  Wound Care Orders initiated by RN: No    Pressure Injury (Stage 1,2,3,4, Unstageable, DTI, NWPT, and Complex wounds) if present, place Wound referral order by RN under : No    New Ostomies, if present place, Ostomy referral order under : No     Nurse 1 eSignature: Electronically signed by Ale Gonsalves LPN on 7/7/25 at 4:28 PM EDT    **SHARE this note so that the co-signing nurse can place an eSignature**    Nurse 2 eSignature: {Esignature:[id number]}

## 2025-07-07 NOTE — DISCHARGE INSTR - DIET
Good nutrition is important when healing from an illness, injury, or surgery.  Follow any nutrition recommendations given to you during your hospital stay.   If you were given an oral nutrition supplement while in the hospital, continue to take this supplement at home.  You can take it with meals, in-between meals, and/or before bedtime. These supplements can be purchased at most local grocery stores, pharmacies, and chain super-stores.   If you have any questions about your diet or nutrition, call the hospital and ask for the dietitian.    Please read Nutrition handout below:     High Calorie, High Protein Nutrition Therapy from Nutrition Care Manual     A high-calorie, high-protein diet has been recommended to you. Your registered dietitian nutritionist (RDN) may have recommended this diet because you are having difficulty eating enough calories throughout the day, you have lost weight, and/or you need to add protein to your diet.  Sometimes you may not feel like eating, even if you know the importance of good nutrition. The recommendations in this handout can help you with the following:  Regaining your strength and energy  Keeping your body healthy  Healing and recovering from surgery or illness and fighting infection    Tips  Schedule Your Meals and Snacks  Several small meals and snacks are often better tolerated and digested than large meals.  Strategies  Plan to eat 3 meals and 3 snacks daily.  [place] with timing meals to find out when you have a larger appetite.  Appetite may be greatest in the morning after not eating all night so you may prefer to eat your larger meals and snacks in the morning and at lunch.  Breakfast-type foods are often better tolerated so eat foods such as eggs, pancakes, waffles and cereal for any meal or snack.  Carry snacks with you so you are prepared to eat every 2 to 3 hours.  Determine what works best for you if your body’s cues for feeling hungry or full are not    Fiber, total dietary g 78   Sugars, total g 153   Minerals   Calcium, Ca mg 1754   Iron, Fe mg 25   Sodium, Na mg 2292   Vitamins   Vitamin C, total ascorbic acid mg 342   Vitamin A, IU IU 21999   Vitamin D    Lipids   Fatty acids, total saturated g 17   Fatty acids, total monounsaturated g 57   Fatty acids, total polyunsaturated g 38   Cholesterol mg 2        High-Calorie, High-Protein Vegetarian (Lacto-Ovo) Sample 1-Day Menu View Nutrient Info  Breakfast 1 cup cooked oatmeal made with:  1 cup whole milk  2 tablespoons ground flaxseeds  ½ cup blueberries  1 egg   Morning Snack 1 cup fruit smoothie made with: ½ cup whole milk  ½ cup frozen banana  2 tablespoons almond butter   Lunch 2 slices whole wheat bread  2 ounces cheese  ¼ cup lettuce  2 slices tomato  4 slices avocado  ½ cup baby carrots  1 medium apple  1 cup grape juice   Afternoon Snack 1 whole wheat javy bread  ½ cup hummus  ½ cup orange juice   Evening Meal Burrito made with: 2, 6-inch corn tortilla  ½ cup refried vegetarian beans  ½ cup chopped tomatoes  ½ cup lettuce  2 tablespoons salsa  ½ cup brown rice  ½ tablespoon olive oil for rice  ½ cup cooked zucchini  1 cup whole milk   Evening Snack ½ cup raisins  ¼ cup peanuts   Daily Sum  Nutrient Unit Value   Macronutrients   Energy kcal 3021   Energy kJ 36313   Protein g 105   Total lipid (fat) g 124   Carbohydrate, by difference g 405   Fiber, total dietary g 59   Sugars, total g 174   Minerals   Calcium, Ca mg 1659   Iron, Fe mg 20   Sodium, Na mg 2733   Vitamins   Vitamin C, total ascorbic acid mg 168   Vitamin A, IU IU 62933   Vitamin D    Lipids   Fatty acids, total saturated g 35   Fatty acids, total monounsaturated g 51   Fatty acids, total polyunsaturated g 26   Cholesterol mg 286

## 2025-07-07 NOTE — DISCHARGE INSTR - COC
Continuity of Care Form    Patient Name: Doretha Melo   :  6/15/1927  MRN:  [id number]    Admit date:  7/3/2025  Discharge date:  25      Code Status Order: Full Code   Advance Directives:     Admitting Physician:  Jasmin Russell MD  PCP: Kenna Ceron MD    Discharging Nurse: Joseluis   Discharging Hospital Unit/Room#: 4122/4122-A  Discharging Unit Phone Number: [phone]    Emergency Contact:   Extended Emergency Contact Information  Primary Emergency Contact: Monae Paul  Address:  [address]  Home Phone: [phone]  Relation: Child  Secondary Emergency Contact: Hakan Melo  Home Phone: [phone]  Mobile Phone: [phone]  Relation: Child   needed? No    Past Surgical History:  Past Surgical History:   Procedure Laterality Date    ABDOMINAL EXPLORATION SURGERY      With Lysis of Adhesions    APPENDECTOMY      w/  rt.uso    CYSTOCELE REPAIR      w/ rectocele    FIXATION KYPHOPLASTY  2018    T10 & L1    HYSTERECTOMY (CERVIX STATUS UNKNOWN)  1969     remaining ovary removed also    SKIN CANCER EXCISION Left 2017    BCC with skin graft    WRIST FRACTURE SURGERY Left 2017    ORIF       Immunization History:   Immunization History   Administered Date(s) Administered    COVID-19, MODERNA BLUE border, Primary or Immunocompromised, (age 12y+), IM, 100 mcg/0.5mL 2021, 2021, 10/27/2021    COVID-19, MODERNA Bivalent, (age 12y+), IM, 50 mcg/0.5 mL 2022    COVID-19, MODERNA, , (age 12y+), IM, 50mcg/0.5mL 10/24/2023    COVID-19, NOVAVAX, (age 12y+), IM, 5mcg/0.5mL 10/14/2024    Influenza Virus Vaccine 2015, 09/15/2016, 2017, 2018, 2019    Influenza Whole 2013    Influenza, FLUAD, (age 65 y+), IM, Quadv, 0.5mL 10/05/2023    Influenza, FLUZONE High Dose, (age 65 y+), IM, Trivalent PF, 0.5mL 2014, 2015, 2017, 2018,    Continue PT/OT, pain control    Physician Certification: I certify the above information and transfer of Doretha Melo  is necessary for the continuing treatment of the diagnosis listed and that she requires Skilled Nursing Facility for greater 30 days.     Update Admission H&P: No change in H&P    PHYSICIAN SIGNATURE:  Electronically signed by Sanna Zapata MD on 7/8/25 at 10:34 AM EDT

## 2025-07-07 NOTE — CARE COORDINATION
CMS guidelines require all inpatient rehab programs to comply with the 60/40 rule, mandating that 60% of all rehab admissions fall into one of 13 diagnostic categories.   Unfortunately, at this time we do not currently have a bed for this patient given her rehab diagnosis.  This selectivity is necessary to help ensure our program remains compliant with this CMS requirement.  Thank you for this referral, we will communicate if anything changes.

## 2025-07-07 NOTE — CARE COORDINATION
Reviewed chart, ARU not able to take pt. Discussed other options with pt, daughter, sons x2 and DIL.  Pt would like [facility] with [place] as backup plan.  Referrals sent via [employer].   Also will call Poetry at [facility] to f/u

## 2025-07-07 NOTE — PLAN OF CARE
Problem: Chronic Conditions and Co-morbidities  Goal: Patient's chronic conditions and co-morbidity symptoms are monitored and maintained or improved  7/7/2025 1010 by Ale Gonsalves LPN  Outcome: Progressing  7/6/2025 2023 by Matilde Renee RN  Outcome: Progressing     Problem: Discharge Planning  Goal: Discharge to home or other facility with appropriate resources  7/7/2025 1010 by Ale Gonsalves LPN  Outcome: Progressing  7/6/2025 2023 by Matilde Renee RN  Outcome: Progressing     Problem: Safety - Adult  Goal: Free from fall injury  7/7/2025 1010 by Ale Gonsalves LPN  Outcome: Progressing  7/6/2025 2023 by Matilde Renee RN  Outcome: Progressing

## 2025-07-07 NOTE — PROGRESS NOTES
Pt arrived to unit from OBS via WC, oriented to room and staff. Call light and bedside table in reach. Ccare continues.

## 2025-07-07 NOTE — PROGRESS NOTES
Occupational Therapy      Occupational Therapy Treatment Note    Name: Doretha Melo MRN: [id number] :   6/15/1927   Date:  2025   Admission Date: 7/3/2025 Room:  [address] [place] [address]-A     Primary Problem:  Difficulty in walking    Restrictions/Precautions:          General Precautions, Fall Risk    Communication with other providers: Nursing handoff    Subjective:  Patient states:  \"I need to use the bathroom\"  Pain:   No    Objective:    Observation: Pt received supine in bed, agreeable to therapy   Objective Measures:  Vitals stable throughout session    Treatment, including education:  Therapeutic Activity Training:   Therapeutic activity training was instructed today.  Cues were given for safety, sequence, UE/LE placement, awareness, and balance.    Activities performed today included bed mobility training, sup-sit, sit-stand, functional mobility, stand to sit    Self Care Training:   Cues were given for safety, sequence, UE/LE placement, visual cues, and balance.    Activities performed today included grooming, LB bathing/dressing, toileting, toilet transfer    Supine to sit Lisseth, sitting EOB SBA, grooming washing face/hands and oral care SBA. LB dressing donning socks sitting EOB maxA. STS from EOB up to BSC modA, toilet transfer modA, LB dressing doffing depends in stand maxA, LB bathing washing katherine area/buttocks maxA. Toileting maxA. LB dressing donning fresh depends in sit up to knees maxA, STS from BSC up to stand modA, LB dressing threading depends over buttocks maxA. Functional mobility in room w/ RW ~8 feet modA, stand to sit from RW to reclining chair Lisseth w/ Vcs for use of BUE arms for good eccentric control.    Pt sitting upright in chair, chair alarm on, call light at side, nursing notified    Assessment / Impression:    Patient's tolerance of treatment: Well  Adverse Reaction: None  Significant change in status and impact: Improved from initial evaluation  Barriers to improvement: None

## 2025-07-07 NOTE — PLAN OF CARE
Problem: Chronic Conditions and Co-morbidities  Goal: Patient's chronic conditions and co-morbidity symptoms are monitored and maintained or improved  7/6/2025 2023 by Matilde Renee RN  Outcome: Progressing  7/6/2025 1627 by Norah Reilly LPN  Outcome: Progressing     Problem: Discharge Planning  Goal: Discharge to home or other facility with appropriate resources  7/6/2025 2023 by Matilde Renee RN  Outcome: Progressing  7/6/2025 1627 by Norah Reilly LPN  Outcome: Progressing     Problem: Safety - Adult  Goal: Free from fall injury  7/6/2025 2023 by Matilde Renee RN  Outcome: Progressing  7/6/2025 1627 by Norah Reilly LPN  Outcome: Progressing     Problem: ABCDS Injury Assessment  Goal: Absence of physical injury  7/6/2025 2023 by Matilde Renee RN  Outcome: Progressing  7/6/2025 1627 by Norah Reilly LPN  Outcome: Progressing     Problem: Pain  Goal: Verbalizes/displays adequate comfort level or baseline comfort level  7/6/2025 2023 by Matilde Renee RN  Outcome: Progressing  7/6/2025 1627 by Norah Reilly LPN  Outcome: Progressing     Problem: Skin/Tissue Integrity  Goal: Skin integrity remains intact  Description: 1.  Monitor for areas of redness and/or skin breakdown  2.  Assess vascular access sites hourly  3.  Every 4-6 hours minimum:  Change oxygen saturation probe site  4.  Every 4-6 hours:  If on nasal continuous positive airway pressure, respiratory therapy assess nares and determine need for appliance change or resting period  7/6/2025 2023 by Matilde Renee RN  Outcome: Progressing  7/6/2025 1627 by Norah Reilly LPN  Outcome: Progressing

## 2025-07-08 ENCOUNTER — CARE COORDINATION (OUTPATIENT)
Dept: CARE COORDINATION | Age: 89
End: 2025-07-08

## 2025-07-08 VITALS
DIASTOLIC BLOOD PRESSURE: 69 MMHG | HEIGHT: 55 IN | HEART RATE: 87 BPM | TEMPERATURE: 98.4 F | WEIGHT: 79.37 LBS | RESPIRATION RATE: 14 BRPM | OXYGEN SATURATION: 100 % | SYSTOLIC BLOOD PRESSURE: 128 MMHG | BODY MASS INDEX: 18.37 KG/M2

## 2025-07-08 LAB
MICROORGANISM SPEC CULT: ABNORMAL
SPECIMEN DESCRIPTION: ABNORMAL

## 2025-07-08 PROCEDURE — 97110 THERAPEUTIC EXERCISES: CPT

## 2025-07-08 PROCEDURE — 6370000000 HC RX 637 (ALT 250 FOR IP): Performed by: STUDENT IN AN ORGANIZED HEALTH CARE EDUCATION/TRAINING PROGRAM

## 2025-07-08 PROCEDURE — 6360000002 HC RX W HCPCS: Performed by: INTERNAL MEDICINE

## 2025-07-08 PROCEDURE — 6370000000 HC RX 637 (ALT 250 FOR IP): Performed by: INTERNAL MEDICINE

## 2025-07-08 PROCEDURE — 97530 THERAPEUTIC ACTIVITIES: CPT

## 2025-07-08 PROCEDURE — 94761 N-INVAS EAR/PLS OXIMETRY MLT: CPT

## 2025-07-08 PROCEDURE — 2500000003 HC RX 250 WO HCPCS: Performed by: INTERNAL MEDICINE

## 2025-07-08 RX ORDER — HYDROCODONE BITARTRATE AND ACETAMINOPHEN 5; 325 MG/1; MG/1
1 TABLET ORAL EVERY 6 HOURS PRN
Qty: 12 TABLET | Refills: 0 | Status: SHIPPED | OUTPATIENT
Start: 2025-07-08 | End: 2025-07-11

## 2025-07-08 RX ORDER — HYDROCODONE BITARTRATE AND ACETAMINOPHEN 5; 325 MG/1; MG/1
1 TABLET ORAL EVERY 6 HOURS PRN
Qty: 12 TABLET | Refills: 0 | Status: SHIPPED | OUTPATIENT
Start: 2025-07-08 | End: 2025-07-08

## 2025-07-08 RX ORDER — LINEZOLID 600 MG/1
600 TABLET, FILM COATED ORAL EVERY 12 HOURS SCHEDULED
DISCHARGE
Start: 2025-07-08 | End: 2025-07-13

## 2025-07-08 RX ORDER — LINEZOLID 600 MG/1
600 TABLET, FILM COATED ORAL EVERY 12 HOURS SCHEDULED
Status: DISCONTINUED | OUTPATIENT
Start: 2025-07-08 | End: 2025-07-08 | Stop reason: HOSPADM

## 2025-07-08 RX ADMIN — Medication 500 MCG: at 09:48

## 2025-07-08 RX ADMIN — ASPIRIN 81 MG 81 MG: 81 TABLET ORAL at 09:48

## 2025-07-08 RX ADMIN — POLYETHYLENE GLYCOL (3350) 17 G: 17 POWDER, FOR SOLUTION ORAL at 09:51

## 2025-07-08 RX ADMIN — AMLODIPINE BESYLATE 10 MG: 10 TABLET ORAL at 09:50

## 2025-07-08 RX ADMIN — HEPARIN SODIUM 5000 UNITS: 5000 INJECTION INTRAVENOUS; SUBCUTANEOUS at 09:52

## 2025-07-08 RX ADMIN — TIMOLOL MALEATE 1 DROP: 5 SOLUTION OPHTHALMIC at 09:53

## 2025-07-08 RX ADMIN — LINEZOLID 600 MG: 600 TABLET, FILM COATED ORAL at 12:20

## 2025-07-08 RX ADMIN — SODIUM CHLORIDE, PRESERVATIVE FREE 10 ML: 5 INJECTION INTRAVENOUS at 09:48

## 2025-07-08 RX ADMIN — POTASSIUM CHLORIDE 10 MEQ: 1500 TABLET, EXTENDED RELEASE ORAL at 09:48

## 2025-07-08 RX ADMIN — Medication 1000 UNITS: at 09:49

## 2025-07-08 RX ADMIN — Medication 1 TABLET: at 09:49

## 2025-07-08 ASSESSMENT — PAIN SCALES - GENERAL: PAINLEVEL_OUTOF10: 0

## 2025-07-08 ASSESSMENT — PAIN SCALES - WONG BAKER: WONGBAKER_NUMERICALRESPONSE: NO HURT

## 2025-07-08 NOTE — CARE COORDINATION
Pt on discharge to [facility], set up [facility] for 1230.  Updated pt/daughter , nurse keiry and [facility] at [facility].

## 2025-07-08 NOTE — DISCHARGE SUMMARY
V2.0  Discharge Summary    Name:  Doretha Melo /Age/Sex: 6/15/1927 (89 y.o. female)   Admit Date: 7/3/2025  Discharge Date: 25    MRN & CSN:  [id number] & [id number] Encounter Date and Time 25 1:24 PM EDT    Attending:  No att. providers found Discharging Provider: Sanna Zapata MD       Hospital Course:     Brief HPI: Doretha Melo is a 89 y.o. female who presented with difficulty ambulating.  Patient had a fall 2 weeks ago and was evaluated in ER 2025.  Since last 1 week patient has been having worsening back pain and difficulty walking.  Patient denying any radiating pain, denying any weakness in bilateral lower extremities, denying any urine or bowel incontinence.  Denying any chest pain, shortness of breath, nausea, vomiting, abdominal pain.  Vitals on arrival-/82, HR 89, RR 16, temp 98.1, saturating 100% on room air.  CBC, CMP within normal range, except BUN 22, random glucose 137, total CK is 53, albumin 3.3, WBC 12.9, hemoglobin 11.4.  X-ray of the lumbar spine, right hip done in ED.  Patient received Norco, Zofran in ED.    Brief Problem Based Course:     Patient presented with difficulty walking.  MRI showed chronic fracture in the spine and acute sacral fracture as well.  Was evaluated by spine surgery and recommended no intervention at this time.  Plan was to continue physical therapy.  Pain is controlled now.  And patient able to tolerate therapy.  Plan to discharge to rehab.     Difficulty walking  Recent fall  - Patient was evaluated in ER 2025  - CT head, CT C-spine, CT L-spine-no acute process  - CT L-spine showed remote appearing compression fractures of L1 and L3  - X-ray of the right hip, x-ray of the lumbar spine done 7/3/2025--age indeterminate fractures of T9 and superior endplate L3.  - MRI T-spine, L-spine  showed Chronic fractures of spine and acute Sacral fracture   neurosurgery -no surgical intervention are presently at this time.  Will continue  59 L/min   BMI 18.45 kg/m²       Physical Exam:     General: NAD  Eyes: EOMI  ENT: neck supple  Cardiovascular: Regular rate.  Respiratory: Clear to auscultation  Gastrointestinal: Soft, non tender  Genitourinary: no suprapubic tenderness  Musculoskeletal: No edema  Skin: warm, dry  Neuro: Alert.  Psych: Mood appropriate.         Labs and Imaging   MRI LUMBAR SPINE WO CONTRAST  Result Date: 7/4/2025  EXAMINATION: MRI LUMBAR SPINE WO CONTRAST DATE OF EXAM:  7/4/2025 13:18 DEMOGRAPHICS: 89 years old Female INDICATION: Superior endplate fracture of L3 COMPARISON: CT lumbar spine dated 6/30/2025, lumbar radiographs dated 7/3/2025 TECHNIQUE: Multisequence, multiplanar MRI of the lumbar spine was performed without IV contrast. FINDINGS: MRI LUMBAR SPINE: Treated L1 fracture. Moderate biconcave compression deformity of the L3 vertebral body, no associated  marrow edema indicating chronic age. No additional fractures. Relative preservation of the disc spaces, early endplate lipping. 3 mm anterolisthesis of  L4 on L5, L5 on S1. Conus medullaris terminates at the L1-L2 level. T12-L1: Retropulsion of fragments cause early effacement of the thecal sac, no direct contact with the conus medullaris. No foraminal compromise. L1-2: Slight disc bulging, facet arthropathy without central or foraminal stenosis. L2-3: Disc bulging, slight retropulsion of fragments and facet arthropathy changes, minimal effacement of the thecal sac without central or foraminal stenosis. L3-4: Early disc bulging, moderate facet arthropathy changes without central or foraminal stenosis. L4-5: Early disc bulging, moderate facet arthropathy with mild central and foraminal stenosis. No exiting nerve root impingement. L5-S1: Anterolisthesis and trace disc bulging as well as severe facet arthropathy without central or lateral recess stenosis. The neural foramina are patent. No widening of the sacroiliac joints. There is a sacral insufficiency fracture across

## 2025-07-08 NOTE — CARE COORDINATION
ACM outreach deferred as patient is currently admitted at [facility]. Per chart review, patient is considering SNF placement to [facility]. ACM will outreach patient at time of discharge to home.

## 2025-07-08 NOTE — PROGRESS NOTES
Physical Therapy  Name: Doretha Melo MRN: [id number] :   6/15/1927   Date:  2025   Admission Date: 7/3/2025 Room:  [address] [place] [address]   Restrictions/Precautions:        fall risk   Communication with other providers:  Ginger ANTUNEZ states pt is ok to see for therapy  Subjective:  Patient states:  I'll do what I can  Pain:   Location, Type, Intensity (0/10 to 10/10):  denies pain  Objective:    Observation:  pt in low fowlers in bed    Treatment, including education/measures:  Agreeable to therapy. Daughter entered room part way through session and observed. Pt performed supine> EOB with Jaya to complete transfer and to scoot to EOB. She performed all AROM seated EOB with UE support on bed rails. She was able to perform 2 STS with CGA and a maximum standing tolerance of aprox 1m. SPT deferred for safety concerns. She performed EOB>supine with Jaya for LE advancement. Scooted up in bed with MaxA. Positioned comfortably an left with all needs met. Long rest breaks between bouts of AROM.       Sitting Exercises:  Ankle pumps x 10  Heel raises x 10  LAQ's x 15  Marching x 15   Clams x 10  Hip Abd x 15      Therapeutic Exercise:  Therapeutic exercises were instructed today.  Cues were given for technique, safety, recruitment, and rationale.  Cues were verbal and/or tactile.    Transfers with line management of tele, pure wick   Rolling: SBA  Supine to sit :Jaya  Sit to supine :Jaya  Scooting :Jaya  Sit to stand :CGA  Stand to sit :CGA  SPT:DNT    Safety  Patient left safely in the bed, with call light/phone in reach with alarm applied. Gait belt was used for transfers and gait.    Assessment / Impression:     Patient's tolerance of treatment:  good   Adverse Reaction: no  Significant change in status and impact:  no  Barriers to improvement:  activity tolerance limitations    Plan for Next Session:    Will cont to work towards pt's goals per patient tolerance  Time in:  11:16  Time out:  11:45  Timed treatment minutes:   29  Total treatment time:  29  Previously filed items:  Social/Functional History  Lives With: Alone  Type of Home: House  Home Layout: One level  Home Access: Level entry  Bathroom Shower/Tub: Tub only (sponge baths only)  Bathroom Toilet: Standard  Home Equipment: Walker - 4-Wheeled  Has the patient had two or more falls in the past year or any fall with injury in the past year?: Yes (multiple in last few months)  Prior Level of Assist for ADLs: Independent  Prior Level of Assist for Homemaking: Independent  Homemaking Responsibilities: Yes  Prior Level of Assist for Ambulation: Independent household ambulator, with or without device  Prior Level of Assist for Transfers: Independent  Active : Yes  Mode of Transportation: Car  Occupation: Retired  Short Term Goals  Time Frame for Short Term Goals: 1 week  Short Term Goal 1: pt to complete all bed mobility min A  Short Term Goal 2: pt to complete all STS transfers to/from bed, commode, and chair CGA  Short Term Goal 3: pt to ambulate 25' with LRAD CGA     Electronically signed by:    Nadine Hernandez PTA PTA  7/8/2025, 11:47 AM

## 2025-07-08 NOTE — PLAN OF CARE
Problem: Chronic Conditions and Co-morbidities  Goal: Patient's chronic conditions and co-morbidity symptoms are monitored and maintained or improved  7/7/2025 2250 by Awilda Deng RN  Outcome: Progressing  7/7/2025 1010 by Ale Gonsalves LPN  Outcome: Progressing     Problem: Discharge Planning  Goal: Discharge to home or other facility with appropriate resources  7/7/2025 2250 by Awilda Deng RN  Outcome: Progressing  7/7/2025 1010 by Ale Gonsalves LPN  Outcome: Progressing     Problem: ABCDS Injury Assessment  Goal: Absence of physical injury  7/7/2025 2250 by Awilda Deng RN  Outcome: Progressing  Flowsheets (Taken 7/7/2025 2249)  Absence of Physical Injury: Implement safety measures based on patient assessment  7/7/2025 1010 by Ale Gonsalves LPN  Outcome: Progressing     Problem: Pain  Goal: Verbalizes/displays adequate comfort level or baseline comfort level  7/7/2025 2250 by Awilda Deng RN  Outcome: Progressing  7/7/2025 1010 by Ale Gonsalves LPN  Outcome: Progressing     Problem: Skin/Tissue Integrity  Goal: Skin integrity remains intact  Description: 1.  Monitor for areas of redness and/or skin breakdown  2.  Assess vascular access sites hourly  3.  Every 4-6 hours minimum:  Change oxygen saturation probe site  4.  Every 4-6 hours:  If on nasal continuous positive airway pressure, respiratory therapy assess nares and determine need for appliance change or resting period  7/7/2025 2250 by Awilda Deng RN  Outcome: Progressing  Flowsheets (Taken 7/7/2025 2249)  Skin Integrity Remains Intact:   Monitor for areas of redness and/or skin breakdown   Assess vascular access sites hourly   Every 4-6 hours minimum:  Change oxygen saturation probe site   Every 4-6 hours:  If on nasal continuous positive airway pressure, assess nares and determine need for appliance change or resting period  7/7/2025 1010 by Ale Gonsalves LPN  Outcome: Progressing

## 2025-07-08 NOTE — CARE COORDINATION
VM from [facility] at [facility], they have a private room for pt today.  Updated pt's sons.   CM will continue to follow.

## 2025-07-11 RX ORDER — ATORVASTATIN CALCIUM 80 MG/1
80 TABLET, FILM COATED ORAL NIGHTLY
COMMUNITY

## 2025-07-24 ENCOUNTER — CARE COORDINATION (OUTPATIENT)
Dept: CASE MANAGEMENT | Age: 89
End: 2025-07-24

## 2025-07-24 ENCOUNTER — TELEPHONE (OUTPATIENT)
Dept: INTERNAL MEDICINE CLINIC | Age: 89
End: 2025-07-24

## 2025-07-24 NOTE — TELEPHONE ENCOUNTER
Called keisha and instructions given that the patient will have to make appt with the walk-in clinic for the home care orders

## 2025-07-24 NOTE — TELEPHONE ENCOUNTER
Called keisha with [facility]-the order is for a new start of home care-it's not to resume the first order that dorothy did

## 2025-07-24 NOTE — TELEPHONE ENCOUNTER
Patient is being discharged and needs home care orders. Patient last seen 5/6/2025 by PCP. [facility] can be reached at [place] [phone]

## 2025-07-24 NOTE — TELEPHONE ENCOUNTER
Patient's daughter called and she is being discharged from nursing home-she doesn't understand that if it's a new order she has to see a provider-if it's a resume then it may not be issue-instructed her I would call and speak with Erin and see if it's a new start or resume

## 2025-07-26 NOTE — PROGRESS NOTES
Physician Progress Note      PATIENT:               YOVANI MENDEZ  CSN #:                  [id number]  :                       6/15/1927  ADMIT DATE:       7/3/2025 4:19 PM  DISCH DATE:        2025 12:43 PM  RESPONDING  PROVIDER #:        Sanna Zapata MD          QUERY TEXT:    Please clarify whether the sacral fracture is related to a traumatic or   non-traumatic cause or following a minor injury that would not routinely break   a healthy bone:    The clinical indicators include:  -- Admitted with sacral FX after same level fall.  History of osteoporosis.  -- MRI   \"There is a sacral insufficiency fracture across the longitudinal left sacrum,   as well as a minimal right upper sacral insufficiency fracture, these being   acute/subacute in age with associated marrow edema.\"  -- Fosamax, PT/OT, MRI, supportive care  Options provided:  -- Traumatic fracture  -- Insufficiency/fragility fracture related to osteoporosis  -- Other - I will add my own diagnosis  -- Disagree - Not applicable / Not valid  -- Disagree - Clinically unable to determine / Unknown  -- Refer to Clinical Documentation Reviewer    PROVIDER RESPONSE TEXT:    The patient has a Traumatic fracture.    Query created by: Kelly Coronado on 2025 2:15 PM      Electronically signed by:  Sanna Zapata MD 2025 10:30 AM

## 2025-07-28 ENCOUNTER — OFFICE VISIT (OUTPATIENT)
Dept: INTERNAL MEDICINE CLINIC | Age: 89
End: 2025-07-28

## 2025-07-28 VITALS
WEIGHT: 84.6 LBS | BODY MASS INDEX: 19.66 KG/M2 | OXYGEN SATURATION: 96 % | HEART RATE: 89 BPM | DIASTOLIC BLOOD PRESSURE: 62 MMHG | SYSTOLIC BLOOD PRESSURE: 100 MMHG

## 2025-07-28 DIAGNOSIS — S32.030D COMPRESSION FRACTURE OF L3 VERTEBRA WITH ROUTINE HEALING: ICD-10-CM

## 2025-07-28 DIAGNOSIS — I48.0 PAROXYSMAL ATRIAL FIBRILLATION (HCC): Primary | ICD-10-CM

## 2025-07-28 DIAGNOSIS — S32.010D COMPRESSION FRACTURE OF L1 VERTEBRA WITH ROUTINE HEALING, SUBSEQUENT ENCOUNTER: ICD-10-CM

## 2025-07-28 DIAGNOSIS — E78.2 MIXED HYPERLIPIDEMIA: ICD-10-CM

## 2025-07-28 DIAGNOSIS — Z91.81 HISTORY OF FALL: ICD-10-CM

## 2025-07-28 DIAGNOSIS — Z86.73 HISTORY OF CVA (CEREBROVASCULAR ACCIDENT): ICD-10-CM

## 2025-07-28 DIAGNOSIS — I10 ESSENTIAL HYPERTENSION: ICD-10-CM

## 2025-07-28 DIAGNOSIS — M17.0 BILATERAL PRIMARY OSTEOARTHRITIS OF KNEE: ICD-10-CM

## 2025-07-29 NOTE — PROGRESS NOTES
Name: Doretha Melo  [id number]  Age: 89 y.o.  YOB: 1927  Sex: female    CHIEF COMPLAINT:    Chief Complaint   Patient presents with    Other     PATIENT WAS D/C FROM NURSING HOME, NEEDS NEW [facility] ORDERS       HISTORY OF PRESENT ILLNESS:     This is a pleasant  89 y.o. female  is seen today for management of chronic medical problems and medications refills.  Previous records reviewed .    Patient of Dr. Ceron.  I am covering for her.    She is an elderly lady with multiple medical problems including paroxysmal atrial fibrillation, hypertension, hyperlipidemia, osteoporosis with a history of compression fractures of the vertebrae, osteoarthritis of knees, history of CVA, glaucoma and UTIs etc.    She was recently admitted to the hospital from 7/3/2025 to 7/8/2025 and then went sent to nursing home where she had PT and OT and now back home.  She was admitted because of a fall few weeks ago on 6/30/2025.  She developed significant back pain since then and has difficulty walking.     Patient was evaluated in ER 6/30/2025  - CT head, CT C-spine, CT L-spine-no acute process  - CT L-spine showed remote appearing compression fractures of L1 and L3  - X-ray of the right hip, x-ray of the lumbar spine done 7/3/2025--age indeterminate fractures of T9 and superior endplate L3.  - MRI T-spine, L-spine  showed Chronic fractures of spine and acute Sacral fracture  7/5 neurosurgery -no surgical intervention are presently at this time.  Will continue with physical therapy and pain management  - PT/OT evaluation.  Analgesics as needed.    Patient also had a UTI which was treated during the course of hospitalization.      Other chronic conditions were addressed appropriately during the course of hospitalization which included  Hypertension, hyperlipidemia, osteoarthritis of the knees, osteoporosis and compression fractures of the vertebrae, history of CVA etc.  She also had a history of cervical spine fracture after

## 2025-08-04 ENCOUNTER — CARE COORDINATION (OUTPATIENT)
Dept: CARE COORDINATION | Age: 89
End: 2025-08-04

## 2025-08-04 ASSESSMENT — SOCIAL DETERMINANTS OF HEALTH (SDOH)
HOW OFTEN DO YOU ATTENT MEETINGS OF THE CLUB OR ORGANIZATION YOU BELONG TO?: NEVER
DO YOU BELONG TO ANY CLUBS OR ORGANIZATIONS SUCH AS CHURCH GROUPS UNIONS, FRATERNAL OR ATHLETIC GROUPS, OR SCHOOL GROUPS?: NO
IN A TYPICAL WEEK, HOW MANY TIMES DO YOU TALK ON THE PHONE WITH FAMILY, FRIENDS, OR NEIGHBORS?: MORE THAN THREE TIMES A WEEK
HOW OFTEN DO YOU GET TOGETHER WITH FRIENDS OR RELATIVES?: MORE THAN THREE TIMES A WEEK
HOW OFTEN DO YOU ATTEND CHURCH OR RELIGIOUS SERVICES?: MORE THAN 4 TIMES PER YEAR

## 2025-08-13 ENCOUNTER — CARE COORDINATION (OUTPATIENT)
Dept: CARE COORDINATION | Age: 89
End: 2025-08-13

## 2025-09-03 ENCOUNTER — CARE COORDINATION (OUTPATIENT)
Dept: CARE COORDINATION | Age: 89
End: 2025-09-03